# Patient Record
Sex: FEMALE | Race: WHITE | NOT HISPANIC OR LATINO | Employment: FULL TIME | URBAN - METROPOLITAN AREA
[De-identification: names, ages, dates, MRNs, and addresses within clinical notes are randomized per-mention and may not be internally consistent; named-entity substitution may affect disease eponyms.]

---

## 2017-01-16 ENCOUNTER — PRE VISIT (OUTPATIENT)
Dept: NEUROLOGY | Facility: CLINIC | Age: 57
End: 2017-01-16

## 2017-01-16 NOTE — TELEPHONE ENCOUNTER
1.  Date/reason for appt:  1/31/17   Small Vessel Ischemic Disease    2.  Referring provider:  Internal, Dr Wegener - Office Visit 9/30/16    3.  Call to patient (Yes / No - short description):  MRI at [facility]    4.  Previous care at / records requested from:  [facility]

## 2017-01-16 NOTE — TELEPHONE ENCOUNTER
Radiology reports received from [facility], will forward to clinic. Notified film room to pull image.   MRI Cspine on 12/5/98  MRI Lspine on 1/19/04  MR brain on 9/16/16

## 2017-01-25 ASSESSMENT — ENCOUNTER SYMPTOMS
BRUISES/BLEEDS EASILY: 1
DOUBLE VISION: 1
WEAKNESS: 0
PARALYSIS: 0
STIFFNESS: 0
HEADACHES: 0
DIZZINESS: 0
DISTURBANCES IN COORDINATION: 0
JOINT SWELLING: 0
MEMORY LOSS: 0
SEIZURES: 0
MUSCLE CRAMPS: 0
TINGLING: 0
EYE REDNESS: 1
NUMBNESS: 0
SWOLLEN GLANDS: 0
LOSS OF CONSCIOUSNESS: 0
ARTHRALGIAS: 0
TREMORS: 0
NECK PAIN: 1
BACK PAIN: 1
EYE WATERING: 0
MUSCLE WEAKNESS: 0
EYE IRRITATION: 1
EYE PAIN: 0
SPEECH CHANGE: 0
MYALGIAS: 1

## 2017-01-31 ENCOUNTER — OFFICE VISIT (OUTPATIENT)
Dept: NEUROLOGY | Facility: CLINIC | Age: 57
End: 2017-01-31

## 2017-01-31 VITALS — DIASTOLIC BLOOD PRESSURE: 72 MMHG | HEART RATE: 69 BPM | HEIGHT: 66 IN | SYSTOLIC BLOOD PRESSURE: 124 MMHG

## 2017-01-31 DIAGNOSIS — H49.11: Primary | ICD-10-CM

## 2017-01-31 ASSESSMENT — PAIN SCALES - GENERAL: PAINLEVEL: NO PAIN (0)

## 2017-01-31 NOTE — PROGRESS NOTES
[facility]  [address] [place] [address]  Phone: [phone]  Fax: [phone]    Reason for clinic visit:  Hx of Rt 4th nerve palsy; suspected stroke    History of present Illness:   Ms. Boykin is a pleasant 55 yo F who was referred to stroke clinic for suspected small vessel stroke. Per patient, in March/April 2016 she went to optometrist when it was noticed that she has astigmatism. Since then she required multiple changes in her eyeglasses. She started noticing double vision afterwards when she went to Ophthalmologist in 09/16 when she was diagnosed with Rt 4th nerve palsy based on eye exam. Her MRI did not reveal any evidence of acute stroke or 4th nerve inflammation but small white matter disease in Lt frontal lobe. She was referred to stroke clinic for further evaluation.    Per patient, her symptoms have improved slightly since last 09/16. However, she continues to have double vision in Lt lateral gaze which is minimaShe denies any headaches, numbness, weakness, dizziness or gait abnormalities.    Past Medical History:  1. Rt 4th nerve palsy  2. White matter disease  3. Hyperlipidemia    Past Surgical History:  Past Surgical History   Procedure Laterality Date     Herniorrhaphy ventral N/A 7/7/2016     Procedure: HERNIORRHAPHY VENTRAL;  Surgeon: Ash Thompson MD;  [facility]: [facility]     Bunionectomy Right 2016     Social History:  Social History     Social History     Marital Status:      Spouse Name: N/A     Number of Children: N/A     Years of Education: N/A     Occupational History     Not on file.     Social History Main Topics     Smoking status: Never Smoker      Smokeless tobacco: Never Used     Alcohol Use: Not on file     Drug Use: Not on file     Sexual Activity: Not on file     Other Topics Concern     Parent/Sibling W/ Cabg, Mi Or Angioplasty Before 65f 55m? No     Social  "History Narrative     Family History:  Multiple family members on maternal side with strokes (~>70 years) but no family hx of strokes/CAD at young age.    Home Medications:  Current Outpatient Prescriptions   Medication     traZODone (DESYREL) 50 MG tablet     senna-docusate (SENOKOT-S;PERICOLACE) 8.6-50 MG per tablet     HYDROcodone-acetaminophen (NORCO) 5-325 MG per tablet     melatonin 3 MG CAPS     aspirin 81 MG tablet     Calcium Carbonate-Vit D-Min (CALCIUM 1200 PO)     Cholecalciferol (VITAMIN D) 1000 UNITS capsule     diphenhydrAMINE (BENADRYL) 25 MG tablet     Omega-3 Fatty Acids (OMEGA 3 PO)     No current facility-administered medications for this visit.     Allergies:  Allergies   Allergen Reactions     Darvocet [Propoxyphene N-Apap] Other (See Comments)     confusion     Penicillins Itching     Percocet [Oxycodone-Acetaminophen] Other (See Comments)     confusion     Tape [Adhesive Tape]      Once after surgical tape     Physical Examination:  /72 mmHg  Pulse 69  Ht 1.664 m (5' 5.5\")  LMP 09/06/2006  General: Awake, alert, no acute distress  Neurological:  Awake, alert and oriented x3  Cranial Nerves: PERRLA, VFF, EOMI, Facial sensations intact, Face symmetric, hearing normal B/L, palate elevates to midline, shoulder shrug strong B/L, tongue movements intact   Motor: Tone normal. Strenght 5/5 throughout  Sensations: Intact B/L to LT, PP, vibration, temp  Cerebellar signs: FTN/HST intact B/L  Gait: Normal. Romberg's neg. Tandem gait normal  Attention/Concentration: Intact per serial 7s   Memory: 3/3 immediate & delayed recall    National Institutes of Health Stroke Scale     Score    Level of consciousness: (0)   Alert, keenly responsive    LOC questions: (0)   Answers both questions correctly    LOC commands: (0)   Performs both tasks correctly    Best gaze: (0)   Normal    Visual: (0)   No visual loss    Facial palsy: (0)   Normal symmetrical movements    Motor arm (left): (0)   No drift    " Motor arm (right): (0)   No drift    Motor leg (left): (0)   No drift    Motor leg (right): (0)   No drift    Limb ataxia: (0)   Absent    Sensory: (0)   Normal- no sensory loss    Best language: (0)   Normal- no aphasia    Dysarthria: (0)   Normal    Extinction and inattention: (0)   No abnormality        Total Score:  0     mRS 0 - No symptoms.    Imaging:  MRI Brain w/wo contrast - No acute stroke. Small Lt frontal T2 FLAIR hyperintensity.    Impression:  - Hx of Rt 4th nerve palsy   - White matter disease  - Hyperlipidemia    Plan:  - CTA head/neck to look for vascular disease. Will update results over phone.  - Refer to neuro-ophthalmologist Dr. Ann. NO 4th nerve palsy on our evaluation while patient is symptomatic. She may also benefit from vision specific occupational therapy; deferred to Dr. Ann  - Counseled on BP monitoring  - No need of ASA given no evidence of stroke  - White matter disease in Lt frontal is non-specific and appears proportionate to her age  - Hyperlipiedemia: LDL goal <100. Patient adopting lifestyle modification to get LDL at goal. Decision to start statin deferred to PCP.  - RTC as needed    Patient seen and discussed with Dr. Reddy who agrees with plan mentioned above.    Inder Clemente  Stroke fellow    ATTENDING NOTE    Patient was seen and examined with Dr. Clemente . I agree with the note above except as noted. Patient has diplopia on lateral gaze but not at extreme of gaze. No diplopia on vertical testing and also able to perform superior oblique actions with both eyes to our exam. Vascular imaging ordered.

## 2017-01-31 NOTE — NURSING NOTE
Chief Complaint   Patient presents with     Consult     small vessel disease/stroke    Nelly Bang, GINA

## 2017-01-31 NOTE — Clinical Note
1/31/2017       RE: Clara Boykin  [address]   [facility] [address]     Dear Colleague,    Thank you for referring your patient, Clara Boykin, to the [facility]UR[facility] at [facility]. Please see a copy of my visit note below.                                                                         [facility]  [address]  3[address]  [facility] [address]  Phone: [phone]  Fax: [phone]    Reason for clinic visit:  Hx of Rt 4th nerve palsy; suspected stroke    History of present Illness:   Ms. Boykin is a pleasant 55 yo F who was referred to stroke clinic for suspected small vessel stroke. Per patient, in March/April 2016 she went to optometrist when it was noticed that she has astigmatism. Since then she required multiple changes in her eyeglasses. She started noticing double vision afterwards when she went to Ophthalmologist in 09/16 when she was diagnosed with Rt 4th nerve palsy based on eye exam. Her MRI did not reveal any evidence of acute stroke or 4th nerve inflammation but small white matter disease in Lt frontal lobe. She was referred to stroke clinic for further evaluation.    Per patient, her symptoms have improved slightly since last 09/16. However, she continues to have double vision in Lt lateral gaze which is minimaShe denies any headaches, numbness, weakness, dizziness or gait abnormalities.    Past Medical History:  1. Rt 4th nerve palsy  2. White matter disease  3. Hyperlipidemia    Past Surgical History:  Past Surgical History   Procedure Laterality Date     Herniorrhaphy ventral N/A 7/7/2016     Procedure: HERNIORRHAPHY VENTRAL;  Surgeon: Ash Thompson MD;  [facility]     Bunionectomy Right 2016     Social History:  Social History     Social History     Marital Status:      Spouse Name: N/A     Number of Children: N/A     Years of Education: N/A     Occupational History     Not on file.     Social  "History Main Topics     Smoking status: Never Smoker      Smokeless tobacco: Never Used     Alcohol Use: Not on file     Drug Use: Not on file     Sexual Activity: Not on file     Other Topics Concern     Parent/Sibling W/ Cabg, Mi Or Angioplasty Before 65f 55m? No     Social History Narrative     Family History:  Multiple family members on maternal side with strokes (~>70 years) but no family hx of strokes/CAD at young age.    Home Medications:  Current Outpatient Prescriptions   Medication     traZODone (DESYREL) 50 MG tablet     senna-docusate (SENOKOT-S;PERICOLACE) 8.6-50 MG per tablet     HYDROcodone-acetaminophen (NORCO) 5-325 MG per tablet     melatonin 3 MG CAPS     aspirin 81 MG tablet     Calcium Carbonate-Vit D-Min (CALCIUM 1200 PO)     Cholecalciferol (VITAMIN D) 1000 UNITS capsule     diphenhydrAMINE (BENADRYL) 25 MG tablet     Omega-3 Fatty Acids (OMEGA 3 PO)     No current facility-administered medications for this visit.     Allergies:  Allergies   Allergen Reactions     Darvocet [Propoxyphene N-Apap] Other (See Comments)     confusion     Penicillins Itching     Percocet [Oxycodone-Acetaminophen] Other (See Comments)     confusion     Tape [Adhesive Tape]      Once after surgical tape     Physical Examination:  /72 mmHg  Pulse 69  Ht 1.664 m (5' 5.5\")  LMP 09/06/2006  General: Awake, alert, no acute distress  Neurological:  Awake, alert and oriented x3  Cranial Nerves: PERRLA, VFF, EOMI, Facial sensations intact, Face symmetric, hearing normal B/L, palate elevates to midline, shoulder shrug strong B/L, tongue movements intact   Motor: Tone normal. Strenght 5/5 throughout  Sensations: Intact B/L to LT, PP, vibration, temp  Cerebellar signs: FTN/HST intact B/L  Gait: Normal. Romberg's neg. Tandem gait normal  Attention/Concentration: Intact per serial 7s   Memory: 3/3 immediate & delayed recall    National Institutes of Health Stroke Scale     Score    Level of consciousness: (0)   Alert, " keenly responsive    LOC questions: (0)   Answers both questions correctly    LOC commands: (0)   Performs both tasks correctly    Best gaze: (0)   Normal    Visual: (0)   No visual loss    Facial palsy: (0)   Normal symmetrical movements    Motor arm (left): (0)   No drift    Motor arm (right): (0)   No drift    Motor leg (left): (0)   No drift    Motor leg (right): (0)   No drift    Limb ataxia: (0)   Absent    Sensory: (0)   Normal- no sensory loss    Best language: (0)   Normal- no aphasia    Dysarthria: (0)   Normal    Extinction and inattention: (0)   No abnormality        Total Score:  0     mRS 0 - No symptoms.    Imaging:  MRI Brain w/wo contrast - No acute stroke. Small Lt frontal T2 FLAIR hyperintensity.    Impression:  - Hx of Rt 4th nerve palsy   - White matter disease  - Hyperlipidemia    Plan:  - CTA head/neck to look for vascular disease. Will update results over phone.  - Refer to neuro-ophthalmologist Dr. Ann. NO 4th nerve palsy on our evaluation while patient is symptomatic. She may also benefit from vision specific occupational therapy; deferred to Dr. Ann  - Counseled on BP monitoring  - No need of ASA given no evidence of stroke  - White matter disease in Lt frontal is non-specific and appears proportionate to her age  - Hyperlipiedemia: LDL goal <100. Patient adopting lifestyle modification to get LDL at goal. Decision to start statin deferred to PCP.  - RTC as needed    Patient seen and discussed with Dr. Reddy who agrees with plan mentioned above.    Inder Clemente  Stroke fellow    ATTENDING NOTE    Patient was seen and examined with Dr. Clemente . I agree with the note above except as noted. Patient has diplopia on lateral gaze but not at extreme of gaze. No diplopia on vertical testing and also able to perform superior oblique actions with both eyes to our exam. Vascular imaging ordered.      Again, thank you for allowing me to participate in the care of your patient.       Sincerely,    Zion Reddy MD

## 2017-02-17 ENCOUNTER — OFFICE VISIT (OUTPATIENT)
Dept: FAMILY MEDICINE | Facility: CLINIC | Age: 57
End: 2017-02-17
Payer: COMMERCIAL

## 2017-02-17 VITALS
TEMPERATURE: 98.3 F | BODY MASS INDEX: 20.65 KG/M2 | HEART RATE: 67 BPM | RESPIRATION RATE: 22 BRPM | SYSTOLIC BLOOD PRESSURE: 104 MMHG | DIASTOLIC BLOOD PRESSURE: 72 MMHG | WEIGHT: 126 LBS | OXYGEN SATURATION: 97 %

## 2017-02-17 DIAGNOSIS — Z00.00 ROUTINE GENERAL MEDICAL EXAMINATION AT A HEALTH CARE FACILITY: Primary | ICD-10-CM

## 2017-02-17 DIAGNOSIS — Z12.4 SCREENING FOR CERVICAL CANCER: ICD-10-CM

## 2017-02-17 DIAGNOSIS — I67.9 SMALL VESSEL DISEASE, CEREBROVASCULAR: ICD-10-CM

## 2017-02-17 PROCEDURE — 87624 HPV HI-RISK TYP POOLED RSLT: CPT | Performed by: FAMILY MEDICINE

## 2017-02-17 PROCEDURE — G0145 SCR C/V CYTO,THINLAYER,RESCR: HCPCS | Performed by: FAMILY MEDICINE

## 2017-02-17 PROCEDURE — 99396 PREV VISIT EST AGE 40-64: CPT | Performed by: FAMILY MEDICINE

## 2017-02-17 NOTE — PROGRESS NOTES
SUBJECTIVE:     CC: Clara Boykin is an 56 year old woman who presents for preventive health visit.     Healthy Habits:    Do you get at least three servings of calcium containing foods daily (dairy, green leafy vegetables, etc.)? yes    Amount of exercise or daily activities, outside of work: 5 day(s) per week    Problems taking medications regularly No    Medication side effects: No        Do you see a dentist twice per year? yes    Do you have sleep apnea, excessive snoring or daytime drowsiness?no        -------------------------------------    Today's PHQ-2 Score:   PHQ-2 ( 1999 [employer]) 9/30/2016 2/23/2016   Q1: Little interest or pleasure in doing things 0 0   Q2: Feeling down, depressed or hopeless 0 0   PHQ-2 Score 0 0       Abuse: Current or Past(Physical, Sexual or Emotional)- No  Do you feel safe in your environment - Yes    Social History   Substance Use Topics     Smoking status: Never Smoker     Smokeless tobacco: Never Used     Alcohol use Not on file     The patient does not drink >3 drinks per day nor >7 drinks per week.    Recent Labs   Lab Test  10/10/16   0828  09/04/15   0841   CHOL  199  213*   HDL  67  72   LDL  119*  129   TRIG  66  62   CHOLHDLRATIO   --   3.0   NHDL  132*   --        Reviewed orders with patient.  Reviewed health maintenance and updated orders accordingly - Yes    Mammo Decision Support:  Patient over age 50, mutual decision to screen reflected in health maintenance.    Pertinent mammograms are reviewed under the imaging tab.  History of abnormal Pap smear: not in recent past but years ago had colposcopy.   All Histories reviewed and updated in Epic.  No past medical history on file.   Past Surgical History   Procedure Laterality Date     Herniorrhaphy ventral N/A 7/7/2016     Procedure: HERNIORRHAPHY VENTRAL;  Surgeon: Ash Thompson MD;  [facility]: [facility]     Bunionectomy Right 2016       ROS:  C: NEGATIVE for fever, chills, change in weight  I: NEGATIVE for  worrisome rashes, moles or lesions  E: NEGATIVE for vision changes or irritation  ENT: NEGATIVE for ear, mouth and throat problems  R: NEGATIVE for significant cough or SOB  B: NEGATIVE for masses, tenderness or discharge  CV: NEGATIVE for chest pain, palpitations or peripheral edema  GI: NEGATIVE for nausea, abdominal pain, heartburn, or change in bowel habits  : NEGATIVE for unusual urinary or vaginal symptoms. No vaginal bleeding.  M: NEGATIVE for significant arthralgias or myalgia  N: NEGATIVE for weakness, dizziness or paresthesias  P: NEGATIVE for changes in mood or affect     Problem list, Medication list, Allergies, and Medical/Social/Surgical histories reviewed in EPIC and updated as appropriate.  OBJECTIVE:     /72 (BP Location: Left arm, Patient Position: Chair, Cuff Size: Adult Regular)  Pulse 67  Temp 98.3  F (36.8  C) (Oral)  Resp 22  Wt 126 lb (57.2 kg)  LMP 09/06/2006  SpO2 97%  Breastfeeding? No  BMI 20.65 kg/m2  EXAM:  GENERAL: healthy, alert and no distress  ABDOMEN: soft, nontender, no hepatosplenomegaly, no masses and bowel sounds normal  MS: no gross musculoskeletal defects noted, no edema  SKIN: no suspicious lesions or rashes  NEURO: Normal strength and tone, mentation intact and speech normal  PSYCH: mentation appears normal, affect normal/bright    ASSESSMENT/PLAN:     ASSESSMENT AND PLAN  1. Routine general medical examination at a health care facility  Had flu shot already.  Desiring pelvic exam early since friend with ovarian cancer and another with endometrial cancer.  She has had not specific symptoms or vaginal bleeding or abdominal pain.     Discussed pap smear screening/hpv testing for cervical cancer.  Besides exam no other screening recommended for ovarian/endometrial cancer.  Following lipids via her chiropractic clinic and does not want/need today.   2. Screening for cervical cancer    - Pap imaged thin layer screen with HPV - recommended age 30 - 65 years (select HPV  "order below)    3. Small vessel disease, cerebrovascular  Working with the stroke clinic to manage stroke risks.  Possibly has early morning hypertension so is monitoring at home.             COUNSELING:   Reviewed preventive health counseling, as reflected in patient instructions       Regular exercise       Healthy diet/nutrition       Colon cancer screening         reports that she has never smoked. She has never used smokeless tobacco.    Estimated body mass index is 20.65 kg/(m^2) as calculated from the following:    Height as of 1/31/17: 5' 5.5\" (1.664 m).    Weight as of this encounter: 126 lb (57.2 kg).       Counseling Resources:  ATP IV Guidelines  Pooled Cohorts Equation Calculator  Breast Cancer Risk Calculator  FRAX Risk Assessment  ICSI Preventive Guidelines  Dietary Guidelines for Americans, 2010  USDA's MyPlate  ASA Prophylaxis  Lung CA Screening    Joel Daniel Wegener, MD  [facility]  "

## 2017-02-17 NOTE — NURSING NOTE
"Chief Complaint   Patient presents with     Gyn Exam       Initial /72 (BP Location: Left arm, Patient Position: Chair, Cuff Size: Adult Regular)  Pulse 67  Temp 98.3  F (36.8  C) (Oral)  Resp 22  Wt 126 lb (57.2 kg)  LMP 09/06/2006  SpO2 97%  Breastfeeding? No  BMI 20.65 kg/m2 Estimated body mass index is 20.65 kg/(m^2) as calculated from the following:    Height as of 1/31/17: 5' 5.5\" (1.664 m).    Weight as of this encounter: 126 lb (57.2 kg).  Medication Reconciliation: complete   Ciro Moreno CMA   "

## 2017-02-17 NOTE — MR AVS SNAPSHOT
After Visit Summary   2/17/2017    Clara Boykin    MRN: [id number]           Patient Information     Date Of Birth          1960        Visit Information        Provider Department      2/17/2017 7:40 AM Wegener, Joel Daniel Irwin, MD [facility]        Today's Diagnoses     Routine general medical examination at a health care facility    -  1    Screening for cervical cancer        Small vessel disease, cerebrovascular          Care Instructions      Preventive Health Recommendations  Female Ages 50 - 64    Yearly exam: See your health care provider every year in order to  o Review health changes.   o Discuss preventive care.    o Review your medicines if your doctor has prescribed any.      Get a Pap test every three years (unless you have an abnormal result and your provider advises testing more often).    If you get Pap tests with HPV test, you only need to test every 5 years, unless you have an abnormal result.     You do not need a Pap test if your uterus was removed (hysterectomy) and you have not had cancer.    You should be tested each year for STDs (sexually transmitted diseases) if you're at risk.     Have a mammogram every 1 to 2 years.    Have a colonoscopy at age 50, or have a yearly FIT test (stool test). These exams screen for colon cancer.      Have a cholesterol test every 5 years, or more often if advised.    Have a diabetes test (fasting glucose) every three years. If you are at risk for diabetes, you should have this test more often.     If you are at risk for osteoporosis (brittle bone disease), think about having a bone density scan (DEXA).    Shots: Get a flu shot each year. Get a tetanus shot every 10 years.    Nutrition:     Eat at least 5 servings of fruits and vegetables each day.    Eat whole-grain bread, whole-wheat pasta and brown rice instead of white grains and rice.    Talk to your provider about Calcium and Vitamin D.     Lifestyle    Exercise at  least 150 minutes a week (30 minutes a day, 5 days a week). This will help you control your weight and prevent disease.    Limit alcohol to one drink per day.    No smoking.     Wear sunscreen to prevent skin cancer.     See your dentist every six months for an exam and cleaning.    See your eye doctor every 1 to 2 years.          Follow-ups after your visit        Who to contact     If you have questions or need follow up information about today's clinic visit or your schedule please contact [facility] directly at [phone].  Normal or non-critical lab and imaging results will be communicated to you by Zkatterhart, letter or phone within 4 business days after the clinic has received the results. If you do not hear from us within 7 days, please contact the clinic through Velteo or phone. If you have a critical or abnormal lab result, we will notify you by phone as soon as possible.  Submit refill requests through Velteo or call your pharmacy and they will forward the refill request to us. Please allow 3 business days for your refill to be completed.          Additional Information About Your Visit        ZkatterharMicrosaic Information     Velteo gives you secure access to your electronic health record. If you see a primary care provider, you can also send messages to your care team and make appointments. If you have questions, please call your primary care clinic.  If you do not have a primary care provider, please call [phone] and they will assist you.        Care EveryWhere ID     This is your Care EveryWhere ID. This could be used by other organizations to access your [facility] medical records  [id number]        Your Vitals Were     Pulse Temperature Respirations Last Period Pulse Oximetry Breastfeeding?    67 98.3  F (36.8  C) (Oral) 22 09/06/2006 97% No    BMI (Body Mass Index)                   20.65 kg/m2            Blood Pressure from Last 3 Encounters:   02/17/17 104/72   01/31/17 124/72    09/30/16 130/80    Weight from Last 3 Encounters:   02/17/17 126 lb (57.2 kg)   09/30/16 128 lb (58.1 kg)   07/19/16 125 lb (56.7 kg)              We Performed the Following     Pap imaged thin layer screen with HPV - recommended age 30 - 65 years (select HPV order below)        Primary Care Provider Office Phone # Fax #    Joel Daniel Irwin Wegener, [phone]       [facility] [address]  [place] [address]        Thank you!     Thank you for choosing [facility]  for your care. Our goal is always to provide you with excellent care. Hearing back from our patients is one way we can continue to improve our services. Please take a few minutes to complete the written survey that you may receive in the mail after your visit with us. Thank you!             Your Updated Medication List - Protect others around you: Learn how to safely use, store and throw away your medicines at www.disposemymeds.org.          This list is accurate as of: 2/17/17  9:50 AM.  Always use your most recent med list.                   Brand Name Dispense Instructions for use    aspirin 81 MG tablet      Take 81 mg by mouth as needed       BENADRYL 25 MG tablet   Generic drug:  diphenhydrAMINE      Take 25 mg by mouth nightly as needed Patient takes half tabs       CALCIUM 1200 PO          melatonin 3 MG Caps      Take 2 capsules by mouth At Bedtime       OMEGA 3 PO      Take  by mouth.       traZODone 50 MG tablet    DESYREL    30 tablet    1/4 to 1/2 pill nightly as needed.       vitamin D 1000 UNITS capsule      Take 1 capsule by mouth daily

## 2017-02-21 LAB
COPATH REPORT: NORMAL
PAP: NORMAL

## 2017-02-22 LAB
FINAL DIAGNOSIS: NORMAL
HPV HR 12 DNA CVX QL NAA+PROBE: NEGATIVE
HPV16 DNA SPEC QL NAA+PROBE: NEGATIVE
HPV18 DNA SPEC QL NAA+PROBE: NEGATIVE
SPECIMEN DESCRIPTION: NORMAL

## 2017-03-09 ENCOUNTER — OFFICE VISIT (OUTPATIENT)
Dept: OPHTHALMOLOGY | Facility: CLINIC | Age: 57
End: 2017-03-09
Attending: OPHTHALMOLOGY
Payer: COMMERCIAL

## 2017-03-09 DIAGNOSIS — H49.11 PARALYTIC STRABISMUS, FOURTH OR TROCHLEAR NERVE PALSY, RIGHT: Primary | ICD-10-CM

## 2017-03-09 PROCEDURE — 92060 SENSORIMOTOR EXAMINATION: CPT | Mod: ZF | Performed by: OPHTHALMOLOGY

## 2017-03-09 ASSESSMENT — EXTERNAL EXAM - RIGHT EYE: OD_EXAM: NORMAL

## 2017-03-09 ASSESSMENT — CUP TO DISC RATIO
OD_RATIO: 0.1
OS_RATIO: 0.1

## 2017-03-09 ASSESSMENT — REFRACTION_WEARINGRX
OS_CYLINDER: +0.50
OD_SPHERE: +1.75
OD_AXIS: 175
OS_AXIS: 030
OD_CYLINDER: +1.00
OD_VPRISM: 3 BD
OS_VPRISM: 3 BU
OS_SPHERE: +2.00

## 2017-03-09 ASSESSMENT — VISUAL ACUITY
OS_SC+: -3
OD_SC: 20/20
OS_SC: 20/20
METHOD: SNELLEN - LINEAR

## 2017-03-09 ASSESSMENT — SLIT LAMP EXAM - LIDS
COMMENTS: NORMAL
COMMENTS: NORMAL

## 2017-03-09 ASSESSMENT — TONOMETRY
OS_IOP_MMHG: 21
IOP_METHOD: TONOPEN
OD_IOP_MMHG: 19

## 2017-03-09 ASSESSMENT — CONF VISUAL FIELD
METHOD: COUNTING FINGERS
OS_NORMAL: 1
OD_NORMAL: 1

## 2017-03-09 ASSESSMENT — EXTERNAL EXAM - LEFT EYE: OS_EXAM: NORMAL

## 2017-03-09 NOTE — MR AVS SNAPSHOT
After Visit Summary   3/9/2017    Clara Boykin    MRN: [id number]           Patient Information     Date Of Birth          1960        Visit Information        Provider Department      3/9/2017 1:00 PM Emeterio Ann MD [facility] [place] Peds Eye General        Today's Diagnoses     Paralytic strabismus, fourth or trochlear nerve palsy, right    -  1       Follow-ups after your visit        Follow-up notes from your care team     Return for adult strabismus - jenn.      Who to contact     Please call your clinic at [phone] to:    Ask questions about your health    Make or cancel appointments    Discuss your medicines    Learn about your test results    Speak to your doctor   If you have compliments or concerns about an experience at your clinic, or if you wish to file a complaint, please contact [facility] Patient Relations at [phone] or email us at [email or web address]         Additional Information About Your Visit        MyChart Information     [employer] gives you secure access to your electronic health record. If you see a primary care provider, you can also send messages to your care team and make appointments. If you have questions, please call your primary care clinic.  If you do not have a primary care provider, please call [phone] and they will assist you.      [employer] is an electronic gateway that provides easy, online access to your medical records. With [employer], you can request a clinic appointment, read your test results, renew a prescription or communicate with your care team.     To access your existing account, please contact your [facility] or call [phone] for assistance.        Care EveryWhere ID     This is your Care EveryWhere ID. This could be used by other organizations to access your [place] medical records  [id number]        Your Vitals Were     Last Period                    09/06/2006            Blood Pressure from Last 3 Encounters:   02/17/17 104/72   01/31/17 124/72   09/30/16 130/80    Weight from Last 3 Encounters:   02/17/17 57.2 kg (126 lb)   09/30/16 58.1 kg (128 lb)   07/19/16 56.7 kg (125 lb)              We Performed the Following     Sensorimotor        Primary Care Provider Office Phone # Fax #    Joel Daniel Irwin Wegener, [phone]       [facility] [address] [facility] [address]        Thank you!     Thank you for choosing [facility]  for your care. Our goal is always to provide you with excellent care. Hearing back from our patients is one way we can continue to improve our services. Please take a few minutes to complete the written survey that you may receive in the mail after your visit with us. Thank you!             Your Updated Medication List - Protect others around you: Learn how to safely use, store and throw away your medicines at www.disposemymeds.org.          This list is accurate as of: 3/9/17  3:11 PM.  Always use your most recent med list.                   Brand Name Dispense Instructions for use    aspirin 81 MG tablet      Take 81 mg by mouth as needed       BENADRYL 25 MG tablet   Generic drug:  diphenhydrAMINE      Take 25 mg by mouth nightly as needed Patient takes half tabs       CALCIUM 1200 PO          melatonin 3 MG Caps      Take 2 capsules by mouth At Bedtime       OMEGA 3 PO      Take  by mouth.       traZODone 50 MG tablet    DESYREL    30 tablet    1/4 to 1/2 pill nightly as needed.       vitamin D 1000 UNITS capsule      Take 1 capsule by mouth daily

## 2017-03-09 NOTE — Clinical Note
3/9/2017      RE: Clara Boykin  [address]   [place] [address]              Assessment & Plan     Clara Boykin is a 56 year old female with the following diagnoses:   1. Paralytic strabismus, fourth or trochlear nerve palsy, right       She presents today for evaluation of longstanding double vision. She describes difficulty with leveling things when she is working on her computer. She had a brain MRI and CT angiogram, which I reviewed the images for and both were unremarkable.     Her examination today is remarkable for right hypertropia worse in the left gaze and right head tilt mapping a right fourth nerve palsy.   She has large vertical fusional amplitudes and this is most consistent with a congenital or longstanding 4th nerve palsy.       Today she has a pair of glasses for computer. She likes 8 PD BU in the left eye.     We discussed strabismus surgery, prism, and patching. She would like to pursue strabismus surgery.           Attending Physician Attestation:  I have seen and examined this patient.  I have confirmed and edited as necessary the chief complaint(s), history of present illness, review of systems, relevant history, and examination findings as documented by others.  I have personally reviewed the relevant tests, images, and reports as documented above.  I have confirmed and edited as necessary the assessment and plan and agree with this note.  - Emeterio Ann MD 3:44 PM 3/9/2017      Emeterio Ann MD

## 2017-03-09 NOTE — PROGRESS NOTES
Assessment & Plan     Clara Boykin is a 56 year old female with the following diagnoses:   1. Paralytic strabismus, fourth or trochlear nerve palsy, right       She presents today for evaluation of longstanding double vision. She describes difficulty with leveling things when she is working on her computer. She had a brain MRI and CT angiogram, which I reviewed the images for and both were unremarkable.     Her examination today is remarkable for right hypertropia worse in the left gaze and right head tilt mapping a right fourth nerve palsy.   She has large vertical fusional amplitudes and this is most consistent with a congenital or longstanding 4th nerve palsy.       Today she has a pair of glasses for computer. She likes 8 PD BU in the left eye.     We discussed strabismus surgery, prism, and patching. She would like to pursue strabismus surgery.           Attending Physician Attestation:  I have seen and examined this patient.  I have confirmed and edited as necessary the chief complaint(s), history of present illness, review of systems, relevant history, and examination findings as documented by others.  I have personally reviewed the relevant tests, images, and reports as documented above.  I have confirmed and edited as necessary the assessment and plan and agree with this note.  - Emeterio Ann MD 3:44 PM 3/9/2017

## 2017-03-09 NOTE — LETTER
3/9/2017         RE:  :  MRN: Clara Boykin  1960  [id number]     Dear Dr. Reddy,    Thank you for asking me to see your very pleasant patient, Clara Boykin, in neuro-ophthalmic consultation.  I would like to thank you for sending your records and I have summarized them in the history of present illness.  My assessment and plan are below.  For further details, please see my attached clinic note.          Assessment & Plan     Clara Boykin is a 56 year old female with the following diagnoses:   1. Paralytic strabismus, fourth or trochlear nerve palsy, right       She presents today for evaluation of longstanding double vision. She describes difficulty with leveling things when she is working on her computer. She had a brain MRI and CT angiogram, which I reviewed the images for and both were unremarkable.     Her examination today is remarkable for right hypertropia worse in the left gaze and right head tilt mapping a right fourth nerve palsy.   She has large vertical fusional amplitudes and this is most consistent with a congenital or longstanding 4th nerve palsy.       Today she has a pair of glasses for computer. She likes 8 PD BU in the left eye.     We discussed strabismus surgery, prism, and patching. She would like to pursue strabismus surgery.      Again, thank you for allowing me to participate in the care of your patient.      Sincerely,    Emeterio Ann MD  Professor, Neuro-Ophthalmology  Department of Ophthalmology and Visual Neurosciences  [facility]    CC: Zion Reddy MD   Physicians  [address] [facility] [id number]  [facility] [address]  VIA In Basket     Joel Daniel Irwin Wegener, MD  [facility]  [address] [facility] [address]  VIA In Basket     [facility]  [address] [facility] [address]  VIA Facsimile: [phone]     Eloise Vigil RN  VIA In Basket       DX = congenital 4th nerve palsy

## 2017-03-09 NOTE — NURSING NOTE
Chief Complaint   Patient presents with     Diplopia Evaluation     diplopia at near with readers/computer glasses since 2014. Tried prisms, but still not correct. Has changed glasss about 4 times. Starting to notice some issues at dist now, but doesn't want to wear glasses full time.      HPI    Affected eye(s):  Both   Symptoms:     Double vision      Frequency:  Constant       Do you have eye pain now?:  No      Comments:  Pt states has prism in gls but feels that are not helping. Pt states easier to read with one eye closed. Computer distance is the hardest for her. Pt has had CT recently and MRI back in September.     Liliana Tracy COT 1:00 PM March 9, 2017

## 2017-03-09 NOTE — NURSING NOTE
Chief Complaints and History of Present Illnesses   Patient presents with     Diplopia Evaluation     HPI    Affected eye(s):  Both   Symptoms:     Double vision      Frequency:  Constant       Do you have eye pain now?:  No      Comments:  Pt states has prism in gls but feels that are not helping. Pt states easier to read with one eye closed. Computer distance is the hardest for her. Pt has had CT recently and MRI back in September.     Liliana Tracy COT 1:00 PM March 9, 2017

## 2017-03-14 ENCOUNTER — OFFICE VISIT (OUTPATIENT)
Dept: OPHTHALMOLOGY | Facility: CLINIC | Age: 57
End: 2017-03-14
Attending: OPHTHALMOLOGY
Payer: COMMERCIAL

## 2017-03-14 DIAGNOSIS — H50.21 HYPERTROPIA OF RIGHT EYE: Primary | ICD-10-CM

## 2017-03-14 PROCEDURE — 99213 OFFICE O/P EST LOW 20 MIN: CPT | Mod: ZF | Performed by: TECHNICIAN/TECHNOLOGIST

## 2017-03-14 ASSESSMENT — REFRACTION_WEARINGRX
OS_AXIS: 030
OD_CYLINDER: +0.75
SPECS_TYPE: COMPUTER GLS
OD_SPHERE: +1.75
OD_VPRISM: 3.0
OD_VBASE: DOWN
OD_CYLINDER: +1.00
OD_AXIS: 005
OD_VPRISM: 3.0
OD_AXIS: 175
OS_CYLINDER: +0.50
OS_AXIS: 025
OD_SPHERE: +1.50
OS_CYLINDER: +0.25
OS_SPHERE: +2.25
OD_VBASE: DOWN
OS_SPHERE: +2.00
OS_VBASE: UP
OS_VPRISM: 3.0

## 2017-03-14 ASSESSMENT — EXTERNAL EXAM - RIGHT EYE: OD_EXAM: NORMAL

## 2017-03-14 ASSESSMENT — CUP TO DISC RATIO
OS_RATIO: 0.1
OD_RATIO: 0.1

## 2017-03-14 ASSESSMENT — VISUAL ACUITY
OS_SC+: -1
METHOD: SNELLEN - LINEAR
OS_SC: 20/20
CORRECTION_TYPE: GLASSES
OD_SC+: +2
OD_SC: 20/25

## 2017-03-14 ASSESSMENT — TONOMETRY
OS_IOP_MMHG: 19
IOP_METHOD: ICARE
OD_IOP_MMHG: 20

## 2017-03-14 ASSESSMENT — SLIT LAMP EXAM - LIDS
COMMENTS: NORMAL
COMMENTS: NORMAL

## 2017-03-14 ASSESSMENT — CONF VISUAL FIELD
OS_NORMAL: 1
OD_NORMAL: 1

## 2017-03-14 ASSESSMENT — EXTERNAL EXAM - LEFT EYE: OS_EXAM: NORMAL

## 2017-03-14 NOTE — PROGRESS NOTES
ASSESSMENT AND PLAN:         1. Right congenital cranial nerve 4 palsy:    - binocular blurring / diplopia symptoms present for at least 3 years  - MRI brain performed in the past for diplopia was negative by patient's report  - patient has 6 pairs of prism glasses and is not completely happy with having to wear prism glasses - does not wear distance glasses most of the time.    - We discussed in detail the risks, benefits, and alternatives of eye muscle correction surgery including the very rare risk of death or serious morbidity from a general anesthesia complication and the rare risk of severe vision loss in the operative eye(s) secondary to retinal detachment or endophthalmitis.  We discussed more likely sub-optimal outcomes including the unanticipated need for additional strabismus surgery.  Finally the patient was aware that prisms glasses may be required to optimize single binocular vision following surgery.    After a thorough discussion of these risks, the patient decided to proceed with strabismus surgery.  The surgical plan is as follows:     - Right inferior oblique myectomy     Follow-up 1 week after surgery  Patient has prism free computer glasses for reading after surgery         Complete documentation of historical and exam elements from today's encounter can be found in the full encounter summary report (not reduplicated in this progress note).  I personally obtained the chief complaint(s) and history of present illness.  I confirmed and edited as necessary the review of systems, past medical/surgical history, family history, social history, and examination findings as documented by others; and I examined the patient myself.  I personally reviewed the relevant tests, images, and reports as documented above.  I formulated and edited as necessary the assessment and plan and discussed the findings and management plan with the patient and family   Lyle Leggett MD  3:38  PM  3/14/2017

## 2017-03-14 NOTE — NURSING NOTE
Chief Complaints and History of Present Illnesses   Patient presents with     Consult For     strabismus sx     HPI    Affected eye(s):  Both   Symptoms:        Frequency:  Constant       Do you have eye pain now?:  No      Comments:  Since 2014 she has had prism in her gls.  Feels that the prism is not working   Father had strab sx around the age 41.   Only getting diplopia when laying on her right side and looking to the left  Feels that her eyes are tiring faster than before  Laura Ramos COT 1:40 PM March 14, 2017

## 2017-03-14 NOTE — MR AVS SNAPSHOT
After Visit Summary   3/14/2017    Clara Boykin    MRN: [id number]           Patient Information     Date Of Birth          1960        Visit Information        Provider Department      3/14/2017 1:30 PM Lyle Leggett MD [facility] Peds Eye General        Today's Diagnoses     Hypertropia of right eye    -  1       Follow-ups after your visit        Your next 10 appointments already scheduled     Apr 18, 2017 10:00 AM CDT   Post-Op with Lyle Leggett MD   [facility] Peds Eye General ([facility])    [address]  [address] [id number] [phone]              Who to contact     Please call your clinic at [phone] to:    Ask questions about your health    Make or cancel appointments    Discuss your medicines    Learn about your test results    Speak to your doctor   If you have compliments or concerns about an experience at your clinic, or if you wish to file a complaint, please contact [facility] Patient Relations at [phone] or email us at [email or web address]         Additional Information About Your Visit        MyChart Information     [employer] gives you secure access to your electronic health record. If you see a primary care provider, you can also send messages to your care team and make appointments. If you have questions, please call your primary care clinic.  If you do not have a primary care provider, please call [phone] and they will assist you.      [employer] is an electronic gateway that provides easy, online access to your medical records. With [employer], you can request a clinic appointment, read your test results, renew a prescription or communicate with your care team.     To access your existing account, please contact your [facility] or call [phone] for assistance.        Care EveryWhere ID     This is your Care EveryWhere ID. This could be used  by other organizations to access your [place] medical records  [id number]        Your Vitals Were     Last Period                   09/06/2006            Blood Pressure from Last 3 Encounters:   02/17/17 104/72   01/31/17 124/72   09/30/16 130/80    Weight from Last 3 Encounters:   02/17/17 57.2 kg (126 lb)   09/30/16 58.1 kg (128 lb)   07/19/16 56.7 kg (125 lb)              We Performed the Following     Rosalinda-Operative Worksheet (Peds)     Sensorimotor        Primary Care Provider Office Phone # Fax #    Joel Daniel Irwin Wegener, [phone]       [place] [address] [facility] [address]        Thank you!     Thank you for choosing [facility]  for your care. Our goal is always to provide you with excellent care. Hearing back from our patients is one way we can continue to improve our services. Please take a few minutes to complete the written survey that you may receive in the mail after your visit with us. Thank you!             Your Updated Medication List - Protect others around you: Learn how to safely use, store and throw away your medicines at www.disposemymeds.org.          This list is accurate as of: 3/14/17  3:20 PM.  Always use your most recent med list.                   Brand Name Dispense Instructions for use    aspirin 81 MG tablet      Take 81 mg by mouth as needed       BENADRYL 25 MG tablet   Generic drug:  diphenhydrAMINE      Take 25 mg by mouth nightly as needed Patient takes half tabs       CALCIUM 1200 PO          melatonin 3 MG Caps      Take 2 capsules by mouth At Bedtime       OMEGA 3 PO      Take  by mouth.       traZODone 50 MG tablet    DESYREL    30 tablet    1/4 to 1/2 pill nightly as needed.       vitamin D 1000 UNITS capsule      Take 1 capsule by mouth daily

## 2017-03-14 NOTE — LETTER
2017    RE: Clara Boykin  : 1960  MRN: [id number]    Dear Dr. Ann,    Thank you for referring your patient, Clara Boykin, to my neuro-ophthalmology clinic recently.  After a thorough neuro-ophthalmic history and examination, I came to the following conclusions:         1. Right congenital cranial nerve 4 palsy:    - binocular blurring / diplopia symptoms present for at least 3 years  - MRI brain performed in the past for diplopia was negative by patient's report  - patient has 6 pairs of prism glasses and is not completely happy with having to wear prism glasses - does not wear distance glasses most of the time.    - We discussed in detail the risks, benefits, and alternatives of eye muscle correction surgery including the very rare risk of death or serious morbidity from a general anesthesia complication and the rare risk of severe vision loss in the operative eye(s) secondary to retinal detachment or endophthalmitis.  We discussed more likely sub-optimal outcomes including the unanticipated need for additional strabismus surgery.  Finally the patient was aware that prisms glasses may be required to optimize single binocular vision following surgery.    After a thorough discussion of these risks, the patient decided to proceed with strabismus surgery.  The surgical plan is as follows:     - Right inferior oblique myectomy     Follow-up 1 week after surgery  Patient has prism free computer glasses for reading after surgery        Again, thank you for trusting me with the care of your patient.  For further exam details, please feel free to contact our office for additional records.  If you wish to contact me regarding this patient please email me at [email or web address] or give my clinic a call to arrange a phone conversation.    Sincerely,    Lyle Leggett MD  , Neuro-Ophthalmology and Adult Strabismus  Department of Ophthalmology and Visual Neurosciences  [facility]  [place]    DX: right congenital cranial nerve 4 palsy

## 2017-03-30 ENCOUNTER — MYC MEDICAL ADVICE (OUTPATIENT)
Dept: FAMILY MEDICINE | Facility: CLINIC | Age: 57
End: 2017-03-30

## 2017-03-30 DIAGNOSIS — J20.9 ACUTE BRONCHITIS WITH SYMPTOMS > 10 DAYS: Primary | ICD-10-CM

## 2017-03-30 RX ORDER — ALBUTEROL SULFATE 90 UG/1
2 AEROSOL, METERED RESPIRATORY (INHALATION) EVERY 6 HOURS PRN
Qty: 1 INHALER | Refills: 0 | Status: SHIPPED | OUTPATIENT
Start: 2017-03-30 | End: 2018-02-19

## 2017-03-31 ENCOUNTER — OFFICE VISIT (OUTPATIENT)
Dept: FAMILY MEDICINE | Facility: CLINIC | Age: 57
End: 2017-03-31
Payer: COMMERCIAL

## 2017-03-31 VITALS
BODY MASS INDEX: 19.99 KG/M2 | WEIGHT: 122 LBS | TEMPERATURE: 98.5 F | OXYGEN SATURATION: 96 % | HEART RATE: 76 BPM | DIASTOLIC BLOOD PRESSURE: 62 MMHG | SYSTOLIC BLOOD PRESSURE: 100 MMHG

## 2017-03-31 DIAGNOSIS — H50.9 STRABISMUS: ICD-10-CM

## 2017-03-31 DIAGNOSIS — Z01.818 PREOP GENERAL PHYSICAL EXAM: Primary | ICD-10-CM

## 2017-03-31 DIAGNOSIS — J20.9 ACUTE BRONCHITIS WITH SYMPTOMS > 10 DAYS: ICD-10-CM

## 2017-03-31 PROCEDURE — 99214 OFFICE O/P EST MOD 30 MIN: CPT | Performed by: FAMILY MEDICINE

## 2017-03-31 RX ORDER — AZITHROMYCIN 250 MG/1
TABLET, FILM COATED ORAL
Qty: 6 TABLET | Refills: 0 | Status: SHIPPED | OUTPATIENT
Start: 2017-03-31 | End: 2017-04-07

## 2017-03-31 RX ORDER — GUAIFENESIN/DEXTROMETHORPHAN 100-10MG/5
5 SYRUP ORAL EVERY 4 HOURS PRN
Qty: 560 ML | Refills: 0 | Status: SHIPPED | OUTPATIENT
Start: 2017-03-31 | End: 2017-04-07

## 2017-03-31 RX ORDER — PREDNISONE 20 MG/1
40 TABLET ORAL DAILY
Qty: 10 TABLET | Refills: 0 | Status: SHIPPED | OUTPATIENT
Start: 2017-03-31 | End: 2017-04-05

## 2017-03-31 NOTE — PROGRESS NOTES
[facility]  [address] [place] [id number] [phone]  Dept: [phone]    PRE-OP EVALUATION:  Today's date: 3/31/2017    Clara Boykin (: 1960) presents for pre-operative evaluation assessment as requested by Dr. Leggett, Lyle Macdonald MD.  She requires evaluation and anesthesia risk assessment prior to undergoing surgery/procedure for treatment of RECESSION RESECTION (REPAIR STRABISMUS)  .  Proposed procedure: strabismus repair    Date of Surgery/ Procedure: 4/10/2017  Time of Surgery/ Procedure: 10:45am  Hospital/Surgical Facility: [facility]  Fax number for surgical facility: [phone]  Primary Physician: Wegener, Joel Daniel Irwin  Type of Anesthesia Anticipated: General    Patient has a Health Care Directive or Living Will:  NO    Preop Questions 3/30/2017   1.  Do you have a history of heart attack, stroke, stent, bypass or surgery on an artery in the head, neck, heart or legs? No   2.  Do you ever have any pain or discomfort in your chest? No   3.  Do you have a history of  Heart Failure? No   4.   Are you troubled by shortness of breath when:  walking on a level surface, or up a slight hill, or at night? No   5.  Do you currently have a cold, bronchitis or other respiratory infection? No   6.  Do you have a cough, shortness of breath, or wheezing? YES - pt have a cough/   7.  Do you sometimes get pains in the calves of your legs when you walk? No   8. Do you or anyone in your family have previous history of blood clots? No   9.  Do you or does anyone in your family have a serious bleeding problem such as prolonged bleeding following surgeries or cuts? No   10. Have you ever had problems with anemia or been told to take iron pills? No   11. Have you had any abnormal blood loss such as black, tarry or bloody stools, or abnormal vaginal bleeding? No   12. Have you ever had a blood transfusion? YES -  premature    13. Have you or any of your relatives ever had problems with anesthesia? YES - nausea and vomitting   14. Do you have sleep apnea, excessive snoring or daytime drowsiness? No   15. Do you have any prosthetic heart valves? No   16. Do you have prosthetic joints? No   17. Is there any chance that you may be pregnant? No         HPI:                                                      Brief HPI related to upcoming procedure: overall doing well.  Back to running, feeling healthy except developed chest cold/bronchitis about 10 days ago now.  Wheezing in chest, severe cough.  Chills but no fevers.  Did start albuterol yesterday, not sure if helped yet.           MEDICAL HISTORY:                                                      Patient Active Problem List    Diagnosis Date Noted     Right foot pain 2016     Priority: Medium     Small vessel disease, cerebrovascular 2016     Priority: Medium     History of anesthesia reaction 2016     Priority: Medium     CARDIOVASCULAR SCREENING; LDL GOAL LESS THAN 160 2015     Priority: Medium     Insomnia 2015     Priority: Medium     Skin exam, screening for cancer 2013     Priority: Medium     Pain in joint, upper arm 2013     iamJOINT PAIN-PELVIS 2005      Past Medical History:   Diagnosis Date     Diplopia      H/O CT scan      H/O magnetic resonance imaging      Paralytic strabismus      Past Surgical History:   Procedure Laterality Date     BUNIONECTOMY Right 2016     HERNIORRHAPHY VENTRAL N/A 2016    Procedure: HERNIORRHAPHY VENTRAL;  Surgeon: Ash Thompson MD;  Location: [facility] OR     Current Outpatient Prescriptions   Medication Sig Dispense Refill     albuterol (PROAIR HFA/PROVENTIL HFA/VENTOLIN HFA) 108 (90 BASE) MCG/ACT Inhaler Inhale 2 puffs into the lungs every 6 hours as needed for shortness of breath / dyspnea or wheezing 1 Inhaler 0     traZODone (DESYREL) 50 MG tablet 1/4 to 1/2 pill  nightly as needed. 30 tablet 3     melatonin 3 MG CAPS Take 2 capsules by mouth At Bedtime        Calcium Carbonate-Vit D-Min (CALCIUM 1200 PO)        Cholecalciferol (VITAMIN D) 1000 UNITS capsule Take 1 capsule by mouth daily       aspirin 81 MG tablet Take 81 mg by mouth as needed Reported on 3/31/2017       diphenhydrAMINE (BENADRYL) 25 MG tablet Take 25 mg by mouth nightly as needed Reported on 3/31/2017       Omega-3 Fatty Acids (OMEGA 3 PO) Reported on 3/31/2017       OTC products: None, except as noted above    Allergies   Allergen Reactions     Darvocet [Propoxyphene N-Apap] Other (See Comments)     confusion     Penicillins Itching     Percocet [Oxycodone-Acetaminophen] Other (See Comments)     confusion     Tape [Adhesive Tape]      Once after surgical tape      Latex Allergy: NO    Social History   Substance Use Topics     Smoking status: Never Smoker     Smokeless tobacco: Never Used     Alcohol use Not on file     History   Drug Use No       REVIEW OF SYSTEMS:                                                    Constitutional, neuro, ENT, endocrine, pulmonary, cardiac, gastrointestinal, genitourinary, musculoskeletal, integument and psychiatric systems are negative, except as otherwise noted.    EXAM:                                                    /62 (BP Location: Left arm, Patient Position: Chair, Cuff Size: Adult Regular)  Pulse 76  Temp 98.5  F (36.9  C) (Oral)  Wt 122 lb (55.3 kg)  LMP 09/06/2006  SpO2 96%  BMI 19.99 kg/m2    GENERAL APPEARANCE: healthy, alert and no distress     EYES: EOMI, PERRL     HENT: ear canals and TM's normal and nose and mouth without ulcers or lesions     NECK: no adenopathy, no asymmetry, masses, or scars and thyroid normal to palpation     RESP: ronchi, wheeze with forced expiration.  No crackles, good air movement.      CV: regular rates and rhythm, normal S1 S2, no S3 or S4 and no murmur, click or rub     ABDOMEN:  soft, nontender, no HSM or masses and  bowel sounds normal     MS: extremities normal- no gross deformities noted, no evidence of inflammation in joints, FROM in all extremities.     NEURO: Normal strength and tone, sensory exam grossly normal, mentation intact and speech normal     PSYCH: mentation appears normal. and affect normal/bright     LYMPHATICS: No axillary, cervical, or supraclavicular nodes    DIAGNOSTICS:                                                    EKG: Not indicated due to non-vascular surgery and low risk of event (age <65 and without cardiac risk factors)    Recent Labs   Lab Test  06/28/16   0921   HGB  13.5   PLT  271   NA  142   POTASSIUM  4.0   CR  0.71        IMPRESSION:                                                    Reason for surgery/procedure: strabismus  Diagnosis/reason for consult: pre op clearance    The proposed surgical procedure is considered LOW risk.    REVISED CARDIAC RISK INDEX  The patient has the following serious cardiovascular risks for perioperative complications such as (MI, PE, VFib and 3  AV Block):  No serious cardiac risks  INTERPRETATION: 0 risks: Class I (very low risk - 0.4% complication rate)    The patient has the following additional risks for perioperative complications:  No identified additional risks    No diagnosis found.    RECOMMENDATIONS:                                                      ASSESSMENT AND PLAN  1. Preop general physical exam  Cleared for surgery with general anesthesia. Already stopped aspirin.  Skip any other meds/supplements the morning of the procedure. Discussed doing EKG given h/o small vessel disease but given exercise tolerance and previous normal ekg decided not needed.     2. 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     3. Acute bronchitis with symptoms > 10 days  Given surgery coming up start prednisone, azithromycin now.  Continue albuterol 2 puffs four times daily.   Cough syrup given to use as well.     If not feeling back to  normal will let me know 1-2 days prior to the procedure.     - predniSONE (DELTASONE) 20 MG tablet; Take 2 tablets (40 mg) by mouth daily for 5 days  Dispense: 10 tablet; Refill: 0  - azithromycin (ZITHROMAX) 250 MG tablet; Two tablets first day, then one tablet daily for four days.  Dispense: 6 tablet; Refill: 0  - guaiFENesin-dextromethorphan (ROBITUSSIN DM) 100-10 MG/5ML syrup; Take 5 mLs by mouth every 4 hours as needed for cough  Dispense: 560 mL; Refill: 0         Signed Electronically by: Joel Daniel Wegener, MD    Copy of this evaluation report is provided to requesting physician.    [facility] Preop Guidelines

## 2017-03-31 NOTE — MR AVS SNAPSHOT
After Visit Summary   3/31/2017    Clara Boykin    MRN: [id number]           Patient Information     Date Of Birth          1960        Visit Information        Provider Department      3/31/2017 8:00 AM Wegener, Joel Daniel Irwin, MD [facility]        Today's Diagnoses     Preop general physical exam    -  1    Strabismus        Acute bronchitis with symptoms > 10 days          Care Instructions    ASSESSMENT AND PLAN  1. Preop general physical exam  Cleared for surgery with general anesthesia. Already stopped aspirin.  Skip any other meds/supplements the morning of the procedure.     2. 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     3. Acute bronchitis with symptoms > 10 days  Given surgery coming up start prednisone, azithromycin now.  Continue albuterol 2 puffs four times daily.   Cough syrup given to use as well.     - predniSONE (DELTASONE) 20 MG tablet; Take 2 tablets (40 mg) by mouth daily for 5 days  Dispense: 10 tablet; Refill: 0  - azithromycin (ZITHROMAX) 250 MG tablet; Two tablets first day, then one tablet daily for four days.  Dispense: 6 tablet; Refill: 0  - guaiFENesin-dextromethorphan (ROBITUSSIN DM) 100-10 MG/5ML syrup; Take 5 mLs by mouth every 4 hours as needed for cough  Dispense: 560 mL; Refill: 0        MYCHART SIGNUP FOR E-VISITS AND EASIER COMMUNICATION:  [email or web address]     Zipnosis:  [email or web address].  Sign up for e-visits for common illnesses!     RADIOLOGY:   [facility]:  [phone] to schedule any radiology tests at [facility]: [phone]    Mammograms/colonoscopies:  [phone]    CONSUMER PRICE LINE for estimates of test costs:  [phone]       Before Your Surgery      Call your surgeon if there is any change in your health. This includes signs of a cold or flu (such as a sore throat, runny nose, cough, rash or fever).    Do not smoke, drink alcohol or take over the  counter medicine (unless your surgeon or primary care doctor tells you to) for the 24 hours before and after surgery.    If you take prescribed drugs: Follow your doctor s orders about which medicines to take and which to stop until after surgery.    Eating and drinking prior to surgery: follow the instructions from your surgeon    Take a shower or bath the night before surgery. Use the soap your surgeon gave you to gently clean your skin. If you do not have soap from your surgeon, use your regular soap. Do not shave or scrub the surgery site.  Wear clean pajamas and have clean sheets on your bed.         Follow-ups after your visit        Your next 10 appointments already scheduled     Apr 10, 2017   Procedure with Lyle Leggett MD   [facility] ([facility])    [address] [place] [address]  5th [facility] [address] [phone]           Located in the Clinics and Surgery Center at [address] [place] [address].   parking is very convenient and highly recommended.  is a $6 flat rate fee.  Both  and self parkers should enter the main arrival plaza from [facility]; parking attendants will direct you based on your parking preference.            Apr 18, 2017 10:00 AM CDT   Post-Op with Lyle Leggett MD   [facility] ([facility])    [address] [facility] [address]  [address] [address] [phone]              Who to contact     If you have questions or need follow up information about today's clinic visit or your schedule please contact [facility] directly at [phone].  Normal or non-critical lab and imaging results will be communicated to you by MyChart, letter or phone within 4 business days after the clinic has received the results. If you do not hear from us within 7 days, please contact the clinic through MyChart or phone. If you have a critical or  abnormal lab result, we will notify you by phone as soon as possible.  Submit refill requests through EditGrid or call your pharmacy and they will forward the refill request to us. Please allow 3 business days for your refill to be completed.          Additional Information About Your Visit        [email or web address] Information     EditGrid gives you secure access to your electronic health record. If you see a primary care provider, you can also send messages to your care team and make appointments. If you have questions, please call your primary care clinic.  If you do not have a primary care provider, please call [phone] and they will assist you.        Care EveryWhere ID     This is your Care EveryWhere ID. This could be used by other organizations to access your [facility] medical records  [id number]        Your Vitals Were     Pulse Temperature Last Period Pulse Oximetry BMI (Body Mass Index)       76 98.5  F (36.9  C) (Oral) 09/06/2006 96% 19.99 kg/m2        Blood Pressure from Last 3 Encounters:   03/31/17 100/62   02/17/17 104/72   01/31/17 124/72    Weight from Last 3 Encounters:   03/31/17 122 lb (55.3 kg)   02/17/17 126 lb (57.2 kg)   09/30/16 128 lb (58.1 kg)              Today, you had the following     No orders found for display         Today's Medication Changes          These changes are accurate as of: 3/31/17  8:32 AM.  If you have any questions, ask your nurse or doctor.               Start taking these medicines.        Dose/Directions    azithromycin 250 MG tablet   Commonly known as:  ZITHROMAX   Used for:  Acute bronchitis with symptoms > 10 days   Started by:  Wegener, Joel Daniel Irwin, MD        Two tablets first day, then one tablet daily for four days.   Quantity:  6 tablet   Refills:  0       guaiFENesin-dextromethorphan 100-10 MG/5ML syrup   Commonly known as:  ROBITUSSIN DM   Used for:  Acute bronchitis with symptoms > 10 days   Started by:  Wegener, Joel Daniel Irwin, MD        Dose:  5 mL    Take 5 mLs by mouth every 4 hours as needed for cough   Quantity:  560 mL   Refills:  0       predniSONE 20 MG tablet   Commonly known as:  DELTASONE   Used for:  Acute bronchitis with symptoms > 10 days   Started by:  Wegener, Joel Daniel Irwin, MD        Dose:  40 mg   Take 2 tablets (40 mg) by mouth daily for 5 days   Quantity:  10 tablet   Refills:  0            Where to get your medicines      These medications were sent to [facility] [address] [place] [address] AT [facility] & [address] [place] [id number]    Hours:  24-hours Phone:  [phone]     azithromycin 250 MG tablet    guaiFENesin-dextromethorphan 100-10 MG/5ML syrup    predniSONE 20 MG tablet                Primary Care Provider Office Phone # Fax #    Joel Daniel Irwin Wegener, [phone]       [facility] [address] [place] [address]        Thank you!     Thank you for choosing [facility]  for your care. Our goal is always to provide you with excellent care. Hearing back from our patients is one way we can continue to improve our services. Please take a few minutes to complete the written survey that you may receive in the mail after your visit with us. Thank you!             Your Updated Medication List - Protect others around you: Learn how to safely use, store and throw away your medicines at www.disposemymeds.org.          This list is accurate as of: 3/31/17  8:32 AM.  Always use your most recent med list.                   Brand Name Dispense Instructions for use    albuterol 108 (90 BASE) MCG/ACT Inhaler    PROAIR HFA/PROVENTIL HFA/VENTOLIN HFA    1 Inhaler    Inhale 2 puffs into the lungs every 6 hours as needed for shortness of breath / dyspnea or wheezing       aspirin 81 MG tablet      Take 81 mg by mouth as needed Reported on 3/31/2017       azithromycin 250 MG tablet    ZITHROMAX    6 tablet    Two tablets first day, then one  tablet daily for four days.       BENADRYL 25 MG tablet   Generic drug:  diphenhydrAMINE      Take 25 mg by mouth nightly as needed Reported on 3/31/2017       CALCIUM 1200 PO          guaiFENesin-dextromethorphan 100-10 MG/5ML syrup    ROBITUSSIN DM    560 mL    Take 5 mLs by mouth every 4 hours as needed for cough       melatonin 3 MG Caps      Take 2 capsules by mouth At Bedtime       OMEGA 3 PO      Reported on 3/31/2017       predniSONE 20 MG tablet    DELTASONE    10 tablet    Take 2 tablets (40 mg) by mouth daily for 5 days       traZODone 50 MG tablet    DESYREL    30 tablet    1/4 to 1/2 pill nightly as needed.       vitamin D 1000 UNITS capsule      Take 1 capsule by mouth daily

## 2017-03-31 NOTE — PATIENT INSTRUCTIONS
ASSESSMENT AND PLAN  1. Preop general physical exam  Cleared for surgery with general anesthesia. Already stopped aspirin.  Skip any other meds/supplements the morning of the procedure.     2. 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     3. Acute bronchitis with symptoms > 10 days  Given surgery coming up start prednisone, azithromycin now.  Continue albuterol 2 puffs four times daily.   Cough syrup given to use as well.     - predniSONE (DELTASONE) 20 MG tablet; Take 2 tablets (40 mg) by mouth daily for 5 days  Dispense: 10 tablet; Refill: 0  - azithromycin (ZITHROMAX) 250 MG tablet; Two tablets first day, then one tablet daily for four days.  Dispense: 6 tablet; Refill: 0  - guaiFENesin-dextromethorphan (ROBITUSSIN DM) 100-10 MG/5ML syrup; Take 5 mLs by mouth every 4 hours as needed for cough  Dispense: 560 mL; Refill: 0        MYCHART SIGNUP FOR E-VISITS AND EASIER COMMUNICATION:  [email or web address]     Zipnosis:  [email or web address].  Sign up for e-visits for common illnesses!     RADIOLOGY:   [facility]:  [phone] to schedule any radiology tests at [facility]: [phone]    Mammograms/colonoscopies:  [phone]    CONSUMER PRICE LINE for estimates of test costs:  [phone]       Before Your Surgery      Call your surgeon if there is any change in your health. This includes signs of a cold or flu (such as a sore throat, runny nose, cough, rash or fever).    Do not smoke, drink alcohol or take over the counter medicine (unless your surgeon or primary care doctor tells you to) for the 24 hours before and after surgery.    If you take prescribed drugs: Follow your doctor s orders about which medicines to take and which to stop until after surgery.    Eating and drinking prior to surgery: follow the instructions from your surgeon    Take a shower or bath the night before surgery. Use the soap your surgeon gave you to gently clean your  skin. If you do not have soap from your surgeon, use your regular soap. Do not shave or scrub the surgery site.  Wear clean pajamas and have clean sheets on your bed.

## 2017-03-31 NOTE — NURSING NOTE
"Chief Complaint   Patient presents with     Pre-Op Exam       Initial /62 (BP Location: Left arm, Patient Position: Chair, Cuff Size: Adult Regular)  Pulse 76  Temp 98.5  F (36.9  C) (Oral)  Wt 122 lb (55.3 kg)  LMP 09/06/2006  SpO2 96%  BMI 19.99 kg/m2 Estimated body mass index is 19.99 kg/(m^2) as calculated from the following:    Height as of 1/31/17: 5' 5.5\" (1.664 m).    Weight as of this encounter: 122 lb (55.3 kg).  Medication Reconciliation: complete Elliot Tracy MA      "

## 2017-04-10 ENCOUNTER — ANESTHESIA EVENT (OUTPATIENT)
Dept: SURGERY | Facility: AMBULATORY SURGERY CENTER | Age: 57
End: 2017-04-10

## 2017-04-10 ENCOUNTER — ANESTHESIA (OUTPATIENT)
Dept: SURGERY | Facility: AMBULATORY SURGERY CENTER | Age: 57
End: 2017-04-10

## 2017-04-10 ENCOUNTER — HOSPITAL ENCOUNTER (OUTPATIENT)
Facility: AMBULATORY SURGERY CENTER | Age: 57
End: 2017-04-10
Attending: OPHTHALMOLOGY

## 2017-04-10 ENCOUNTER — SURGERY (OUTPATIENT)
Age: 57
End: 2017-04-10

## 2017-04-10 VITALS
OXYGEN SATURATION: 98 % | SYSTOLIC BLOOD PRESSURE: 135 MMHG | DIASTOLIC BLOOD PRESSURE: 81 MMHG | RESPIRATION RATE: 16 BRPM | WEIGHT: 121 LBS | BODY MASS INDEX: 20.16 KG/M2 | HEART RATE: 77 BPM | HEIGHT: 65 IN | TEMPERATURE: 97.2 F

## 2017-04-10 DIAGNOSIS — Z98.890 POST-OPERATIVE STATE: Primary | ICD-10-CM

## 2017-04-10 RX ORDER — ONDANSETRON 4 MG/1
4 TABLET, ORALLY DISINTEGRATING ORAL EVERY 30 MIN PRN
Status: DISCONTINUED | OUTPATIENT
Start: 2017-04-10 | End: 2017-04-11 | Stop reason: HOSPADM

## 2017-04-10 RX ORDER — PREDNISOLONE ACETATE 10 MG/ML
1 SUSPENSION/ DROPS OPHTHALMIC 4 TIMES DAILY
Qty: 1 BOTTLE | Refills: 0 | Status: SHIPPED | OUTPATIENT
Start: 2017-04-10 | End: 2018-03-20

## 2017-04-10 RX ORDER — HYDROCODONE BITARTRATE AND ACETAMINOPHEN 5; 325 MG/1; MG/1
1-2 TABLET ORAL ONCE
Status: COMPLETED | OUTPATIENT
Start: 2017-04-10 | End: 2017-04-10

## 2017-04-10 RX ORDER — FENTANYL CITRATE 50 UG/ML
25 INJECTION, SOLUTION INTRAMUSCULAR; INTRAVENOUS ONCE
Status: COMPLETED | OUTPATIENT
Start: 2017-04-10 | End: 2017-04-10

## 2017-04-10 RX ORDER — MEPERIDINE HYDROCHLORIDE 25 MG/ML
12.5 INJECTION INTRAMUSCULAR; INTRAVENOUS; SUBCUTANEOUS
Status: DISCONTINUED | OUTPATIENT
Start: 2017-04-10 | End: 2017-04-11 | Stop reason: HOSPADM

## 2017-04-10 RX ORDER — OXYMETAZOLINE HYDROCHLORIDE 0.05 G/100ML
SPRAY NASAL PRN
Status: DISCONTINUED | OUTPATIENT
Start: 2017-04-10 | End: 2017-04-10 | Stop reason: HOSPADM

## 2017-04-10 RX ORDER — ONDANSETRON 2 MG/ML
4 INJECTION INTRAMUSCULAR; INTRAVENOUS EVERY 30 MIN PRN
Status: DISCONTINUED | OUTPATIENT
Start: 2017-04-10 | End: 2017-04-11 | Stop reason: HOSPADM

## 2017-04-10 RX ORDER — ACETAMINOPHEN 325 MG/1
975 TABLET ORAL ONCE
Status: COMPLETED | OUTPATIENT
Start: 2017-04-10 | End: 2017-04-10

## 2017-04-10 RX ORDER — SCOLOPAMINE TRANSDERMAL SYSTEM 1 MG/1
1 PATCH, EXTENDED RELEASE TRANSDERMAL
Status: DISCONTINUED | OUTPATIENT
Start: 2017-04-10 | End: 2017-04-11 | Stop reason: HOSPADM

## 2017-04-10 RX ORDER — NALOXONE HYDROCHLORIDE 0.4 MG/ML
.1-.4 INJECTION, SOLUTION INTRAMUSCULAR; INTRAVENOUS; SUBCUTANEOUS
Status: DISCONTINUED | OUTPATIENT
Start: 2017-04-10 | End: 2017-04-11 | Stop reason: HOSPADM

## 2017-04-10 RX ORDER — PROPOFOL 10 MG/ML
INJECTION, EMULSION INTRAVENOUS PRN
Status: DISCONTINUED | OUTPATIENT
Start: 2017-04-10 | End: 2017-04-10

## 2017-04-10 RX ORDER — KETOROLAC TROMETHAMINE 30 MG/ML
30 INJECTION, SOLUTION INTRAMUSCULAR; INTRAVENOUS ONCE
Status: COMPLETED | OUTPATIENT
Start: 2017-04-10 | End: 2017-04-10

## 2017-04-10 RX ORDER — DEXAMETHASONE SODIUM PHOSPHATE 4 MG/ML
INJECTION, SOLUTION INTRA-ARTICULAR; INTRALESIONAL; INTRAMUSCULAR; INTRAVENOUS; SOFT TISSUE PRN
Status: DISCONTINUED | OUTPATIENT
Start: 2017-04-10 | End: 2017-04-10

## 2017-04-10 RX ORDER — FENTANYL CITRATE 50 UG/ML
INJECTION, SOLUTION INTRAMUSCULAR; INTRAVENOUS PRN
Status: DISCONTINUED | OUTPATIENT
Start: 2017-04-10 | End: 2017-04-10

## 2017-04-10 RX ORDER — BALANCED SALT SOLUTION 6.4; .75; .48; .3; 3.9; 1.7 MG/ML; MG/ML; MG/ML; MG/ML; MG/ML; MG/ML
SOLUTION OPHTHALMIC PRN
Status: DISCONTINUED | OUTPATIENT
Start: 2017-04-10 | End: 2017-04-10 | Stop reason: HOSPADM

## 2017-04-10 RX ORDER — GABAPENTIN 300 MG/1
300 CAPSULE ORAL ONCE
Status: DISCONTINUED | OUTPATIENT
Start: 2017-04-10 | End: 2017-04-11 | Stop reason: HOSPADM

## 2017-04-10 RX ORDER — SODIUM CHLORIDE, SODIUM LACTATE, POTASSIUM CHLORIDE, CALCIUM CHLORIDE 600; 310; 30; 20 MG/100ML; MG/100ML; MG/100ML; MG/100ML
INJECTION, SOLUTION INTRAVENOUS CONTINUOUS
Status: DISCONTINUED | OUTPATIENT
Start: 2017-04-10 | End: 2017-04-11 | Stop reason: HOSPADM

## 2017-04-10 RX ORDER — LIDOCAINE 40 MG/G
CREAM TOPICAL
Status: DISCONTINUED | OUTPATIENT
Start: 2017-04-10 | End: 2017-04-11 | Stop reason: HOSPADM

## 2017-04-10 RX ORDER — ONDANSETRON 2 MG/ML
INJECTION INTRAMUSCULAR; INTRAVENOUS PRN
Status: DISCONTINUED | OUTPATIENT
Start: 2017-04-10 | End: 2017-04-10

## 2017-04-10 RX ORDER — LIDOCAINE HYDROCHLORIDE 20 MG/ML
INJECTION, SOLUTION INFILTRATION; PERINEURAL PRN
Status: DISCONTINUED | OUTPATIENT
Start: 2017-04-10 | End: 2017-04-10

## 2017-04-10 RX ORDER — GLYCOPYRROLATE 0.2 MG/ML
INJECTION, SOLUTION INTRAMUSCULAR; INTRAVENOUS PRN
Status: DISCONTINUED | OUTPATIENT
Start: 2017-04-10 | End: 2017-04-10

## 2017-04-10 RX ORDER — PROPOFOL 10 MG/ML
INJECTION, EMULSION INTRAVENOUS CONTINUOUS PRN
Status: DISCONTINUED | OUTPATIENT
Start: 2017-04-10 | End: 2017-04-10

## 2017-04-10 RX ADMIN — PROPOFOL 50 MG: 10 INJECTION, EMULSION INTRAVENOUS at 12:17

## 2017-04-10 RX ADMIN — GLYCOPYRROLATE 0.2 MG: 0.2 INJECTION, SOLUTION INTRAMUSCULAR; INTRAVENOUS at 11:59

## 2017-04-10 RX ADMIN — ONDANSETRON 4 MG: 2 INJECTION INTRAMUSCULAR; INTRAVENOUS at 12:28

## 2017-04-10 RX ADMIN — SODIUM CHLORIDE, SODIUM LACTATE, POTASSIUM CHLORIDE, CALCIUM CHLORIDE: 600; 310; 30; 20 INJECTION, SOLUTION INTRAVENOUS at 11:18

## 2017-04-10 RX ADMIN — KETOROLAC TROMETHAMINE 30 MG: 30 INJECTION, SOLUTION INTRAMUSCULAR; INTRAVENOUS at 13:14

## 2017-04-10 RX ADMIN — PROPOFOL 200 MCG/KG/MIN: 10 INJECTION, EMULSION INTRAVENOUS at 11:56

## 2017-04-10 RX ADMIN — OXYMETAZOLINE HYDROCHLORIDE 1 SPRAY: 0.05 SPRAY NASAL at 12:14

## 2017-04-10 RX ADMIN — BALANCED SALT SOLUTION 10 ML: 6.4; .75; .48; .3; 3.9; 1.7 SOLUTION OPHTHALMIC at 12:13

## 2017-04-10 RX ADMIN — DEXAMETHASONE SODIUM PHOSPHATE 4 MG: 4 INJECTION, SOLUTION INTRA-ARTICULAR; INTRALESIONAL; INTRAMUSCULAR; INTRAVENOUS; SOFT TISSUE at 11:57

## 2017-04-10 RX ADMIN — LIDOCAINE HYDROCHLORIDE 60 MG: 20 INJECTION, SOLUTION INFILTRATION; PERINEURAL at 11:56

## 2017-04-10 RX ADMIN — ONDANSETRON 4 MG: 2 INJECTION INTRAMUSCULAR; INTRAVENOUS at 11:57

## 2017-04-10 RX ADMIN — ACETAMINOPHEN 975 MG: 325 TABLET ORAL at 11:34

## 2017-04-10 RX ADMIN — FENTANYL CITRATE 50 MCG: 50 INJECTION, SOLUTION INTRAMUSCULAR; INTRAVENOUS at 11:58

## 2017-04-10 RX ADMIN — SCOLOPAMINE TRANSDERMAL SYSTEM 1 PATCH: 1 PATCH, EXTENDED RELEASE TRANSDERMAL at 11:34

## 2017-04-10 RX ADMIN — PROPOFOL 160 MG: 10 INJECTION, EMULSION INTRAVENOUS at 11:56

## 2017-04-10 RX ADMIN — FENTANYL CITRATE 25 MCG: 50 INJECTION, SOLUTION INTRAMUSCULAR; INTRAVENOUS at 13:17

## 2017-04-10 RX ADMIN — HYDROCODONE BITARTRATE AND ACETAMINOPHEN 1 TABLET: 5; 325 TABLET ORAL at 13:19

## 2017-04-10 RX ADMIN — FENTANYL CITRATE: 50 INJECTION, SOLUTION INTRAMUSCULAR; INTRAVENOUS at 13:30

## 2017-04-10 NOTE — ANESTHESIA POSTPROCEDURE EVALUATION
Patient: Clara Boykin    Procedure(s):  Eye Muscle Correction, Right Eye - Wound Class: I-Clean    Diagnosis:Right Hypertropia  Diagnosis Additional Information: No value filed.    Anesthesia Type:  General    Note:  Anesthesia Post Evaluation    Patient location during evaluation: PACU  Patient participation: Able to fully participate in evaluation  Level of consciousness: awake and alert  Pain management: adequate  Airway patency: patent  Cardiovascular status: hemodynamically stable  Respiratory status: acceptable  Hydration status: stable  PONV: none     Anesthetic complications: None          Last vitals:  Vitals:    04/10/17 1042   BP: 120/79   Pulse: 77   Resp: 18   Temp: 36.7  C (98.1  F)   SpO2: 96%         Electronically Signed By: Reagan Fabian MD  April 10, 2017  12:53 PM

## 2017-04-10 NOTE — IP AVS SNAPSHOT
[facility]    [address] [place] [address]    Phone:  [phone]    Fax:  [phone]                                       After Visit Summary   4/10/2017    Clara Boykin    MRN: [id number]           After Visit Summary Signature Page     I have received my discharge instructions, and my questions have been answered. I have discussed any challenges I see with this plan with the nurse or doctor.    ..........................................................................................................................................  Patient/Patient Representative Signature      ..........................................................................................................................................  Patient Representative Print Name and Relationship to Patient    ..................................................               ................................................  Date                                            Time    ..........................................................................................................................................  Reviewed by Signature/Title    ...................................................              ..............................................  Date                                                            Time

## 2017-04-10 NOTE — ANESTHESIA PREPROCEDURE EVALUATION
Anesthesia Evaluation     .             ROS/MED HX    ENT/Pulmonary:  - neg pulmonary ROS     Neurologic:  - neg neurologic ROS     Cardiovascular:  - neg cardiovascular ROS       METS/Exercise Tolerance:     Hematologic:  - neg hematologic  ROS       Musculoskeletal:  - neg musculoskeletal ROS       GI/Hepatic:  - neg GI/hepatic ROS       Renal/Genitourinary:  - ROS Renal section negative       Endo:  - neg endo ROS       Psychiatric:  - neg psychiatric ROS       Infectious Disease:  - neg infectious disease ROS       Malignancy:      - no malignancy   Other:    - neg other ROS                 Physical Exam  Normal systems: cardiovascular, pulmonary and dental    Airway   Mallampati: I  TM distance: >3 FB  Neck ROM: full    Dental     Cardiovascular   Rhythm and rate: regular and normal      Pulmonary    breath sounds clear to auscultation                    Anesthesia Plan      History & Physical Review  History and physical reviewed and following examination; no interval change.    ASA Status:  2 .    NPO Status:  > 8 hours    Plan for General and LMA with Propofol and Intravenous induction. Maintenance will be TIVA.    PONV prophylaxis:  Ondansetron (or other 5HT-3)       Postoperative Care  Postoperative pain management:  Oral pain medications.      Consents  Anesthetic plan, risks, benefits and alternatives discussed with:  Patient..                          .

## 2017-04-10 NOTE — ANESTHESIA CARE TRANSFER NOTE
Patient: Clara Boykin    Procedure(s):  Eye Muscle Correction, Right Eye - Wound Class: I-Clean    Diagnosis: Right Hypertropia  Diagnosis Additional Information: No value filed.    Anesthesia Type:   General     Note:  Airway :Face Mask  Patient transferred to:PACU  Comments: Awake, spont resp, vss, iv patent  137/83  68  99%  Report to RN        Vitals: (Last set prior to Anesthesia Care Transfer)    [facility] VITALS  4/10/2017 1204 - 4/10/2017 1238      4/10/2017             Pulse: 74    SpO2: 98 %                Electronically Signed By: BLADIMIR Delgado CRNA  April 10, 2017  12:38 PM

## 2017-04-10 NOTE — BRIEF OP NOTE
[facility] Brief Operative Note    Pre-operative diagnosis: Right Hypertropia   Post-operative diagnosis * No post-op diagnosis entered *   Procedure: Procedure(s):  Eye Muscle Correction, Right Eye - Wound Class: I-Clean   Surgeon: Lyle Leggett    Assistants(s): Andre Burgess    Estimated blood loss: None    Specimens: None   Findings: See full operative note

## 2017-04-10 NOTE — DISCHARGE INSTRUCTIONS
Scopolamine Patch  (Absorbed through the skin)    The Scopolamine Patch prevents nausea and vomiting caused by motion sickness or anesthesia and surgery in adults.    Brand Name(s): Transderm Scop, Transderm-Scope  There may be other brand names for this medicine.    When This Medicine Should Not Be Used:  You should not use this medicine if you have had an allergic reaction to scopolamine, or if you have narrow angle glaucoma.    How to Use This Medicine:    Your doctor will tell you how many patches to use, where to apply them, and how often to apply them.     Do not use more patches or apply them more often than your doctor tells you to.    This medicine comes with patient instructions. Read and follow these instructions carefully. Ask your doctor or pharmacist if you have any questions.    To prevent motion sickness, apply the patch at least 4 hours before you need it.    Wash and dry your hands thoroughly before applying the patch.    Leave the patch in its sealed wrapper until you are ready to put it on. Tear the wrapper open carefully. NEVER CUT the wrapper or the patch with scissors. Do not use any patch that has been cut by accident.    Take the liner off the sticky side before applying.    Apply the patch to dry, hairless skin behind the ear.    If the patch is loose or falls off, apply a new patch at a different place behind the ear.    After you take off the patch, wash the place where the patch was and your hands thoroughly.    Only one patch should be used at any time.    If a dose is missed:  If you forget to wear or change a patch, put one on as soon as you can. If it is almost time to put on your next patch, wait until then to apply a new patch and skip the one you missed. Do not apply extra patches to make up for a missed dose.    How to Store and Dispose of This Medicine:    Store the patches at room temperature in a closed container, away from heat, moisture and direct light.    Fold the used  patch in half with the sticky sides together. Throw any used patch away so that children or pets cannot get to it. You will also need to throw away old patches after the expiration date has passed.    Keep all medicine away from children and never share your medicine with anyone.    Ask your doctor or pharmacist before using any other medicine, including over-the-counter medicines, vitamins, and herbal products.  Tell your doctor if you are using any medicines that make you sleepy. These include sleeping pills, cold and allergy medicine, narcotic pain relievers and sedatives.     Tell your doctor if you are using any medicine that make you sleepy. These include sleeping pills, brad and allergy medicine, narcotic pain relievers and sedatives.    Do not drink alcohol while you are using this medicine.     Warnings While Using This Medicine:    Make sure your doctor knows if you are pregnant or breastfeeding or if you have glaucoma, prostate problems, trouble urinating, blocked bowels, liver disease, kidney disease or a history of seizures or mental illness.    This medicine can cause blurring of vision and other vision problems if it comes in contact with the eyes. This medicine may also cause problems with urination. If any of these reactions occur, remove the patch and call your doctor right away.    This medicine may make you dizzy or drowsy. Avoid driving, using machines, or doing anything else that could be dangerous if you are not alert. If you plan to participate in underwater sports, this medicine may cause disorienting effects. If this is a concern for you, talk with your doctor.    This medicine may make you sweat less and cause your body to get too hot. Be careful in hot weather, when you are exercising or if using sauna or whirlpool.    Make sure any doctor or dentist who treats you knows that you are using this medicine. This medicine may affect the results of certain medical tests.    Skin burns have  been reported at the patch site in several patients wearing an aluminized transdermal system during a magnetic resonance imaging scan (MRI). Because Transderm Scop contains aluminum, it is recommended to remove the system before undergoing an MRI.    Call your doctor right away if you notice any of these side effects:    Allergic reaction: Itching or hives, swelling in your face or hands, swelling or tingling in your mouth or throat, chest tightness, trouble breathing    Blurred vision    Confusion or memory loss    Fast, slow or uneven heartbeat    Lightheadedness, dizziness, drowsiness or fainting    Seeing, hearing or feeling things that are not there    Severe eye pain    Trouble urinating    If you notice these less serious side effects, talk with your doctor:    Dry mouth    Dry, itchy or red eyes    Restlessness    Skin rash or redness    If you notice other side effects that you think are caused by this medicine, tell your doctor immediately.    Rev. 4/2014    [facility]  Home Care Following Anesthesia  For 24 hours after surgery:  1. Get plenty of rest.  A responsible adult must stay with you for at least 24 hours after you leave the surgery center.  2. Do not drive or use heavy equipment.  If you have weakness or tingling, don't drive or use heavy equipment until this feeling goes away.   3. Do not drink alcohol.   4. Avoid strenuous or risky activities.  Ask for help when climbing stairs.  5. You may feel lightheaded.  IF so, sit for a few minutes before standing.  Have someone help you get up.   6. If you have nausea (feel sick to your stomach): Drink only clear liquids such as apple juice, ginger ale, broth or 7-Up.  Rest may also help.  Be sure to drink enough fluids.  Move to a regular diet as you feel able.   7. You may have a slight fever.  Call the doctor if your fever is over 100 F (37.7 C) (taken under the tongue) or lasts longer than 24 hours.  8. You may  have a dry mouth, a sore throat, muscle aches or trouble sleeping. These should go away after 24 hours.  9. Do not make important or legal decisions.               Tips for taking pain medications  To get the best pain relief possible, remember these points:    Take pain medications as directed, before pain becomes severe.    Pain medication can upset your stomach: taking it with food may help.    Constipation is a common side effect of pain medication. Drink plenty of  fluids.    Eat foods high in fiber. Take a stool softener if recommended by your doctor or pharmacist.    Do not drink alcohol, drive or operate machinery while taking pain medications.    Ask about other ways to control pain, such as with heat, ice or relaxation.    Call a doctor for any of the followin. Signs of infection (fever, growing tenderness at the surgery site, a large amount of drainage or bleeding, severe pain, foul-smelling drainage, redness, swelling).  2. It has been over 8 to 10 hours since surgery and you are still not able to urinate (pass water).  3. Headache for over 24 hours.  4. Numbness, tingling or weakness the day after surgery (if you had spinal anesthesia).  Your doctor is:       Dr. Lyle Leggett, Ophthalmology: [phone]               Or dial [phone] and ask for the resident on call for:  Ophthalmology  For emergency care, call the:  [facility]:  [phone] (TTY for hearing impaired: [phone])                Instructions for after your eye muscle surgery:    Instill 1 cm of Tobradex ophthalmic ointment in the operative eye(s) in 3 times per day until follow-up with Dr. Leggett in about 1 week.  The ointment is expected to blur vision but is necessary.      You will also go home with a prescription for a steroid eye drop. Do NOT start this eye drop yet.  When you see Dr. Leggett at your post-operative visit he will tell you if and when to start the drops.    Avoid all eye  pressure or trauma for 7 days.  No eye rubbing, straining, swimming, athletics, or outdoor activities in which dirt or foreign objects could enter the eye.      No water in the face for 1 week- ok to take a bath and shower immediately but don t run shower water on face.      Tylenol or ibuprofen over the counter may be used for pain.  Pain can occasionally be moderate for 1 or 2 days after surgery.  If pain is severe or does not improve greatly with over the counter medications call our office.    Return for follow-up with Dr. Leggett as already scheduled (generally about 1 week after surgery).  If you do not have an appointment already, please call Seth Sharp at [phone] or our  at [phone] and arrange to follow-up in 1 week.

## 2017-04-10 NOTE — IP AVS SNAPSHOT
MRN:[id number]                      After Visit Summary   4/10/2017    Clara Boykin    MRN: [id number]           Thank you!     Thank you for choosing [facility] for your care. Our goal is always to provide you with excellent care. Hearing back from our patients is one way we can continue to improve our services. Please take a few minutes to complete the written survey that you may receive in the mail after you visit with us. Thank you!        Patient Information     Date Of Birth          1960        About your hospital stay     You were admitted on:  April 10, 2017 [facility]    You were discharged on:  April 10, 2017       Who to Call     For medical emergencies, please call 911.  For non-urgent questions about your medical care, please call your primary care provider or clinic, [phone]  For questions related to your surgery, please call your surgery clinic        Attending Provider     Provider Specialty    Lyle Leggett MD Ophthalmology       Primary Care Provider Office Phone # Fax #    Manny Mehul Irwin Wegener, [phone]       [facility] [address]  [facility] [address]        Your next 10 appointments already scheduled     Apr 18, 2017 10:00 AM CDT   Post-Op with Lyle Leggett MD   [facility] ([facility])    [address]  [place] [address] [facility] [address] [phone]              Further instructions from your care team       Scopolamine Patch  (Absorbed through the skin)    The Scopolamine Patch prevents nausea and vomiting caused by motion sickness or anesthesia and surgery in adults.    Brand Name(s): Transderm Scop, Transderm-Scope  There may be other brand names for this medicine.    When This Medicine Should Not Be Used:  You should not use this medicine if you have had an allergic reaction to scopolamine, or if you have  narrow angle glaucoma.    How to Use This Medicine:    Your doctor will tell you how many patches to use, where to apply them, and how often to apply them.     Do not use more patches or apply them more often than your doctor tells you to.    This medicine comes with patient instructions. Read and follow these instructions carefully. Ask your doctor or pharmacist if you have any questions.    To prevent motion sickness, apply the patch at least 4 hours before you need it.    Wash and dry your hands thoroughly before applying the patch.    Leave the patch in its sealed wrapper until you are ready to put it on. Tear the wrapper open carefully. NEVER CUT the wrapper or the patch with scissors. Do not use any patch that has been cut by accident.    Take the liner off the sticky side before applying.    Apply the patch to dry, hairless skin behind the ear.    If the patch is loose or falls off, apply a new patch at a different place behind the ear.    After you take off the patch, wash the place where the patch was and your hands thoroughly.    Only one patch should be used at any time.    If a dose is missed:  If you forget to wear or change a patch, put one on as soon as you can. If it is almost time to put on your next patch, wait until then to apply a new patch and skip the one you missed. Do not apply extra patches to make up for a missed dose.    How to Store and Dispose of This Medicine:    Store the patches at room temperature in a closed container, away from heat, moisture and direct light.    Fold the used patch in half with the sticky sides together. Throw any used patch away so that children or pets cannot get to it. You will also need to throw away old patches after the expiration date has passed.    Keep all medicine away from children and never share your medicine with anyone.    Ask your doctor or pharmacist before using any other medicine, including over-the-counter medicines, vitamins, and herbal  products.  Tell your doctor if you are using any medicines that make you sleepy. These include sleeping pills, cold and allergy medicine, narcotic pain relievers and sedatives.     Tell your doctor if you are using any medicine that make you sleepy. These include sleeping pills, brad and allergy medicine, narcotic pain relievers and sedatives.    Do not drink alcohol while you are using this medicine.     Warnings While Using This Medicine:    Make sure your doctor knows if you are pregnant or breastfeeding or if you have glaucoma, prostate problems, trouble urinating, blocked bowels, liver disease, kidney disease or a history of seizures or mental illness.    This medicine can cause blurring of vision and other vision problems if it comes in contact with the eyes. This medicine may also cause problems with urination. If any of these reactions occur, remove the patch and call your doctor right away.    This medicine may make you dizzy or drowsy. Avoid driving, using machines, or doing anything else that could be dangerous if you are not alert. If you plan to participate in underwater sports, this medicine may cause disorienting effects. If this is a concern for you, talk with your doctor.    This medicine may make you sweat less and cause your body to get too hot. Be careful in hot weather, when you are exercising or if using sauna or whirlpool.    Make sure any doctor or dentist who treats you knows that you are using this medicine. This medicine may affect the results of certain medical tests.    Skin burns have been reported at the patch site in several patients wearing an aluminized transdermal system during a magnetic resonance imaging scan (MRI). Because Transderm Scop contains aluminum, it is recommended to remove the system before undergoing an MRI.    Call your doctor right away if you notice any of these side effects:    Allergic reaction: Itching or hives, swelling in your face or hands, swelling or  tingling in your mouth or throat, chest tightness, trouble breathing    Blurred vision    Confusion or memory loss    Fast, slow or uneven heartbeat    Lightheadedness, dizziness, drowsiness or fainting    Seeing, hearing or feeling things that are not there    Severe eye pain    Trouble urinating    If you notice these less serious side effects, talk with your doctor:    Dry mouth    Dry, itchy or red eyes    Restlessness    Skin rash or redness    If you notice other side effects that you think are caused by this medicine, tell your doctor immediately.    Rev. 4/2014    [facility]  Home Care Following Anesthesia  For 24 hours after surgery:  1. Get plenty of rest.  A responsible adult must stay with you for at least 24 hours after you leave the surgery center.  2. Do not drive or use heavy equipment.  If you have weakness or tingling, don't drive or use heavy equipment until this feeling goes away.   3. Do not drink alcohol.   4. Avoid strenuous or risky activities.  Ask for help when climbing stairs.  5. You may feel lightheaded.  IF so, sit for a few minutes before standing.  Have someone help you get up.   6. If you have nausea (feel sick to your stomach): Drink only clear liquids such as apple juice, ginger ale, broth or 7-Up.  Rest may also help.  Be sure to drink enough fluids.  Move to a regular diet as you feel able.   7. You may have a slight fever.  Call the doctor if your fever is over 100 F (37.7 C) (taken under the tongue) or lasts longer than 24 hours.  8. You may have a dry mouth, a sore throat, muscle aches or trouble sleeping. These should go away after 24 hours.  9. Do not make important or legal decisions.               Tips for taking pain medications  To get the best pain relief possible, remember these points:    Take pain medications as directed, before pain becomes severe.    Pain medication can upset your stomach: taking it with food may  help.    Constipation is a common side effect of pain medication. Drink plenty of  fluids.    Eat foods high in fiber. Take a stool softener if recommended by your doctor or pharmacist.    Do not drink alcohol, drive or operate machinery while taking pain medications.    Ask about other ways to control pain, such as with heat, ice or relaxation.    Call a doctor for any of the followin. Signs of infection (fever, growing tenderness at the surgery site, a large amount of drainage or bleeding, severe pain, foul-smelling drainage, redness, swelling).  2. It has been over 8 to 10 hours since surgery and you are still not able to urinate (pass water).  3. Headache for over 24 hours.  4. Numbness, tingling or weakness the day after surgery (if you had spinal anesthesia).  Your doctor is:       Dr. Lyle Leggett, Ophthalmology: [phone]               Or dial [phone] and ask for the resident on call for:  Ophthalmology  For emergency care, call the:  [facility]:  [phone] (TTY for hearing impaired: [phone])                Instructions for after your eye muscle surgery:    Instill 1 cm of Tobradex ophthalmic ointment in the operative eye(s) in 3 times per day until follow-up with Dr. Leggett in about 1 week.  The ointment is expected to blur vision but is necessary.      You will also go home with a prescription for a steroid eye drop. Do NOT start this eye drop yet.  When you see Dr. Leggett at your post-operative visit he will tell you if and when to start the drops.    Avoid all eye pressure or trauma for 7 days.  No eye rubbing, straining, swimming, athletics, or outdoor activities in which dirt or foreign objects could enter the eye.      No water in the face for 1 week- ok to take a bath and shower immediately but don t run shower water on face.      Tylenol or ibuprofen over the counter may be used for pain.  Pain can occasionally be moderate for 1 or 2 days after  "surgery.  If pain is severe or does not improve greatly with over the counter medications call our office.    Return for follow-up with Dr. Leggett as already scheduled (generally about 1 week after surgery).  If you do not have an appointment already, please call Seth Sharp at [phone] or our  at [phone] and arrange to follow-up in 1 week.      Pending Results     No orders found from 4/8/2017 to 4/11/2017.            Admission Information     Date & Time Provider Department Dept. Phone    4/10/2017 Lyle Leggett MD [facility] [phone]      Your Vitals Were     Blood Pressure Pulse Temperature Respirations Height Weight    120/79 77 98.1  F (36.7  C) (Oral) 18 1.657 m (5' 5.25\") 54.9 kg (121 lb)    Last Period Pulse Oximetry BMI (Body Mass Index)             09/06/2006 96% 19.98 kg/m2         [employer] Information     [employer] gives you secure access to your electronic health record. If you see a primary care provider, you can also send messages to your care team and make appointments. If you have questions, please call your primary care clinic.  If you do not have a primary care provider, please call [phone] and they will assist you.      [employer] is an electronic gateway that provides easy, online access to your medical records. With [employer], you can request a clinic appointment, read your test results, renew a prescription or communicate with your care team.     To access your existing account, please contact your [facility] or call [phone] for assistance.        Care EveryWhere ID     This is your Care EveryWhere ID. This could be used by other organizations to access your [facility] medical records  [id number]           Review of your medicines      UNREVIEWED medicines. Ask your doctor about these medicines        Dose / Directions    albuterol 108 (90 BASE) MCG/ACT Inhaler   Commonly known as:  " PROAIR HFA/PROVENTIL HFA/VENTOLIN HFA   Used for:  Acute bronchitis with symptoms > 10 days        Dose:  2 puff   Inhale 2 puffs into the lungs every 6 hours as needed for shortness of breath / dyspnea or wheezing   Quantity:  1 Inhaler   Refills:  0       aspirin 81 MG tablet        Dose:  81 mg   Take 81 mg by mouth as needed Reported on 3/31/2017   Refills:  0       BENADRYL 25 MG tablet   Generic drug:  diphenhydrAMINE        Dose:  25 mg   Take 25 mg by mouth nightly as needed Reported on 3/31/2017   Refills:  0       CALCIUM 1200 PO        Refills:  0       melatonin 3 MG Caps        Dose:  2 capsule   Take 2 capsules by mouth At Bedtime   Refills:  0       OMEGA 3 PO        Reported on 3/31/2017   Refills:  0       traZODone 50 MG tablet   Commonly known as:  DESYREL   Used for:  Primary insomnia        1/4 to 1/2 pill nightly as needed.   Quantity:  30 tablet   Refills:  3       vitamin D 1000 UNITS capsule        Dose:  1 capsule   Take 1 capsule by mouth daily   Refills:  0         START taking        Dose / Directions    prednisoLONE acetate 1 % ophthalmic susp   Commonly known as:  PRED FORTE   Used for:  Post-operative state        Dose:  1 drop   Place 1 drop into the right eye 4 times daily   Quantity:  1 Bottle   Refills:  0            Where to get your medicines      These medications were sent to [address] [address]    Hours:  TRANSPLANT PHONE NUMBER [phone] Phone:  [phone]     prednisoLONE acetate 1 % ophthalmic susp                Protect others around you: Learn how to safely use, store and throw away your medicines at www.disposemymeds.org.             Medication List: This is a list of all your medications and when to take them. Check marks below indicate your daily home schedule. Keep this list as a reference.      Medications           Morning Afternoon Evening Bedtime  As Needed    albuterol 108 (90 BASE) MCG/ACT Inhaler   Commonly known as:  PROAIR HFA/PROVENTIL HFA/VENTOLIN HFA   Inhale 2 puffs into the lungs every 6 hours as needed for shortness of breath / dyspnea or wheezing                                aspirin 81 MG tablet   Take 81 mg by mouth as needed Reported on 3/31/2017                                BENADRYL 25 MG tablet   Take 25 mg by mouth nightly as needed Reported on 3/31/2017   Generic drug:  diphenhydrAMINE                                CALCIUM 1200 PO                                melatonin 3 MG Caps   Take 2 capsules by mouth At Bedtime                                OMEGA 3 PO   Reported on 3/31/2017                                prednisoLONE acetate 1 % ophthalmic susp   Commonly known as:  PRED FORTE   Place 1 drop into the right eye 4 times daily                                traZODone 50 MG tablet   Commonly known as:  DESYREL   1/4 to 1/2 pill nightly as needed.                                vitamin D 1000 UNITS capsule   Take 1 capsule by mouth daily

## 2017-04-11 NOTE — OP NOTE
"ATTENDING SURGEON:  Lyle Leggett MD    DATE OF PROCEDURE:  April 10, 2017    FIRST SURGICAL ASSISTANT:  Andre Burgess MD     ANESTHESIA:  General anesthesia    PREOPERATIVE DIAGNOSIS (ES):  1. 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 Right inferior oblique myectomy     INDICATIONS FOR PROCEDURE:    The patient is a pleasant 56 year old  female with binocular diplopia secondary to a congenital cranial nerve 4 palsy.  She was highly interested in surgical correction.    I warned the patient about the risks, benefits, and alternatives of eye muscle surgery including a relatively small risk for severe infection, retinal detachment, and permanent vision loss of the eye.  I also discussed the possibility of postoperative double vision.  I discussed the possibility that due to postoperative double vision or a postoperative recurrent strabismus, the patient may require additional strabismus procedures in the future.  Understanding these risks, the patient decided to proceed with strabismus surgery.      DESCRIPTION OF PROCEDURE:    After the risks, benefits, and alternatives of eye muscle surgery were discussed with the patient and the patient's questions were answered both in clinic and on the day of surgery, written informed consent was obtained and witnessed in the preoperative holding area on the day of surgery.  The word \"yes\" was written over the right eye indicating that the patient consented to eye muscle correction in the right eye.  An intravenous line was placed and light intravenous sedation was given.  The patient was transported to the operating room where after appropriate monitors were placed, the patient underwent induction of general anesthesia without complication.      Both eyes were prepped and draped in the usual sterile fashion.  A lid speculum was placed in the right eye.  Forced ductions testing of this eye " revealed no restriction to movement.  Conjunctival forceps and Rio scissors were used to create a conjunctival incision in the inferotemporal fornix down to the level of bare sclera.  Two conjunctival forceps were used to retract tenon's capsule and directly visualize the inferior oblique muscle which was isolated using two Danielito tenotomy hooks.  The inferior oblique muscle belly was pulled up through the incision and approximately 1-1.5 centimeter segment of the muscle was clamped on either end with mosquito hemostats.  This section of muscle was removed and the two stumps of remaining muscle were cauterized.  At this point additional examination of the inferotemporal fornix revealed no additional muscle fibers of the inferior oblique muscle.  The muscle ends were released from the hemostats and retracted into the periocular tenons space. The conjunctival incision was closed with two 6-0 plain gut sutures placed in an interrupted fashion.  At this point the lid speculum was removed.     The drapes were removed. The face was cleaned.  TobraDex ointment was placed in the right eye. The patient was awakened from general anesthesia without complications and discharged to the recovery room in stable condition.      SPECIMENS REMOVED:  None.      ESTIMATED BLOOD LOSS:  1 mL or less.    INTRAOPERATIVE FLUIDS:  As per anesthesia records.      SPONGE/INSTRUMENT/NEEDLE COUNTS:  All sponge, instrument, and needle counts were correct times two.    CONDITION ON DISCHARGE FROM OPERATING ROOM:  Stable.      COMPLICATIONS:  None.     I was present for the entire procedure

## 2017-04-13 ENCOUNTER — TELEPHONE (OUTPATIENT)
Dept: OPHTHALMOLOGY | Facility: CLINIC | Age: 57
End: 2017-04-13

## 2017-04-13 NOTE — TELEPHONE ENCOUNTER
Patient states eyes have been improving, most dramatically yesterday to today. She states vision is still very blurry and quick eye movements are difficult but she has been sleeping a lot and it seems to help. She states distance vision is very blurry and there seems to be a difference in her ability to see things depending on whether they are in her superior or inferior field of view. Not able to look side to side.   Patient denies any abnormal swelling or discharge, she is using ointment TID. Reminded patient of follow up appointment and requested that she call if she have any questions or concerns between now and then.

## 2017-04-15 DIAGNOSIS — Z98.890 POSTOPERATIVE EYE STATE: Primary | ICD-10-CM

## 2017-04-17 DIAGNOSIS — Z98.890 POSTOPERATIVE EYE STATE: ICD-10-CM

## 2017-04-17 NOTE — TELEPHONE ENCOUNTER
torbradex  Last Written Prescription Date:  4/15/17  Last Fill Quantity: 1,   # refills: 0  Last Office Visit: 4/10/17 suregery  Future Office visit:   4/15/17  Routing refill request to provider for review/approval because:  Dr Leggett,   Pretty sure that I left that little tube in [place] after    I used it at 1pm today. Drat.  Can you reorder Tobradex   at [facility] on [address] [place]?   [phone]. I see you Tues 10a. It seems to help   raw feeling & itching.   Thanks   Clara

## 2017-04-18 ENCOUNTER — OFFICE VISIT (OUTPATIENT)
Dept: OPHTHALMOLOGY | Facility: CLINIC | Age: 57
End: 2017-04-18
Attending: OPHTHALMOLOGY
Payer: COMMERCIAL

## 2017-04-18 DIAGNOSIS — H50.21 HYPERTROPIA OF RIGHT EYE: Primary | ICD-10-CM

## 2017-04-18 PROCEDURE — 99214 OFFICE O/P EST MOD 30 MIN: CPT | Mod: ZF

## 2017-04-18 ASSESSMENT — CONF VISUAL FIELD
OD_NORMAL: 1
OS_NORMAL: 1

## 2017-04-18 ASSESSMENT — TONOMETRY
OD_IOP_MMHG: 21
IOP_METHOD: TONOPEN
OS_IOP_MMHG: 19

## 2017-04-18 ASSESSMENT — VISUAL ACUITY
OD_SC: 20/20
OS_SC: 20/30
OS_PH_SC: 20/20
OD_SC+: -2
METHOD: SNELLEN - LINEAR

## 2017-04-18 NOTE — PROGRESS NOTES
ASSESSMENT AND PLAN:     1. 1 week post-op status post strabismus surgery:     -Surgery: 4/10/17  Right inferior oblique myectomy   - Alignment: Doing great.  Small right hypotropia in up gaze and tiny in far left gaze - the upgaze may still improve over next several weeks.  - Diplopia: only in far up gaze and far left gaze.  Major improvement from preop  - Healing up appropriately  - Tobradex ophthalmic ointment: stop  - Start Predforte 1% four times a day in the operative eye(s) and decrease by one drop daily every week.  Course will complete in 1 month.    Return to clinic at the end of June due to patient having insurance issues (may be losing current insurance)     Complete documentation of historical and exam elements from today's encounter can be found in the full encounter summary report (not reduplicated in this progress note).  I personally obtained the chief complaint(s) and history of present illness.  I confirmed and edited as necessary the review of systems, past medical/surgical history, family history, social history, and examination findings as documented by others; and I examined the patient myself.  I personally reviewed the relevant tests, images, and reports as documented above.  I formulated and edited as necessary the assessment and plan and discussed the findings and management plan with the patient and family   Lyle Leggett MD  10:49 AM  4/18/2017

## 2017-04-18 NOTE — NURSING NOTE
Chief Complaints and History of Present Illnesses   Patient presents with     Post Op (Ophthalmology) Right Eye     Right inferior oblique myectomy      HPI    Affected eye(s):  Right   Symptoms:        Frequency:  Constant       Do you have eye pain now?:  No      Comments:  Double va if turns head   Depth perception is off  When looking to the left she can feel the RE pulling  +gritty feeling  Laura JAMES 10:07 AM April 18, 2017

## 2017-04-18 NOTE — NURSING NOTE
Chief Complaint   Patient presents with     Post Op (Ophthalmology) Right Eye     Right inferior oblique myectomy 4/10/2017 for RSOP. Diplopia in extreme upgaze and left tilt.      HPI    Affected eye(s):  Right   Symptoms:        Frequency:  Constant       Do you have eye pain now?:  No      Comments:  Double va if turns head   Depth perception is off  When looking to the left she can feel the RE pulling  +gritty feeling  Laura Ramos COT 10:07 AM April 18, 2017

## 2017-04-18 NOTE — MR AVS SNAPSHOT
After Visit Summary   4/18/2017    Clara Boykin    MRN: [id number]           Patient Information     Date Of Birth          1960        Visit Information        Provider Department      4/18/2017 10:00 AM Lyle Leggett MD [facility]         Follow-ups after your visit        Your next 10 appointments already scheduled     Jun 20, 2017  8:00 AM CDT   Post-Op with Lyle Leggett MD   [facility] ([facility])    [address] [facility] [address]  [address] [id number] [phone]              Who to contact     Please call your clinic at [phone] to:    Ask questions about your health    Make or cancel appointments    Discuss your medicines    Learn about your test results    Speak to your doctor   If you have compliments or concerns about an experience at your clinic, or if you wish to file a complaint, please contact [facility] Patient Relations at [phone] or email us at [email or web address]         Additional Information About Your Visit        MyChart Information     myLINGOt gives you secure access to your electronic health record. If you see a primary care provider, you can also send messages to your care team and make appointments. If you have questions, please call your primary care clinic.  If you do not have a primary care provider, please call [phone] and they will assist you.      STYLHUNT is an electronic gateway that provides easy, online access to your medical records. With STYLHUNT, you can request a clinic appointment, read your test results, renew a prescription or communicate with your care team.     To access your existing account, please contact your [facility] or call [phone] for assistance.        Care EveryWhere ID     This is your Care EveryWhere ID. This could be used by other organizations to access your [facility]  records  [id number]        Your Vitals Were     Last Period                   09/06/2006            Blood Pressure from Last 3 Encounters:   04/10/17 135/81   03/31/17 100/62   02/17/17 104/72    Weight from Last 3 Encounters:   04/10/17 54.9 kg (121 lb)   03/31/17 55.3 kg (122 lb)   02/17/17 57.2 kg (126 lb)              Today, you had the following     No orders found for display       Primary Care Provider Office Phone # Fax #    Joel Daniel Irwin Wegener, [phone]       [facility] [address]  [facility] [address]        Thank you!     Thank you for choosing [facility]  for your care. Our goal is always to provide you with excellent care. Hearing back from our patients is one way we can continue to improve our services. Please take a few minutes to complete the written survey that you may receive in the mail after your visit with us. Thank you!             Your Updated Medication List - Protect others around you: Learn how to safely use, store and throw away your medicines at www.disposemymeds.org.          This list is accurate as of: 4/18/17 10:55 AM.  Always use your most recent med list.                   Brand Name Dispense Instructions for use    albuterol 108 (90 BASE) MCG/ACT Inhaler    PROAIR HFA/PROVENTIL HFA/VENTOLIN HFA    1 Inhaler    Inhale 2 puffs into the lungs every 6 hours as needed for shortness of breath / dyspnea or wheezing       aspirin 81 MG tablet      Take 81 mg by mouth as needed Reported on 3/31/2017       BENADRYL 25 MG tablet   Generic drug:  diphenhydrAMINE      Take 25 mg by mouth nightly as needed Reported on 3/31/2017       CALCIUM 1200 PO          melatonin 3 MG Caps      Take 2 capsules by mouth At Bedtime       OMEGA 3 PO      Reported on 3/31/2017       prednisoLONE acetate 1 % ophthalmic susp    PRED FORTE    1 Bottle    Place 1 drop into the right eye 4 times daily       tobramycin-dexamethasone ophthalmic ointment    TOBRADEX    1  Tube    Place 1 Application into the right eye 3 times daily       traZODone 50 MG tablet    DESYREL    30 tablet    1/4 to 1/2 pill nightly as needed.       vitamin D 1000 UNITS capsule      Take 1 capsule by mouth daily

## 2017-06-20 ENCOUNTER — OFFICE VISIT (OUTPATIENT)
Dept: OPHTHALMOLOGY | Facility: CLINIC | Age: 57
End: 2017-06-20
Attending: OPHTHALMOLOGY
Payer: COMMERCIAL

## 2017-06-20 DIAGNOSIS — H49.11 PARALYTIC STRABISMUS, FOURTH OR TROCHLEAR NERVE PALSY, RIGHT: Primary | ICD-10-CM

## 2017-06-20 PROCEDURE — 99213 OFFICE O/P EST LOW 20 MIN: CPT | Mod: ZF

## 2017-06-20 PROCEDURE — 92015 DETERMINE REFRACTIVE STATE: CPT | Mod: GY

## 2017-06-20 ASSESSMENT — CONF VISUAL FIELD
OS_NORMAL: 1
OD_NORMAL: 1
METHOD: COUNTING FINGERS

## 2017-06-20 ASSESSMENT — TONOMETRY
IOP_METHOD: ICARE
OS_IOP_MMHG: 19
OD_IOP_MMHG: 18

## 2017-06-20 ASSESSMENT — REFRACTION_WEARINGRX
OS_SPHERE: +2.00
OD_VBASE: DOWN
SPECS_TYPE: COMPUTER GLS
OD_AXIS: 175
OD_AXIS: 005
OD_SPHERE: +1.50
OD_VBASE: DOWN
OS_SPHERE: +2.25
OD_CYLINDER: +0.75
OS_VBASE: UP
OD_CYLINDER: +1.00
OD_VPRISM: 3.0
OD_VPRISM: 3.0
OS_AXIS: 025
OS_CYLINDER: +0.50
OS_VPRISM: 3.0
OD_SPHERE: +1.75
OS_AXIS: 030
OS_CYLINDER: +0.25

## 2017-06-20 ASSESSMENT — SLIT LAMP EXAM - LIDS
COMMENTS: NORMAL
COMMENTS: NORMAL

## 2017-06-20 ASSESSMENT — REFRACTION_MANIFEST
OS_CYLINDER: SPHERE
OS_SPHERE: +2.25
OD_AXIS: 180
OD_SPHERE: +2.00
OD_CYLINDER: +1.00

## 2017-06-20 ASSESSMENT — VISUAL ACUITY
METHOD: SNELLEN - LINEAR
OD_SC: 20/20-
OS_SC: 20/20-

## 2017-06-20 ASSESSMENT — EXTERNAL EXAM - LEFT EYE: OS_EXAM: NORMAL

## 2017-06-20 ASSESSMENT — MARGIN REFLEX DISTANCE
OD_MRD1: 5
OS_MRD1: 4.5

## 2017-06-20 ASSESSMENT — EXTERNAL EXAM - RIGHT EYE: OD_EXAM: NORMAL

## 2017-06-20 NOTE — MR AVS SNAPSHOT
After Visit Summary   6/20/2017    Clara Boykin    MRN: [id number]           Patient Information     Date Of Birth          1960        Visit Information        Provider Department      6/20/2017 8:00 AM Lyle Leggett MD [facility] Peds Eye General         Follow-ups after your visit        Who to contact     Please call your clinic at [phone] to:    Ask questions about your health    Make or cancel appointments    Discuss your medicines    Learn about your test results    Speak to your doctor   If you have compliments or concerns about an experience at your clinic, or if you wish to file a complaint, please contact [facility] Patient Relations at [phone] or email us at [email or web address]         Additional Information About Your Visit        MyChart Information     Hashablet gives you secure access to your electronic health record. If you see a primary care provider, you can also send messages to your care team and make appointments. If you have questions, please call your primary care clinic.  If you do not have a primary care provider, please call [phone] and they will assist you.      [employer] is an electronic gateway that provides easy, online access to your medical records. With [employer], you can request a clinic appointment, read your test results, renew a prescription or communicate with your care team.     To access your existing account, please contact your [facility] or call [phone] for assistance.        Care EveryWhere ID     This is your Care EveryWhere ID. This could be used by other organizations to access your [facility] medical records  [id number]        Your Vitals Were     Last Period                   09/06/2006            Blood Pressure from Last 3 Encounters:   04/10/17 135/81   03/31/17 100/62   02/17/17 104/72    Weight from Last 3 Encounters:   04/10/17 54.9 kg (121 lb)    03/31/17 55.3 kg (122 lb)   02/17/17 57.2 kg (126 lb)              Today, you had the following     No orders found for display       Primary Care Provider Office Phone # Fax #    Joel Daniel Irwin Wegener, [phone]       [facility] [address]  [facility] [address]        Thank you!     Thank you for choosing [facility]  for your care. Our goal is always to provide you with excellent care. Hearing back from our patients is one way we can continue to improve our services. Please take a few minutes to complete the written survey that you may receive in the mail after your visit with us. Thank you!             Your Updated Medication List - Protect others around you: Learn how to safely use, store and throw away your medicines at www.disposemymeds.org.          This list is accurate as of: 6/20/17  9:05 AM.  Always use your most recent med list.                   Brand Name Dispense Instructions for use    albuterol 108 (90 BASE) MCG/ACT Inhaler    PROAIR HFA/PROVENTIL HFA/VENTOLIN HFA    1 Inhaler    Inhale 2 puffs into the lungs every 6 hours as needed for shortness of breath / dyspnea or wheezing       ALEVE PO      Take by mouth as needed       aspirin 81 MG tablet      Take 81 mg by mouth as needed Reported on 3/31/2017       BENADRYL 25 MG tablet   Generic drug:  diphenhydrAMINE      Take 25 mg by mouth nightly as needed Reported on 3/31/2017       CALCIUM 1200 PO          melatonin 3 MG Caps      Take 2 capsules by mouth At Bedtime       OMEGA 3 PO      Reported on 3/31/2017       prednisoLONE acetate 1 % ophthalmic susp    PRED FORTE    1 Bottle    Place 1 drop into the right eye 4 times daily       tobramycin-dexamethasone ophthalmic ointment    TOBRADEX    1 Tube    Place 1 Application into the right eye 3 times daily       traZODone 50 MG tablet    DESYREL    30 tablet    1/4 to 1/2 pill nightly as needed.       vitamin D 1000 UNITS capsule      Take 1 capsule by  mouth daily

## 2017-06-20 NOTE — NURSING NOTE
Chief Complaints and History of Present Illnesses   Patient presents with     Post Op (Ophthalmology) Right Eye     post-op status post strabismus surgery Surgery: 4/10/17     HPI    Affected eye(s):  Right   Symptoms:     Double vision      Frequency:  Intermittent       Do you have eye pain now?:  No      Comments:  Pt states that she is doing well, no diplopia unless she looks way up  Feels like right eyelid is drooping slightly since surgery  HALIMA Martel 8:08 AM 06/20/2017

## 2017-06-20 NOTE — PROGRESS NOTES
ASSESSMENT AND PLAN:     1. 2 months post-op status post strabismus surgery:     -Surgery: 4/10/17  Right inferior oblique myectomy   - Alignment: Doing fantastic.   - Diplopia: only in far far up gaze   - Has healed well; patient concerned about lid droop right eye; not appreciable today in clinic.  MRD1 today symmetric in both eyes.  - Off all drops  -Updated Manifest refraction given to patient today    Follow up as needed     Complete documentation of historical and exam elements from today's encounter can be found in the full encounter summary report (not reduplicated in this progress note).  I personally obtained the chief complaint(s) and history of present illness.  I confirmed and edited as necessary the review of systems, past medical/surgical history, family history, social history, and examination findings as documented by others; and I examined the patient myself.  I personally reviewed the relevant tests, images, and reports as documented above.  I formulated and edited as necessary the assessment and plan and discussed the findings and management plan with the patient and family     Lyle Leggett MD

## 2017-07-18 ENCOUNTER — ALLIED HEALTH/NURSE VISIT (OUTPATIENT)
Dept: NURSING | Facility: CLINIC | Age: 57
End: 2017-07-18
Payer: COMMERCIAL

## 2017-07-18 DIAGNOSIS — Z23 NEED FOR TDAP VACCINATION: Primary | ICD-10-CM

## 2017-07-18 PROCEDURE — 99207 ZZC NO CHARGE NURSE ONLY: CPT

## 2017-07-18 PROCEDURE — 90715 TDAP VACCINE 7 YRS/> IM: CPT

## 2017-07-18 PROCEDURE — 90471 IMMUNIZATION ADMIN: CPT

## 2017-07-18 NOTE — NURSING NOTE
Screening Questionnaire for Adult Immunization    Are you sick today?   No   Do you have allergies to medications, food, a vaccine component or latex?   No   Have you ever had a serious reaction after receiving a vaccination?   No   Do you have a long-term health problem with heart disease, lung disease, asthma, kidney disease, metabolic disease (e.g. diabetes), anemia, or other blood disorder?   No   Do you have cancer, leukemia, HIV/AIDS, or any other immune system problem?   No   In the past 3 months, have you taken medications that affect  your immune system, such as prednisone, other steroids, or anticancer drugs; drugs for the treatment of rheumatoid arthritis, Crohn s disease, or psoriasis; or have you had radiation treatments?   No   Have you had a seizure, or a brain or other nervous system problem?   No   During the past year, have you received a transfusion of blood or blood     products, or been given immune (gamma) globulin or antiviral drug?   No   For women: Are you pregnant or is there a chance you could become        pregnant during the next month?   No   Have you received any vaccinations in the past 4 weeks?   No     Immunization questionnaire answers were all negative.      MNVFC doesn't apply on this patient    Per orders of Dr. Collado, injection of Adacel given by Francy Garcia. Patient instructed to remain in clinic for 15 minutes afterwards, and to report any adverse reaction to me immediately.       Screening performed by Francy Garcia on 7/18/2017 at 9:23 AM.

## 2017-07-18 NOTE — MR AVS SNAPSHOT
After Visit Summary   7/18/2017    Clara Boykin    MRN: [id number]           Patient Information     Date Of Birth          1960        Visit Information        Provider Department      7/18/2017 11:00 AM  [facility]        Today's Diagnoses     Need for Tdap vaccination    -  1       Follow-ups after your visit        Your next 10 appointments already scheduled     Jul 18, 2017 11:00 AM CDT   Nurse Only with  MEDICAL ASSISTANT   [facility] ([facility])    [address] [place] [address] [phone]              Who to contact     If you have questions or need follow up information about today's clinic visit or your schedule please contact [facility] directly at [phone].  Normal or non-critical lab and imaging results will be communicated to you by MyChart, letter or phone within 4 business days after the clinic has received the results. If you do not hear from us within 7 days, please contact the clinic through MyChart or phone. If you have a critical or abnormal lab result, we will notify you by phone as soon as possible.  Submit refill requests through Curalate or call your pharmacy and they will forward the refill request to us. Please allow 3 business days for your refill to be completed.          Additional Information About Your Visit        MyChart Information     Curalate gives you secure access to your electronic health record. If you see a primary care provider, you can also send messages to your care team and make appointments. If you have questions, please call your primary care clinic.  If you do not have a primary care provider, please call [phone] and they will assist you.        Care EveryWhere ID     This is your Care EveryWhere ID. This could be used by other organizations to access your [facility] medical records  [id number]        Your Vitals Were     Last Period                    09/06/2006            Blood Pressure from Last 3 Encounters:   04/10/17 135/81   03/31/17 100/62   02/17/17 104/72    Weight from Last 3 Encounters:   04/10/17 121 lb (54.9 kg)   03/31/17 122 lb (55.3 kg)   02/17/17 126 lb (57.2 kg)              We Performed the Following     TDAP VACCINE (ADACEL)        Primary Care Provider Office Phone # Fax #    Manny Daniel Irwin Wegener, [phone]       [facility] [address]  [facility] [address]        Equal Access to Services     [facility]: Hadii aad ku hadasho Soomaali, waaxda luqadaha, qaybta kaalmada adeegyada, waxjustin genaoin hayaan adeanu landaverde . So [facility] [phone].    ATENCIÓN: Si habla español, tiene a fuentes disposición servicios gratuitos de asistencia lingüística. Llame al [phone].    We comply with applicable federal civil rights laws and [place] laws. We do not discriminate on the basis of race, color, national origin, age, disability sex, sexual orientation or gender identity.            Thank you!     Thank you for choosing [facility]  for your care. Our goal is always to provide you with excellent care. Hearing back from our patients is one way we can continue to improve our services. Please take a few minutes to complete the written survey that you may receive in the mail after your visit with us. Thank you!             Your Updated Medication List - Protect others around you: Learn how to safely use, store and throw away your medicines at www.disposemymeds.org.          This list is accurate as of: 7/18/17  9:42 AM.  Always use your most recent med list.                   Brand Name Dispense Instructions for use Diagnosis    albuterol 108 (90 BASE) MCG/ACT Inhaler    PROAIR HFA/PROVENTIL HFA/VENTOLIN HFA    1 Inhaler    Inhale 2 puffs into the lungs every 6 hours as needed for shortness of breath / dyspnea or wheezing    Acute bronchitis with symptoms > 10 days       ALEVE PO      Take  by mouth as needed        aspirin 81 MG tablet      Take 81 mg by mouth as needed Reported on 3/31/2017        BENADRYL 25 MG tablet   Generic drug:  diphenhydrAMINE      Take 25 mg by mouth nightly as needed Reported on 3/31/2017        CALCIUM 1200 PO           melatonin 3 MG Caps      Take 2 capsules by mouth At Bedtime        OMEGA 3 PO      Reported on 3/31/2017        prednisoLONE acetate 1 % ophthalmic susp    PRED FORTE    1 Bottle    Place 1 drop into the right eye 4 times daily    Post-operative state       tobramycin-dexamethasone ophthalmic ointment    TOBRADEX    1 Tube    Place 1 Application into the right eye 3 times daily    Postoperative eye state       traZODone 50 MG tablet    DESYREL    30 tablet    1/4 to 1/2 pill nightly as needed.    Primary insomnia       vitamin D 1000 UNITS capsule      Take 1 capsule by mouth daily

## 2017-10-27 ENCOUNTER — TELEPHONE (OUTPATIENT)
Dept: DERMATOLOGY | Facility: CLINIC | Age: 57
End: 2017-10-27

## 2017-10-27 NOTE — TELEPHONE ENCOUNTER
Attached documentation and chart reviewed. Okay to wait until next available.    Tatianna White MD  PGY-3, Dermatology  [facility]

## 2017-10-27 NOTE — TELEPHONE ENCOUNTER
I called the patient and she stated that she has had the lesion for awhile but it has a halo around it and it's kind of raised. She would like to have a skin cancer exam as well. The lesion does not bleed and it is not painful. Can this wait till the next available appointment or should she be worked in?   Nuria Cruz, CMA

## 2017-10-27 NOTE — TELEPHONE ENCOUNTER
----- Message from Rusty Garibay sent at 10/27/2017  9:28 AM CDT -----  Regarding: Urgent- DX: Changing Leasion (Red, Raised, Color Change in 2 weeks time)  Hi Team,    Hope you all are well!    Patient called in regards to a lesion she has on the inside of her knee that she noticed two weeks ago. She needs to be scheduled for DX: Changing Leasion (Red, Raised, Color Change in 2 weeks time). She says it has a white halo around it now that wasn't there before.     Kind Regards    Rusty     Please DO NOT send this message and/or reply back to sender. Call Center Representatives DO NOT respond to messages.

## 2017-12-12 ENCOUNTER — ALLIED HEALTH/NURSE VISIT (OUTPATIENT)
Dept: NURSING | Facility: CLINIC | Age: 57
End: 2017-12-12
Payer: COMMERCIAL

## 2017-12-12 DIAGNOSIS — Z23 NEED FOR PROPHYLACTIC VACCINATION AND INOCULATION AGAINST INFLUENZA: Primary | ICD-10-CM

## 2017-12-12 PROCEDURE — 90686 IIV4 VACC NO PRSV 0.5 ML IM: CPT

## 2017-12-12 PROCEDURE — 99207 ZZC NO CHARGE NURSE ONLY: CPT

## 2017-12-12 PROCEDURE — 90471 IMMUNIZATION ADMIN: CPT

## 2017-12-12 NOTE — PROGRESS NOTES

## 2017-12-12 NOTE — MR AVS SNAPSHOT
After Visit Summary   12/12/2017    Clara Boykin    MRN: [id number]           Patient Information     Date Of Birth          1960        Visit Information        Provider Department      12/12/2017 3:00 PM [facility]        Today's Diagnoses     Need for prophylactic vaccination and inoculation against influenza    -  1       Follow-ups after your visit        Your next 10 appointments already scheduled     Jan 11, 2018  8:30 AM CST   (Arrive by 8:15 AM)   Return Visit with Michelle Sanabria MD   [facility] D[facility] ([facility] [place])    [address] [place] [address]  3rd [facility] [address] [phone]              Who to contact     If you have questions or need follow up information about today's clinic visit or your schedule please contact [facility] directly at [phone].  Normal or non-critical lab and imaging results will be communicated to you by MyChart, letter or phone within 4 business days after the clinic has received the results. If you do not hear from us within 7 days, please contact the clinic through MyChart or phone. If you have a critical or abnormal lab result, we will notify you by phone as soon as possible.  Submit refill requests through Contactual or call your pharmacy and they will forward the refill request to us. Please allow 3 business days for your refill to be completed.          Additional Information About Your Visit        MyChart Information     Contactual gives you secure access to your electronic health record. If you see a primary care provider, you can also send messages to your care team and make appointments. If you have questions, please call your primary care clinic.  If you do not have a primary care provider, please call [phone] and they will assist you.        Care EveryWhere ID     This is your Care EveryWhere ID. This could be used by other organizations to access  your [facility] medical records  [id number]        Your Vitals Were     Last Period                   09/06/2006            Blood Pressure from Last 3 Encounters:   04/10/17 135/81   03/31/17 100/62   02/17/17 104/72    Weight from Last 3 Encounters:   04/10/17 121 lb (54.9 kg)   03/31/17 122 lb (55.3 kg)   02/17/17 126 lb (57.2 kg)              We Performed the Following     FLU VAC, SPLIT VIRUS IM > 3 YO (QUADRIVALENT) [52188]     Vaccine Administration, Initial [03381]        Primary Care Provider Office Phone # Fax #    Manny Mehul Irwin Wegener, [phone] [address] [place] [address]        Equal Access to Services     [facility] : Hadii andrews arnetto Solaxmi, waaxda luqadaha, qaybta kaalmada adeegyada, shahab landaverde . So [facility] [phone].    ATENCIÓN: Si habla español, tiene a fuentes disposición servicios gratuitos de asistencia lingüística. Llame al [phone].    We comply with applicable federal civil rights laws and [place] laws. We do not discriminate on the basis of race, color, national origin, age, disability, sex, sexual orientation, or gender identity.            Thank you!     Thank you for choosing [facility]  for your care. Our goal is always to provide you with excellent care. Hearing back from our patients is one way we can continue to improve our services. Please take a few minutes to complete the written survey that you may receive in the mail after your visit with us. Thank you!             Your Updated Medication List - Protect others around you: Learn how to safely use, store and throw away your medicines at www.disposemymeds.org.          This list is accurate as of: 12/12/17  3:14 PM.  Always use your most recent med list.                   Brand Name Dispense Instructions for use Diagnosis    albuterol 108 (90 BASE) MCG/ACT Inhaler    PROAIR HFA/PROVENTIL HFA/VENTOLIN HFA    1 Inhaler    Inhale 2 puffs into  the lungs every 6 hours as needed for shortness of breath / dyspnea or wheezing    Acute bronchitis with symptoms > 10 days       ALEVE PO      Take by mouth as needed        aspirin 81 MG tablet      Take 81 mg by mouth as needed Reported on 3/31/2017        BENADRYL 25 MG tablet   Generic drug:  diphenhydrAMINE      Take 25 mg by mouth nightly as needed Reported on 3/31/2017        CALCIUM 1200 PO           melatonin 3 MG Caps      Take 2 capsules by mouth At Bedtime        OMEGA 3 PO      Reported on 3/31/2017        prednisoLONE acetate 1 % ophthalmic susp    PRED FORTE    1 Bottle    Place 1 drop into the right eye 4 times daily    Post-operative state       tobramycin-dexamethasone ophthalmic ointment    TOBRADEX    1 Tube    Place 1 Application into the right eye 3 times daily    Postoperative eye state       traZODone 50 MG tablet    DESYREL    30 tablet    1/4 to 1/2 pill nightly as needed.    Primary insomnia       vitamin D 1000 UNITS capsule      Take 1 capsule by mouth daily

## 2018-01-11 ENCOUNTER — OFFICE VISIT (OUTPATIENT)
Dept: DERMATOLOGY | Facility: CLINIC | Age: 58
End: 2018-01-11
Payer: COMMERCIAL

## 2018-01-11 DIAGNOSIS — L57.0 AK (ACTINIC KERATOSIS): Primary | ICD-10-CM

## 2018-01-11 DIAGNOSIS — L82.1 SEBORRHEIC KERATOSIS: ICD-10-CM

## 2018-01-11 ASSESSMENT — PAIN SCALES - GENERAL: PAINLEVEL: NO PAIN (0)

## 2018-01-11 NOTE — LETTER
1/11/2018       RE: Clara Boykin  [address]  [address]  [facility] [address]     Dear Colleague,    Thank you for referring your patient, Clara Boykin, to the [facility] DERMATOLOGY at [facility] Please see a copy of my visit note below.    [facility] Dermatology Note      Dermatology Problem List:  1. Lipoma  2. SKs  3. Cherry angiomas  4. Solar lentigenes  5. Pruritus  6. Prurigo nodularis  7. Onychodystrophy  8. AKs  -cryo    Encounter Date: Jan 11, 2018    CC:   Chief Complaint   Patient presents with     Skin Check     Clara allen is here today for a skin check.  Did have a spot on the outside of her right knee           History of Present Illness:  Ms. Clara Boykin is a 57 year old female who presents as a follow-up for skin check. The patient was last seen 3/6/15 when she had lipoma wound check s/p excision.   The patient called a couple months ago with a spot on her right knee she wanted to have evaluated.  She states this spot has now fallen off.  No other lesions of concern.    Past Medical History:   Patient Active Problem List   Diagnosis     iamJOINT PAIN-PELVIS     Pain in joint, upper arm     Skin exam, screening for cancer     CARDIOVASCULAR SCREENING; LDL GOAL LESS THAN 160     Insomnia     History of anesthesia reaction     Small vessel disease, cerebrovascular     Right foot pain     Past Medical History:   Diagnosis Date     Diplopia      H/O CT scan      H/O magnetic resonance imaging      Paralytic strabismus      Pneumonia      PONV (postoperative nausea and vomiting)      Past Surgical History:   Procedure Laterality Date     BUNIONECTOMY Right 2016     HERNIORRHAPHY VENTRAL N/A 7/7/2016    Procedure: HERNIORRHAPHY VENTRAL;  Surgeon: Ash Thompson MD;  Location: [facility] OR     RECESSION RESECTION (REPAIR STRABISMUS) Right 4/10/2017    Procedure: RECESSION RESECTION (REPAIR STRABISMUS);  Surgeon: Lyle Leggett  MD Torres;  Location:  OR       Social History:  The patient denies use of tanning beds.    Family History:  History of NMSC in parents    Medications:  Current Outpatient Prescriptions   Medication Sig Dispense Refill     Naproxen Sodium (ALEVE PO) Take by mouth as needed       tobramycin-dexamethasone (TOBRADEX) ophthalmic ointment Place 1 Application into the right eye 3 times daily 1 Tube 0     prednisoLONE acetate (PRED FORTE) 1 % ophthalmic susp Place 1 drop into the right eye 4 times daily 1 Bottle 0     albuterol (PROAIR HFA/PROVENTIL HFA/VENTOLIN HFA) 108 (90 BASE) MCG/ACT Inhaler Inhale 2 puffs into the lungs every 6 hours as needed for shortness of breath / dyspnea or wheezing 1 Inhaler 0     traZODone (DESYREL) 50 MG tablet 1/4 to 1/2 pill nightly as needed. 30 tablet 3     melatonin 3 MG CAPS Take 2 capsules by mouth At Bedtime        aspirin 81 MG tablet Take 81 mg by mouth as needed Reported on 3/31/2017       Calcium Carbonate-Vit D-Min (CALCIUM 1200 PO)        Cholecalciferol (VITAMIN D) 1000 UNITS capsule Take 1 capsule by mouth daily       diphenhydrAMINE (BENADRYL) 25 MG tablet Take 25 mg by mouth nightly as needed Reported on 3/31/2017       Omega-3 Fatty Acids (OMEGA 3 PO) Reported on 3/31/2017          Allergies   Allergen Reactions     Exparel [Bupivacaine] GI Disturbance     Patient had severe abdominal distention     Darvocet [Propoxyphene N-Apap] Other (See Comments)     confusion     Liposomes GI Disturbance     Penicillins Itching     Percocet [Oxycodone-Acetaminophen] Other (See Comments)     confusion     Tape [Adhesive Tape]      Once after surgical tape     Vistaril [Hydroxyzine] Rash     Intense flushing/redness of face          Review of Systems:  -As per HPI  -Constitutional: The patient denies fatigue, fevers, chills, unintended weight loss, and night sweats.  -HEENT: Patient denies nonhealing oral sores.  -Skin: As above in HPI. No additional skin concerns.    Physical  exam:  Vitals: LMP 09/06/2006  Breastfeeding? No  GEN: This is a well developed, well-nourished female in no acute distress, in a pleasant mood.    SKIN: Total skin excluding the undergarment areas was performed. The exam included the head/face, neck, both arms, chest, back, abdomen, both legs, digits and/or nails.   - There are erythematous macules with overyling adherent scale on the bridge of nose and R cheek (AKs x 2).   There are bright red some shaped papules scattered on the abdomen.   There are waxy stuck on tan to brown papules on the trunk and extremities.  -Numerous well-healed surgical scars on her back  -No other lesions of concern on areas examined.     Impression/Plan:  1. Actinic keratosis    Cryotherapy procedure note: After verbal consent and discussion of risks and benefits including but no limited to dyspigmentation/scar, blister, and pain, 2 was(were) treated with 1-2mm freeze border for 2 cycles with liquid nitrogen. Post cryotherapy instructions were provided.       2. Seborrheic keratosis, non irritated    Seborrheic keratosis: Discussed benign nature of lesions.      3. Cherry angioma(s)    Benign nature of skin lesions described      CC Dr. De Los Santos on close of this encounter.  Follow-up in 1 year, earlier for new or changing lesions.       Dr. De Los Santos staffed the patient.    Staff Involved:  Resident(Michelle Sanabria)/Staff(as above)  I talked with and examined Clara Boykin and I agree with the assessment and the plan. I was present for the entire procedure. SAEED De Los Santos MD.      Again, thank you for allowing me to participate in the care of your patient.      Sincerely,    Michelle Sanabria MD

## 2018-01-11 NOTE — MR AVS SNAPSHOT
After Visit Summary   1/11/2018    Clara Boykin    MRN: [id number]           Patient Information     Date Of Birth          1960        Visit Information        Provider Department      1/11/2018 8:30 AM Michelle Sanabria MD [facility] Dermatology        Today's Diagnoses     AK (actinic keratosis)    -  1    Seborrheic keratosis          Care Instructions    Cryotherapy    What is it?    Use of a very cold liquid, such as liquid nitrogen, to freeze and destroy abnormal skin cells that need to be removed    What should I expect?    Tenderness and redness    A small blister that might grow and fill with dark purple blood. There may be crusting.    More than one treatment may be needed if the lesions do not go away.    How do I care for the treated area?    Gently wash the area with your hands when bathing.    Use a thin layer of Vaseline to help with healing. You may use a Band-Aid.     The area should heal within 7-10 days and may leave behind a pink or lighter color.     Do not use an antibiotic or Neosporin ointment.     You may take acetaminophen (Tylenol) for pain.     Call your Doctor if you have:    Severe pain    Signs of infection (warmth, redness, cloudy yellow drainage, and or a bad smell)    Questions or concerns    Who should I call with questions?       [facility]: [phone]       [facility]: [phone]       For urgent needs outside of business hours call the [facility] at [phone]        and ask for the dermatology resident on call            Follow-ups after your visit        Follow-up notes from your care team     Return in about 1 year (around 1/11/2019).      Your next 10 appointments already scheduled     Jan 30, 2018  8:00 AM CST   Office Visit with Joel Daniel Irwin Wegener, MD   [facility] ([facility]    [address] [place] [id number]    [phone]           Bring a current list of meds and any records pertaining to this visit. For Physicals, please bring immunization records and any forms needing to be filled out. Please arrive 10 minutes early to complete paperwork.              Who to contact     Please call your clinic at [phone] to:    Ask questions about your health    Make or cancel appointments    Discuss your medicines    Learn about your test results    Speak to your doctor   If you have compliments or concerns about an experience at your clinic, or if you wish to file a complaint, please contact [facility] Patient Relations at [phone] or email us at [email or web address]         Additional Information About Your Visit        [employer] Information     Home-Account gives you secure access to your electronic health record. If you see a primary care provider, you can also send messages to your care team and make appointments. If you have questions, please call your primary care clinic.  If you do not have a primary care provider, please call [phone] and they will assist you.      Home-Account is an electronic gateway that provides easy, online access to your medical records. With Home-Account, you can request a clinic appointment, read your test results, renew a prescription or communicate with your care team.     To access your existing account, please contact your [facility] or call [phone] for assistance.        Care EveryWhere ID     This is your Care EveryWhere ID. This could be used by other organizations to access your [facility] medical records  [id number]        Your Vitals Were     Last Period Breastfeeding?                09/06/2006 No           Blood Pressure from Last 3 Encounters:   No data found for BP    Weight from Last 3 Encounters:   No data found for Wt              Today, you had the following     No orders found for display       Primary Care  Provider Office Phone # Fax #    Joel Daniel Irwin Wegener, [phone] [address]  [facility] [address]        Equal Access to Services     BRIELLE JOYKATHLEEN : Hadii aad ku haddoo Soradhaali, waaxda luqadaha, qaybta kaalmada adecarlosda, shahab garciakandi megan. So [facility] [phone].    ATENCIÓN: Si habla español, tiene a fuentes disposición servicios gratuitos de asistencia lingüística. Llame al [phone].    We comply with applicable federal civil rights laws and [place] laws. We do not discriminate on the basis of race, color, national origin, age, disability, sex, sexual orientation, or gender identity.            Thank you!     Thank you for choosing [facility] DERMATOLOGY  for your care. Our goal is always to provide you with excellent care. Hearing back from our patients is one way we can continue to improve our services. Please take a few minutes to complete the written survey that you may receive in the mail after your visit with us. Thank you!             Your Updated Medication List - Protect others around you: Learn how to safely use, store and throw away your medicines at www.disposemymeds.org.          This list is accurate as of: 1/11/18 11:59 PM.  Always use your most recent med list.                   Brand Name Dispense Instructions for use Diagnosis    albuterol 108 (90 BASE) MCG/ACT Inhaler    PROAIR HFA/PROVENTIL HFA/VENTOLIN HFA    1 Inhaler    Inhale 2 puffs into the lungs every 6 hours as needed for shortness of breath / dyspnea or wheezing    Acute bronchitis with symptoms > 10 days       ALEVE PO      Take by mouth as needed        aspirin 81 MG tablet      Take 81 mg by mouth as needed Reported on 3/31/2017        BENADRYL 25 MG tablet   Generic drug:  diphenhydrAMINE      Take 25 mg by mouth nightly as needed Reported on 3/31/2017        CALCIUM 1200 PO           melatonin 3 MG Caps      Take 2 capsules by mouth At Bedtime        OMEGA 3 PO      Reported  on 3/31/2017        prednisoLONE acetate 1 % ophthalmic susp    PRED FORTE    1 Bottle    Place 1 drop into the right eye 4 times daily    Post-operative state       tobramycin-dexamethasone ophthalmic ointment    TOBRADEX    1 Tube    Place 1 Application into the right eye 3 times daily    Postoperative eye state       traZODone 50 MG tablet    DESYREL    30 tablet    1/4 to 1/2 pill nightly as needed.    Primary insomnia       vitamin D 1000 UNITS capsule      Take 1 capsule by mouth daily

## 2018-01-11 NOTE — PATIENT INSTRUCTIONS
Cryotherapy    What is it?    Use of a very cold liquid, such as liquid nitrogen, to freeze and destroy abnormal skin cells that need to be removed    What should I expect?    Tenderness and redness    A small blister that might grow and fill with dark purple blood. There may be crusting.    More than one treatment may be needed if the lesions do not go away.    How do I care for the treated area?    Gently wash the area with your hands when bathing.    Use a thin layer of Vaseline to help with healing. You may use a Band-Aid.     The area should heal within 7-10 days and may leave behind a pink or lighter color.     Do not use an antibiotic or Neosporin ointment.     You may take acetaminophen (Tylenol) for pain.     Call your Doctor if you have:    Severe pain    Signs of infection (warmth, redness, cloudy yellow drainage, and or a bad smell)    Questions or concerns    Who should I call with questions?       [facility]: [phone]       [facility]: [phone]       For urgent needs outside of business hours call the [facility] at [phone]        and ask for the dermatology resident on call

## 2018-01-11 NOTE — PROGRESS NOTES
[facility] Dermatology Note      Dermatology Problem List:  1. Lipoma  2. SKs  3. Cherry angiomas  4. Solar lentigenes  5. Pruritus  6. Prurigo nodularis  7. Onychodystrophy  8. AKs  -cryo    Encounter Date: Jan 11, 2018    CC:   Chief Complaint   Patient presents with     Skin Check     Clara allen is here today for a skin check.  Did have a spot on the outside of her right knee           History of Present Illness:  Ms. Clara Boykin is a 57 year old female who presents as a follow-up for skin check. The patient was last seen 3/6/15 when she had lipoma wound check s/p excision.   The patient called a couple months ago with a spot on her right knee she wanted to have evaluated.  She states this spot has now fallen off.  No other lesions of concern.    Past Medical History:   Patient Active Problem List   Diagnosis     iamJOINT PAIN-PELVIS     Pain in joint, upper arm     Skin exam, screening for cancer     CARDIOVASCULAR SCREENING; LDL GOAL LESS THAN 160     Insomnia     History of anesthesia reaction     Small vessel disease, cerebrovascular     Right foot pain     Past Medical History:   Diagnosis Date     Diplopia      H/O CT scan      H/O magnetic resonance imaging      Paralytic strabismus      Pneumonia      PONV (postoperative nausea and vomiting)      Past Surgical History:   Procedure Laterality Date     BUNIONECTOMY Right 2016     HERNIORRHAPHY VENTRAL N/A 7/7/2016    Procedure: HERNIORRHAPHY VENTRAL;  Surgeon: Ash Thompson MD;  Location:  OR     RECESSION RESECTION (REPAIR STRABISMUS) Right 4/10/2017    Procedure: RECESSION RESECTION (REPAIR STRABISMUS);  Surgeon: Lyle Leggett MD;  Location:  OR       Social History:  The patient denies use of tanning beds.    Family History:  History of NMSC in parents    Medications:  Current Outpatient Prescriptions   Medication Sig Dispense Refill     Naproxen Sodium (ALEVE PO) Take by mouth as needed        tobramycin-dexamethasone (TOBRADEX) ophthalmic ointment Place 1 Application into the right eye 3 times daily 1 Tube 0     prednisoLONE acetate (PRED FORTE) 1 % ophthalmic susp Place 1 drop into the right eye 4 times daily 1 Bottle 0     albuterol (PROAIR HFA/PROVENTIL HFA/VENTOLIN HFA) 108 (90 BASE) MCG/ACT Inhaler Inhale 2 puffs into the lungs every 6 hours as needed for shortness of breath / dyspnea or wheezing 1 Inhaler 0     traZODone (DESYREL) 50 MG tablet 1/4 to 1/2 pill nightly as needed. 30 tablet 3     melatonin 3 MG CAPS Take 2 capsules by mouth At Bedtime        aspirin 81 MG tablet Take 81 mg by mouth as needed Reported on 3/31/2017       Calcium Carbonate-Vit D-Min (CALCIUM 1200 PO)        Cholecalciferol (VITAMIN D) 1000 UNITS capsule Take 1 capsule by mouth daily       diphenhydrAMINE (BENADRYL) 25 MG tablet Take 25 mg by mouth nightly as needed Reported on 3/31/2017       Omega-3 Fatty Acids (OMEGA 3 PO) Reported on 3/31/2017          Allergies   Allergen Reactions     Exparel [Bupivacaine] GI Disturbance     Patient had severe abdominal distention     Darvocet [Propoxyphene N-Apap] Other (See Comments)     confusion     Liposomes GI Disturbance     Penicillins Itching     Percocet [Oxycodone-Acetaminophen] Other (See Comments)     confusion     Tape [Adhesive Tape]      Once after surgical tape     Vistaril [Hydroxyzine] Rash     Intense flushing/redness of face          Review of Systems:  -As per HPI  -Constitutional: The patient denies fatigue, fevers, chills, unintended weight loss, and night sweats.  -HEENT: Patient denies nonhealing oral sores.  -Skin: As above in HPI. No additional skin concerns.    Physical exam:  Vitals: LMP 09/06/2006  Breastfeeding? No  GEN: This is a well developed, well-nourished female in no acute distress, in a pleasant mood.    SKIN: Total skin excluding the undergarment areas was performed. The exam included the head/face, neck, both arms, chest, back, abdomen,  both legs, digits and/or nails.   - There are erythematous macules with overyling adherent scale on the bridge of nose and R cheek (AKs x 2).   There are bright red some shaped papules scattered on the abdomen.   There are waxy stuck on tan to brown papules on the trunk and extremities.  -Numerous well-healed surgical scars on her back  -No other lesions of concern on areas examined.     Impression/Plan:  1. Actinic keratosis    Cryotherapy procedure note: After verbal consent and discussion of risks and benefits including but no limited to dyspigmentation/scar, blister, and pain, 2 was(were) treated with 1-2mm freeze border for 2 cycles with liquid nitrogen. Post cryotherapy instructions were provided.       2. Seborrheic keratosis, non irritated    Seborrheic keratosis: Discussed benign nature of lesions.      3. Cherry angioma(s)    Benign nature of skin lesions described        CC Dr. De Los Santos on close of this encounter.  Follow-up in 1 year, earlier for new or changing lesions.       Dr. De Los Santos staffed the patient.    Staff Involved:  Resident(Michelle Sanabria)/Staff(as above)  I talked with and examined Clara Boykin and I agree with the assessment and the plan. I was present for the entire procedure. SAEED De Los Santos MD.

## 2018-01-11 NOTE — LETTER
1/11/2018      RE: Clara Boykin  [address]  [address]  [place] [address]       [facility] Dermatology Note      Dermatology Problem List:  1. Lipoma  2. SKs  3. Cherry angiomas  4. Solar lentigenes  5. Pruritus  6. Prurigo nodularis  7. Onychodystrophy  8. AKs  -cryo    Encounter Date: Jan 11, 2018    CC:   Chief Complaint   Patient presents with     Skin Check     Clara allen is here today for a skin check.  Did have a spot on the outside of her right knee           History of Present Illness:  Ms. Clara Boykin is a 57 year old female who presents as a follow-up for skin check. The patient was last seen 3/6/15 when she had lipoma wound check s/p excision.   The patient called a couple months ago with a spot on her right knee she wanted to have evaluated.  She states this spot has now fallen off.  No other lesions of concern.    Past Medical History:   Patient Active Problem List   Diagnosis     iamJOINT PAIN-PELVIS     Pain in joint, upper arm     Skin exam, screening for cancer     CARDIOVASCULAR SCREENING; LDL GOAL LESS THAN 160     Insomnia     History of anesthesia reaction     Small vessel disease, cerebrovascular     Right foot pain     Past Medical History:   Diagnosis Date     Diplopia      H/O CT scan      H/O magnetic resonance imaging      Paralytic strabismus      Pneumonia      PONV (postoperative nausea and vomiting)      Past Surgical History:   Procedure Laterality Date     BUNIONECTOMY Right 2016     HERNIORRHAPHY VENTRAL N/A 7/7/2016    Procedure: HERNIORRHAPHY VENTRAL;  Surgeon: Ash Thompson MD;  Location:  OR     RECESSION RESECTION (REPAIR STRABISMUS) Right 4/10/2017    Procedure: RECESSION RESECTION (REPAIR STRABISMUS);  Surgeon: Lyle Leggett MD;  Location:  OR       Social History:  The patient denies use of tanning beds.    Family History:  History of NMSC in parents    Medications:  Current Outpatient Prescriptions   Medication  Sig Dispense Refill     Naproxen Sodium (ALEVE PO) Take by mouth as needed       tobramycin-dexamethasone (TOBRADEX) ophthalmic ointment Place 1 Application into the right eye 3 times daily 1 Tube 0     prednisoLONE acetate (PRED FORTE) 1 % ophthalmic susp Place 1 drop into the right eye 4 times daily 1 Bottle 0     albuterol (PROAIR HFA/PROVENTIL HFA/VENTOLIN HFA) 108 (90 BASE) MCG/ACT Inhaler Inhale 2 puffs into the lungs every 6 hours as needed for shortness of breath / dyspnea or wheezing 1 Inhaler 0     traZODone (DESYREL) 50 MG tablet 1/4 to 1/2 pill nightly as needed. 30 tablet 3     melatonin 3 MG CAPS Take 2 capsules by mouth At Bedtime        aspirin 81 MG tablet Take 81 mg by mouth as needed Reported on 3/31/2017       Calcium Carbonate-Vit D-Min (CALCIUM 1200 PO)        Cholecalciferol (VITAMIN D) 1000 UNITS capsule Take 1 capsule by mouth daily       diphenhydrAMINE (BENADRYL) 25 MG tablet Take 25 mg by mouth nightly as needed Reported on 3/31/2017       Omega-3 Fatty Acids (OMEGA 3 PO) Reported on 3/31/2017          Allergies   Allergen Reactions     Exparel [Bupivacaine] GI Disturbance     Patient had severe abdominal distention     Darvocet [Propoxyphene N-Apap] Other (See Comments)     confusion     Liposomes GI Disturbance     Penicillins Itching     Percocet [Oxycodone-Acetaminophen] Other (See Comments)     confusion     Tape [Adhesive Tape]      Once after surgical tape     Vistaril [Hydroxyzine] Rash     Intense flushing/redness of face          Review of Systems:  -As per HPI  -Constitutional: The patient denies fatigue, fevers, chills, unintended weight loss, and night sweats.  -HEENT: Patient denies nonhealing oral sores.  -Skin: As above in HPI. No additional skin concerns.    Physical exam:  Vitals: LMP 09/06/2006  Breastfeeding? No  GEN: This is a well developed, well-nourished female in no acute distress, in a pleasant mood.    SKIN: Total skin excluding the undergarment areas was  performed. The exam included the head/face, neck, both arms, chest, back, abdomen, both legs, digits and/or nails.   - There are erythematous macules with overyling adherent scale on the bridge of nose and R cheek (AKs x 2).   There are bright red some shaped papules scattered on the abdomen.   There are waxy stuck on tan to brown papules on the trunk and extremities.  -Numerous well-healed surgical scars on her back  -No other lesions of concern on areas examined.     Impression/Plan:  1. Actinic keratosis    Cryotherapy procedure note: After verbal consent and discussion of risks and benefits including but no limited to dyspigmentation/scar, blister, and pain, 2 was(were) treated with 1-2mm freeze border for 2 cycles with liquid nitrogen. Post cryotherapy instructions were provided.       2. Seborrheic keratosis, non irritated    Seborrheic keratosis: Discussed benign nature of lesions.      3. Cherry angioma(s)    Benign nature of skin lesions described        CC Dr. De Los Santos on close of this encounter.  Follow-up in 1 year, earlier for new or changing lesions.       Dr. De Los Santos staffed the patient.    Staff Involved:  Resident(Michelle Sanabria)/Staff(as above)  I talked with and examined Clara Boykin and I agree with the assessment and the plan. I was present for the entire procedure. SAEED De Los Santos MD.      Michelle Sanabria MD

## 2018-01-11 NOTE — NURSING NOTE
Dermatology Rooming Note    Clara Boykin's goals for this visit include:   Chief Complaint   Patient presents with     Skin Check     Clara allen is here today for a skin check.  Did have a spot on the outside of her right knee           Betty Elliott LPN

## 2018-02-19 ENCOUNTER — TELEPHONE (OUTPATIENT)
Dept: FAMILY MEDICINE | Facility: CLINIC | Age: 58
End: 2018-02-19

## 2018-02-19 ENCOUNTER — OFFICE VISIT (OUTPATIENT)
Dept: FAMILY MEDICINE | Facility: CLINIC | Age: 58
End: 2018-02-19
Payer: COMMERCIAL

## 2018-02-19 VITALS
TEMPERATURE: 99.1 F | BODY MASS INDEX: 19.65 KG/M2 | HEART RATE: 91 BPM | OXYGEN SATURATION: 97 % | DIASTOLIC BLOOD PRESSURE: 66 MMHG | RESPIRATION RATE: 18 BRPM | SYSTOLIC BLOOD PRESSURE: 114 MMHG | WEIGHT: 119 LBS

## 2018-02-19 DIAGNOSIS — R68.89 FLU-LIKE SYMPTOMS: Primary | ICD-10-CM

## 2018-02-19 DIAGNOSIS — J20.9 ACUTE BRONCHITIS, UNSPECIFIED ORGANISM: ICD-10-CM

## 2018-02-19 LAB
FLUAV+FLUBV AG SPEC QL: NEGATIVE
FLUAV+FLUBV AG SPEC QL: NEGATIVE
SPECIMEN SOURCE: NORMAL

## 2018-02-19 PROCEDURE — 87804 INFLUENZA ASSAY W/OPTIC: CPT | Performed by: FAMILY MEDICINE

## 2018-02-19 PROCEDURE — 99214 OFFICE O/P EST MOD 30 MIN: CPT | Performed by: FAMILY MEDICINE

## 2018-02-19 RX ORDER — OSELTAMIVIR PHOSPHATE 75 MG/1
75 CAPSULE ORAL 2 TIMES DAILY
Qty: 10 CAPSULE | Refills: 0 | Status: SHIPPED | OUTPATIENT
Start: 2018-02-19 | End: 2018-03-20

## 2018-02-19 RX ORDER — ALBUTEROL SULFATE 90 UG/1
2 AEROSOL, METERED RESPIRATORY (INHALATION) EVERY 6 HOURS PRN
Qty: 1 INHALER | Refills: 0 | Status: SHIPPED | OUTPATIENT
Start: 2018-02-19 | End: 2018-10-09

## 2018-02-19 RX ORDER — CODEINE PHOSPHATE AND GUAIFENESIN 10; 100 MG/5ML; MG/5ML
1 SOLUTION ORAL EVERY 4 HOURS PRN
Qty: 120 ML | Refills: 1 | Status: SHIPPED | OUTPATIENT
Start: 2018-02-19 | End: 2018-04-20

## 2018-02-19 NOTE — PATIENT INSTRUCTIONS
ASSESSMENT AND PLAN  1. Flu-like symptoms  Based on classic symptoms.     Has had significant respiratory illnesses in past so if not improving in next 24 hours then ok to start tamiflu.      Ibuprofen for fever/aches and cough syrup with codeine for cough at night and can take regular robitusin during the day.     Continue albuterol two puffs every 4-6 hours until better.       - Influenza A/B antigen  - oseltamivir (TAMIFLU) 75 MG capsule; Take 1 capsule (75 mg) by mouth 2 times daily  Dispense: 10 capsule; Refill: 0  - guaiFENesin-codeine (ROBITUSSIN AC) 100-10 MG/5ML SOLN solution; Take 5 mLs by mouth every 4 hours as needed for cough  Dispense: 120 mL; Refill: 1    2. Acute bronchitis, unspecified organism    - guaiFENesin-codeine (ROBITUSSIN AC) 100-10 MG/5ML SOLN solution; Take 5 mLs by mouth every 4 hours as needed for cough  Dispense: 120 mL; Refill: 1        MYCHART SIGNUP FOR E-VISITS AND EASIER COMMUNICATION:  [email or web address]     Zipnosis:  [place].Thoof.  Sign up for e-visits for common illnesses!     RADIOLOGY:   [facility]:  [phone] to schedule any radiology tests at [facility]: [phone]    Mammograms/colonoscopies:  [phone]    CONSUMER PRICE LINE for estimates of test costs:  [phone]

## 2018-02-19 NOTE — PROGRESS NOTES
SUBJECTIVE:   Clara Boykin is a 57 year old female who presents to clinic today for the following health issues:      RESPIRATORY SYMPTOMS      Duration: 2 days    Description  nasal congestion, rhinorrhea, sore throat, cough, fever, headache, fatigue/malaise and hoarse voice    Severity: moderate    Accompanying signs and symptoms: None    History (predisposing factors):  none    Precipitating or alleviating factors: None    Therapies tried and outcome:  Benadryl  effective      Had known contact of person sick with influenza A. Now has two days of nasal congestion, cough, sore throat, possible fever, mild muscle aches. Has been using old albuterol inhaler. Feels like the symptoms came on very suddenly. Is wondering if she has the flu and if she should be treated, as she is going on a vacation this weekend.    Additional history/life update:    Her mother recently  after repeated hospitalizations and nursing home stays. She is now working 0.7 FTE at work and is waiting a while to go back to full time.       Flu-like symptoms  Acute bronchitis, unspecified organism         Problem list, Medication list, Allergies, and Medical/Social/Surgical histories reviewed in [facility] and updated as appropriate.  Labs reviewed in EPIC  BP Readings from Last 3 Encounters:   18 114/66   04/10/17 135/81   17 100/62    Wt Readings from Last 3 Encounters:   18 119 lb (54 kg)   04/10/17 121 lb (54.9 kg)   17 122 lb (55.3 kg)                  Patient Active Problem List   Diagnosis     iamJOINT PAIN-PELVIS     Pain in joint, upper arm     Skin exam, screening for cancer     CARDIOVASCULAR SCREENING; LDL GOAL LESS THAN 160     Insomnia     History of anesthesia reaction     Small vessel disease, cerebrovascular     Right foot pain     Past Surgical History:   Procedure Laterality Date     BUNIONECTOMY Right 2016     HERNIORRHAPHY VENTRAL N/A 2016    Procedure: HERNIORRHAPHY VENTRAL;  Surgeon: Donna  Ash Tucker MD;  Location:  OR     RECESSION RESECTION (REPAIR STRABISMUS) Right 4/10/2017    Procedure: RECESSION RESECTION (REPAIR STRABISMUS);  Surgeon: Lyle Leggett MD;  Location:  OR       Social History   Substance Use Topics     Smoking status: Never Smoker     Smokeless tobacco: Never Used     Alcohol use Not on file     Family History   Problem Relation Age of Onset     CANCER Mother      skin cancer     DIABETES Mother      borderline/Type 2     Hypertension Mother      3 meds: 4.5cm asc aortic aneurysm     Hyperlipidemia Mother      hi chol & triglycerides     CEREBROVASCULAR DISEASE Mother      many small, cog. impaired     OSTEOPOROSIS Mother      fosamax x 5 yrs     Obesity Mother      BMI 35 or so     Unknown/Adopted Mother      dermatomyositis since      CANCER Father      skin cancer     Coronary Artery Disease Father      angioplasty ~     Hypertension Father      1 med, mild. Age 94     Depression Father      2x: age 87 [facility]     CANCER Maternal Grandmother      skin cancer     CEREBROVASCULAR DISEASE Maternal Grandmother       from multiple CVA     Obesity Maternal Grandmother      overweight     DIABETES Maternal Grandfather      borderline/Type 2     Coronary Artery Disease Maternal Grandfather      2-3 MIs; worked after each     CEREBROVASCULAR DISEASE Maternal Grandfather       from CVA postop hernia     Obesity Maternal Grandfather      was overwt to obese     DIABETES Cousin      prediabetes     Hypertension Brother      1 med for 20 yrs     Substance Abuse Brother      hx of EtOH     CEREBROVASCULAR DISEASE Other      cog. impairment like my mom     MENTAL ILLNESS Sister      ?bipolar; past chem dep     Substance Abuse Sister      hx of: prescript drug& EtOH     Unknown/Adopted Sister      sister is adopted     Anesthesia Reaction Other      naus/vomit 1-8 hr postop unless tx'd     Anesthesia Reaction No family hx of          Current Outpatient  Prescriptions   Medication Sig Dispense Refill     albuterol (PROAIR HFA/PROVENTIL HFA/VENTOLIN HFA) 108 (90 BASE) MCG/ACT Inhaler Inhale 2 puffs into the lungs every 6 hours as needed for shortness of breath / dyspnea or wheezing 1 Inhaler 0     oseltamivir (TAMIFLU) 75 MG capsule Take 1 capsule (75 mg) by mouth 2 times daily 10 capsule 0     guaiFENesin-codeine (ROBITUSSIN AC) 100-10 MG/5ML SOLN solution Take 5 mLs by mouth every 4 hours as needed for cough 120 mL 1     Naproxen Sodium (ALEVE PO) Take by mouth as needed       tobramycin-dexamethasone (TOBRADEX) ophthalmic ointment Place 1 Application into the right eye 3 times daily 1 Tube 0     melatonin 3 MG CAPS Take 2 capsules by mouth At Bedtime        aspirin 81 MG tablet Take 81 mg by mouth as needed Reported on 3/31/2017       Calcium Carbonate-Vit D-Min (CALCIUM 1200 PO)        Cholecalciferol (VITAMIN D) 1000 UNITS capsule Take 1 capsule by mouth daily       diphenhydrAMINE (BENADRYL) 25 MG tablet Take 25 mg by mouth nightly as needed Reported on 3/31/2017       Omega-3 Fatty Acids (OMEGA 3 PO) Reported on 3/31/2017       prednisoLONE acetate (PRED FORTE) 1 % ophthalmic susp Place 1 drop into the right eye 4 times daily (Patient not taking: Reported on 2/19/2018) 1 Bottle 0     [DISCONTINUED] albuterol (PROAIR HFA/PROVENTIL HFA/VENTOLIN HFA) 108 (90 BASE) MCG/ACT Inhaler Inhale 2 puffs into the lungs every 6 hours as needed for shortness of breath / dyspnea or wheezing 1 Inhaler 0     traZODone (DESYREL) 50 MG tablet 1/4 to 1/2 pill nightly as needed. (Patient not taking: Reported on 2/19/2018) 30 tablet 3     Allergies   Allergen Reactions     Exparel [Bupivacaine] GI Disturbance     Patient had severe abdominal distention     Darvocet [Propoxyphene N-Apap] Other (See Comments)     confusion     Liposomes GI Disturbance     Penicillins Itching     Percocet [Oxycodone-Acetaminophen] Other (See Comments)     confusion     Tape [Adhesive Tape]      Once  after surgical tape     Vistaril [Hydroxyzine] Rash     Intense flushing/redness of face      Recent Labs   Lab Test  10/10/16   0828  06/28/16   0921  09/04/15   0841   LDL  119*   --   129   HDL  67   --   72   TRIG  66   --   62   CR   --   0.71   --    GFRESTIMATED   --   84   --    GFRESTBLACK   --   >90   GFR Calc     --    POTASSIUM   --   4.0   --    TSH   --    --   1.22        ROS:  Constitutional, HEENT, cardiovascular, pulmonary, GI, , musculoskeletal, neuro, skin, endocrine and psych systems are negative, except as otherwise noted.        OBJECTIVE:  /66  Pulse 91  Temp 99.1  F (37.3  C) (Oral)  Resp 18  Wt 119 lb (54 kg)  LMP 09/06/2006  SpO2 97%  BMI 19.65 kg/m2    EXAM:  GENERAL APPEARANCE: healthy, alert and no distress  HEENT: posterior oropharynx without exudate or lesions, without cervical lymphadenopathy, nasal congestion.   Tympanic membranes clear  No lymphadenopathy   Pharynx clear  RESP: lungs clear to auscultation - no rales, rhonchi or wheezes. Prominent cough throughout examination with some productive yellow sputum      ASSESSMENT AND PLAN  Patient Instructions   ASSESSMENT AND PLAN  1. Flu-like symptoms  Based on classic symptoms.     Has had significant respiratory illnesses in past so if not improving in next 24 hours then ok to start tamiflu.      Ibuprofen for fever/aches and cough syrup with codeine for cough at night and can take regular robitusin during the day.     Continue albuterol two puffs every 4-6 hours until better.       - Influenza A/B antigen  - oseltamivir (TAMIFLU) 75 MG capsule; Take 1 capsule (75 mg) by mouth 2 times daily  Dispense: 10 capsule; Refill: 0  - guaiFENesin-codeine (ROBITUSSIN AC) 100-10 MG/5ML SOLN solution; Take 5 mLs by mouth every 4 hours as needed for cough  Dispense: 120 mL; Refill: 1    2. Acute bronchitis, unspecified organism    - guaiFENesin-codeine (ROBITUSSIN AC) 100-10 MG/5ML SOLN solution; Take 5 mLs by  mouth every 4 hours as needed for cough  Dispense: 120 mL; Refill: 1        MYCHART SIGNUP FOR E-VISITS AND EASIER COMMUNICATION:  [email or web address]     Zipnosis:  [place].[employer]ova.[employer].  Sign up for e-visits for common illnesses!     RADIOLOGY:   [facility]:  [phone] to schedule any radiology tests at [facility]: [phone]    Mammograms/colonoscopies:  [phone]    CONSUMER PRICE LINE for estimates of test costs:  [phone]              Scribed by Jose Alfredo Zazueat, Medical Student for Joel Wegener, MD based on the provider s statements to me.        I have reviewed and edited the medical student s documentation acting as scribe and verify that the history, exam and medical decision making reflect the history, exam and decision making performed by myself today.      Joel Wegener,MD

## 2018-02-19 NOTE — TELEPHONE ENCOUNTER
Patient states she has been ill for a day and a half  Started yesterday with cold symptoms but today has HA and feels tired  Temp was 99.2  States she is supposed to of out of town next weekend to a cabin with some friends and does not want to expose others if she has the flu    Last time she was sick with URI symptoms needed Inhaler, prednisone, cough syrup and antibiotics   Appointment scheduled for later this morning.  Brandy Marcos RN

## 2018-02-19 NOTE — MR AVS SNAPSHOT
After Visit Summary   2/19/2018    Clara Boykin    MRN: [id number]           Patient Information     Date Of Birth          1960        Visit Information        Provider Department      2/19/2018 11:20 AM Wegener, Joel Daniel Irwin, MD [facility][note 2]        Today's Diagnoses     Flu-like symptoms    -  1    Acute bronchitis, unspecified organism          Care Instructions    ASSESSMENT AND PLAN  1. Flu-like symptoms  Based on classic symptoms.     Has had significant respiratory illnesses in past so if not improving in next 24 hours then ok to start tamiflu.      Ibuprofen for fever/aches and cough syrup with codeine for cough at night and can take regular robitusin during the day.     Continue albuterol two puffs every 4-6 hours until better.       - Influenza A/B antigen  - oseltamivir (TAMIFLU) 75 MG capsule; Take 1 capsule (75 mg) by mouth 2 times daily  Dispense: 10 capsule; Refill: 0  - guaiFENesin-codeine (ROBITUSSIN AC) 100-10 MG/5ML SOLN solution; Take 5 mLs by mouth every 4 hours as needed for cough  Dispense: 120 mL; Refill: 1    2. Acute bronchitis, unspecified organism    - guaiFENesin-codeine (ROBITUSSIN AC) 100-10 MG/5ML SOLN solution; Take 5 mLs by mouth every 4 hours as needed for cough  Dispense: 120 mL; Refill: 1        MYCHART SIGNUP FOR E-VISITS AND EASIER COMMUNICATION:  [email or web address]     Zipnosis:  [place].[employer].Needly.  Sign up for e-visits for common illnesses!     RADIOLOGY:   [facility]:  [phone] to schedule any radiology tests at [facility]: [phone]    Mammograms/colonoscopies:  [phone]    CONSUMER PRICE LINE for estimates of test costs:  [phone]               Follow-ups after your visit        Your next 10 appointments already scheduled     Mar 02, 2018  8:00 AM CST   Office Visit with Joel Daniel Irwin Wegener, MD   [facility][note 2] ([facility] [place][note 2])    [address]  [facility] [address] [phone]           Bring a current list of meds and any records pertaining to this visit. For Physicals, please bring immunization records and any forms needing to be filled out. Please arrive 10 minutes early to complete paperwork.              Who to contact     If you have questions or need follow up information about today's clinic visit or your schedule please contact [facility] directly at [phone].  Normal or non-critical lab and imaging results will be communicated to you by Format Dynamicshart, letter or phone within 4 business days after the clinic has received the results. If you do not hear from us within 7 days, please contact the clinic through Hoot.Me or phone. If you have a critical or abnormal lab result, we will notify you by phone as soon as possible.  Submit refill requests through Hoot.Me or call your pharmacy and they will forward the refill request to us. Please allow 3 business days for your refill to be completed.          Additional Information About Your Visit        Hoot.Me Information     Hoot.Me gives you secure access to your electronic health record. If you see a primary care provider, you can also send messages to your care team and make appointments. If you have questions, please call your primary care clinic.  If you do not have a primary care provider, please call [phone] and they will assist you.        Care EveryWhere ID     This is your Care EveryWhere ID. This could be used by other organizations to access your [facility] medical records  [id number]        Your Vitals Were     Pulse Temperature Respirations Last Period Pulse Oximetry BMI (Body Mass Index)    91 99.1  F (37.3  C) (Oral) 18 09/06/2006 97% 19.65 kg/m2       Blood Pressure from Last 3 Encounters:   02/19/18 114/66   04/10/17 135/81   03/31/17 100/62    Weight from Last 3 Encounters:   02/19/18 119 lb (54 kg)   04/10/17 121 lb (54.9 kg)   03/31/17 122 lb (55.3 kg)               We Performed the Following     Influenza A/B antigen          Today's Medication Changes          These changes are accurate as of 2/19/18 12:03 PM.  If you have any questions, ask your nurse or doctor.               Start taking these medicines.        Dose/Directions    guaiFENesin-codeine 100-10 MG/5ML Soln solution   Commonly known as:  ROBITUSSIN AC   Used for:  Flu-like symptoms, Acute bronchitis, unspecified organism   Started by:  Wegener, Joel Daniel Irwin, MD        Dose:  1 tsp.   Take 5 mLs by mouth every 4 hours as needed for cough   Quantity:  120 mL   Refills:  1       oseltamivir 75 MG capsule   Commonly known as:  TAMIFLU   Used for:  Flu-like symptoms   Started by:  Wegener, Joel Daniel Irwin, MD        Dose:  75 mg   Take 1 capsule (75 mg) by mouth 2 times daily   Quantity:  10 capsule   Refills:  0            Where to get your medicines      These medications were sent to [facility] [id number] [place] [address] AT [facility]  [address] [place] [id number]     Phone:  [phone]     albuterol 108 (90 BASE) MCG/ACT Inhaler    oseltamivir 75 MG capsule         Some of these will need a paper prescription and others can be bought over the counter.  Ask your nurse if you have questions.     Bring a paper prescription for each of these medications     guaiFENesin-codeine 100-10 MG/5ML Soln solution                Primary Care Provider Office Phone # Fax #    Joel Daniel Irwin Wegener, [phone] [address] [place] [address]        Equal Access to Services     [facility]: Hadii aad ku hadasho Soradhaali, waaxda luqadaha, qaybta kaalmada shahab spencer. So [facility] [phone].    ATENCIÓN: Si habla español, tiene a fuentes disposición servicios gratuitos de asistencia lingüística. Llame al [phone].    We comply with applicable federal civil rights laws and [place]  laws. We do not discriminate on the basis of race, color, national origin, age, disability, sex, sexual orientation, or gender identity.            Thank you!     Thank you for choosing [facility]  for your care. Our goal is always to provide you with excellent care. Hearing back from our patients is one way we can continue to improve our services. Please take a few minutes to complete the written survey that you may receive in the mail after your visit with us. Thank you!             Your Updated Medication List - Protect others around you: Learn how to safely use, store and throw away your medicines at www.disposemymeds.org.          This list is accurate as of 2/19/18 12:03 PM.  Always use your most recent med list.                   Brand Name Dispense Instructions for use Diagnosis    albuterol 108 (90 BASE) MCG/ACT Inhaler    PROAIR HFA/PROVENTIL HFA/VENTOLIN HFA    1 Inhaler    Inhale 2 puffs into the lungs every 6 hours as needed for shortness of breath / dyspnea or wheezing        ALEVE PO      Take by mouth as needed        aspirin 81 MG tablet      Take 81 mg by mouth as needed Reported on 3/31/2017        BENADRYL 25 MG tablet   Generic drug:  diphenhydrAMINE      Take 25 mg by mouth nightly as needed Reported on 3/31/2017        CALCIUM 1200 PO           guaiFENesin-codeine 100-10 MG/5ML Soln solution    ROBITUSSIN AC    120 mL    Take 5 mLs by mouth every 4 hours as needed for cough    Flu-like symptoms, Acute bronchitis, unspecified organism       melatonin 3 MG Caps      Take 2 capsules by mouth At Bedtime        OMEGA 3 PO      Reported on 3/31/2017        oseltamivir 75 MG capsule    TAMIFLU    10 capsule    Take 1 capsule (75 mg) by mouth 2 times daily    Flu-like symptoms       prednisoLONE acetate 1 % ophthalmic susp    PRED FORTE    1 Bottle    Place 1 drop into the right eye 4 times daily    Post-operative state       tobramycin-dexamethasone ophthalmic ointment    TOBRADEX    1  Tube    Place 1 Application into the right eye 3 times daily    Postoperative eye state       traZODone 50 MG tablet    DESYREL    30 tablet    1/4 to 1/2 pill nightly as needed.    Primary insomnia       vitamin D 1000 UNITS capsule      Take 1 capsule by mouth daily

## 2018-03-02 ENCOUNTER — MYC MEDICAL ADVICE (OUTPATIENT)
Dept: FAMILY MEDICINE | Facility: CLINIC | Age: 58
End: 2018-03-02

## 2018-03-02 DIAGNOSIS — R05.9 COUGH: Primary | ICD-10-CM

## 2018-03-02 RX ORDER — PREDNISONE 20 MG/1
20 TABLET ORAL DAILY
Qty: 5 TABLET | Refills: 0 | Status: SHIPPED | OUTPATIENT
Start: 2018-03-02 | End: 2018-03-20

## 2018-03-02 NOTE — TELEPHONE ENCOUNTER
Dr. Wegener -- please see pt message below. She is still having symptoms; inquiring about another inhaler. Please advise. Would you like patient symptoms triaged further?    Thank you  VALENTÍN WilhelmN, RN  [facility]

## 2018-03-20 ENCOUNTER — OFFICE VISIT (OUTPATIENT)
Dept: FAMILY MEDICINE | Facility: CLINIC | Age: 58
End: 2018-03-20
Payer: COMMERCIAL

## 2018-03-20 VITALS
OXYGEN SATURATION: 96 % | RESPIRATION RATE: 22 BRPM | BODY MASS INDEX: 20.03 KG/M2 | HEART RATE: 69 BPM | TEMPERATURE: 97.9 F | WEIGHT: 120.25 LBS | HEIGHT: 65 IN | DIASTOLIC BLOOD PRESSURE: 74 MMHG | SYSTOLIC BLOOD PRESSURE: 116 MMHG

## 2018-03-20 DIAGNOSIS — J20.9 ACUTE BRONCHITIS WITH SYMPTOMS > 10 DAYS: Primary | ICD-10-CM

## 2018-03-20 PROCEDURE — 99213 OFFICE O/P EST LOW 20 MIN: CPT | Performed by: FAMILY MEDICINE

## 2018-03-20 RX ORDER — AZITHROMYCIN 250 MG/1
TABLET, FILM COATED ORAL
Qty: 6 TABLET | Refills: 0 | Status: SHIPPED | OUTPATIENT
Start: 2018-03-20 | End: 2018-04-20

## 2018-03-20 NOTE — MR AVS SNAPSHOT
After Visit Summary   3/20/2018    Clara Boykin    MRN: [id number]           Patient Information     Date Of Birth          1960        Visit Information        Provider Department      3/20/2018 11:20 AM Yesy Hand MD [facility]        Today's Diagnoses     Acute bronchitis with symptoms > 10 days    -  1      Care Instructions      Bronchitis, Antibiotic Treatment (Adult)    Bronchitis is an infection of the air passages (bronchial tubes) in your lungs. It often occurs when you have a cold. This illness is contagious during the first few days and is spread through the air by coughing and sneezing, or by direct contact (touching the sick person and then touching your own eyes, nose, or mouth).  Symptoms of bronchitis include cough with mucus (phlegm) and low-grade fever. Bronchitis usually lasts 7 to 14 days. Mild cases can be treated with simple home remedies. More severe infection is treated with an antibiotic.  Home care  Follow these guidelines when caring for yourself at home:    If your symptoms are severe, rest at home for the first 2 to 3 days. When you go back to your usual activities, don't let yourself get too tired.    Do not smoke. Also avoid being exposed to secondhand smoke.    You may use over-the-counter medicines to control fever or pain, unless another medicine was prescribed. (Note: If you have chronic liver or kidney disease or have ever had a stomach ulcer or gastrointestinal bleeding, talk with your healthcare provider before using these medicines. Also talk to your provider if you are taking medicine to prevent blood clots.) Aspirin should never be given to anyone younger than 18 years of age who is ill with a viral infection or fever. It may cause severe liver or brain damage.    Your appetite may be poor, so a light diet is fine. Avoid dehydration by drinking 6 to 8 glasses of fluids per day (such as water, soft drinks, sports drinks, juices, tea,  or soup). Extra fluids will help loosen secretions in the nose and lungs.    Over-the-counter cough, cold, and sore-throat medicines will not shorten the length of the illness, but they may be helpful to reduce symptoms. (Note: Do not use decongestants if you have high blood pressure.)    Finish all antibiotic medicine. Do this even if you are feeling better after only a few days.  Follow-up care  Follow up with your healthcare provider, or as advised. If you had an X-ray or ECG (electrocardiogram), a specialist will review it. You will be notified of any new findings that may affect your care.  Note: If you are age 65 or older, or if you have a chronic lung disease or condition that affects your immune system, or you smoke, talk to your healthcare provider about having pneumococcal vaccinations and a yearly influenza vaccination (flu shot).  When to seek medical advice  Call your healthcare provider right away if any of these occur:    Fever of 100.4 F (38 C) or higher    Coughing up increased amounts of colored sputum    Weakness, drowsiness, headache, facial pain, ear pain, or a stiff neck  Call 911, or get immediate medical care  Contact emergency services right away if any of these occur.    Coughing up blood    Worsening weakness, drowsiness, headache, or stiff neck    Trouble breathing, wheezing, or pain with breathing  Date Last Reviewed: 9/13/2015 2000-2017 [employer]. [address] [place] [address]. All rights reserved. This information is not intended as a substitute for professional medical care. Always follow your healthcare professional's instructions.                Follow-ups after your visit        Your next 10 appointments already scheduled     Apr 20, 2018  7:40 AM CDT   Office Visit with Joel Daniel Irwin Wegener, MD   [facility] ([facility])    [address] [place] [id number] [phone]           Bring a current  "list of meds and any records pertaining to this visit. For Physicals, please bring immunization records and any forms needing to be filled out. Please arrive 10 minutes early to complete paperwork.              Who to contact     If you have questions or need follow up information about today's clinic visit or your schedule please contact [facility] directly at [phone].  Normal or non-critical lab and imaging results will be communicated to you by [employer], letter or phone within 4 business days after the clinic has received the results. If you do not hear from us within 7 days, please contact the clinic through [employer] or phone. If you have a critical or abnormal lab result, we will notify you by phone as soon as possible.  Submit refill requests through [employer] or call your pharmacy and they will forward the refill request to us. Please allow 3 business days for your refill to be completed.          Additional Information About Your Visit        [employer] Information     [employer] gives you secure access to your electronic health record. If you see a primary care provider, you can also send messages to your care team and make appointments. If you have questions, please call your primary care clinic.  If you do not have a primary care provider, please call [phone] and they will assist you.        Care EveryWhere ID     This is your Care EveryWhere ID. This could be used by other organizations to access your [facility] medical records  [id number]        Your Vitals Were     Pulse Temperature Respirations Height Last Period Pulse Oximetry    69 97.9  F (36.6  C) (Oral) 22 5' 5\" (1.651 m) 09/06/2006 96%    BMI (Body Mass Index)                   20.01 kg/m2            Blood Pressure from Last 3 Encounters:   03/20/18 116/74   02/19/18 114/66   04/10/17 135/81    Weight from Last 3 Encounters:   03/20/18 120 lb 4 oz (54.5 kg)   02/19/18 119 lb (54 kg)   04/10/17 121 lb (54.9 kg)              Today, " you had the following     No orders found for display         Today's Medication Changes          These changes are accurate as of 3/20/18 11:58 AM.  If you have any questions, ask your nurse or doctor.               Start taking these medicines.        Dose/Directions    azithromycin 250 MG tablet   Commonly known as:  ZITHROMAX   Used for:  Acute bronchitis with symptoms > 10 days   Started by:  Yesy Hand MD        Two tablets first day, then one tablet daily for four days.   Quantity:  6 tablet   Refills:  0         Stop taking these medicines if you haven't already. Please contact your care team if you have questions.     prednisoLONE acetate 1 % ophthalmic susp   Commonly known as:  PRED FORTE   Stopped by:  Yesy Hand MD           tobramycin-dexamethasone ophthalmic ointment   Commonly known as:  TOBRADEX   Stopped by:  Yesy Hand MD                Where to get your medicines      These medications were sent to [facility] [address] [place], [place] [address]E S AT [facility] [address]  [address] [facility] [address]     Phone:  [phone]     azithromycin 250 MG tablet                Primary Care Provider Office Phone # Fax #    Manny Daniel Irwin Wegener, [phone] [address]  [facility] [address]        Equal Access to Services     BRIELLE LR AH: Hadii aad ku hadasho Soomaali, waaxda luqadaha, qaybta kaalmada adeegyada, waxay birgitin hayagapiton mishel guzman. So [facility] [phone].    ATENCIÓN: Si habla español, tiene a fuentes disposición servicios gratuitos de asistencia lingüística. Llame al [phone].    We comply with applicable federal civil rights laws and [place] laws. We do not discriminate on the basis of race, color, national origin, age, disability, sex, sexual orientation, or gender identity.            Thank you!     Thank you for choosing [facility]  for your care. Our goal is always to provide  you with excellent care. Hearing back from our patients is one way we can continue to improve our services. Please take a few minutes to complete the written survey that you may receive in the mail after your visit with us. Thank you!             Your Updated Medication List - Protect others around you: Learn how to safely use, store and throw away your medicines at www.disposemymeds.org.          This list is accurate as of 3/20/18 11:58 AM.  Always use your most recent med list.                   Brand Name Dispense Instructions for use Diagnosis    albuterol 108 (90 BASE) MCG/ACT Inhaler    PROAIR HFA/PROVENTIL HFA/VENTOLIN HFA    1 Inhaler    Inhale 2 puffs into the lungs every 6 hours as needed for shortness of breath / dyspnea or wheezing        ALEVE PO      Take by mouth as needed        aspirin 81 MG tablet      Take 81 mg by mouth as needed Reported on 3/31/2017        azithromycin 250 MG tablet    ZITHROMAX    6 tablet    Two tablets first day, then one tablet daily for four days.    Acute bronchitis with symptoms > 10 days       BENADRYL 25 MG tablet   Generic drug:  diphenhydrAMINE      Take 25 mg by mouth nightly as needed Reported on 3/31/2017        CALCIUM 1200 PO           fluticasone 250 MCG/BLIST Aepb Inhaler    FLOVENT DISKUS    1 Inhaler    Inhale 1 puff into the lungs 2 times daily    Cough       guaiFENesin-codeine 100-10 MG/5ML Soln solution    ROBITUSSIN AC    120 mL    Take 5 mLs by mouth every 4 hours as needed for cough    Flu-like symptoms, Acute bronchitis, unspecified organism       melatonin 3 MG Caps      Take 2 capsules by mouth At Bedtime        OMEGA 3 PO      Reported on 3/31/2017        traZODone 50 MG tablet    DESYREL    30 tablet    1/4 to 1/2 pill nightly as needed.    Primary insomnia       vitamin D 1000 UNITS capsule      Take 1 capsule by mouth daily

## 2018-03-20 NOTE — PATIENT INSTRUCTIONS
Bronchitis, Antibiotic Treatment (Adult)    Bronchitis is an infection of the air passages (bronchial tubes) in your lungs. It often occurs when you have a cold. This illness is contagious during the first few days and is spread through the air by coughing and sneezing, or by direct contact (touching the sick person and then touching your own eyes, nose, or mouth).  Symptoms of bronchitis include cough with mucus (phlegm) and low-grade fever. Bronchitis usually lasts 7 to 14 days. Mild cases can be treated with simple home remedies. More severe infection is treated with an antibiotic.  Home care  Follow these guidelines when caring for yourself at home:    If your symptoms are severe, rest at home for the first 2 to 3 days. When you go back to your usual activities, don't let yourself get too tired.    Do not smoke. Also avoid being exposed to secondhand smoke.    You may use over-the-counter medicines to control fever or pain, unless another medicine was prescribed. (Note: If you have chronic liver or kidney disease or have ever had a stomach ulcer or gastrointestinal bleeding, talk with your healthcare provider before using these medicines. Also talk to your provider if you are taking medicine to prevent blood clots.) Aspirin should never be given to anyone younger than 18 years of age who is ill with a viral infection or fever. It may cause severe liver or brain damage.    Your appetite may be poor, so a light diet is fine. Avoid dehydration by drinking 6 to 8 glasses of fluids per day (such as water, soft drinks, sports drinks, juices, tea, or soup). Extra fluids will help loosen secretions in the nose and lungs.    Over-the-counter cough, cold, and sore-throat medicines will not shorten the length of the illness, but they may be helpful to reduce symptoms. (Note: Do not use decongestants if you have high blood pressure.)    Finish all antibiotic medicine. Do this even if you are feeling better after only a  few days.  Follow-up care  Follow up with your healthcare provider, or as advised. If you had an X-ray or ECG (electrocardiogram), a specialist will review it. You will be notified of any new findings that may affect your care.  Note: If you are age 65 or older, or if you have a chronic lung disease or condition that affects your immune system, or you smoke, talk to your healthcare provider about having pneumococcal vaccinations and a yearly influenza vaccination (flu shot).  When to seek medical advice  Call your healthcare provider right away if any of these occur:    Fever of 100.4 F (38 C) or higher    Coughing up increased amounts of colored sputum    Weakness, drowsiness, headache, facial pain, ear pain, or a stiff neck  Call 911, or get immediate medical care  Contact emergency services right away if any of these occur.    Coughing up blood    Worsening weakness, drowsiness, headache, or stiff neck    Trouble breathing, wheezing, or pain with breathing  Date Last Reviewed: 9/13/2015 2000-2017 [employer]. [address] [place] [address], [place] [address]. All rights reserved. This information is not intended as a substitute for professional medical care. Always follow your healthcare professional's instructions.

## 2018-03-20 NOTE — PROGRESS NOTES
"  SUBJECTIVE:   Clara Boykin is a 57 year old female who presents to clinic today for the following health issues:         Acute Illness     She is having this lingering illness. This is abnormal for her because she normally takes very good care of herself. She has been under a lot of stress lately taking care of her 90 year old father and settling her mother's estate. Bronchospasms early in the morning, has coughing fits and will occassionally cough up a chunk of yellow-brown phlegm. She was originally thought to have the flu and took Tamaflu, but she didn't improve much. She then was given prednisone and this helped loosen her chest up a bit. She just picked up the Flovent inhaler yesterday. She has been taking benadryl this last month as well. She can hear inspiratory and expiratory rattling in her lungs. She reports that today is the best she has been in the last month.     Acute illness concerns: Bronchitis   Onset:  1 month    Fever: no     Chills/Sweats: no     Headache (location?): YES    Sinus Pressure:YES    Conjunctivitis:  YES: both    Ear Pain: YES: left    Rhinorrhea: YES    Congestion: YES- chest    Sore Throat: no      Cough: YES-productive of clear sputum, productive of yellow sputum    Wheeze: no     Decreased Appetite: yes    Nausea: no     Vomiting: no     Diarrhea:  no     Dysuria/Freq.: no     Fatigue/Achiness: YES    Sick/Strep Exposure: YES- in past     Therapies Tried and outcome:  meds that was prescribe by Dr. Wegener and juan m       From clinic visit on 2/19/18 with Dr. Wegener:   \"Had known contact of person sick with influenza A. Now has two days of nasal congestion, cough, sore throat, possible fever, mild muscle aches. Has been using old albuterol inhaler. Feels like the symptoms came on very suddenly. Is wondering if she has the flu and if she should be treated, as she is going on a vacation this weekend.    1. Flu-like symptoms  Based on classic symptoms.      Has had " "significant respiratory illnesses in past so if not improving in next 24 hours then ok to start tamiflu.       Ibuprofen for fever/aches and cough syrup with codeine for cough at night and can take regular robitusin during the day.      Continue albuterol two puffs every 4-6 hours until better.         - Influenza A/B antigen  - oseltamivir (TAMIFLU) 75 MG capsule; Take 1 capsule (75 mg) by mouth 2 times daily  Dispense: 10 capsule; Refill: 0  - guaiFENesin-codeine (ROBITUSSIN AC) 100-10 MG/5ML SOLN solution; Take 5 mLs by mouth every 4 hours as needed for cough  Dispense: 120 mL; Refill: 1     2. Acute bronchitis, unspecified organism     - guaiFENesin-codeine (ROBITUSSIN AC) 100-10 MG/5ML SOLN solution; Take 5 mLs by mouth every 4 hours as needed for cough  Dispense: 120 mL; Refill: 1\"    See MyChart Communication with Dr. Wegener- On 3/2 her cough still not resolved, prescribed oral steroids and steroid inhaler. Patient started with just the Prednisone 20 mg daily. On 3/15 she reported still having the bronchial cough. She had finished the oral steroid already at that time and was using her ventolin inhaler 3x daily along with cough syrup and benadryl. Dr. Wegener advised to start the steroid inhaler and follow up this week.       Problem list and histories reviewed & adjusted, as indicated.  Additional history: as documented    Patient Active Problem List   Diagnosis     iamJOINT PAIN-PELVIS     Pain in joint, upper arm     Skin exam, screening for cancer     CARDIOVASCULAR SCREENING; LDL GOAL LESS THAN 160     Insomnia     History of anesthesia reaction     Small vessel disease, cerebrovascular     Right foot pain     Past Surgical History:   Procedure Laterality Date     BUNIONECTOMY Right 2016     HERNIORRHAPHY VENTRAL N/A 7/7/2016    Procedure: HERNIORRHAPHY VENTRAL;  Surgeon: Ash Thompson MD;  Location: [facility] OR     RECESSION RESECTION (REPAIR STRABISMUS) Right 4/10/2017    Procedure: RECESSION " RESECTION (REPAIR STRABISMUS);  Surgeon: Lyle Leggett MD;  Location:  OR       Social History   Substance Use Topics     Smoking status: Never Smoker     Smokeless tobacco: Never Used     Alcohol use Not on file     Family History   Problem Relation Age of Onset     CANCER Mother      skin cancer     DIABETES Mother      borderline/Type 2     Hypertension Mother      3 meds: 4.5cm asc aortic aneurysm     Hyperlipidemia Mother      hi chol & triglycerides     CEREBROVASCULAR DISEASE Mother      many small, cog. impaired     OSTEOPOROSIS Mother      fosamax x 5 yrs     Obesity Mother      BMI 35 or so     Unknown/Adopted Mother      dermatomyositis since      CANCER Father      skin cancer     Coronary Artery Disease Father      angioplasty ~     Hypertension Father      1 med, mild. Age 94     Depression Father      2x: age 87 [facility]     CANCER Maternal Grandmother      skin cancer     CEREBROVASCULAR DISEASE Maternal Grandmother       from multiple CVA     Obesity Maternal Grandmother      overweight     DIABETES Maternal Grandfather      borderline/Type 2     Coronary Artery Disease Maternal Grandfather      2-3 MIs; worked after each     CEREBROVASCULAR DISEASE Maternal Grandfather       from CVA postop hernia     Obesity Maternal Grandfather      was overwt to obese     DIABETES Cousin      prediabetes     Hypertension Brother      1 med for 20 yrs     Substance Abuse Brother      hx of EtOH     CEREBROVASCULAR DISEASE Other      cog. impairment like my mom     MENTAL ILLNESS Sister      ?bipolar; past chem dep     Substance Abuse Sister      hx of: prescript drug& EtOH     Unknown/Adopted Sister      sister is adopted     Anesthesia Reaction Other      naus/vomit 1-8 hr postop unless tx'd     Anesthesia Reaction No family hx of          Current Outpatient Prescriptions   Medication Sig Dispense Refill     fluticasone (FLOVENT DISKUS) 250 MCG/BLIST AEPB Inhaler Inhale 1  puff into the lungs 2 times daily 1 Inhaler 0     predniSONE (DELTASONE) 20 MG tablet Take 1 tablet (20 mg) by mouth daily 5 tablet 0     albuterol (PROAIR HFA/PROVENTIL HFA/VENTOLIN HFA) 108 (90 BASE) MCG/ACT Inhaler Inhale 2 puffs into the lungs every 6 hours as needed for shortness of breath / dyspnea or wheezing 1 Inhaler 0     guaiFENesin-codeine (ROBITUSSIN AC) 100-10 MG/5ML SOLN solution Take 5 mLs by mouth every 4 hours as needed for cough 120 mL 1     Naproxen Sodium (ALEVE PO) Take by mouth as needed       tobramycin-dexamethasone (TOBRADEX) ophthalmic ointment Place 1 Application into the right eye 3 times daily 1 Tube 0     prednisoLONE acetate (PRED FORTE) 1 % ophthalmic susp Place 1 drop into the right eye 4 times daily 1 Bottle 0     traZODone (DESYREL) 50 MG tablet 1/4 to 1/2 pill nightly as needed. 30 tablet 3     melatonin 3 MG CAPS Take 2 capsules by mouth At Bedtime        aspirin 81 MG tablet Take 81 mg by mouth as needed Reported on 3/31/2017       Calcium Carbonate-Vit D-Min (CALCIUM 1200 PO)        Cholecalciferol (VITAMIN D) 1000 UNITS capsule Take 1 capsule by mouth daily       diphenhydrAMINE (BENADRYL) 25 MG tablet Take 25 mg by mouth nightly as needed Reported on 3/31/2017       Omega-3 Fatty Acids (OMEGA 3 PO) Reported on 3/31/2017       Allergies   Allergen Reactions     Exparel [Bupivacaine] GI Disturbance     Patient had severe abdominal distention     Darvocet [Propoxyphene N-Apap] Other (See Comments)     confusion     Liposomes GI Disturbance     Penicillins Itching     Percocet [Oxycodone-Acetaminophen] Other (See Comments)     confusion     Tape [Adhesive Tape]      Once after surgical tape     Vistaril [Hydroxyzine] Rash     Intense flushing/redness of face      Recent Labs   Lab Test  10/10/16   0828  06/28/16   0921  09/04/15   0841   LDL  119*   --   129   HDL  67   --   72   TRIG  66   --   62   CR   --   0.71   --    GFRESTIMATED   --   84   --    GFRESTBLACK   --    ">90   GFR Calc     --    POTASSIUM   --   4.0   --    TSH   --    --   1.22      BP Readings from Last 3 Encounters:   03/20/18 116/74   02/19/18 114/66   04/10/17 135/81    Wt Readings from Last 3 Encounters:   03/20/18 54.5 kg (120 lb 4 oz)   02/19/18 54 kg (119 lb)   04/10/17 54.9 kg (121 lb)            Reviewed and updated as needed this visit by clinical staff  Tobacco  Allergies  Med Hx  Surg Hx  Fam Hx  Soc Hx      Reviewed and updated as needed this visit by Provider       ROS:    See above.     This document serves as a record of the services and decisions personally performed and made by Yesy Hand MD. It was created on her behalf by Erin Prescott, a trained medical scribe. The creation of this document is based the provider's statements to the medical scribe.  Erin Prescott, March 20, 2018 11:52 AM     OBJECTIVE:     /74 (BP Location: Right arm, Patient Position: Chair, Cuff Size: Adult Regular)  Pulse 69  Temp 97.9  F (36.6  C) (Oral)  Resp 22  Ht 1.651 m (5' 5\")  Wt 54.5 kg (120 lb 4 oz)  LMP 09/06/2006  SpO2 96%  BMI 20.01 kg/m2  Body mass index is 20.01 kg/(m^2).  Exam:  GENERAL APPEARANCE:  alert and no acute distress, appears fatigued  EYES: anicteric, no conjunctival redness  HENT: ear canals and TM's normal and nose and mouth without ulcers or lesions; OP mildly erythematous without swelling or exudates  RESP: intermittent cough, a few wheezes were heard, no rhonchi or rales, airflow to bases.    CV: regular rates and rhythm, normal S1 S2, no S3 or S4 and no murmur, click or rub    Diagnostic Test Results:  No results found for this or any previous visit (from the past 24 hour(s)).    ASSESSMENT/PLAN:       1. Acute bronchitis with symptoms > 10 days  Symptoms present for about a month. Not much improvement with prednisone. Recommended starting z-pack for possible bacterial infection. She will continue with two weeks of flovent and has robitussin AC to use for " cough. I encouraged plenty of rest. If persistent despite antibiotic, would recommend rtc and cxr.   - azithromycin (ZITHROMAX) 250 MG tablet; Two tablets first day, then one tablet daily for four days.  Dispense: 6 tablet; Refill: 0     Patient Instructions     Bronchitis, Antibiotic Treatment (Adult)    Bronchitis is an infection of the air passages (bronchial tubes) in your lungs. It often occurs when you have a cold. This illness is contagious during the first few days and is spread through the air by coughing and sneezing, or by direct contact (touching the sick person and then touching your own eyes, nose, or mouth).  Symptoms of bronchitis include cough with mucus (phlegm) and low-grade fever. Bronchitis usually lasts 7 to 14 days. Mild cases can be treated with simple home remedies. More severe infection is treated with an antibiotic.  Home care  Follow these guidelines when caring for yourself at home:    If your symptoms are severe, rest at home for the first 2 to 3 days. When you go back to your usual activities, don't let yourself get too tired.    Do not smoke. Also avoid being exposed to secondhand smoke.    You may use over-the-counter medicines to control fever or pain, unless another medicine was prescribed. (Note: If you have chronic liver or kidney disease or have ever had a stomach ulcer or gastrointestinal bleeding, talk with your healthcare provider before using these medicines. Also talk to your provider if you are taking medicine to prevent blood clots.) Aspirin should never be given to anyone younger than 18 years of age who is ill with a viral infection or fever. It may cause severe liver or brain damage.    Your appetite may be poor, so a light diet is fine. Avoid dehydration by drinking 6 to 8 glasses of fluids per day (such as water, soft drinks, sports drinks, juices, tea, or soup). Extra fluids will help loosen secretions in the nose and lungs.    Over-the-counter cough, cold, and  sore-throat medicines will not shorten the length of the illness, but they may be helpful to reduce symptoms. (Note: Do not use decongestants if you have high blood pressure.)    Finish all antibiotic medicine. Do this even if you are feeling better after only a few days.  Follow-up care  Follow up with your healthcare provider, or as advised. If you had an X-ray or ECG (electrocardiogram), a specialist will review it. You will be notified of any new findings that may affect your care.  Note: If you are age 65 or older, or if you have a chronic lung disease or condition that affects your immune system, or you smoke, talk to your healthcare provider about having pneumococcal vaccinations and a yearly influenza vaccination (flu shot).  When to seek medical advice  Call your healthcare provider right away if any of these occur:    Fever of 100.4 F (38 C) or higher    Coughing up increased amounts of colored sputum    Weakness, drowsiness, headache, facial pain, ear pain, or a stiff neck  Call 911, or get immediate medical care  Contact emergency services right away if any of these occur.    Coughing up blood    Worsening weakness, drowsiness, headache, or stiff neck    Trouble breathing, wheezing, or pain with breathing  Date Last Reviewed: 9/13/2015 2000-2017 [employer]. [address] [place] [address], [place] [address]. All rights reserved. This information is not intended as a substitute for professional medical care. Always follow your healthcare professional's instructions.             Yesy Hand MD, MD  [facility]

## 2018-04-17 ENCOUNTER — MYC MEDICAL ADVICE (OUTPATIENT)
Dept: FAMILY MEDICINE | Facility: CLINIC | Age: 58
End: 2018-04-17

## 2018-04-17 NOTE — TELEPHONE ENCOUNTER
Dr. Wegener,     Please see patient MyChart message below --Do you want patient to be seen sooner than Friday 4/20?    Thanks! Keshia Grant RN

## 2018-04-20 ENCOUNTER — OFFICE VISIT (OUTPATIENT)
Dept: FAMILY MEDICINE | Facility: CLINIC | Age: 58
End: 2018-04-20
Payer: COMMERCIAL

## 2018-04-20 VITALS
DIASTOLIC BLOOD PRESSURE: 69 MMHG | OXYGEN SATURATION: 99 % | TEMPERATURE: 97.8 F | WEIGHT: 121 LBS | BODY MASS INDEX: 20.14 KG/M2 | RESPIRATION RATE: 18 BRPM | HEART RATE: 60 BPM | SYSTOLIC BLOOD PRESSURE: 111 MMHG

## 2018-04-20 DIAGNOSIS — I67.9 SMALL VESSEL DISEASE, CEREBROVASCULAR: ICD-10-CM

## 2018-04-20 DIAGNOSIS — Z11.3 SCREEN FOR STD (SEXUALLY TRANSMITTED DISEASE): ICD-10-CM

## 2018-04-20 DIAGNOSIS — Z11.59 ENCOUNTER FOR HCV SCREENING TEST FOR LOW RISK PATIENT: ICD-10-CM

## 2018-04-20 DIAGNOSIS — Z87.01 H/O: PNEUMONIA: ICD-10-CM

## 2018-04-20 DIAGNOSIS — D72.819 LEUKOPENIA, UNSPECIFIED TYPE: ICD-10-CM

## 2018-04-20 DIAGNOSIS — Z23 NEED FOR PNEUMOCOCCAL VACCINATION: Primary | ICD-10-CM

## 2018-04-20 DIAGNOSIS — Z00.00 ROUTINE GENERAL MEDICAL EXAMINATION AT A HEALTH CARE FACILITY: ICD-10-CM

## 2018-04-20 DIAGNOSIS — E55.9 HYPOVITAMINOSIS D: ICD-10-CM

## 2018-04-20 DIAGNOSIS — R53.83 FATIGUE, UNSPECIFIED TYPE: ICD-10-CM

## 2018-04-20 DIAGNOSIS — M85.80 OSTEOPENIA, UNSPECIFIED LOCATION: ICD-10-CM

## 2018-04-20 DIAGNOSIS — N39.3 FEMALE STRESS INCONTINENCE: ICD-10-CM

## 2018-04-20 DIAGNOSIS — Z13.6 CARDIOVASCULAR SCREENING; LDL GOAL LESS THAN 160: ICD-10-CM

## 2018-04-20 LAB
ALBUMIN SERPL-MCNC: 4.4 G/DL (ref 3.4–5)
ALP SERPL-CCNC: 60 U/L (ref 40–150)
ALT SERPL W P-5'-P-CCNC: 38 U/L (ref 0–50)
ANION GAP SERPL CALCULATED.3IONS-SCNC: 5 MMOL/L (ref 3–14)
AST SERPL W P-5'-P-CCNC: 35 U/L (ref 0–45)
BASOPHILS # BLD AUTO: 0 10E9/L (ref 0–0.2)
BASOPHILS NFR BLD AUTO: 0.8 %
BILIRUB SERPL-MCNC: 0.6 MG/DL (ref 0.2–1.3)
BUN SERPL-MCNC: 23 MG/DL (ref 7–30)
CALCIUM SERPL-MCNC: 9.3 MG/DL (ref 8.5–10.1)
CHLORIDE SERPL-SCNC: 105 MMOL/L (ref 94–109)
CHOLEST SERPL-MCNC: 206 MG/DL
CO2 SERPL-SCNC: 30 MMOL/L (ref 20–32)
CREAT SERPL-MCNC: 0.64 MG/DL (ref 0.52–1.04)
DIFFERENTIAL METHOD BLD: ABNORMAL
EOSINOPHIL # BLD AUTO: 0.1 10E9/L (ref 0–0.7)
EOSINOPHIL NFR BLD AUTO: 3.8 %
ERYTHROCYTE [DISTWIDTH] IN BLOOD BY AUTOMATED COUNT: 13.5 % (ref 10–15)
GFR SERPL CREATININE-BSD FRML MDRD: >90 ML/MIN/1.7M2
GLUCOSE SERPL-MCNC: 86 MG/DL (ref 70–99)
HCT VFR BLD AUTO: 43.8 % (ref 35–47)
HDLC SERPL-MCNC: 73 MG/DL
HGB BLD-MCNC: 14.7 G/DL (ref 11.7–15.7)
LDLC SERPL CALC-MCNC: 118 MG/DL
LYMPHOCYTES # BLD AUTO: 1.8 10E9/L (ref 0.8–5.3)
LYMPHOCYTES NFR BLD AUTO: 48 %
MCH RBC QN AUTO: 31.2 PG (ref 26.5–33)
MCHC RBC AUTO-ENTMCNC: 33.6 G/DL (ref 31.5–36.5)
MCV RBC AUTO: 93 FL (ref 78–100)
MONOCYTES # BLD AUTO: 0.4 10E9/L (ref 0–1.3)
MONOCYTES NFR BLD AUTO: 10.4 %
NEUTROPHILS # BLD AUTO: 1.4 10E9/L (ref 1.6–8.3)
NEUTROPHILS NFR BLD AUTO: 37 %
NONHDLC SERPL-MCNC: 133 MG/DL
PLATELET # BLD AUTO: 256 10E9/L (ref 150–450)
POTASSIUM SERPL-SCNC: 3.7 MMOL/L (ref 3.4–5.3)
PROT SERPL-MCNC: 8 G/DL (ref 6.8–8.8)
RBC # BLD AUTO: 4.71 10E12/L (ref 3.8–5.2)
SODIUM SERPL-SCNC: 140 MMOL/L (ref 133–144)
TRIGL SERPL-MCNC: 73 MG/DL
TSH SERPL DL<=0.005 MIU/L-ACNC: 1.44 MU/L (ref 0.4–4)
WBC # BLD AUTO: 3.7 10E9/L (ref 4–11)

## 2018-04-20 PROCEDURE — 85025 COMPLETE CBC W/AUTO DIFF WBC: CPT | Performed by: FAMILY MEDICINE

## 2018-04-20 PROCEDURE — 80061 LIPID PANEL: CPT | Performed by: FAMILY MEDICINE

## 2018-04-20 PROCEDURE — 87389 HIV-1 AG W/HIV-1&-2 AB AG IA: CPT | Performed by: FAMILY MEDICINE

## 2018-04-20 PROCEDURE — 36415 COLL VENOUS BLD VENIPUNCTURE: CPT | Performed by: FAMILY MEDICINE

## 2018-04-20 PROCEDURE — 90670 PCV13 VACCINE IM: CPT | Performed by: FAMILY MEDICINE

## 2018-04-20 PROCEDURE — 82306 VITAMIN D 25 HYDROXY: CPT | Performed by: FAMILY MEDICINE

## 2018-04-20 PROCEDURE — 90471 IMMUNIZATION ADMIN: CPT | Performed by: FAMILY MEDICINE

## 2018-04-20 PROCEDURE — 99396 PREV VISIT EST AGE 40-64: CPT | Mod: 25 | Performed by: FAMILY MEDICINE

## 2018-04-20 PROCEDURE — 84443 ASSAY THYROID STIM HORMONE: CPT | Performed by: FAMILY MEDICINE

## 2018-04-20 PROCEDURE — 99213 OFFICE O/P EST LOW 20 MIN: CPT | Mod: 25 | Performed by: FAMILY MEDICINE

## 2018-04-20 PROCEDURE — 80053 COMPREHEN METABOLIC PANEL: CPT | Performed by: FAMILY MEDICINE

## 2018-04-20 PROCEDURE — 86803 HEPATITIS C AB TEST: CPT | Performed by: FAMILY MEDICINE

## 2018-04-20 NOTE — MR AVS SNAPSHOT
After Visit Summary   4/20/2018    Clara Boykin    MRN: [id number]           Patient Information     Date Of Birth          1960        Visit Information        Provider Department      4/20/2018 7:40 AM Wegener, Joel Daniel Irwin, MD [facility]        Today's Diagnoses     Routine general medical examination at a health care facility    -  1    Fatigue, unspecified type        Female stress incontinence        H/O: pneumonia        Small vessel disease, cerebrovascular        Hypovitaminosis D        Osteopenia, unspecified location        Encounter for HCV screening test for low risk patient        Screen for STD (sexually transmitted disease)        CARDIOVASCULAR SCREENING; LDL GOAL LESS THAN 160          Care Instructions    ASSESSMENT AND PLAN  1. Routine general medical examination at a health care facility  Due for colonoscopy by October (call number below).     Schedule mammogram now.         2. Fatigue, unspecified type  Check labs for anemia/hypothyroid.   - CBC with platelets differential  - Comprehensive metabolic panel  - TSH with free T4 reflex    3. Female stress incontinence  Reviewed.  Offered/declined referral at this time.     4. H/O: pneumonia  pcv 13 today and 23 next year.  Repeat age 63.     5. Small vessel disease, cerebrovascular  Monitoring morning surge htn to 130/140s, otherwise at goal.   - Comprehensive metabolic panel    6. Hypovitaminosis D  Re-check today.   - Vitamin D Deficiency    7. 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     Osteopenia 8 years ago, re-check today.   - DX Hip/Pelvis/Spine; Future    8. Encounter for HCV screening test for low risk patient  Hep c screen today.     9. Screen for STD (sexually transmitted disease)  today  - HIV Antigen Antibody Combo    10. CARDIOVASCULAR SCREENING; LDL GOAL LESS THAN 160    - Lipid panel reflex to direct LDL  "Fasting        MYCHART FOR ON-LINE CARE(VISITS), LABS, REFILLS, MESSAGING, ETC [email or web address] , [phone]    E-VISIT: click \"on-line care, then request e-visit\".  E-visits work well for following up on issues we have discussed in clinic previously which may need new prescriptions, new prescriptions or substantial discussion. These are always done by me (Dr. Wegener).     ONCARE VISIT:  [email or web address]  - we treat nearly 50 common conditions through on-care.  These are done in an hour by on-call staff.     RADIOLOGY:  [facility]:  [phone]   [facility]: [phone]    Mammogram and Colonoscopy [phone]    Smoking Cessation: www.quitplan.org, 0-012-792-PLAN (9030)      CONSUMER PRICE LINE for estimates of test costs:  [phone]               Follow-ups after your visit        Future tests that were ordered for you today     Open Future Orders        Priority Expected Expires Ordered    DX Hip/Pelvis/Spine Routine  2019            Who to contact     If you have questions or need follow up information about today's clinic visit or your schedule please contact [facility] directly at [phone].  Normal or non-critical lab and imaging results will be communicated to you by [email or web address], letter or phone within 4 business days after the clinic has received the results. If you do not hear from us within 7 days, please contact the clinic through [email or web address] or phone. If you have a critical or abnormal lab result, we will notify you by phone as soon as possible.  Submit refill requests through beModel or call your pharmacy and they will forward the refill request to us. Please allow 3 business days for your refill to be completed.          Additional Information About Your Visit        [email or web address] Information     beModel gives you secure access to your electronic health record. If you see a primary care provider, you can also send messages to " your care team and make appointments. If you have questions, please call your primary care clinic.  If you do not have a primary care provider, please call [phone] and they will assist you.        Care EveryWhere ID     This is your Care EveryWhere ID. This could be used by other organizations to access your [facility] medical records  [id number]        Your Vitals Were     Pulse Temperature Respirations Last Period Pulse Oximetry BMI (Body Mass Index)    60 97.8  F (36.6  C) (Oral) 18 09/06/2006 99% 20.14 kg/m2       Blood Pressure from Last 3 Encounters:   04/20/18 111/69   03/20/18 116/74   02/19/18 114/66    Weight from Last 3 Encounters:   04/20/18 121 lb (54.9 kg)   03/20/18 120 lb 4 oz (54.5 kg)   02/19/18 119 lb (54 kg)              We Performed the Following     CBC with platelets differential     Comprehensive metabolic panel     Hepatitis C Screen Reflex to HCV RNA Quant and Genotype     HIV Antigen Antibody Combo     Lipid panel reflex to direct LDL Fasting     TSH with free T4 reflex     Vitamin D Deficiency          Today's Medication Changes          These changes are accurate as of 4/20/18  8:28 AM.  If you have any questions, ask your nurse or doctor.               Stop taking these medicines if you haven't already. Please contact your care team if you have questions.     fluticasone 250 MCG/BLIST Aepb Inhaler   Commonly known as:  FLOVENT DISKUS   Stopped by:  Wegener, Joel Daniel Irwin, MD                    Primary Care Provider Office Phone # Fax #    Joel Daniel Irwin Wegener, [phone] [address] [address]        Equal Access to Services     [facility]: Hadii aad ku hadasho Soomaali, waaxda luqadaha, qaybta kaalmada shahab spencer. So [facility] [phone].    ATENCIÓN: Si habla español, tiene a fuentes disposición servicios gratuitos de asistencia lingüística. Llame al [phone].    We comply with applicable  federal civil rights laws and [place] laws. We do not discriminate on the basis of race, color, national origin, age, disability, sex, sexual orientation, or gender identity.            Thank you!     Thank you for choosing [facility]  for your care. Our goal is always to provide you with excellent care. Hearing back from our patients is one way we can continue to improve our services. Please take a few minutes to complete the written survey that you may receive in the mail after your visit with us. Thank you!             Your Updated Medication List - Protect others around you: Learn how to safely use, store and throw away your medicines at www.disposemymeds.org.          This list is accurate as of 4/20/18  8:28 AM.  Always use your most recent med list.                   Brand Name Dispense Instructions for use Diagnosis    albuterol 108 (90 Base) MCG/ACT Inhaler    PROAIR HFA/PROVENTIL HFA/VENTOLIN HFA    1 Inhaler    Inhale 2 puffs into the lungs every 6 hours as needed for shortness of breath / dyspnea or wheezing        ALEVE PO      Take by mouth as needed        aspirin 81 MG tablet      Take 81 mg by mouth as needed Reported on 3/31/2017        BENADRYL 25 MG tablet   Generic drug:  diphenhydrAMINE      Take 25 mg by mouth nightly as needed Reported on 3/31/2017        CALCIUM 1200 PO           melatonin 3 MG Caps      Take 2 capsules by mouth At Bedtime        OMEGA 3 PO      Reported on 3/31/2017        traZODone 50 MG tablet    DESYREL    30 tablet    1/4 to 1/2 pill nightly as needed.    Primary insomnia       vitamin D 1000 units capsule      Take 1 capsule by mouth daily

## 2018-04-20 NOTE — PATIENT INSTRUCTIONS
"ASSESSMENT AND PLAN  1. Routine general medical examination at a health care facility  Due for colonoscopy by October (call number below).     Schedule mammogram now.         2. Fatigue, unspecified type  Check labs for anemia/hypothyroid.   - CBC with platelets differential  - Comprehensive metabolic panel  - TSH with free T4 reflex    3. Female stress incontinence  Reviewed.  Offered/declined referral at this time.     4. H/O: pneumonia  pcv 13 today and 23 next year.  Repeat age 63.     5. Small vessel disease, cerebrovascular  Monitoring morning surge htn to 130/140s, otherwise at goal.   - Comprehensive metabolic panel    6. Hypovitaminosis D  Re-check today.   - Vitamin D Deficiency    7. 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     Osteopenia 8 years ago, re-check today.   - DX Hip/Pelvis/Spine; Future    8. Encounter for HCV screening test for low risk patient  Hep c screen today.     9. Screen for STD (sexually transmitted disease)  today  - HIV Antigen Antibody Combo    10. CARDIOVASCULAR SCREENING; LDL GOAL LESS THAN 160    - Lipid panel reflex to direct LDL Fasting        MYCHART FOR ON-LINE CARE(VISITS), LABS, REFILLS, MESSAGING, ETC [email or web address] , [phone]    E-VISIT: click \"on-line care, then request e-visit\".  E-visits work well for following up on issues we have discussed in clinic previously which may need new prescriptions, new prescriptions or substantial discussion. These are always done by me (Dr. Wegener).     ONCARE VISIT:  [email or web address]  - we treat nearly 50 common conditions through on-care.  These are done in an hour by on-call staff.     RADIOLOGY:  [facility]:  [phone]   [facility]: [phone]    Mammogram and Colonoscopy [phone]    Smoking Cessation: www.[email or web address], 8-833-091-PLAN (3423)      CONSUMER PRICE LINE for estimates of test costs:  " [phone]         Preventive Health Recommendations  Female Ages 50 - 64    Yearly exam: See your health care provider every year in order to  o Review health changes.   o Discuss preventive care.    o Review your medicines if your doctor has prescribed any.      Get a Pap test every three years (unless you have an abnormal result and your provider advises testing more often).    If you get Pap tests with HPV test, you only need to test every 5 years, unless you have an abnormal result.     You do not need a Pap test if your uterus was removed (hysterectomy) and you have not had cancer.    You should be tested each year for STDs (sexually transmitted diseases) if you're at risk.     Have a mammogram every 1 to 2 years.    Have a colonoscopy at age 50, or have a yearly FIT test (stool test). These exams screen for colon cancer.      Have a cholesterol test every 5 years, or more often if advised.    Have a diabetes test (fasting glucose) every three years. If you are at risk for diabetes, you should have this test more often.     If you are at risk for osteoporosis (brittle bone disease), think about having a bone density scan (DEXA).    Shots: Get a flu shot each year. Get a tetanus shot every 10 years.    Nutrition:     Eat at least 5 servings of fruits and vegetables each day.    Eat whole-grain bread, whole-wheat pasta and brown rice instead of white grains and rice.    Talk to your provider about Calcium and Vitamin D.     Lifestyle    Exercise at least 150 minutes a week (30 minutes a day, 5 days a week). This will help you control your weight and prevent disease.    Limit alcohol to one drink per day.    No smoking.     Wear sunscreen to prevent skin cancer.     See your dentist every six months for an exam and cleaning.    See your eye doctor every 1 to 2 years.

## 2018-04-20 NOTE — PROGRESS NOTES
SUBJECTIVE:   Clara Boykin is a 57 year old female who presents to clinic today for the following health issues:      Pt comes in clinic today to discuss Health maintenance.     -------------------------------------  ASSESSMENT AND PLAN  1. Routine general medical examination at a health care facility  Wonders when colonoscopy due.     2. Fatigue, unspecified type  Increased over past few months with training for 1/2 marathon.  Has not run one for 15 years!    -    3. Female stress incontinence  Wondering if any new options, not desiring surgery.  Mostly bothers on am runs.  Uses diphenhydramine prior which helps some but is somewhat sedating.           6. Hypovitaminosis D  Has had in past, due for re-check.  Taking supplement.  Wondering about this and also if should do repeat dexa scan.       7. Osteopenia, unspecified location  Last dexa about 8 years ago in osteopenic range after poor healing of left acromial fracture managed by dr. Guevara.  Has had two fractures, neither unexplained, both with trauma.  Family history of maternal osteoporosis.  No h/o prednisone use, no h/o heavy alcohol use.  Has always been thin.  High level of activity/excercise as child but low level of calcium intake as thought had milk allergy.  Did not have much yogurt or cheese.                     Problem list and histories reviewed & adjusted, as indicated.  Additional history: as documented        Reviewed and updated as needed this visit by clinical staff  Tobacco  Allergies  Meds  Med Hx  Surg Hx  Fam Hx  Soc Hx      Reviewed and updated as needed this visit by Provider                SUBJECTIVE:   CC: Clara Boykin is an 57 year old woman who presents for preventive health visit.     Healthy Habits:    Do you get at least three servings of calcium containing foods daily (dairy, green leafy vegetables, etc.)? yes    Amount of exercise or daily activities, outside of work: 5 day(s) per week    Problems taking  medications regularly No    Medication side effects: No    Have you had an eye exam in the past two years? no    Do you see a dentist twice per year? yes    Do you have sleep apnea, excessive snoring or daytime drowsiness?no      PROBLEMS TO ADD ON...  -------------------------------------  See above    Today's PHQ-2 Score:   PHQ-2 ( 1999 Pf[employer]) 4/20/2018 3/20/2018   Q1: Little interest or pleasure in doing things 0 0   Q2: Feeling down, depressed or hopeless 0 0   PHQ-2 Score 0 0       Abuse: Current or Past(Physical, Sexual or Emotional)- not asked  Do you feel safe in your environment - not asked    Social History   Substance Use Topics     Smoking status: Never Smoker     Smokeless tobacco: Never Used     Alcohol use Not on file     If you drink alcohol do you typically have >3 drinks per day or >7 drinks per week? No                     Reviewed orders with patient.  Reviewed health maintenance and updated orders accordingly - Yes  Labs reviewed in EPIC        Pertinent mammograms are reviewed under the imaging tab.  History of abnormal Pap smear: NO - age 30-65 PAP every 5 years with negative HPV co-testing recommended    Reviewed and updated as needed this visit by clinical staff  Tobacco  Allergies  Meds  Med Hx  Surg Hx  Fam Hx  Soc Hx        Reviewed and updated as needed this visit by Provider        Past Medical History:   Diagnosis Date     Diplopia      H/O CT scan      H/O magnetic resonance imaging      Paralytic strabismus      Pneumonia      PONV (postoperative nausea and vomiting)       Past Surgical History:   Procedure Laterality Date     BUNIONECTOMY Right 2016     HERNIORRHAPHY VENTRAL N/A 7/7/2016    Procedure: HERNIORRHAPHY VENTRAL;  Surgeon: sAh Thompson MD;  Location:  OR     RECESSION RESECTION (REPAIR STRABISMUS) Right 4/10/2017    Procedure: RECESSION RESECTION (REPAIR STRABISMUS);  Surgeon: Lyle Leggett MD;  Location:  OR       ROS:  CONSTITUTIONAL:  NEGATIVE for fever, chills, change in weight  INTEGUMENTARY/SKIN: NEGATIVE for worrisome rashes, moles or lesions  EYES: NEGATIVE for vision changes or irritation  ENT: NEGATIVE for ear, mouth and throat problems  RESP: NEGATIVE for significant cough or SOB  BREAST: NEGATIVE for masses, tenderness or discharge  CV: NEGATIVE for chest pain, palpitations or peripheral edema  GI: NEGATIVE for nausea, abdominal pain, heartburn, or change in bowel habits  : NEGATIVE for unusual urinary or vaginal symptoms. No vaginal bleeding.  MUSCULOSKELETAL: NEGATIVE for significant arthralgias or myalgia  NEURO: NEGATIVE for weakness, dizziness or paresthesias  PSYCHIATRIC: NEGATIVE for changes in mood or affect     OBJECTIVE:   /69  Pulse 60  Temp 97.8  F (36.6  C) (Oral)  Resp 18  Wt 121 lb (54.9 kg)  LMP 09/06/2006  SpO2 99%  BMI 20.14 kg/m2  EXAM:  GENERAL: healthy, alert and no distress  EYES: Eyes grossly normal to inspection, PERRL and conjunctivae and sclerae normal  HENT: ear canals and TM's normal, nose and mouth without ulcers or lesions  NECK: no adenopathy, no asymmetry, masses, or scars and thyroid normal to palpation  RESP: lungs clear to auscultation - no rales, rhonchi or wheezes  BREAST: normal without masses, tenderness or nipple discharge and no palpable axillary masses or adenopathy  CV: regular rate and rhythm, normal S1 S2, no S3 or S4, no murmur, click or rub, no peripheral edema and peripheral pulses strong  ABDOMEN: soft, nontender, no hepatosplenomegaly, no masses and bowel sounds normal  MS: no gross musculoskeletal defects noted, no edema  SKIN: no suspicious lesions or rashes  NEURO: Normal strength and tone, mentation intact and speech normal  PSYCH: mentation appears normal, affect normal/bright    ASSESSMENT/PLAN:   ASSESSMENT AND PLAN  1. Routine general medical examination at a health care facility  Due for colonoscopy by October (call number below).     Schedule mammogram now. Up to  "date on tdap reviewed/pertussis.         2. Fatigue, unspecified type  Check labs for anemia/hypothyroid.   - CBC with platelets differential  - Comprehensive metabolic panel  - TSH with free T4 reflex    3. Female stress incontinence  Reviewed.  Offered/declined referral at this time. No other good medication options for this.     4. H/O: pneumonia  pcv 13 today and 23 next year.  Repeat age 63.     5. Small vessel disease, cerebrovascular  Monitoring morning surge htn to 130/140s, otherwise at goal.   - Comprehensive metabolic panel eval for ckd.     6. Hypovitaminosis D  Re-check today.   - Vitamin D Deficiency    7. 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     Osteopenia 8 years ago, re-check today follow up to review results.   - DX Hip/Pelvis/Spine; Future    8. Encounter for HCV screening test for low risk patient  Hep c screen today.     9. Screen for STD (sexually transmitted disease)  today  - HIV Antigen Antibody Combo    10. CARDIOVASCULAR SCREENING; LDL GOAL LESS THAN 160    - Lipid panel reflex to direct LDL Fasting        MYCHART FOR ON-LINE CARE(VISITS), LABS, REFILLS, MESSAGING, ETC [email or web address] , [phone]    E-VISIT: click \"on-line care, then request e-visit\".  E-visits work well for following up on issues we have discussed in clinic previously which may need new prescriptions, new prescriptions or substantial discussion. These are always done by me (Dr. Wegener).     ONCARE VISIT:  [email or web address]  - we treat nearly 50 common conditions through on-care.  These are done in an hour by on-call staff.     RADIOLOGY:  [facility]:  [phone]   [facility]: [phone]    Mammogram and Colonoscopy [phone]    Smoking Cessation: www.[email or web address], 4-877-557-PLAN ([phone])      CONSUMER PRICE LINE for estimates of test costs:  [phone]     COUNSELING:   Reviewed preventive " "health counseling, as reflected in patient instructions       Regular exercise       Healthy diet/nutrition       Colon cancer screening         reports that she has never smoked. She has never used smokeless tobacco.    Estimated body mass index is 20.14 kg/(m^2) as calculated from the following:    Height as of 3/20/18: 5' 5\" (1.651 m).    Weight as of this encounter: 121 lb (54.9 kg).       Counseling Resources:  ATP IV Guidelines  Pooled Cohorts Equation Calculator  Breast Cancer Risk Calculator  FRAX Risk Assessment  ICSI Preventive Guidelines  Dietary Guidelines for Americans, 2010  USDA's MyPlate  ASA Prophylaxis  Lung CA Screening    Joel Daniel Wegener, MD  [facility]  "

## 2018-04-21 LAB — HCV AB SERPL QL IA: NONREACTIVE

## 2018-04-22 ENCOUNTER — MYC MEDICAL ADVICE (OUTPATIENT)
Dept: FAMILY MEDICINE | Facility: CLINIC | Age: 58
End: 2018-04-22

## 2018-04-23 LAB
DEPRECATED CALCIDIOL+CALCIFEROL SERPL-MC: 45 UG/L (ref 20–75)
HIV 1+2 AB+HIV1 P24 AG SERPL QL IA: NONREACTIVE

## 2018-07-12 ENCOUNTER — RADIANT APPOINTMENT (OUTPATIENT)
Dept: BONE DENSITY | Facility: CLINIC | Age: 58
End: 2018-07-12
Attending: FAMILY MEDICINE
Payer: COMMERCIAL

## 2018-07-12 DIAGNOSIS — M85.80 OSTEOPENIA, UNSPECIFIED LOCATION: ICD-10-CM

## 2018-07-12 PROCEDURE — 77080 DXA BONE DENSITY AXIAL: CPT | Performed by: INTERNAL MEDICINE

## 2018-07-27 ENCOUNTER — RADIANT APPOINTMENT (OUTPATIENT)
Dept: MAMMOGRAPHY | Facility: CLINIC | Age: 58
End: 2018-07-27
Attending: FAMILY MEDICINE
Payer: COMMERCIAL

## 2018-07-27 DIAGNOSIS — Z00.00 PREVENTATIVE HEALTH CARE: ICD-10-CM

## 2018-09-04 ENCOUNTER — OFFICE VISIT (OUTPATIENT)
Dept: FAMILY MEDICINE | Facility: CLINIC | Age: 58
End: 2018-09-04
Payer: COMMERCIAL

## 2018-09-04 ENCOUNTER — MYC MEDICAL ADVICE (OUTPATIENT)
Dept: FAMILY MEDICINE | Facility: CLINIC | Age: 58
End: 2018-09-04

## 2018-09-04 VITALS
HEIGHT: 65 IN | SYSTOLIC BLOOD PRESSURE: 116 MMHG | DIASTOLIC BLOOD PRESSURE: 77 MMHG | TEMPERATURE: 98.5 F | HEART RATE: 60 BPM | BODY MASS INDEX: 20.66 KG/M2 | OXYGEN SATURATION: 95 % | WEIGHT: 124 LBS | RESPIRATION RATE: 18 BRPM

## 2018-09-04 DIAGNOSIS — Z23 NEED FOR ZOSTER VACCINE: ICD-10-CM

## 2018-09-04 DIAGNOSIS — I10 HYPERTENSION GOAL BP (BLOOD PRESSURE) < 140/90: Primary | ICD-10-CM

## 2018-09-04 DIAGNOSIS — F51.01 PRIMARY INSOMNIA: ICD-10-CM

## 2018-09-04 PROCEDURE — 90471 IMMUNIZATION ADMIN: CPT | Performed by: FAMILY MEDICINE

## 2018-09-04 PROCEDURE — 99214 OFFICE O/P EST MOD 30 MIN: CPT | Mod: 25 | Performed by: FAMILY MEDICINE

## 2018-09-04 PROCEDURE — 90736 HZV VACCINE LIVE SUBQ: CPT | Performed by: FAMILY MEDICINE

## 2018-09-04 RX ORDER — LISINOPRIL 5 MG/1
5 TABLET ORAL DAILY
Qty: 30 TABLET | Refills: 1 | Status: SHIPPED | OUTPATIENT
Start: 2018-09-04 | End: 2018-10-11

## 2018-09-04 NOTE — NURSING NOTE
Screening Questionnaire for Adult Immunization    Are you sick today?   No   Do you have allergies to medications, food, a vaccine component or latex?   No   Have you ever had a serious reaction after receiving a vaccination?   No   Do you have a long-term health problem with heart disease, lung disease, asthma, kidney disease, metabolic disease (e.g. diabetes), anemia, or other blood disorder?   No   Do you have cancer, leukemia, HIV/AIDS, or any other immune system problem?   No   In the past 3 months, have you taken medications that affect  your immune system, such as prednisone, other steroids, or anticancer drugs; drugs for the treatment of rheumatoid arthritis, Crohn s disease, or psoriasis; or have you had radiation treatments?   No   Have you had a seizure, or a brain or other nervous system problem?   No   During the past year, have you received a transfusion of blood or blood     products, or been given immune (gamma) globulin or antiviral drug?   No   For women: Are you pregnant or is there a chance you could become        pregnant during the next month?   No   Have you received any vaccinations in the past 4 weeks?   No     Immunization questionnaire answers were all negative.        Per orders of Dr. Wegener, injection of Zostavax given by Monalisa Roman. Patient instructed to remain in clinic for 15 minutes afterwards, and to report any adverse reaction to me immediately.       Screening performed by Monalisa Roman on 9/4/2018 at 9:23 AM.

## 2018-09-04 NOTE — MR AVS SNAPSHOT
"              After Visit Summary   2018    Clara Boykin    MRN: [id number]           Patient Information     Date Of Birth          1960        Visit Information        Provider Department      2018 7:40 AM Wegener, Joel Daniel Irwin, MD [facility]        Today's Diagnoses     Hypertension goal BP (blood pressure) < 140/90    -  1      Care Instructions    ASSESSMENT AND PLAN  1. Hypertension goal BP (blood pressure) < 140/90  Start lisinopril  5 mg daily.  Follow up one month for re-check and lab.     Will do old shingles vaccine zostavax today.       - lisinopril (PRINIVIL/ZESTRIL) 5 MG tablet; Take 1 tablet (5 mg) by mouth daily  Dispense: 30 tablet; Refill: 1        MYCHART FOR ON-LINE CARE(VISITS), LABS, REFILLS, MESSAGING, ETC [email or web address] , [phone]    E-VISIT: click \"on-line care, then request e-visit\".  E-visits work well for following up on issues we have discussed in clinic previously which may need new prescriptions, new prescriptions or substantial discussion. These are always done by me (Dr. Wegener).     ONCARE VISIT:  [email or web address]  - we treat nearly 50 common conditions through on-care.  These are done in an hour by on-call staff.     RADIOLOGY:  [facility]:  [phone]   [facility]: [phone]    Mammogram and Colonoscopy [phone]    Smoking Cessation: www.[email or web address], 6-163-291-PLAN ([phone])      CONSUMER PRICE LINE for estimates of test costs:  [phone]               Follow-ups after your visit        Your next 10 appointments already scheduled     Sep 13, 2018  5:20 PM CDT   (Arrive by 5:05 PM)   [facility] with Tyrone Lucia PT   [facility]  )    [address] [place] [id number] [phone]              Who to contact     If you have questions or need follow up information about today's clinic visit or your " "schedule please contact [facility] directly at [phone].  Normal or non-critical lab and imaging results will be communicated to you by MyChart, letter or phone within 4 business days after the clinic has received the results. If you do not hear from us within 7 days, please contact the clinic through [employer] or phone. If you have a critical or abnormal lab result, we will notify you by phone as soon as possible.  Submit refill requests through [facility] or call your pharmacy and they will forward the refill request to us. Please allow 3 business days for your refill to be completed.          Additional Information About Your Visit        [facility] Information     [employer] gives you secure access to your electronic health record. If you see a primary care provider, you can also send messages to your care team and make appointments. If you have questions, please call your primary care clinic.  If you do not have a primary care provider, please call [phone] and they will assist you.        Care EveryWhere ID     This is your Care EveryWhere ID. This could be used by other organizations to access your [facility] medical records  [id number]        Your Vitals Were     Pulse Temperature Respirations Height Last Period Pulse Oximetry    60 98.5  F (36.9  C) (Oral) 18 5' 5\" (1.651 m) 09/06/2006 95%    BMI (Body Mass Index)                   20.63 kg/m2            Blood Pressure from Last 3 Encounters:   09/04/18 116/77   04/20/18 111/69   03/20/18 116/74    Weight from Last 3 Encounters:   09/04/18 124 lb (56.2 kg)   04/20/18 121 lb (54.9 kg)   03/20/18 120 lb 4 oz (54.5 kg)              Today, you had the following     No orders found for display         Today's Medication Changes          These changes are accurate as of 9/4/18  8:25 AM.  If you have any questions, ask your nurse or doctor.               Start taking these medicines.        Dose/Directions    lisinopril 5 MG tablet   Commonly known as: "  PRINIVIL/ZESTRIL   Used for:  Hypertension goal BP (blood pressure) < 140/90   Started by:  Wegener, Joel Daniel Irwin, MD        Dose:  5 mg   Take 1 tablet (5 mg) by mouth daily   Quantity:  30 tablet   Refills:  1            Where to get your medicines      These medications were sent to [facility] [address] [place] [address] AT [facility] [address] [facility] [address]  [address] [place] [id number]    Hours:  24-hours Phone:  [phone]     lisinopril 5 MG tablet                Primary Care Provider Office Phone # Fax #    Joel Daniel Irwin Wegener, [phone] [address] [place] [id number]        Equal Access to Services     [facility] AH: Hadii aad ku hadasho Soomaali, waaxda luqadaha, qaybta kaalmada adeegyada, waxay birgitin hayaan mishel landaverde . So [facility] [phone].    ATENCIÓN: Si habla español, tiene a fuentes disposición servicios gratuitos de asistencia lingüística. Nelame al [phone].    We comply with applicable federal civil rights laws and [place] laws. We do not discriminate on the basis of race, color, national origin, age, disability, sex, sexual orientation, or gender identity.            Thank you!     Thank you for choosing [facility]  for your care. Our goal is always to provide you with excellent care. Hearing back from our patients is one way we can continue to improve our services. Please take a few minutes to complete the written survey that you may receive in the mail after your visit with us. Thank you!             Your Updated Medication List - Protect others around you: Learn how to safely use, store and throw away your medicines at www.disposemymeds.org.          This list is accurate as of 9/4/18  8:25 AM.  Always use your most recent med list.                   Brand Name Dispense Instructions for use Diagnosis    albuterol 108 (90 Base) MCG/ACT inhaler    PROAIR HFA/PROVENTIL HFA/VENTOLIN HFA    1  Inhaler    Inhale 2 puffs into the lungs every 6 hours as needed for shortness of breath / dyspnea or wheezing        ALEVE PO      Take by mouth as needed        aspirin 81 MG tablet      Take 81 mg by mouth as needed Reported on 3/31/2017        BENADRYL 25 MG tablet   Generic drug:  diphenhydrAMINE      Take 25 mg by mouth nightly as needed Reported on 3/31/2017        CALCIUM 1200 PO           lisinopril 5 MG tablet    PRINIVIL/ZESTRIL    30 tablet    Take 1 tablet (5 mg) by mouth daily    Hypertension goal BP (blood pressure) < 140/90       melatonin 3 MG Caps      Take 2 capsules by mouth At Bedtime        OMEGA 3 PO      Reported on 3/31/2017        traZODone 50 MG tablet    DESYREL    30 tablet    1/4 to 1/2 pill nightly as needed.    Primary insomnia       vitamin D 1000 units capsule      Take 1 capsule by mouth daily

## 2018-09-04 NOTE — PATIENT INSTRUCTIONS
"ASSESSMENT AND PLAN  1. Hypertension goal BP (blood pressure) < 140/90  Start lisinopril  5 mg daily.  Follow up one month for re-check and lab.     Will do old shingles vaccine zostavax today.       - lisinopril (PRINIVIL/ZESTRIL) 5 MG tablet; Take 1 tablet (5 mg) by mouth daily  Dispense: 30 tablet; Refill: 1        MYCHART FOR ON-LINE CARE(VISITS), LABS, REFILLS, MESSAGING, ETC [email or web address] , [phone]    E-VISIT: click \"on-line care, then request e-visit\".  E-visits work well for following up on issues we have discussed in clinic previously which may need new prescriptions, new prescriptions or substantial discussion. These are always done by me (Dr. Wegener).     ONCARE VISIT:  [email or web address]  - we treat nearly 50 common conditions through on-care.  These are done in an hour by on-call staff.     RADIOLOGY:  [facility]:  [phone]   [facility]: [phone]    Mammogram and Colonoscopy [phone]    Smoking Cessation: www.quitplan.org, 0-044-626-PLAN (3443)      CONSUMER PRICE LINE for estimates of test costs:  [phone]     morg surger htn, acetabular tear/pubic rami fracture after fall. , zoster, flu shot next visit, re check labs  "
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
## 2018-09-05 RX ORDER — TRAZODONE HYDROCHLORIDE 50 MG/1
TABLET, FILM COATED ORAL
Qty: 45 TABLET | Refills: 3 | Status: SHIPPED | OUTPATIENT
Start: 2018-09-05 | End: 2020-02-04

## 2018-09-05 NOTE — TELEPHONE ENCOUNTER
"Requested Prescriptions   Pending Prescriptions Disp Refills     traZODone (DESYREL) 50 MG tablet  Last Written Prescription Date:  2016  Last Fill Quantity: 30 tablet,  # refills: 3   Last Office Visit: 2018  Future Office Visit:    Next 5 appointments (look out 90 days)     Oct 05, 2018  7:40 AM CDT   SHORT with Joel Daniel Irwin Wegener, MD   [facility] ([facility])    [address] [place] [id number] [phone]                30 tablet 3     Si/2 pill nightly as needed.    Serotonin Modulators Passed    2018  3:58 PM       Passed - Recent (12 mo) or future (30 days) visit within the authorizing provider's specialty    Patient had office visit in the last 12 months or has a visit in the next 30 days with authorizing provider or within the authorizing provider's specialty.  See \"Patient Info\" tab in inbasket, or \"Choose Columns\" in Meds & Orders section of the refill encounter.           Passed - Patient is age 18 or older       Passed - No active pregnancy on record       Passed - No positive pregnancy test in past 12 months          "

## 2018-09-05 NOTE — TELEPHONE ENCOUNTER
Dr. Wegener-Please review and sign if agree.    Sig had to be updated/changed, per patient's report of how she takes medication, so writer unable to authorize refill.    Thank you!  VALENTÍN ShepardN, RN

## 2018-09-07 ENCOUNTER — TRANSFERRED RECORDS (OUTPATIENT)
Dept: HEALTH INFORMATION MANAGEMENT | Facility: CLINIC | Age: 58
End: 2018-09-07

## 2018-09-13 ENCOUNTER — THERAPY VISIT (OUTPATIENT)
Dept: PHYSICAL THERAPY | Facility: CLINIC | Age: 58
End: 2018-09-13
Payer: COMMERCIAL

## 2018-09-13 DIAGNOSIS — M25.551 HIP PAIN, RIGHT: Primary | ICD-10-CM

## 2018-09-13 PROCEDURE — 97110 THERAPEUTIC EXERCISES: CPT | Mod: GP | Performed by: PHYSICAL THERAPIST

## 2018-09-13 PROCEDURE — 97530 THERAPEUTIC ACTIVITIES: CPT | Mod: GP | Performed by: PHYSICAL THERAPIST

## 2018-09-13 PROCEDURE — 97161 PT EVAL LOW COMPLEX 20 MIN: CPT | Mod: GP | Performed by: PHYSICAL THERAPIST

## 2018-09-13 ASSESSMENT — ACTIVITIES OF DAILY LIVING (ADL)
HOS_ADL_HIGHEST_POTENTIAL_SCORE: 56
PUTTING_ON_SOCKS_AND_SHOES: NO DIFFICULTY AT ALL
HOW_WOULD_YOU_RATE_YOUR_CURRENT_LEVEL_OF_FUNCTION_DURING_YOUR_USUAL_ACTIVITIES_OF_DAILY_LIVING_FROM_0_TO_100_WITH_100_BEING_YOUR_LEVEL_OF_FUNCTION_PRIOR_TO_YOUR_HIP_PROBLEM_AND_0_BEING_THE_INABILITY_TO_PERFORM_ANY_OF_YOUR_USUAL_DAILY_ACTIVITIES?: 30
HOS_ADL_COUNT: 14
SITTING_FOR_15_MINUTES: SLIGHT DIFFICULTY
RECREATIONAL_ACTIVITIES: SLIGHT DIFFICULTY
ROLLING_OVER_IN_BED: NO DIFFICULTY AT ALL
GOING_UP_1_FLIGHT_OF_STAIRS: NO DIFFICULTY AT ALL
HOS_ADL_ITEM_SCORE_TOTAL: 47
GOING_DOWN_1_FLIGHT_OF_STAIRS: NO DIFFICULTY AT ALL
GETTING_INTO_AND_OUT_OF_A_BATHTUB: NO DIFFICULTY AT ALL
GETTING_INTO_AND_OUT_OF_AN_AVERAGE_CAR: NO DIFFICULTY AT ALL
LIGHT_TO_MODERATE_WORK: SLIGHT DIFFICULTY
HEAVY_WORK: SLIGHT DIFFICULTY
HOS_ADL_SCORE(%): 83.93
WALKING_15_MINUTES_OR_GREATER: SLIGHT DIFFICULTY
DEEP_SQUATTING: SLIGHT DIFFICULTY
TWISTING/PIVOTING_ON_INVOLVED_LEG: SLIGHT DIFFICULTY
WALKING_APPROXIMATELY_10_MINUTES: SLIGHT DIFFICULTY
WALKING_INITIALLY: MODERATE DIFFICULTY
STANDING_FOR_15_MINUTES: NO DIFFICULTY AT ALL
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
## 2018-09-13 NOTE — MR AVS SNAPSHOT
After Visit Summary   9/13/2018    Clara Boykin    MRN: [id number]           Patient Information     Date Of Birth          1960        Visit Information        Provider Department      9/13/2018 5:20 PM Tyrone Lucia PT [facility]        Today's Diagnoses     Hip pain, right    -  1       Follow-ups after your visit        Your next 10 appointments already scheduled     Sep 25, 2018  4:30 PM CDT   BAETA Extremity with Tyrone Lucia PT   [facility] ([address] [id number] [phone]            Oct 03, 2018  4:00 PM CDT   BEATA Extremity with Tyrone Lucia PT   [facility] ([facility]  )    [address] [place] [id number] [phone]            Oct 05, 2018  7:40 AM CDT   SHORT with Joel Daniel Irwin Wegener, MD   [facility] ([facility])    [address] [place] [id number] [phone]              Who to contact     If you have questions or need follow up information about today's clinic visit or your schedule please contact [facility] directly at [phone].  Normal or non-critical lab and imaging results will be communicated to you by MyChart, letter or phone within 4 business days after the clinic has received the results. If you do not hear from us within 7 days, please contact the clinic through MyChart or phone. If you have a critical or abnormal lab result, we will notify you by phone as soon as possible.  Submit refill requests through [employer] or call your pharmacy and they will forward the refill request to us. Please allow 3 business days for your refill to be completed.          Additional Information About Your Visit        MyChart Information      [employer] gives you secure access to your electronic health record. If you see a primary care provider, you can also send messages to your care team and make appointments. If you have questions, please call your primary care clinic.  If you do not have a primary care provider, please call [phone] and they will assist you.        Care EveryWhere ID     This is your Care EveryWhere ID. This could be used by other organizations to access your [facility] medical records  [id number]        Your Vitals Were     Last Period                   09/06/2006            Blood Pressure from Last 3 Encounters:   09/04/18 116/77   04/20/18 111/69   03/20/18 116/74    Weight from Last 3 Encounters:   09/04/18 56.2 kg (124 lb)   04/20/18 54.9 kg (121 lb)   03/20/18 54.5 kg (120 lb 4 oz)              We Performed the Following     PT Eval, Low Complexity (90213)     Therapeutic Activities     Therapeutic Exercises        Primary Care Provider Office Phone # Fax #    Joel Daniel Irwin Wegener, [phone] [address] [place], [place] [id number]        Equal Access to Services     [facility] Hospital[facility]ATHLEEN : Hadii aad ku hadasho Soomaali, waaxda luqadaha, qaybta kaalmada adeanuyahong, shahab landaverde . So [facility] [phone].    ATENCIÓN: Si habla español, tiene a fuentes disposición servicios gratuitos de asistencia lingüística. [facility] [phone].    We comply with applicable federal civil rights laws and [place] laws. We do not discriminate on the basis of race, color, national origin, age, disability, sex, sexual orientation, or gender identity.            Thank you!     Thank you for choosing [facility]  for your care. Our goal is always to provide you with excellent care. Hearing back from our patients is one way we can continue to improve our services. Please take a few minutes to complete the written survey that you may receive in the mail after  your visit with us. Thank you!             Your Updated Medication List - Protect others around you: Learn how to safely use, store and throw away your medicines at www.disposemymeds.org.          This list is accurate as of 9/13/18 11:59 PM.  Always use your most recent med list.                   Brand Name Dispense Instructions for use Diagnosis    albuterol 108 (90 Base) MCG/ACT inhaler    PROAIR HFA/PROVENTIL HFA/VENTOLIN HFA    1 Inhaler    Inhale 2 puffs into the lungs every 6 hours as needed for shortness of breath / dyspnea or wheezing        ALEVE PO      Take by mouth as needed        aspirin 81 MG tablet      Take 81 mg by mouth as needed Reported on 3/31/2017        BENADRYL 25 MG tablet   Generic drug:  diphenhydrAMINE      Take 25 mg by mouth nightly as needed Reported on 3/31/2017        CALCIUM 1200 PO           lisinopril 5 MG tablet    PRINIVIL/ZESTRIL    30 tablet    Take 1 tablet (5 mg) by mouth daily    Hypertension goal BP (blood pressure) < 140/90       melatonin 3 MG Caps      Take 2 capsules by mouth At Bedtime        OMEGA 3 PO      Reported on 3/31/2017        traZODone 50 MG tablet    DESYREL    45 tablet    1/2 pill nightly as needed.    Primary insomnia       vitamin D 1000 units capsule      Take 1 capsule by mouth daily

## 2018-09-25 ENCOUNTER — THERAPY VISIT (OUTPATIENT)
Dept: PHYSICAL THERAPY | Facility: CLINIC | Age: 58
End: 2018-09-25
Payer: COMMERCIAL

## 2018-09-25 DIAGNOSIS — M25.551 HIP PAIN, RIGHT: Primary | ICD-10-CM

## 2018-09-25 PROCEDURE — 97110 THERAPEUTIC EXERCISES: CPT | Mod: GP | Performed by: PHYSICAL THERAPIST

## 2018-09-25 PROCEDURE — 97530 THERAPEUTIC ACTIVITIES: CPT | Mod: GP | Performed by: PHYSICAL THERAPIST

## 2018-10-09 ENCOUNTER — THERAPY VISIT (OUTPATIENT)
Dept: PHYSICAL THERAPY | Facility: CLINIC | Age: 58
End: 2018-10-09
Payer: COMMERCIAL

## 2018-10-09 ENCOUNTER — OFFICE VISIT (OUTPATIENT)
Dept: FAMILY MEDICINE | Facility: CLINIC | Age: 58
End: 2018-10-09
Payer: COMMERCIAL

## 2018-10-09 VITALS
HEIGHT: 65 IN | SYSTOLIC BLOOD PRESSURE: 108 MMHG | DIASTOLIC BLOOD PRESSURE: 72 MMHG | WEIGHT: 120 LBS | TEMPERATURE: 97.9 F | RESPIRATION RATE: 16 BRPM | HEART RATE: 63 BPM | OXYGEN SATURATION: 97 % | BODY MASS INDEX: 19.99 KG/M2

## 2018-10-09 DIAGNOSIS — I10 HYPERTENSION GOAL BP (BLOOD PRESSURE) < 130/80: ICD-10-CM

## 2018-10-09 DIAGNOSIS — Z01.818 PREOP GENERAL PHYSICAL EXAM: Primary | ICD-10-CM

## 2018-10-09 DIAGNOSIS — M25.551 HIP PAIN, RIGHT: ICD-10-CM

## 2018-10-09 DIAGNOSIS — M21.612 BUNION, LEFT: ICD-10-CM

## 2018-10-09 DIAGNOSIS — D70.9 NEUTROPENIA, UNSPECIFIED TYPE (H): ICD-10-CM

## 2018-10-09 LAB
ANION GAP SERPL CALCULATED.3IONS-SCNC: 8 MMOL/L (ref 3–14)
BUN SERPL-MCNC: 20 MG/DL (ref 7–30)
CALCIUM SERPL-MCNC: 9.2 MG/DL (ref 8.5–10.1)
CHLORIDE SERPL-SCNC: 101 MMOL/L (ref 94–109)
CO2 SERPL-SCNC: 29 MMOL/L (ref 20–32)
CREAT SERPL-MCNC: 0.73 MG/DL (ref 0.52–1.04)
ERYTHROCYTE [DISTWIDTH] IN BLOOD BY AUTOMATED COUNT: 13.2 % (ref 10–15)
GFR SERPL CREATININE-BSD FRML MDRD: 81 ML/MIN/1.7M2
GLUCOSE SERPL-MCNC: 87 MG/DL (ref 70–99)
HCT VFR BLD AUTO: 43.4 % (ref 35–47)
HGB BLD-MCNC: 14.3 G/DL (ref 11.7–15.7)
MCH RBC QN AUTO: 30.8 PG (ref 26.5–33)
MCHC RBC AUTO-ENTMCNC: 32.9 G/DL (ref 31.5–36.5)
MCV RBC AUTO: 93 FL (ref 78–100)
PLATELET # BLD AUTO: 285 10E9/L (ref 150–450)
POTASSIUM SERPL-SCNC: 4 MMOL/L (ref 3.4–5.3)
RBC # BLD AUTO: 4.65 10E12/L (ref 3.8–5.2)
SODIUM SERPL-SCNC: 138 MMOL/L (ref 133–144)
WBC # BLD AUTO: 3.8 10E9/L (ref 4–11)

## 2018-10-09 PROCEDURE — 97110 THERAPEUTIC EXERCISES: CPT | Mod: GP | Performed by: PHYSICAL THERAPIST

## 2018-10-09 PROCEDURE — 80048 BASIC METABOLIC PNL TOTAL CA: CPT | Performed by: FAMILY MEDICINE

## 2018-10-09 PROCEDURE — 85027 COMPLETE CBC AUTOMATED: CPT | Performed by: FAMILY MEDICINE

## 2018-10-09 PROCEDURE — 97530 THERAPEUTIC ACTIVITIES: CPT | Mod: GP | Performed by: PHYSICAL THERAPIST

## 2018-10-09 PROCEDURE — 99214 OFFICE O/P EST MOD 30 MIN: CPT | Performed by: FAMILY MEDICINE

## 2018-10-09 PROCEDURE — 36415 COLL VENOUS BLD VENIPUNCTURE: CPT | Performed by: FAMILY MEDICINE

## 2018-10-09 PROCEDURE — 93000 ELECTROCARDIOGRAM COMPLETE: CPT | Performed by: FAMILY MEDICINE

## 2018-10-09 NOTE — MR AVS SNAPSHOT
"              After Visit Summary   10/9/2018    Clara Boykin    MRN: [id number]           Patient Information     Date Of Birth          1960        Visit Information        Provider Department      10/9/2018 8:00 AM Wegener, Joel Daniel Irwin, MD [facility]        Today's Diagnoses     Preop general physical exam    -  1    Bunion, left        Hypertension goal BP (blood pressure) < 130/80          Care Instructions    ASSESSMENT AND PLAN  1. Preop general physical exam  Cleared for surgery. Planning flu shot in December.       Pre-op labs today.   - EKG 12-lead complete w/read - Clinics; Future  - EKG 12-lead complete w/read - Clinics  - CBC with platelets  - Basic metabolic panel    2. Bunion, left  Reason for surgery .       - CBC with platelets  - Basic metabolic panel    3. Hypertension goal BP (blood pressure) < 130/80  Controlled.  Continuing lisinopril 5 mg daily.           MYCHART FOR ON-LINE CARE(VISITS), LABS, REFILLS, MESSAGING, ETC [email or web address] , [phone]    E-VISIT: click \"on-line care, then request e-visit\".  E-visits work well for following up on issues we have discussed in clinic previously which may need new prescriptions, new prescriptions or substantial discussion. These are always done by me (Dr. Wegener).     ONCARE VISIT:  [email or web address]  - we treat nearly 50 common conditions through on-care.  These are done in an hour by on-call staff.     RADIOLOGY:  [facility]:  [phone]   [facility]: [phone]    Mammogram and Colonoscopy [phone]    Smoking Cessation: www.quitplan.org, 4-664-308-PLAN (0417)      CONSUMER PRICE LINE for estimates of test costs:  [phone]           Before Your Surgery      Call your surgeon if there is any change in your health. This includes signs of a cold or flu (such as a sore throat, runny nose, cough, rash or fever).    Do not smoke, drink alcohol or take over the counter " medicine (unless your surgeon or primary care doctor tells you to) for the 24 hours before and after surgery.    If you take prescribed drugs: Follow your doctor s orders about which medicines to take and which to stop until after surgery.    Eating and drinking prior to surgery: follow the instructions from your surgeon    Take a shower or bath the night before surgery. Use the soap your surgeon gave you to gently clean your skin. If you do not have soap from your surgeon, use your regular soap. Do not shave or scrub the surgery site.  Wear clean pajamas and have clean sheets on your bed.     Before Your Surgery      Call your surgeon if there is any change in your health. This includes signs of a cold or flu (such as a sore throat, runny nose, cough, rash or fever).    Do not smoke, drink alcohol or take over the counter medicine (unless your surgeon or primary care doctor tells you to) for the 24 hours before and after surgery.    If you take prescribed drugs: Follow your doctor s orders about which medicines to take and which to stop until after surgery.    Eating and drinking prior to surgery: follow the instructions from your surgeon    Take a shower or bath the night before surgery. Use the soap your surgeon gave you to gently clean your skin. If you do not have soap from your surgeon, use your regular soap. Do not shave or scrub the surgery site.  Wear clean pajamas and have clean sheets on your bed.           Follow-ups after your visit        Your next 10 appointments already scheduled     Oct 09, 2018  4:30 PM CDT   BEATA Extremity with Tyrone Lucia PT   [facility] ([address] [id number] [phone]            Oct 16, 2018  7:40 AM CDT   Office Visit with Joel Daniel Irwin Wegener, MD   [facility] ([facility])    [address] [place] [address] [phone]            Bring a current list of meds and any records pertaining to this visit. For Physicals, please bring immunization records and any forms needing to be filled out. Please arrive 10 minutes early to complete paperwork.            Oct 22, 2018   Procedure with Johanna Lund MD   [facility] (--)    [address]  [facility] [address]   [phone]            Nov 08, 2018  8:20 AM CST   (Arrive by 8:05 AM)   NEW HAND with Izabella Arroyo MD   [facility] ([facility])    [address]  4th Floor  [facility] [id number] [phone]              Who to contact     If you have questions or need follow up information about today's clinic visit or your schedule please contact [facility] directly at [phone].  Normal or non-critical lab and imaging results will be communicated to you by MyChart, letter or phone within 4 business days after the clinic has received the results. If you do not hear from us within 7 days, please contact the clinic through [employer] or phone. If you have a critical or abnormal lab result, we will notify you by phone as soon as possible.  Submit refill requests through [employer] or call your pharmacy and they will forward the refill request to us. Please allow 3 business days for your refill to be completed.          Additional Information About Your Visit        [employer] Information     [employer] gives you secure access to your electronic health record. If you see a primary care provider, you can also send messages to your care team and make appointments. If you have questions, please call your primary care clinic.  If you do not have a primary care provider, please call [phone] and they will assist you.        Care EveryWhere ID     This is your Care EveryWhere ID. This could be used by other organizations to access your [place] medical records  [id number]        Your  "Vitals Were     Pulse Temperature Respirations Height Last Period Pulse Oximetry    63 97.9  F (36.6  C) (Oral) 16 5' 5\" (1.651 m) 09/06/2006 97%    BMI (Body Mass Index)                   19.97 kg/m2            Blood Pressure from Last 3 Encounters:   10/09/18 108/72   09/04/18 116/77   04/20/18 111/69    Weight from Last 3 Encounters:   10/09/18 120 lb (54.4 kg)   09/04/18 124 lb (56.2 kg)   04/20/18 121 lb (54.9 kg)              We Performed the Following     Basic metabolic panel     CBC with platelets     EKG 12-lead complete w/read - Clinics        Primary Care Provider Office Phone # Fax #    Mannyel Daniel Irwin Wegener, [phone] [address]  [facility] [address]        Equal Access to Services     [facility]: Hadii aad ku hadasho Soomaali, waaxda luqadaha, qaybta kaalmada adeegyada, waxay birgitin hayaan mishel landaverde . So [facility] [phone].    ATENCIÓN: Si habla español, tiene a fuentes disposición servicios gratuitos de asistencia lingüística. Llame al [phone].    We comply with applicable federal civil rights laws and [place] laws. We do not discriminate on the basis of race, color, national origin, age, disability, sex, sexual orientation, or gender identity.            Thank you!     Thank you for choosing [facility]  for your care. Our goal is always to provide you with excellent care. Hearing back from our patients is one way we can continue to improve our services. Please take a few minutes to complete the written survey that you may receive in the mail after your visit with us. Thank you!             Your Updated Medication List - Protect others around you: Learn how to safely use, store and throw away your medicines at www.disposemymeds.org.          This list is accurate as of 10/9/18  8:53 AM.  Always use your most recent med list.                   Brand Name Dispense Instructions for use Diagnosis    ALEVE PO      Take by mouth as needed        " aspirin 81 MG tablet      Take 81 mg by mouth as needed Reported on 3/31/2017        BENADRYL 25 MG tablet   Generic drug:  diphenhydrAMINE      Take 25 mg by mouth nightly as needed Reported on 3/31/2017        CALCIUM 1200 PO           lisinopril 5 MG tablet    PRINIVIL/ZESTRIL    30 tablet    Take 1 tablet (5 mg) by mouth daily    Hypertension goal BP (blood pressure) < 140/90       melatonin 3 MG Caps      Take 2 capsules by mouth At Bedtime        OMEGA 3 PO      Reported on 3/31/2017        traZODone 50 MG tablet    DESYREL    45 tablet    1/2 pill nightly as needed.    Primary insomnia       vitamin D 1000 units capsule      Take 1 capsule by mouth daily

## 2018-10-09 NOTE — PATIENT INSTRUCTIONS
"ASSESSMENT AND PLAN  1. Preop general physical exam  Cleared for surgery. Planning flu shot in December.       Pre-op labs today.   - EKG 12-lead complete w/read - Clinics; Future  - EKG 12-lead complete w/read - Clinics  - CBC with platelets  - Basic metabolic panel    2. Bunion, left  Reason for surgery .       - CBC with platelets  - Basic metabolic panel    3. Hypertension goal BP (blood pressure) < 130/80  Controlled.  Continuing lisinopril 5 mg daily.           MYCHART FOR ON-LINE CARE(VISITS), LABS, REFILLS, MESSAGING, ETC [email or web address] , [phone]    E-VISIT: click \"on-line care, then request e-visit\".  E-visits work well for following up on issues we have discussed in clinic previously which may need new prescriptions, new prescriptions or substantial discussion. These are always done by me (Dr. Wegener).     ONCARE VISIT:  [email or web address]  - we treat nearly 50 common conditions through on-care.  These are done in an hour by on-call staff.     RADIOLOGY:  [facility]:  [phone]   [facility]: [phone]    Mammogram and Colonoscopy [phone]    Smoking Cessation: www.quitplan.org, 0-416-102-PLAN (7907)      CONSUMER PRICE LINE for estimates of test costs:  [phone]           Before Your Surgery      Call your surgeon if there is any change in your health. This includes signs of a cold or flu (such as a sore throat, runny nose, cough, rash or fever).    Do not smoke, drink alcohol or take over the counter medicine (unless your surgeon or primary care doctor tells you to) for the 24 hours before and after surgery.    If you take prescribed drugs: Follow your doctor s orders about which medicines to take and which to stop until after surgery.    Eating and drinking prior to surgery: follow the instructions from your surgeon    Take a shower or bath the night before surgery. Use the soap your surgeon gave you to gently clean your skin. If you do " not have soap from your surgeon, use your regular soap. Do not shave or scrub the surgery site.  Wear clean pajamas and have clean sheets on your bed.     Before Your Surgery      Call your surgeon if there is any change in your health. This includes signs of a cold or flu (such as a sore throat, runny nose, cough, rash or fever).    Do not smoke, drink alcohol or take over the counter medicine (unless your surgeon or primary care doctor tells you to) for the 24 hours before and after surgery.    If you take prescribed drugs: Follow your doctor s orders about which medicines to take and which to stop until after surgery.    Eating and drinking prior to surgery: follow the instructions from your surgeon    Take a shower or bath the night before surgery. Use the soap your surgeon gave you to gently clean your skin. If you do not have soap from your surgeon, use your regular soap. Do not shave or scrub the surgery site.  Wear clean pajamas and have clean sheets on your bed.
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
## 2018-10-11 ENCOUNTER — MYC MEDICAL ADVICE (OUTPATIENT)
Dept: FAMILY MEDICINE | Facility: CLINIC | Age: 58
End: 2018-10-11

## 2018-10-11 DIAGNOSIS — I10 HYPERTENSION GOAL BP (BLOOD PRESSURE) < 140/90: ICD-10-CM

## 2018-10-11 RX ORDER — LISINOPRIL 5 MG/1
5 TABLET ORAL DAILY
Qty: 90 TABLET | Refills: 3 | Status: SHIPPED | OUTPATIENT
Start: 2018-10-11 | End: 2020-01-23

## 2018-10-11 NOTE — TELEPHONE ENCOUNTER
BP Readings from Last 3 Encounters:   10/09/18 108/72   09/04/18 116/77   04/20/18 111/69     Creatinine   Date Value Ref Range Status   10/09/2018 0.73 0.52 - 1.04 mg/dL Final     Potassium   Date Value Ref Range Status   10/09/2018 4.0 3.4 - 5.3 mmol/L Final     Seen for preop on 10/9/18\    This [employer] message is being routed to provider for response to pt.  Eloise Baptiste RN

## 2018-10-12 ENCOUNTER — MYC MEDICAL ADVICE (OUTPATIENT)
Dept: FAMILY MEDICINE | Facility: CLINIC | Age: 58
End: 2018-10-12

## 2018-10-17 RX ORDER — MULTIVITAMIN,THERAPEUTIC
1 TABLET ORAL DAILY
COMMUNITY
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
## 2018-10-19 DIAGNOSIS — D70.9 NEUTROPENIA, UNSPECIFIED TYPE (H): ICD-10-CM

## 2018-10-19 LAB
BASOPHILS # BLD AUTO: 0 10E9/L (ref 0–0.2)
BASOPHILS NFR BLD AUTO: 0.7 %
DIFFERENTIAL METHOD BLD: NORMAL
EOSINOPHIL # BLD AUTO: 0.2 10E9/L (ref 0–0.7)
EOSINOPHIL NFR BLD AUTO: 4.3 %
ERYTHROCYTE [DISTWIDTH] IN BLOOD BY AUTOMATED COUNT: 13 % (ref 10–15)
HCT VFR BLD AUTO: 41.9 % (ref 35–47)
HGB BLD-MCNC: 13.8 G/DL (ref 11.7–15.7)
LYMPHOCYTES # BLD AUTO: 1.9 10E9/L (ref 0.8–5.3)
LYMPHOCYTES NFR BLD AUTO: 44 %
MCH RBC QN AUTO: 30.7 PG (ref 26.5–33)
MCHC RBC AUTO-ENTMCNC: 32.9 G/DL (ref 31.5–36.5)
MCV RBC AUTO: 93 FL (ref 78–100)
MONOCYTES # BLD AUTO: 0.6 10E9/L (ref 0–1.3)
MONOCYTES NFR BLD AUTO: 13.2 %
NEUTROPHILS # BLD AUTO: 1.7 10E9/L (ref 1.6–8.3)
NEUTROPHILS NFR BLD AUTO: 37.8 %
PLATELET # BLD AUTO: 274 10E9/L (ref 150–450)
RBC # BLD AUTO: 4.49 10E12/L (ref 3.8–5.2)
WBC # BLD AUTO: 4.4 10E9/L (ref 4–11)

## 2018-10-19 PROCEDURE — 36415 COLL VENOUS BLD VENIPUNCTURE: CPT | Performed by: FAMILY MEDICINE

## 2018-10-19 PROCEDURE — 85025 COMPLETE CBC W/AUTO DIFF WBC: CPT | Performed by: FAMILY MEDICINE

## 2018-10-19 PROCEDURE — 84165 PROTEIN E-PHORESIS SERUM: CPT | Performed by: FAMILY MEDICINE

## 2018-10-19 PROCEDURE — 00000402 ZZHCL STATISTIC TOTAL PROTEIN: Performed by: FAMILY MEDICINE

## 2018-10-22 ENCOUNTER — APPOINTMENT (OUTPATIENT)
Dept: GENERAL RADIOLOGY | Facility: CLINIC | Age: 58
End: 2018-10-22
Attending: ORTHOPAEDIC SURGERY
Payer: COMMERCIAL

## 2018-10-22 ENCOUNTER — SURGERY (OUTPATIENT)
Age: 58
End: 2018-10-22

## 2018-10-22 ENCOUNTER — HOSPITAL ENCOUNTER (OUTPATIENT)
Facility: CLINIC | Age: 58
Discharge: HOME | End: 2018-10-22
Attending: ORTHOPAEDIC SURGERY | Admitting: ORTHOPAEDIC SURGERY
Payer: COMMERCIAL

## 2018-10-22 ENCOUNTER — ANESTHESIA (OUTPATIENT)
Dept: SURGERY | Facility: CLINIC | Age: 58
End: 2018-10-22
Payer: COMMERCIAL

## 2018-10-22 ENCOUNTER — ANESTHESIA EVENT (OUTPATIENT)
Dept: SURGERY | Facility: CLINIC | Age: 58
End: 2018-10-22
Payer: COMMERCIAL

## 2018-10-22 VITALS
BODY MASS INDEX: 19.63 KG/M2 | DIASTOLIC BLOOD PRESSURE: 67 MMHG | TEMPERATURE: 97.9 F | HEIGHT: 65 IN | OXYGEN SATURATION: 98 % | RESPIRATION RATE: 14 BRPM | SYSTOLIC BLOOD PRESSURE: 106 MMHG | WEIGHT: 117.8 LBS

## 2018-10-22 DIAGNOSIS — Z98.890 S/P BUNIONECTOMY: Primary | ICD-10-CM

## 2018-10-22 LAB
ALBUMIN SERPL ELPH-MCNC: 4.6 G/DL (ref 3.7–5.1)
ALPHA1 GLOB SERPL ELPH-MCNC: 0.2 G/DL (ref 0.2–0.4)
ALPHA2 GLOB SERPL ELPH-MCNC: 0.7 G/DL (ref 0.5–0.9)
B-GLOBULIN SERPL ELPH-MCNC: 0.8 G/DL (ref 0.6–1)
GAMMA GLOB SERPL ELPH-MCNC: 0.9 G/DL (ref 0.7–1.6)
M PROTEIN SERPL ELPH-MCNC: 0 G/DL
PROT PATTERN SERPL ELPH-IMP: NORMAL

## 2018-10-22 PROCEDURE — 40000277 XR SURGERY CARM FLUORO LESS THAN 5 MIN W STILLS

## 2018-10-22 PROCEDURE — 37000009 ZZH ANESTHESIA TECHNICAL FEE, EACH ADDTL 15 MIN: Performed by: ORTHOPAEDIC SURGERY

## 2018-10-22 PROCEDURE — 36000060 ZZH SURGERY LEVEL 3 W FLUORO 1ST 30 MIN: Performed by: ORTHOPAEDIC SURGERY

## 2018-10-22 PROCEDURE — C1713 ANCHOR/SCREW BN/BN,TIS/BN: HCPCS | Performed by: ORTHOPAEDIC SURGERY

## 2018-10-22 PROCEDURE — 71000012 ZZH RECOVERY PHASE 1 LEVEL 1 FIRST HR: Performed by: ORTHOPAEDIC SURGERY

## 2018-10-22 PROCEDURE — 40000170 ZZH STATISTIC PRE-PROCEDURE ASSESSMENT II: Performed by: ORTHOPAEDIC SURGERY

## 2018-10-22 PROCEDURE — 25000125 ZZHC RX 250: Performed by: ANESTHESIOLOGY

## 2018-10-22 PROCEDURE — 25000128 H RX IP 250 OP 636: Performed by: NURSE ANESTHETIST, CERTIFIED REGISTERED

## 2018-10-22 PROCEDURE — 25000125 ZZHC RX 250: Performed by: NURSE ANESTHETIST, CERTIFIED REGISTERED

## 2018-10-22 PROCEDURE — 25000132 ZZH RX MED GY IP 250 OP 250 PS 637: Performed by: PHYSICIAN ASSISTANT

## 2018-10-22 PROCEDURE — 37000008 ZZH ANESTHESIA TECHNICAL FEE, 1ST 30 MIN: Performed by: ORTHOPAEDIC SURGERY

## 2018-10-22 PROCEDURE — 36000058 ZZH SURGERY LEVEL 3 EA 15 ADDTL MIN: Performed by: ORTHOPAEDIC SURGERY

## 2018-10-22 PROCEDURE — 71000027 ZZH RECOVERY PHASE 2 EACH 15 MINS: Performed by: ORTHOPAEDIC SURGERY

## 2018-10-22 PROCEDURE — 27110028 ZZH OR GENERAL SUPPLY NON-STERILE: Performed by: ORTHOPAEDIC SURGERY

## 2018-10-22 PROCEDURE — 25000128 H RX IP 250 OP 636: Performed by: PHYSICIAN ASSISTANT

## 2018-10-22 PROCEDURE — 71000013 ZZH RECOVERY PHASE 1 LEVEL 1 EA ADDTL HR: Performed by: ORTHOPAEDIC SURGERY

## 2018-10-22 PROCEDURE — 25000128 H RX IP 250 OP 636: Performed by: ORTHOPAEDIC SURGERY

## 2018-10-22 PROCEDURE — 27210794 ZZH OR GENERAL SUPPLY STERILE: Performed by: ORTHOPAEDIC SURGERY

## 2018-10-22 DEVICE — IMP SCR SYN CORTEX 1.5X16MM SELF TAP SS 200.816: Type: IMPLANTABLE DEVICE | Site: FOOT | Status: FUNCTIONAL

## 2018-10-22 DEVICE — IMP SCR SYN CORTEX 2.0X16MM SELF TAP SS 201.816: Type: IMPLANTABLE DEVICE | Site: FOOT | Status: FUNCTIONAL

## 2018-10-22 RX ORDER — HYDROCODONE BITARTRATE AND ACETAMINOPHEN 5; 325 MG/1; MG/1
1 TABLET ORAL
Status: COMPLETED | OUTPATIENT
Start: 2018-10-22 | End: 2018-10-22

## 2018-10-22 RX ORDER — SODIUM CHLORIDE, SODIUM LACTATE, POTASSIUM CHLORIDE, CALCIUM CHLORIDE 600; 310; 30; 20 MG/100ML; MG/100ML; MG/100ML; MG/100ML
INJECTION, SOLUTION INTRAVENOUS CONTINUOUS PRN
Status: DISCONTINUED | OUTPATIENT
Start: 2018-10-22 | End: 2018-10-22

## 2018-10-22 RX ORDER — CEFAZOLIN SODIUM 2 G/100ML
2 INJECTION, SOLUTION INTRAVENOUS
Status: DISCONTINUED | OUTPATIENT
Start: 2018-10-22 | End: 2018-10-22 | Stop reason: HOSPADM

## 2018-10-22 RX ORDER — ONDANSETRON 2 MG/ML
INJECTION INTRAMUSCULAR; INTRAVENOUS PRN
Status: DISCONTINUED | OUTPATIENT
Start: 2018-10-22 | End: 2018-10-22

## 2018-10-22 RX ORDER — PROPOFOL 10 MG/ML
INJECTION, EMULSION INTRAVENOUS PRN
Status: DISCONTINUED | OUTPATIENT
Start: 2018-10-22 | End: 2018-10-22

## 2018-10-22 RX ORDER — ONDANSETRON 2 MG/ML
4 INJECTION INTRAMUSCULAR; INTRAVENOUS EVERY 30 MIN PRN
Status: DISCONTINUED | OUTPATIENT
Start: 2018-10-22 | End: 2018-10-22 | Stop reason: HOSPADM

## 2018-10-22 RX ORDER — ONDANSETRON 4 MG/1
4 TABLET, ORALLY DISINTEGRATING ORAL
Status: DISCONTINUED | OUTPATIENT
Start: 2018-10-22 | End: 2018-10-22 | Stop reason: HOSPADM

## 2018-10-22 RX ORDER — FENTANYL CITRATE 50 UG/ML
25-50 INJECTION, SOLUTION INTRAMUSCULAR; INTRAVENOUS
Status: DISCONTINUED | OUTPATIENT
Start: 2018-10-22 | End: 2018-10-22 | Stop reason: HOSPADM

## 2018-10-22 RX ORDER — CEFAZOLIN SODIUM 1 G/3ML
1 INJECTION, POWDER, FOR SOLUTION INTRAMUSCULAR; INTRAVENOUS SEE ADMIN INSTRUCTIONS
Status: DISCONTINUED | OUTPATIENT
Start: 2018-10-22 | End: 2018-10-22 | Stop reason: HOSPADM

## 2018-10-22 RX ORDER — ONDANSETRON 4 MG/1
4-8 TABLET, ORALLY DISINTEGRATING ORAL EVERY 8 HOURS PRN
Qty: 15 TABLET | Refills: 0 | Status: SHIPPED | OUTPATIENT
Start: 2018-10-22 | End: 2018-11-08

## 2018-10-22 RX ORDER — MEPERIDINE HYDROCHLORIDE 25 MG/ML
12.5 INJECTION INTRAMUSCULAR; INTRAVENOUS; SUBCUTANEOUS
Status: DISCONTINUED | OUTPATIENT
Start: 2018-10-22 | End: 2018-10-22 | Stop reason: HOSPADM

## 2018-10-22 RX ORDER — LIDOCAINE 40 MG/G
CREAM TOPICAL
Status: DISCONTINUED | OUTPATIENT
Start: 2018-10-22 | End: 2018-10-22 | Stop reason: HOSPADM

## 2018-10-22 RX ORDER — SCOLOPAMINE TRANSDERMAL SYSTEM 1 MG/1
1 PATCH, EXTENDED RELEASE TRANSDERMAL
Status: DISCONTINUED | OUTPATIENT
Start: 2018-10-22 | End: 2018-10-22 | Stop reason: HOSPADM

## 2018-10-22 RX ORDER — SODIUM CHLORIDE, SODIUM LACTATE, POTASSIUM CHLORIDE, CALCIUM CHLORIDE 600; 310; 30; 20 MG/100ML; MG/100ML; MG/100ML; MG/100ML
INJECTION, SOLUTION INTRAVENOUS CONTINUOUS
Status: DISCONTINUED | OUTPATIENT
Start: 2018-10-22 | End: 2018-10-22 | Stop reason: HOSPADM

## 2018-10-22 RX ORDER — DEXAMETHASONE SODIUM PHOSPHATE 4 MG/ML
INJECTION, SOLUTION INTRA-ARTICULAR; INTRALESIONAL; INTRAMUSCULAR; INTRAVENOUS; SOFT TISSUE PRN
Status: DISCONTINUED | OUTPATIENT
Start: 2018-10-22 | End: 2018-10-22

## 2018-10-22 RX ORDER — ROPIVACAINE HYDROCHLORIDE 5 MG/ML
INJECTION, SOLUTION EPIDURAL; INFILTRATION; PERINEURAL PRN
Status: DISCONTINUED | OUTPATIENT
Start: 2018-10-22 | End: 2018-10-22 | Stop reason: HOSPADM

## 2018-10-22 RX ORDER — FENTANYL CITRATE 50 UG/ML
25-50 INJECTION, SOLUTION INTRAMUSCULAR; INTRAVENOUS EVERY 5 MIN PRN
Status: DISCONTINUED | OUTPATIENT
Start: 2018-10-22 | End: 2018-10-22 | Stop reason: HOSPADM

## 2018-10-22 RX ORDER — HYDROMORPHONE HYDROCHLORIDE 1 MG/ML
.3-.5 INJECTION, SOLUTION INTRAMUSCULAR; INTRAVENOUS; SUBCUTANEOUS EVERY 10 MIN PRN
Status: DISCONTINUED | OUTPATIENT
Start: 2018-10-22 | End: 2018-10-22 | Stop reason: HOSPADM

## 2018-10-22 RX ORDER — CEFAZOLIN SODIUM 2 G/100ML
2 INJECTION, SOLUTION INTRAVENOUS
Status: COMPLETED | OUTPATIENT
Start: 2018-10-22 | End: 2018-10-22

## 2018-10-22 RX ORDER — FENTANYL CITRATE 50 UG/ML
INJECTION, SOLUTION INTRAMUSCULAR; INTRAVENOUS PRN
Status: DISCONTINUED | OUTPATIENT
Start: 2018-10-22 | End: 2018-10-22

## 2018-10-22 RX ORDER — FENTANYL CITRATE 50 UG/ML
50-100 INJECTION, SOLUTION INTRAMUSCULAR; INTRAVENOUS
Status: DISCONTINUED | OUTPATIENT
Start: 2018-10-22 | End: 2018-10-22 | Stop reason: HOSPADM

## 2018-10-22 RX ORDER — ONDANSETRON 4 MG/1
4 TABLET, ORALLY DISINTEGRATING ORAL EVERY 30 MIN PRN
Status: DISCONTINUED | OUTPATIENT
Start: 2018-10-22 | End: 2018-10-22 | Stop reason: HOSPADM

## 2018-10-22 RX ORDER — AMOXICILLIN 250 MG
1-2 CAPSULE ORAL 2 TIMES DAILY
Qty: 30 TABLET | Refills: 0 | Status: SHIPPED | OUTPATIENT
Start: 2018-10-22 | End: 2018-11-08

## 2018-10-22 RX ORDER — HYDROCODONE BITARTRATE AND ACETAMINOPHEN 5; 325 MG/1; MG/1
1-2 TABLET ORAL EVERY 4 HOURS PRN
Qty: 40 TABLET | Refills: 0 | Status: SHIPPED | OUTPATIENT
Start: 2018-10-22 | End: 2018-11-08

## 2018-10-22 RX ORDER — NALOXONE HYDROCHLORIDE 0.4 MG/ML
.1-.4 INJECTION, SOLUTION INTRAMUSCULAR; INTRAVENOUS; SUBCUTANEOUS
Status: DISCONTINUED | OUTPATIENT
Start: 2018-10-22 | End: 2018-10-22 | Stop reason: HOSPADM

## 2018-10-22 RX ORDER — PROPOFOL 10 MG/ML
INJECTION, EMULSION INTRAVENOUS CONTINUOUS PRN
Status: DISCONTINUED | OUTPATIENT
Start: 2018-10-22 | End: 2018-10-22

## 2018-10-22 RX ADMIN — PROPOFOL 100 MG: 10 INJECTION, EMULSION INTRAVENOUS at 10:16

## 2018-10-22 RX ADMIN — HYDROCODONE BITARTRATE AND ACETAMINOPHEN 1 TABLET: 5; 325 TABLET ORAL at 11:50

## 2018-10-22 RX ADMIN — SODIUM CHLORIDE, POTASSIUM CHLORIDE, SODIUM LACTATE AND CALCIUM CHLORIDE: 600; 310; 30; 20 INJECTION, SOLUTION INTRAVENOUS at 10:11

## 2018-10-22 RX ADMIN — DEXAMETHASONE SODIUM PHOSPHATE 4 MG: 4 INJECTION, SOLUTION INTRA-ARTICULAR; INTRALESIONAL; INTRAMUSCULAR; INTRAVENOUS; SOFT TISSUE at 10:24

## 2018-10-22 RX ADMIN — MIDAZOLAM 2 MG: 1 INJECTION INTRAMUSCULAR; INTRAVENOUS at 10:16

## 2018-10-22 RX ADMIN — FENTANYL CITRATE 25 MCG: 50 INJECTION, SOLUTION INTRAMUSCULAR; INTRAVENOUS at 10:16

## 2018-10-22 RX ADMIN — PROPOFOL 170 MCG/KG/MIN: 10 INJECTION, EMULSION INTRAVENOUS at 10:16

## 2018-10-22 RX ADMIN — CEFAZOLIN SODIUM 2 G: 2 INJECTION, SOLUTION INTRAVENOUS at 10:22

## 2018-10-22 RX ADMIN — ROPIVACAINE HYDROCHLORIDE 15 ML: 5 INJECTION, SOLUTION EPIDURAL; INFILTRATION; PERINEURAL at 10:46

## 2018-10-22 RX ADMIN — ONDANSETRON 4 MG: 2 INJECTION INTRAMUSCULAR; INTRAVENOUS at 10:26

## 2018-10-22 RX ADMIN — LIDOCAINE HYDROCHLORIDE 0.2 ML: 10 INJECTION, SOLUTION EPIDURAL; INFILTRATION; INTRACAUDAL; PERINEURAL at 09:55

## 2018-10-22 RX ADMIN — SCOPALAMINE 1 PATCH: 1 PATCH, EXTENDED RELEASE TRANSDERMAL at 09:54

## 2018-10-22 RX ADMIN — ONDANSETRON 4 MG: 2 INJECTION INTRAMUSCULAR; INTRAVENOUS at 10:53

## 2018-10-22 RX ADMIN — FENTANYL CITRATE 50 MCG: 50 INJECTION, SOLUTION INTRAMUSCULAR; INTRAVENOUS at 10:29

## 2018-10-22 ASSESSMENT — COPD QUESTIONNAIRES: COPD: 0

## 2018-10-22 ASSESSMENT — ENCOUNTER SYMPTOMS
DYSRHYTHMIAS: 0
ORTHOPNEA: 0
SEIZURES: 0

## 2018-10-22 ASSESSMENT — LIFESTYLE VARIABLES: TOBACCO_USE: 0

## 2018-10-22 NOTE — OR NURSING
"Bleeding coming through dressing, 1 1/2\" diameter. Reinforced with gauze and an ace wrap. Reported to JEVON Ramirez for Dr. Lund. No tx ordered. Pt advised to elevate and ice.  "

## 2018-10-22 NOTE — IP AVS SNAPSHOT
[facility]    [address]    [place] [address]    Phone:  [phone]    Fax:  [phone]                                       After Visit Summary   10/22/2018    Clara Boykin    MRN: [id number]           After Visit Summary Signature Page     I have received my discharge instructions, and my questions have been answered. I have discussed any challenges I see with this plan with the nurse or doctor.    ..........................................................................................................................................  Patient/Patient Representative Signature      ..........................................................................................................................................  Patient Representative Print Name and Relationship to Patient    ..................................................               ................................................  Date                                   Time    ..........................................................................................................................................  Reviewed by Signature/Title    ...................................................              ..............................................  Date                                               Time          22EPIC Rev 08/18

## 2018-10-22 NOTE — ANESTHESIA POSTPROCEDURE EVALUATION
Patient: Clara Boykin    Procedure(s):  LEFT REVISION BUNION     Diagnosis:FAILED RIGHT BUNION  Diagnosis Additional Information: No value filed.    Anesthesia Type:  General, LMA    Note:  Anesthesia Post Evaluation    Patient location during evaluation: PACU  Patient participation: Able to fully participate in evaluation  Level of consciousness: awake and alert  Pain management: adequate  Airway patency: patent  Cardiovascular status: acceptable and hemodynamically stable  Respiratory status: nonlabored ventilation, unassisted and acceptable  Hydration status: acceptable  PONV: none             Last vitals:  Vitals:    10/22/18 1145 10/22/18 1200 10/22/18 1300   BP: 121/73 121/78 106/67   Resp: 17 16 14   Temp:  36.6  C (97.9  F)    SpO2: 96% 97% 98%         Electronically Signed By: Stanton Rodriguez MD  October 22, 2018  4:32 PM

## 2018-10-22 NOTE — ANESTHESIA PREPROCEDURE EVALUATION
Procedure: Procedure(s):  RIGHT REVISION BUNION (MINI FRAG)  Preop diagnosis: FAILED RIGHT BUNION    Allergies   Allergen Reactions     Exparel [Bupivacaine] GI Disturbance     Patient had severe abdominal distention     Darvocet [Propoxyphene N-Apap] Other (See Comments)     confusion     Liposomes GI Disturbance     Penicillins Itching     Percocet [Oxycodone-Acetaminophen] Other (See Comments)     confusion     Tape [Adhesive Tape]      Once after surgical tape     Vistaril [Hydroxyzine] Rash     Intense flushing/redness of face      Past Medical History:   Diagnosis Date     Diplopia      H/O CT scan      H/O magnetic resonance imaging      Paralytic strabismus      Pneumonia      PONV (postoperative nausea and vomiting)      Past Surgical History:   Procedure Laterality Date     BUNIONECTOMY Right 2016     HERNIORRHAPHY VENTRAL N/A 7/7/2016    Procedure: HERNIORRHAPHY VENTRAL;  Surgeon: Ash Thompson MD;  Location:  OR     RECESSION RESECTION (REPAIR STRABISMUS) Right 4/10/2017    Procedure: RECESSION RESECTION (REPAIR STRABISMUS);  Surgeon: Lyle Leggett MD;  Location:  OR     Social History   Substance Use Topics     Smoking status: Never Smoker     Smokeless tobacco: Never Used     Alcohol use Not on file     Prior to Admission medications    Medication Sig Start Date End Date Taking? Authorizing Provider   CALCIUM PO Take 1,200 mg by mouth daily   Yes Reported, Patient   multivitamin, therapeutic (THERA-VIT) TABS tablet Take 1 tablet by mouth daily   Yes Reported, Patient   VITAMIN D, CHOLECALCIFEROL, PO Take 1,000 Units by mouth daily   Yes Reported, Patient   aspirin 81 MG tablet Take 81 mg by mouth daily as needed Reported on 3/31/2017    Reported, Patient   diphenhydrAMINE (BENADRYL) 25 MG tablet Take 25 mg by mouth nightly as needed Reported on 3/31/2017    Reported, Patient   lisinopril (PRINIVIL/ZESTRIL) 5 MG tablet Take 1 tablet (5 mg) by mouth daily  Patient taking  differently: Take 5 mg by mouth At Bedtime  10/11/18   Wegener, Joel Daniel Irwin, MD   Naproxen Sodium (ALEVE PO) Take 220-440 mg by mouth daily as needed     Reported, Patient   Omega-3 Fatty Acids (OMEGA 3 PO) Take 1 g by mouth daily Reported on 3/31/2017    Reported, Patient   traZODone (DESYREL) 50 MG tablet 1/2 pill nightly as needed.  Patient taking differently: Take 12.5-25 mg by mouth nightly as needed (1/4 to 1/2 tablet) 9/5/18   Wegener, Joel Daniel Irwin, MD     Current Facility-Administered Medications Ordered in Epic   Medication Dose Route Frequency Last Rate Last Dose     ceFAZolin (ANCEF) 1 g vial to attach to  ml bag for ADULT or 50 ml bag for PEDS  1 g Intravenous See Admin Instructions         ceFAZolin (ANCEF) intermittent infusion 2 g in 100 mL dextrose PRE-MIX  2 g Intravenous Pre-Op/Pre-procedure x 1 dose         No current [facility]-ordered outpatient prescriptions on file.       Wt Readings from Last 1 Encounters:   10/09/18 54.4 kg (120 lb)     Temp Readings from Last 1 Encounters:   10/09/18 36.6  C (97.9  F) (Oral)     BP Readings from Last 6 Encounters:   10/09/18 108/72   09/04/18 116/77   04/20/18 111/69   03/20/18 116/74   02/19/18 114/66   04/10/17 135/81     Pulse Readings from Last 4 Encounters:   10/09/18 63   09/04/18 60   04/20/18 60   03/20/18 69     Resp Readings from Last 1 Encounters:   10/09/18 16     SpO2 Readings from Last 1 Encounters:   10/09/18 97%     Recent Labs   Lab Test  10/09/18   0900  04/20/18   0845   NA  138  140   POTASSIUM  4.0  3.7   CHLORIDE  101  105   CO2  29  30   ANIONGAP  8  5   GLC  87  86   BUN  20  23   CR  0.73  0.64   NO  9.2  9.3     Recent Labs   Lab Test  04/20/18   0845   AST  35   ALT  38   ALKPHOS  60   BILITOTAL  0.6     Recent Labs   Lab Test  10/19/18   0735  10/09/18   0900   WBC  4.4  3.8*   HGB  13.8  14.3   PLT  274  285     RECENT LABS:     ECG: Sinus sasha     Anesthesia Evaluation     . Pt has had prior anesthetic. Type:  General    History of anesthetic complications   - PONV        ROS/MED HX    ENT/Pulmonary:      (-) tobacco use, asthma, COPD, sleep apnea and recent URI   Neurologic:      (-) seizures and CVA   Cardiovascular:     (+) hypertension----. : . . . :. .      (-) angina, CAD, orthopnea/PND, syncope, arrhythmias, irregular heartbeat/palpitations, valvular problems/murmurs and angina   METS/Exercise Tolerance:  >4 METS   Hematologic:     (+) History of Transfusion -      Musculoskeletal:         GI/Hepatic:        (-) GERD and liver disease   Renal/Genitourinary:      (-) renal disease   Endo:      (-) Type II DM, thyroid disease and chronic steroid usage   Psychiatric:         Infectious Disease:         Malignancy:         Other:                     Physical Exam      Airway   Mallampati: I  TM distance: >3 FB  Neck ROM: full    Dental   (+) caps    Cardiovascular   Rhythm and rate: regular and normal  (-) no murmur    Pulmonary    breath sounds clear to auscultation(-) no wheezes                    Anesthesia Plan      History & Physical Review  History and physical reviewed and following examination; no interval change.    ASA Status:  2 .    NPO Status:  > 8 hours    Plan for General and LMA with Propofol and Intravenous induction. Maintenance will be TIVA.    PONV prophylaxis:  Ondansetron (or other 5HT-3), Dexamethasone or Solumedrol, Other (See comment) and Scopolamine patch  Zofran 8 mg (divided)       Postoperative Care  Postoperative pain management:  Multi-modal analgesia.      Consents  Anesthetic plan, risks, benefits and alternatives discussed with:  Patient..

## 2018-10-22 NOTE — DISCHARGE INSTRUCTIONS
Information for Patients Discharging with a Transderm Scopolamine Patch       Dry mouth is a common side effect.    Drowsiness is another common side effect especially when combined with pain medication.  Please avoid activities that require mental alertness such as driving a car or making important legal decisions.    Since Scopolamine can cause temporary dilation of the pupils and blurred vision if it comes in contact with the eyes; be sure to wash your hands thoroughly with soap and water immediately after handling the patch.   When you remove your patch, please stick it to a tissue or paper towel for disposal.      Remove the patch immediately and contact a physician in the unlikely event that you experience symptoms of acute glaucoma (pain and reddening of the eyes, accompanied by dilated pupils).    Remove the patch if you develop any difficulties urinating.  If you cannot urinate after removing your patch, please notify your surgeon.    Remove the patch 24 hours after surgery.      Same Day Surgery Discharge Instructions for  Sedation and General Anesthesia       It's not unusual to feel dizzy, light-headed or faint for up to 24 hours after surgery or while taking pain medication.  If you have these symptoms: sit for a few minutes before standing and have someone assist you when you get up to walk or use the bathroom.      You should rest and relax for the next 24 hours. We recommend you make arrangements to have an adult stay with you for at least 24 hours after your discharge.  Avoid hazardous and strenuous activity.      DO NOT DRIVE any vehicle or operate mechanical equipment for 24 hours following the end of your surgery.  Even though you may feel normal, your reactions may be affected by the medication you have received.      Do not drink alcoholic beverages for 24 hours following surgery.       Slowly progress to your regular diet as you feel able. It's not unusual to feel nauseated and/or vomit  after receiving anesthesia.  If you develop these symptoms, drink clear liquids (apple juice, ginger ale, broth, 7-up, etc. ) until you feel better.  If your nausea and vomiting persists for 24 hours, please notify your surgeon.        All narcotic pain medications, along with inactivity and anesthesia, can cause constipation. Drinking plenty of liquids and increasing fiber intake will help.      For any questions of a medical nature, call your surgeon.      Do not make important decisions for 24 hours.      If you had general anesthesia, you may have a sore throat for a couple of days related to the breathing tube used during surgery.  You may use Cepacol lozenges to help with this discomfort.  If it worsens or if you develop a fever, contact your surgeon.       If you feel your pain is not well managed with the pain medications prescribed by your surgeon, please contact your surgeon's office to let them know so they can address your concerns.     POST-OPERATIVE INSTRUCTIONS  FOOT AND ANKLE SURGERY  [facility]C    These precautions MUST be followed for the first 24 hours after surgery:    Upon discharge, go directly home.    You must make arrangements to have a responsible adult stay with you.    DO NOT DRINK ALCOHOLIC BEVERAGES    It is not unusual to feel lightheaded up to 24 hours after surgery or while taking pain medication.  If you feel lightheaded, sit for a few minutes before standing and have someone assisted you when you get up to walk or use the restroom.    Do not use any mechanical equipment.    Do not make any important or legal decisions for 24 hours or while on pain medications.    You may experience dry mouth, sore throat, or sleep disturbances from the anesthesia and medications used during surgery.  Generalized muscle aches can sometimes occur.  These usually disappear in 12-24 hours.    POST-OPERATIVE CARE GUIDELINES    The following are general guidelines about  what to expect the first days/weeks after surgery.  They are not specific, and your recovery may be slightly different.    Blood clots are not common, but are emergencies.  If you have sudden onset pain in your calf or leg, or have sudden shortness of breath, go to the Emergency Department.      Elevation of your operative foot/ankle is key to reducing the swelling in the immediate post-operative phase (first 3-5 days).  When you are at rest, elevate your foot at or above the level of your heart.  When sitting, your foot should be elevated on a chair or stool; not hanging down.      You should plan to rest the day after surgery no matter how minor the procedure.  More complicated procedures will require more time to return to normal activity.      Foot and ankle surgery is painful in most cases.   It is not unusual for the pain to be worse a day or two after surgery than on the day of.  If your pain is more than 8/10 contact our office.  If you don t have pain, gradually decrease your pain meds by substituting Tylenol.  Please don t use Advil/ibuprofen unless we order it for you.      You will be prescribed Percocet or Vicodin for pain, Vistaril for spasms/pain adjunct, Senokot for constipation.  If you have had trouble taking these meds before or experience nausea or vomiting after surgery from your medication, please advise the recovery room nurses.  If you are already at home, try drinking only clear liquids and/or call our office.      All pain medications, along with inactivity can cause constipation.  Use the Senakot as directed, increase fluid intake to 1 quart per day and increase your dietary fiber.  (The  P  fruits - peaches, plums, pears, and prunes as well as anise/black licorice are recommended.)    It is not unusual to run a low-grade fever after surgery.  If your temperature is elevated above 102 , lasts longer than 24 hours, or is questionable in any way, contact our office.      Drainage from your  cast/dressing is normal.  Reinforce your cast/dressing with 4x4 dressings and cover with an Ace wrap.  Wait until 24 hours after surgery to change your dressings; by this time most of your bleeding will have stopped.  If you have drainage through your dressings 2 days after surgery, contact our office.      You cast/dressing will be changed at your 2-week follow up appointment.  They should be kept clean and DRY.  If your cast/dressing is damaged before that, contact our office.      It is normal to experience some bruising in the toes after surgery and they may appear  dark  when your foot hangs down.  It is important to actively wiggle your toes for at least 5-10 minutes each hour UNLESS you had surgery on those toes.      Use ice on your foot/ankle over the dressing/cast for 20 minutes per hour, 10 or more times per day.  A large bag of frozen peas works well.      Bathing: take a tub or sponge bath instead of a shower if possible during the first two weeks.  If you choose to shower, cover the dressing/cast with a waterproof covering, these may be ordered from www.seal-tight.com or are available at some pharmacies/medical supply stores.  Another option is XeroSox, which is the original vacuum sealed bandage and cast cover.  The cast cover has a vacuum seal and is absolutely waterproof.  1-531.854.3217 or www.xerosox.com for more information.      Driving:  For surgery on the left foot/ankle, you may drive as soon as narcotic medications are stopped.  For surgery on the right foot/ankle, you may drive when you are out of a cast, off pain medications, and you feel secure with braking.      In general, listen to your foot/ankle.  If you have a sudden, dramatic increase in pain that does not resolve after an hour or so, something is wrong - call our office.  If you fall or bump your foot/ankle and have sudden pain that resolves, give it 24 hours before you call.      Many of your questions can be addressed at your  2-week follow-up appointment - please make a list of things to ask us as they come up during your recovery.      If you had a nerve block it is common to have numbness in your leg and foot for up to 30 hours after surgery.  If your leg or foot is still numb more than 30 hours after surgery, please call the office.      FUTURE DENTIST VISITS:  If you have had a total ankle arthroplasty please be advised you must be on antibiotics prior to ANY dental work.  Please call your dentist office ahead of time and make them aware of this as your dentist will be able to order the prescription.  If you have had any other surgery this is not a concern.    If you have any further questions or concerns, please call our office at [phone]    **If you have questions or concerns about your procedure,   call Dr. Lund at [phone]**

## 2018-10-22 NOTE — OP NOTE
Procedure Date: 10/22/2018      PREOPERATIVE DIAGNOSES:   1.  Failure of a previous left bunion repair done elsewhere.   2.  Exostosis over the left 2nd metatarsophalangeal joint.      POSTOPERATIVE DIAGNOSIS:  The Same     PROCEDURE:    1.Revision Bunion repair with a Metatarsal osteotomy, and revision Akin proximal phalangs.  2. Cheilectomy second metatarso-phalangeal joint     ANESTHESIA:  General.      SURGEON:  Johanna Lund MD      ASSISTANT:  Kalin ESCOTO      PREAMBLE:  Ms. Boykin presented with a left failed bunion repair done elsewhere.  She tried conservative management to no avail.  She also has a painful 2nd MTP joint.  Informed consent was obtained for the above-mentioned procedure.      DESCRIPTION OF PROCEDURE:  After adequate induction of a general anesthetic, the patient was positioned supine on the operating table.  The left leg was sterilely prepped and free draped in the usual fashion.  Tourniquet around the thigh was inflated to 300 mmHg.        The previous incision was used to expose the medial border of the foot.  There was a vascular clip in the proximal phalanx that was removed.      This was followed by a 90-degree metatarsal osteotomy with a long plantar limb.  The capital fragment was translated laterally by 6 mm and immobilized with 2 screws.  This gave excellent alignment of the medial ray.      This was followed by a 20-degree medially based closing wedge osteotomy of the proximal phalanx.  The osteotomy was closed with a suture.      This gave excellent alignment of her foot.      A small incision was made over the 2nd metatarsophalangeal joint.  The osteophyte was removed from the 2nd metatarsal head.  The joint looked reasonable.      The tourniquet was deflated.  Hemostasis obtained.  The wound closed in layers.  A sterile dressing and a light compressive bandage were applied, followed by a bunion shoe.  She tolerated the procedure well and was taken to the recovery room  in satisfactory condition.        She can ambulate weightbearing as tolerated.  Sutures will be removed in 2 weeks.         RICARDO CASIANO MD             D: 10/22/2018   T: 10/22/2018   MT: [id number]      Name:     RAMIN JONES   MRN:      [id number]        Account:        [id number]   :      1960           Procedure Date: 10/22/2018      Document: [id number]

## 2018-10-22 NOTE — ANESTHESIA CARE TRANSFER NOTE
Patient: Clara Boykin    Procedure(s):  LEFT REVISION BUNION     Diagnosis: FAILED RIGHT BUNION  Diagnosis Additional Information: No value filed.    Anesthesia Type:   General, LMA     Note:  Airway :Face Mask  Patient transferred to:PACU  Handoff Report: Identifed the Patient, Identified the Reponsible Provider, Reviewed the pertinent medical history, Discussed the surgical course, Reviewed Intra-OP anesthesia mangement and issues during anesthesia, Set expectations for post-procedure period and Allowed opportunity for questions and acknowledgement of understanding  Awake , talking. Report to RN        Vitals: (Last set prior to Anesthesia Care Transfer)    CRNA VITALS  10/22/2018 1024 - 10/22/2018 1103      10/22/2018             Pulse: 74    SpO2: 99 %    Resp Rate (observed): 12        P: 63  BP: 103/62  SAO2:99%      Electronically Signed By: BLADIMIR Estes CRNA  October 22, 2018  11:03 AM

## 2018-10-22 NOTE — IP AVS SNAPSHOT
MRN:[id number]                      After Visit Summary   10/22/2018    Clara Boykin    MRN: [id number]           Thank you!     Thank you for choosing [facility] for your care. Our goal is always to provide you with excellent care. Hearing back from our patients is one way we can continue to improve our services. Please take a few minutes to complete the written survey that you may receive in the mail after you visit with us. Thank you!        Patient Information     Date Of Birth          1960        About your hospital stay     You were admitted on:  October 22, 2018 [facility]    You were discharged on:  October 22, 2018       Who to Call     For medical emergencies, please call 911.  For non-urgent questions about your medical care, please call your primary care provider or clinic, [phone]  For questions related to your surgery, please call your surgery clinic        Attending Provider     Provider Johanna Gómez MD Orthopaedic Surgery       Primary Care Provider Office Phone # Fax #    Manny Daniel Irwin Wegener, [phone]      After Care Instructions     Activity       Ambulate with assistance until independent            Discharge Instructions       Review discharge instructions as directed by Provider.            Discharge Instructions       Patient to arrange for return to clinic appointment in two weeks.            Elevate affected extremity           Ice to affected area       Ice pack to affected area PRN (as needed).            No dressing change       until follow up clinic appointment.            No driving or operating machinery       until the day after procedure            Weight bearing status - As tolerated                 Your next 10 appointments already scheduled     Nov 08, 2018  8:20 AM CST   (Arrive by 8:05 AM)   NEW HAND with Izabella Arroyo MD   Health  Orthopaedic Clinic ([facility] [place])    [address]  4th Floor  [place] [address] [phone]              Further instructions from your care team         Information for Patients Discharging with a Transderm Scopolamine Patch       Dry mouth is a common side effect.    Drowsiness is another common side effect especially when combined with pain medication.  Please avoid activities that require mental alertness such as driving a car or making important legal decisions.    Since Scopolamine can cause temporary dilation of the pupils and blurred vision if it comes in contact with the eyes; be sure to wash your hands thoroughly with soap and water immediately after handling the patch.   When you remove your patch, please stick it to a tissue or paper towel for disposal.      Remove the patch immediately and contact a physician in the unlikely event that you experience symptoms of acute glaucoma (pain and reddening of the eyes, accompanied by dilated pupils).    Remove the patch if you develop any difficulties urinating.  If you cannot urinate after removing your patch, please notify your surgeon.    Remove the patch 24 hours after surgery.      Same Day Surgery Discharge Instructions for  Sedation and General Anesthesia       It's not unusual to feel dizzy, light-headed or faint for up to 24 hours after surgery or while taking pain medication.  If you have these symptoms: sit for a few minutes before standing and have someone assist you when you get up to walk or use the bathroom.      You should rest and relax for the next 24 hours. We recommend you make arrangements to have an adult stay with you for at least 24 hours after your discharge.  Avoid hazardous and strenuous activity.      DO NOT DRIVE any vehicle or operate mechanical equipment for 24 hours following the end of your surgery.  Even though you may feel normal, your reactions may be affected by the medication you have  received.      Do not drink alcoholic beverages for 24 hours following surgery.       Slowly progress to your regular diet as you feel able. It's not unusual to feel nauseated and/or vomit after receiving anesthesia.  If you develop these symptoms, drink clear liquids (apple juice, ginger ale, broth, 7-up, etc. ) until you feel better.  If your nausea and vomiting persists for 24 hours, please notify your surgeon.        All narcotic pain medications, along with inactivity and anesthesia, can cause constipation. Drinking plenty of liquids and increasing fiber intake will help.      For any questions of a medical nature, call your surgeon.      Do not make important decisions for 24 hours.      If you had general anesthesia, you may have a sore throat for a couple of days related to the breathing tube used during surgery.  You may use Cepacol lozenges to help with this discomfort.  If it worsens or if you develop a fever, contact your surgeon.       If you feel your pain is not well managed with the pain medications prescribed by your surgeon, please contact your surgeon's office to let them know so they can address your concerns.     POST-OPERATIVE INSTRUCTIONS  FOOT AND ANKLE SURGERY  NIKHIL Lund M.D.  James Gutierrez P.A.-C    These precautions MUST be followed for the first 24 hours after surgery:    Upon discharge, go directly home.    You must make arrangements to have a responsible adult stay with you.    DO NOT DRINK ALCOHOLIC BEVERAGES    It is not unusual to feel lightheaded up to 24 hours after surgery or while taking pain medication.  If you feel lightheaded, sit for a few minutes before standing and have someone assisted you when you get up to walk or use the restroom.    Do not use any mechanical equipment.    Do not make any important or legal decisions for 24 hours or while on pain medications.    You may experience dry mouth, sore throat, or sleep disturbances from the anesthesia and medications  used during surgery.  Generalized muscle aches can sometimes occur.  These usually disappear in 12-24 hours.    POST-OPERATIVE CARE GUIDELINES    The following are general guidelines about what to expect the first days/weeks after surgery.  They are not specific, and your recovery may be slightly different.    Blood clots are not common, but are emergencies.  If you have sudden onset pain in your calf or leg, or have sudden shortness of breath, go to the Emergency Department.      Elevation of your operative foot/ankle is key to reducing the swelling in the immediate post-operative phase (first 3-5 days).  When you are at rest, elevate your foot at or above the level of your heart.  When sitting, your foot should be elevated on a chair or stool; not hanging down.      You should plan to rest the day after surgery no matter how minor the procedure.  More complicated procedures will require more time to return to normal activity.      Foot and ankle surgery is painful in most cases.   It is not unusual for the pain to be worse a day or two after surgery than on the day of.  If your pain is more than 8/10 contact our office.  If you don t have pain, gradually decrease your pain meds by substituting Tylenol.  Please don t use Advil/ibuprofen unless we order it for you.      You will be prescribed Percocet or Vicodin for pain, Vistaril for spasms/pain adjunct, Senokot for constipation.  If you have had trouble taking these meds before or experience nausea or vomiting after surgery from your medication, please advise the recovery room nurses.  If you are already at home, try drinking only clear liquids and/or call our office.      All pain medications, along with inactivity can cause constipation.  Use the Senakot as directed, increase fluid intake to 1 quart per day and increase your dietary fiber.  (The  P  fruits - peaches, plums, pears, and prunes as well as anise/black licorice are recommended.)    It is not unusual  to run a low-grade fever after surgery.  If your temperature is elevated above 102 , lasts longer than 24 hours, or is questionable in any way, contact our office.      Drainage from your cast/dressing is normal.  Reinforce your cast/dressing with 4x4 dressings and cover with an Ace wrap.  Wait until 24 hours after surgery to change your dressings; by this time most of your bleeding will have stopped.  If you have drainage through your dressings 2 days after surgery, contact our office.      You cast/dressing will be changed at your 2-week follow up appointment.  They should be kept clean and DRY.  If your cast/dressing is damaged before that, contact our office.      It is normal to experience some bruising in the toes after surgery and they may appear  dark  when your foot hangs down.  It is important to actively wiggle your toes for at least 5-10 minutes each hour UNLESS you had surgery on those toes.      Use ice on your foot/ankle over the dressing/cast for 20 minutes per hour, 10 or more times per day.  A large bag of frozen peas works well.      Bathing: take a tub or sponge bath instead of a shower if possible during the first two weeks.  If you choose to shower, cover the dressing/cast with a waterproof covering, these may be ordered from www.seal-tight.com or are available at some pharmacies/medical supply stores.  Another option is XeroSox, which is the original vacuum sealed bandage and cast cover.  The cast cover has a vacuum seal and is absolutely waterproof.  5-591-663-2500 or www.xerosox.com for more information.      Driving:  For surgery on the left foot/ankle, you may drive as soon as narcotic medications are stopped.  For surgery on the right foot/ankle, you may drive when you are out of a cast, off pain medications, and you feel secure with braking.      In general, listen to your foot/ankle.  If you have a sudden, dramatic increase in pain that does not resolve after an hour or so, something is  "wrong - call our office.  If you fall or bump your foot/ankle and have sudden pain that resolves, give it 24 hours before you call.      Many of your questions can be addressed at your 2-week follow-up appointment - please make a list of things to ask us as they come up during your recovery.      If you had a nerve block it is common to have numbness in your leg and foot for up to 30 hours after surgery.  If your leg or foot is still numb more than 30 hours after surgery, please call the office.      FUTURE DENTIST VISITS:  If you have had a total ankle arthroplasty please be advised you must be on antibiotics prior to ANY dental work.  Please call your dentist office ahead of time and make them aware of this as your dentist will be able to order the prescription.  If you have had any other surgery this is not a concern.    If you have any further questions or concerns, please call our office at [phone]    **If you have questions or concerns about your procedure,   call Dr. Lund at [phone]**      Pending Results     Date and Time Order Name Status Description    10/22/2018 1110 XR Surgery FRANCO Fluoro L/T 5 Min w Stills In process             Statement of Approval     Ordered          10/22/18 2930  I have reviewed and agree with all the recommendations and orders detailed in this document.  EFFECTIVE NOW     Approved and electronically signed by:  Kalin Gutierrez, PA-C             Admission Information     Date & Time Provider Department Dept. Phone    10/22/2018 Johanna Lund MD [facility] Same Day Surgery [phone]      Your Vitals Were     Blood Pressure Temperature Respirations Height Weight Last Period    111/75 97  F (36.1  C) (Oral) 13 1.651 m (5' 5\") 53.4 kg (117 lb 12.8 oz) 09/06/2006    Pulse Oximetry BMI (Body Mass Index)                98% 19.6 kg/m2          MyChart Information     [employer] gives you secure access to your electronic health record. If you see a primary " care provider, you can also send messages to your care team and make appointments. If you have questions, please call your primary care clinic.  If you do not have a primary care provider, please call [phone] and they will assist you.        Care EveryWhere ID     This is your Care EveryWhere ID. This could be used by other organizations to access your [facility] medical records  [id number]        Equal Access to Services     BRIELLE LR : Hadii andrews stinson haddoo Soradhaali, waaxda luqadaha, qaybta kaalmada adeanuyada, shahab balbuenaraleighfransisco landaverde . So [facility] [phone].    ATENCIÓN: Si habla carolyne, tiene a fuentes disposición servicios gratuitos de asistencia lingüística. Keisha al [phone].    We comply with applicable federal civil rights laws and [place] laws. We do not discriminate on the basis of race, color, national origin, age, disability, sex, sexual orientation, or gender identity.               Review of your medicines      START taking        Dose / Directions    HYDROcodone-acetaminophen 5-325 MG per tablet   Commonly known as:  NORCO   Used for:  S/P bunionectomy   Notes to Patient:  Took 1 tablet at 11:50am        Dose:  1-2 tablet   Take 1-2 tablets by mouth every 4 hours as needed for moderate to severe pain   Quantity:  40 tablet   Refills:  0       ondansetron 4 MG ODT tab   Commonly known as:  ZOFRAN-ODT   Used for:  S/P bunionectomy        Dose:  4-8 mg   Take 1-2 tablets (4-8 mg) by mouth every 8 hours as needed for nausea   Quantity:  15 tablet   Refills:  0       senna-docusate 8.6-50 MG per tablet   Commonly known as:  SENOKOT-S;PERICOLACE   Used for:  S/P bunionectomy        Dose:  1-2 tablet   Take 1-2 tablets by mouth 2 times daily   Quantity:  30 tablet   Refills:  0         CONTINUE these medicines which may have CHANGED, or have new prescriptions. If we are uncertain of the size of tablets/capsules you have at home, strength may be listed as something that might have  changed.        Dose / Directions    lisinopril 5 MG tablet   Commonly known as:  PRINIVIL/ZESTRIL   This may have changed:  when to take this   Used for:  Hypertension goal BP (blood pressure) < 140/90        Dose:  5 mg   Take 1 tablet (5 mg) by mouth daily   Quantity:  90 tablet   Refills:  3       traZODone 50 MG tablet   Commonly known as:  DESYREL   This may have changed:    - how much to take  - how to take this  - when to take this  - reasons to take this  - additional instructions   Used for:  Primary insomnia        1/2 pill nightly as needed.   Quantity:  45 tablet   Refills:  3         CONTINUE these medicines which have NOT CHANGED        Dose / Directions    aspirin 81 MG tablet        Dose:  81 mg   Take 81 mg by mouth daily as needed Reported on 3/31/2017   Refills:  0       BENADRYL 25 MG tablet   Generic drug:  diphenhydrAMINE        Dose:  25 mg   Take 25 mg by mouth nightly as needed Reported on 3/31/2017   Refills:  0       CALCIUM PO        Dose:  1200 mg   Take 1,200 mg by mouth daily   Refills:  0       multivitamin, therapeutic Tabs tablet        Dose:  1 tablet   Take 1 tablet by mouth daily   Refills:  0       OMEGA 3 PO        Dose:  1 g   Take 1 g by mouth daily Reported on 3/31/2017   Refills:  0       VITAMIN D (CHOLECALCIFEROL) PO        Dose:  1000 Units   Take 1,000 Units by mouth daily   Refills:  0         STOP taking     ALEVE PO                Where to get your medicines      These medications were sent to [facility] - [address]  [address], [place] [address]     Phone:  [phone]     ondansetron 4 MG ODT tab    senna-docusate 8.6-50 MG per tablet         Some of these will need a paper prescription and others can be bought over the counter. Ask your nurse if you have questions.     Bring a paper prescription for each of these medications     HYDROcodone-acetaminophen 5-325 MG per tablet                Protect others around  you: Learn how to safely use, store and throw away your medicines at www.disposemymeds.org.        Information about OPIOIDS     PRESCRIPTION OPIOIDS: WHAT YOU NEED TO KNOW   We gave you an opioid (narcotic) pain medicine. It is important to manage your pain, but opioids are not always the best choice. You should first try all the other options your care team gave you. Take this medicine for as short a time (and as few doses) as possible.    Some activities can increase your pain, such as bandage changes or therapy sessions. It may help to take your pain medicine 30 to 60 minutes before these activities. Reduce your stress by getting enough sleep, working on hobbies you enjoy and practicing relaxation or meditation. Talk to your care team about ways to manage your pain beyond prescription opioids.    These medicines have risks:    DO NOT drive when on new or higher doses of pain medicine. These medicines can affect your alertness and reaction times, and you could be arrested for driving under the influence (DUI). If you need to use opioids long-term, talk to your care team about driving.    DO NOT operate heavy machinery    DO NOT do any other dangerous activities while taking these medicines.    DO NOT drink any alcohol while taking these medicines.     If the opioid prescribed includes acetaminophen, DO NOT take with any other medicines that contain acetaminophen. Read all labels carefully. Look for the word  acetaminophen  or  Tylenol.  Ask your pharmacist if you have questions or are unsure.    You can get addicted to pain medicines, especially if you have a history of addiction (chemical, alcohol or substance dependence). Talk to your care team about ways to reduce this risk.    All opioids tend to cause constipation. Drink plenty of water and eat foods that have a lot of fiber, such as fruits, vegetables, prune juice, apple juice and high-fiber cereal. Take a laxative (Miralax, milk of magnesia, Colace, Senna)  if you don t move your bowels at least every other day. Other side effects include upset stomach, sleepiness, dizziness, throwing up, tolerance (needing more of the medicine to have the same effect), physical dependence and slowed breathing.    Store your pills in a secure place, locked if possible. We will not replace any lost or stolen medicine. If you don t finish your medicine, please throw away (dispose) as directed by your pharmacist. The [place] Pollution [employer] has more information about safe disposal: https://www.pca.[facility].[place].us/living-green/managing-unwanted-medications             Medication List: This is a list of all your medications and when to take them. Check marks below indicate your daily home schedule. Keep this list as a reference.      Medications           Morning Afternoon Evening Bedtime As Needed    aspirin 81 MG tablet   Take 81 mg by mouth daily as needed Reported on 3/31/2017                                BENADRYL 25 MG tablet   Take 25 mg by mouth nightly as needed Reported on 3/31/2017   Generic drug:  diphenhydrAMINE                                CALCIUM PO   Take 1,200 mg by mouth daily                                HYDROcodone-acetaminophen 5-325 MG per tablet   Commonly known as:  NORCO   Take 1-2 tablets by mouth every 4 hours as needed for moderate to severe pain   Last time this was given:  1 tablet on 10/22/2018 11:50 AM   Notes to Patient:  Took 1 tablet at 11:50am                                lisinopril 5 MG tablet   Commonly known as:  PRINIVIL/ZESTRIL   Take 1 tablet (5 mg) by mouth daily                                multivitamin, therapeutic Tabs tablet   Take 1 tablet by mouth daily                                OMEGA 3 PO   Take 1 g by mouth daily Reported on 3/31/2017                                ondansetron 4 MG ODT tab   Commonly known as:  ZOFRAN-ODT   Take 1-2 tablets (4-8 mg) by mouth every 8 hours as needed for nausea                                 senna-docusate 8.6-50 MG per tablet   Commonly known as:  SENOKOT-S;PERICOLACE   Take 1-2 tablets by mouth 2 times daily                                traZODone 50 MG tablet   Commonly known as:  DESYREL   1/2 pill nightly as needed.                                VITAMIN D (CHOLECALCIFEROL) PO   Take 1,000 Units by mouth daily

## 2018-10-24 ENCOUNTER — MYC MEDICAL ADVICE (OUTPATIENT)
Dept: FAMILY MEDICINE | Facility: CLINIC | Age: 58
End: 2018-10-24

## 2018-10-27 DIAGNOSIS — I10 HYPERTENSION GOAL BP (BLOOD PRESSURE) < 140/90: ICD-10-CM

## 2018-10-29 RX ORDER — LISINOPRIL 5 MG/1
TABLET ORAL
Qty: 30 TABLET | Refills: 0 | OUTPATIENT
Start: 2018-10-29

## 2018-10-29 NOTE — TELEPHONE ENCOUNTER
"Requested Prescriptions   Pending Prescriptions Disp Refills     lisinopril (PRINIVIL/ZESTRIL) 5 MG tablet [Pharmacy Med Name: LISINOPRIL 5MG TABLETS] 30 tablet 0    Last Written Prescription Date:  10/11/2018  Last Fill Quantity: 90,  # refills: 3   Last Office Visit: 10/9/2018   Future Office Visit:      Sig: TAKE 1 TABLET(5 MG) BY MOUTH DAILY    ACE Inhibitors (Including Combos) Protocol Passed    10/27/2018 10:48 AM       Passed - Blood pressure under 140/90 in past 12 months    BP Readings from Last 3 Encounters:   10/22/18 106/67   10/09/18 108/72   09/04/18 116/77                Passed - Recent (12 mo) or future (30 days) visit within the authorizing provider's specialty    Patient had office visit in the last 12 months or has a visit in the next 30 days with authorizing provider or within the authorizing provider's specialty.  See \"Patient Info\" tab in inbasket, or \"Choose Columns\" in Meds & Orders section of the refill encounter.             Passed - Patient is age 18 or older       Passed - No active pregnancy on record       Passed - Normal serum creatinine on file in past 12 months    Recent Labs   Lab Test  10/09/18   0900   CR  0.73            Passed - Normal serum potassium on file in past 12 months    Recent Labs   Lab Test  10/09/18   0900   POTASSIUM  4.0            Passed - No positive pregnancy test in past 12 months          "

## 2018-11-02 ASSESSMENT — ENCOUNTER SYMPTOMS
EYE IRRITATION: 1
DOUBLE VISION: 0
EYE REDNESS: 1
ARTHRALGIAS: 1
MUSCLE CRAMPS: 0
BACK PAIN: 0
EYE PAIN: 0
STIFFNESS: 0
MUSCLE WEAKNESS: 0
EYE WATERING: 1

## 2018-11-06 ENCOUNTER — TRANSFERRED RECORDS (OUTPATIENT)
Dept: HEALTH INFORMATION MANAGEMENT | Facility: CLINIC | Age: 58
End: 2018-11-06

## 2018-11-07 ENCOUNTER — PRE VISIT (OUTPATIENT)
Dept: ORTHOPEDICS | Facility: CLINIC | Age: 58
End: 2018-11-07

## 2018-11-07 NOTE — TELEPHONE ENCOUNTER
FUTURE VISIT INFORMATION      FUTURE VISIT INFORMATION:    Date: 11/8    Time: 8:20    [place] [facility] [place]:    Referring provider:      Referring providers clinic:      Reason for visit/diagnosis  r hand injury    RECORDS REQUESTED FROM:       Clinic name Comments Records Status Imaging Status   [place] Left vm with pt 11/6 and 11/7 regarding the need to sign an MELODIE
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
## 2018-11-08 ENCOUNTER — OFFICE VISIT (OUTPATIENT)
Dept: ORTHOPEDICS | Facility: CLINIC | Age: 58
End: 2018-11-08
Payer: COMMERCIAL

## 2018-11-08 VITALS — BODY MASS INDEX: 19.99 KG/M2 | HEIGHT: 65 IN | WEIGHT: 120 LBS

## 2018-11-08 DIAGNOSIS — M25.531 RIGHT WRIST PAIN: Primary | ICD-10-CM
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
## 2018-11-08 NOTE — MR AVS SNAPSHOT
After Visit Summary   11/8/2018    Clara Boykin    MRN: [id number]           Patient Information     Date Of Birth          1960        Visit Information        Provider Department      11/8/2018 8:20 AM Izabella Arroyo MD [facility]        Today's Diagnoses     Right wrist pain    -  1       Follow-ups after your visit        Your next 10 appointments already scheduled     Nov 14, 2018  3:15 PM CST   MR WRIST RIGHT W/O CONTRAST with [id number]   [facility] ([facility] [place])    [address] [place] [address] [phone]           How do I prepare for my exam? (Food and drink instructions) **If you will be receiving sedation or general anesthesia, please see special notes below.**  How do I prepare for my exam? (Other instructions) Take your medicines as usual, unless your doctor tells you not to. Please remove any body piercings and hair extensions before you arrive. Follow your doctor s orders. If you do not, we may have to postpone your exam. You may or may not receive IV contrast for this exam pending the discretion of the Radiologist.  You do not need to do anything special to prepare. **If you will be receiving sedation or general anesthesia, please see special notes below.**  What should I wear:  The MRI machine uses a strong magnet. Please wear clothes without metal (snaps, zippers). A sweatsuit works well, or we may give you a hospital gown.  How long does the exam take: Most tests take 30 to 60 minutes.  HOWEVER, IF YOUR DOCTOR PRESCRIBES ANESTHESIA please plan on spending four to five hours in the recovery room.  What should I bring: Bring a list of your current medicines to your exam (including vitamins, minerals and over-the-counter drugs). Also bring the results of similar scans you may have had.  Do I need a : **If you will be receiving sedation or general anesthesia, please see special  notes below.**  What should I do after the exam? No Restrictions, You may resume normal activities.  What is this test: MRI (magnetic resonance imaging) uses a strong magnet and radio waves to look inside the body. An MRA (magnetic resonance angiogram) does the same thing, but it lets us look at your blood vessels. A computer turns the radio waves into pictures showing cross sections of the body, much like slices of bread. This helps us see any problems more clearly.  Who should I call with questions: Please call the Imaging Department at your exam site with any questions. Directions, parking instructions, and other information is available on our website, [email or web address].  How do I prepare if I m having sedation or anesthesia? **IMPORTANT** THE INSTRUCTIONS BELOW ARE ONLY FOR THOSE PATIENTS WHO HAVE BEEN TOLD THEY WILL RECEIVE SEDATION OR GENERAL ANESTHESIA DURING THEIR MRI PROCEDURE:  IF YOU WILL RECEIVE SEDATION (take medicine to help you relax during your exam): You must get the medicine from your doctor before you arrive. Bring the medicine to the exam. Do not take it at home. Arrive one hour early. Bring someone who can take you home after the test. Your medicine will make you sleepy. After the exam, you may not drive, take a bus or take a taxi by yourself. No eating 8 hours before your exam. You may have clear liquids up until 4 hours before your exam. (Clear liquids include water, clear tea, black coffee and fruit juice without pulp.)  IF YOU WILL RECEIVE ANESTHESIA (be asleep for your exam): Arrive 1 1/2 hours early. Bring someone who can take you home after the test. You may not drive, take a bus or take a taxi by yourself. No eating 8 hours before your exam. You may have clear liquids up until 4 hours before your exam. (Clear liquids include water, clear tea, black coffee and fruit juice without pulp.) You will spend four to five hours in the recovery room.            Nov 15, 2018  9:20 AM CST  "  (Arrive by 9:05 AM)   RETURN HAND with Izabella Arroyo MD   [facility] ([facility] [place])    [address] [facility] [facility] [address] [facility]  [address] [facility] [address] [phone]              Future tests that were ordered for you today     Open Future Orders        Priority Expected Expires Ordered    MR Wrist Right w/o Contrast Routine  11/8/2019 11/8/2018            Who to contact     Please call your clinic at [phone] to:    Ask questions about your health    Make or cancel appointments    Discuss your medicines    Learn about your test results    Speak to your doctor            Additional Information About Your Visit        TeneroshariNeed Information     [employer] gives you secure access to your electronic health record. If you see a primary care provider, you can also send messages to your care team and make appointments. If you have questions, please call your primary care clinic.  If you do not have a primary care provider, please call [phone] and they will assist you.      [employer] is an electronic gateway that provides easy, online access to your medical records. With [employer], you can request a clinic appointment, read your test results, renew a prescription or communicate with your care team.     To access your existing account, please contact your [facility] or call [phone] for assistance.        Care EveryWhere ID     This is your Care EveryWhere ID. This could be used by other organizations to access your [place] medical records  [id number]        Your Vitals Were     Height Last Period BMI (Body Mass Index)             1.66 m (5' 5.35\") 09/06/2006 19.75 kg/m2          Blood Pressure from Last 3 Encounters:   10/22/18 106/67   10/09/18 108/72   09/04/18 116/77    Weight from Last 3 Encounters:   11/08/18 54.4 kg (120 lb)   10/22/18 53.4 kg (117 lb 12.8 oz)   10/09/18 54.4 kg (120 lb)                 Today's Medication Changes       "    These changes are accurate as of 11/8/18  2:39 PM.  If you have any questions, ask your nurse or doctor.               These medicines have changed or have updated prescriptions.        Dose/Directions    lisinopril 5 MG tablet   Commonly known as:  PRINIVIL/ZESTRIL   This may have changed:  when to take this   Used for:  Hypertension goal BP (blood pressure) < 140/90        Dose:  5 mg   Take 1 tablet (5 mg) by mouth daily   Quantity:  90 tablet   Refills:  3       traZODone 50 MG tablet   Commonly known as:  DESYREL   This may have changed:    - how much to take  - how to take this  - when to take this  - reasons to take this  - additional instructions   Used for:  Primary insomnia        1/2 pill nightly as needed.   Quantity:  45 tablet   Refills:  3                Primary Care Provider Office Phone # Fax #    Joel Daniel Irwin Wegener, [phone] [address] [facility] [address]        Equal Access to Services     BRIELLE LR : Hadii andrews arnetto Solaxmi, waaxda luqadaha, qaybta kaalmada adeegyada, shahab landaverde . So [facility] [phone].    ATENCIÓN: Si habla español, tiene a fuentes disposición servicios gratuitos de asistencia lingüística. [facility] [phone].    We comply with applicable federal civil rights laws and [place] laws. We do not discriminate on the basis of race, color, national origin, age, disability, sex, sexual orientation, or gender identity.            Thank you!     Thank you for choosing [facility]  for your care. Our goal is always to provide you with excellent care. Hearing back from our patients is one way we can continue to improve our services. Please take a few minutes to complete the written survey that you may receive in the mail after your visit with us. Thank you!             Your Updated Medication List - Protect others around you: Learn how to safely use, store and throw away your medicines at  www.disposemymeds.org.          This list is accurate as of 11/8/18  2:39 PM.  Always use your most recent med list.                   Brand Name Dispense Instructions for use Diagnosis    BENADRYL 25 MG tablet   Generic drug:  diphenhydrAMINE      Take 25 mg by mouth nightly as needed Reported on 3/31/2017        CALCIUM PO      Take 1,200 mg by mouth daily        lisinopril 5 MG tablet    PRINIVIL/ZESTRIL    90 tablet    Take 1 tablet (5 mg) by mouth daily    Hypertension goal BP (blood pressure) < 140/90       multivitamin, therapeutic Tabs tablet      Take 1 tablet by mouth daily        OMEGA 3 PO      Take 1 g by mouth daily Reported on 3/31/2017        traZODone 50 MG tablet    DESYREL    45 tablet    1/2 pill nightly as needed.    Primary insomnia       VITAMIN D (CHOLECALCIFEROL) PO      Take 1,000 Units by mouth daily
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
## 2018-11-14 ENCOUNTER — RADIANT APPOINTMENT (OUTPATIENT)
Dept: MRI IMAGING | Facility: CLINIC | Age: 58
End: 2018-11-14
Attending: ORTHOPAEDIC SURGERY
Payer: COMMERCIAL

## 2018-11-14 DIAGNOSIS — M25.531 RIGHT WRIST PAIN: ICD-10-CM

## 2018-11-15 ENCOUNTER — OFFICE VISIT (OUTPATIENT)
Dept: ORTHOPEDICS | Facility: CLINIC | Age: 58
End: 2018-11-15
Payer: COMMERCIAL

## 2018-11-15 VITALS — BODY MASS INDEX: 19.99 KG/M2 | HEIGHT: 65 IN | WEIGHT: 120 LBS

## 2018-11-15 DIAGNOSIS — M25.532 PAIN IN BOTH WRISTS: Primary | ICD-10-CM

## 2018-11-15 DIAGNOSIS — M25.531 PAIN IN BOTH WRISTS: Primary | ICD-10-CM

## 2018-11-15 NOTE — MR AVS SNAPSHOT
After Visit Summary   11/15/2018    Clara Boykin    MRN: [id number]           Patient Information     Date Of Birth          1960        Visit Information        Provider Department      11/15/2018 9:20 AM Izabella Arroyo MD [facility]        Today's Diagnoses     Pain in both wrists    -  1       Follow-ups after your visit        Additional Services     HAND THERAPY Occupational Therapy or Physical Therapy       Hand Therapy Referral  Bilateral zipper thumb spica splints                  Your next 10 appointments already scheduled     Nov 27, 2018  3:30 PM CST   (Arrive by 3:15 PM)   BEATA Hand with Celina Cruz   [facility] ([facility] [place])    [address] [facility]  4th Floor  [facility] [address] [phone]              Future tests that were ordered for you today     Open Future Orders        Priority Expected Expires Ordered    HAND THERAPY Occupational Therapy or Physical Therapy Routine  11/15/2019 11/15/2018            Who to contact     Please call your clinic at [phone] to:    Ask questions about your health    Make or cancel appointments    Discuss your medicines    Learn about your test results    Speak to your doctor            Additional Information About Your Visit        MyChart Information     [employer] gives you secure access to your electronic health record. If you see a primary care provider, you can also send messages to your care team and make appointments. If you have questions, please call your primary care clinic.  If you do not have a primary care provider, please call [phone] and they will assist you.      [employer] is an electronic gateway that provides easy, online access to your medical records. With [employer], you can request a clinic appointment, read your test results, renew a prescription or communicate with your care team.     To access your existing account, please contact your [facility]  [facility] [facility] or call [phone] for assistance.        Care EveryWhere ID     This is your Care EveryWhere ID. This could be used by other organizations to access your [place] medical records  [id number]        Your Vitals Were     Height Last Period BMI (Body Mass Index)             1.66 m (5' 5.35\") 09/06/2006 19.76 kg/m2          Blood Pressure from Last 3 Encounters:   10/22/18 106/67   10/09/18 108/72   09/04/18 116/77    Weight from Last 3 Encounters:   11/15/18 54.4 kg (120 lb)   11/08/18 54.4 kg (120 lb)   10/22/18 53.4 kg (117 lb 12.8 oz)                 Today's Medication Changes          These changes are accurate as of 11/15/18  8:53 PM.  If you have any questions, ask your nurse or doctor.               These medicines have changed or have updated prescriptions.        Dose/Directions    lisinopril 5 MG tablet   Commonly known as:  PRINIVIL/ZESTRIL   This may have changed:  when to take this   Used for:  Hypertension goal BP (blood pressure) < 140/90        Dose:  5 mg   Take 1 tablet (5 mg) by mouth daily   Quantity:  90 tablet   Refills:  3       traZODone 50 MG tablet   Commonly known as:  DESYREL   This may have changed:    - how much to take  - how to take this  - when to take this  - reasons to take this  - additional instructions   Used for:  Primary insomnia        1/2 pill nightly as needed.   Quantity:  45 tablet   Refills:  3                Primary Care Provider Office Phone # Fax #    Joel Daniel Irwin Wegener, [phone] [address] [id number]        Equal Access to Services     TANNER LR AH: Kyung Shane, renetta martinez, shahab dang. So [facility] [phone].    ATENCIÓN: Si habla español, tiene a fuentes disposición servicios gratuitos de asistencia lingüística. Llame al [phone].    We comply with applicable federal civil rights laws and [place] laws. We do " not discriminate on the basis of race, color, national origin, age, disability, sex, sexual orientation, or gender identity.            Thank you!     Thank you for choosing [facility]  for your care. Our goal is always to provide you with excellent care. Hearing back from our patients is one way we can continue to improve our services. Please take a few minutes to complete the written survey that you may receive in the mail after your visit with us. Thank you!             Your Updated Medication List - Protect others around you: Learn how to safely use, store and throw away your medicines at www.disposemymeds.org.          This list is accurate as of 11/15/18  8:53 PM.  Always use your most recent med list.                   Brand Name Dispense Instructions for use Diagnosis    BENADRYL 25 MG tablet   Generic drug:  diphenhydrAMINE      Take 25 mg by mouth nightly as needed Reported on 3/31/2017        CALCIUM PO      Take 1,200 mg by mouth daily        lisinopril 5 MG tablet    PRINIVIL/ZESTRIL    90 tablet    Take 1 tablet (5 mg) by mouth daily    Hypertension goal BP (blood pressure) < 140/90       multivitamin, therapeutic Tabs tablet      Take 1 tablet by mouth daily        OMEGA 3 PO      Take 1 g by mouth daily Reported on 3/31/2017        traZODone 50 MG tablet    DESYREL    45 tablet    1/2 pill nightly as needed.    Primary insomnia       VITAMIN D (CHOLECALCIFEROL) PO      Take 1,000 Units by mouth daily
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
## 2018-11-15 NOTE — NURSING NOTE
"Reason For Visit:   Chief Complaint   Patient presents with     RECHECK     The patient is here today with right wrist pain, she had an MRI yesterday.        Primary MD: Wegener, Joel Daniel Irwin  Ref. MD: self    Age: 58 year old    ?  No      Ht 1.66 m (5' 5.35\")  Wt 54.4 kg (120 lb)  LMP 09/06/2006  BMI 19.76 kg/m2      Pain Assessment  Patient Currently in Pain: Yes  0-10 Pain Scale: 3  Primary Pain Location: Wrist  Pain Orientation: Right  Pain Descriptors: Aching  Alleviating Factors: Rest  Aggravating Factors: Movement    Hand Dominance Evaluation  Hand Dominance: Right          QuickDASH Assessment  QuickDASH Main 11/2/2018   1.Open a tight or new jar. Mild difficulty   2. Do heavy household chores (e.g., wash walls, floors) Mild difficulty   3. Carry a shopping bag or briefcase. Mild difficulty   4. Wash your back. No difficulty   5. Use a knife to cut food. No difficulty   6. Recreational activities in which you take some force or impact through your arm, shoulder or hand (e.g., golf, hammering, tennis, etc.). Mild difficulty   7. During the past week, to what extent has your arm, shoulder or hand problem interfered with your normal social activities with family, friends, neighbours or groups? Not at all   8. During the past week, were you limited in your work or other regular daily activities as a result of your arm, shoulder or hand problem? Not limited at all   9. Arm, shoulder or hand pain. Mild   10.Tingling (pins and needles) in your arm,shoulder or hand. None   11. During the past week, how much difficulty have you had sleeping because of the pain in your arm, shoulder or hand? ([place] number) No difficulty   Quickdash Ability Score 11.36   1. Open a tight or new jar. -   2. Do heavy household chores (e.g., wash walls, floors). -   3. Carry a shopping bag or briefcase. -   4. Wash your back. -   5. Use a knife to cut food. -   6. Recreational activities in which you take some force or " impact through your arm, shoulder or hand (e.g., golf, hammering, tennis, etc.). -   7. During the past week, to what extent has your arm, shoulder or hand problem interfered with your normal social activities with family, friends, neighbours or groups? -   8. During the past week, were you limited in your work or other regular daily activities as a result of your arm, shoulder or hand problem? -   9. Arm, shoulder or hand pain. -   10. Tingling (pins and needles) in your arm,shoulder or hand. -   11. During the past week, how much difficulty have you had sleeping because of the pain in your arm, shoulder or hand? ([place] number) -   SUM: -          Current Outpatient Prescriptions   Medication Sig Dispense Refill     CALCIUM PO Take 1,200 mg by mouth daily       diphenhydrAMINE (BENADRYL) 25 MG tablet Take 25 mg by mouth nightly as needed Reported on 3/31/2017       lisinopril (PRINIVIL/ZESTRIL) 5 MG tablet Take 1 tablet (5 mg) by mouth daily (Patient taking differently: Take 5 mg by mouth At Bedtime ) 90 tablet 3     multivitamin, therapeutic (THERA-VIT) TABS tablet Take 1 tablet by mouth daily       Omega-3 Fatty Acids (OMEGA 3 PO) Take 1 g by mouth daily Reported on 3/31/2017       traZODone (DESYREL) 50 MG tablet 1/2 pill nightly as needed. (Patient taking differently: Take 12.5-25 mg by mouth nightly as needed (1/4 to 1/2 tablet)) 45 tablet 3     VITAMIN D, CHOLECALCIFEROL, PO Take 1,000 Units by mouth daily         Allergies   Allergen Reactions     Exparel [Bupivacaine] GI Disturbance     Patient had severe abdominal distention     Darvocet [Propoxyphene N-Apap] Other (See Comments)     confusion     Liposomes GI Disturbance     Oxycodone      Penicillins Itching     Percocet [Oxycodone-Acetaminophen] Other (See Comments)     confusion     Tape [Adhesive Tape] Rash     Once after surgical tape     Vistaril [Hydroxyzine] Rash     Intense flushing/redness of face        Daniella Arroyo, ATC

## 2018-11-27 ENCOUNTER — THERAPY VISIT (OUTPATIENT)
Dept: OCCUPATIONAL THERAPY | Facility: CLINIC | Age: 58
End: 2018-11-27
Attending: ORTHOPAEDIC SURGERY
Payer: COMMERCIAL

## 2018-11-27 DIAGNOSIS — M25.532 PAIN IN BOTH WRISTS: ICD-10-CM

## 2018-11-27 DIAGNOSIS — M25.531 PAIN IN BOTH WRISTS: ICD-10-CM

## 2018-11-27 PROCEDURE — 97760 ORTHOTIC MGMT&TRAING 1ST ENC: CPT | Mod: GO | Performed by: OCCUPATIONAL THERAPIST

## 2018-11-27 PROCEDURE — 97165 OT EVAL LOW COMPLEX 30 MIN: CPT | Mod: GO | Performed by: OCCUPATIONAL THERAPIST
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
## 2018-11-27 NOTE — MR AVS SNAPSHOT
After Visit Summary   11/27/2018    Clara Boykin    MRN: [id number]           Patient Information     Date Of Birth          1960        Visit Information        Provider Department      11/27/2018 3:30 PM [facility]        Today's Diagnoses     Pain in both wrists           Follow-ups after your visit        Your next 10 appointments already scheduled     Dec 11, 2018  3:30 PM CST   [facility] ([facility])    [address]  [address] [facility] [address] [phone]              Who to contact     If you have questions or need follow up information about today's clinic visit or your schedule please contact [facility] directly at [phone].  Normal or non-critical lab and imaging results will be communicated to you by MyChart, letter or phone within 4 business days after the clinic has received the results. If you do not hear from us within 7 days, please contact the clinic through MyChart or phone. If you have a critical or abnormal lab result, we will notify you by phone as soon as possible.  Submit refill requests through [employer]st[employer] or call your pharmacy and they will forward the refill request to us. Please allow 3 business days for your refill to be completed.          Additional Information About Your Visit        MyChart Information     Bell[employer] gives you secure access to your electronic health record. If you see a primary care provider, you can also send messages to your care team and make appointments. If you have questions, please call your primary care clinic.  If you do not have a primary care provider, please call [phone] and they will assist you.        Care EveryWhere ID     This is your Care EveryWhere ID. This could be used by other organizations to access your [place] medical records  [id number]        Your Vitals Were     Last Period                    09/06/2006            Blood Pressure from Last 3 Encounters:   10/22/18 106/67   10/09/18 108/72   09/04/18 116/77    Weight from Last 3 Encounters:   11/15/18 54.4 kg (120 lb)   11/08/18 54.4 kg (120 lb)   10/22/18 53.4 kg (117 lb 12.8 oz)              We Performed the Following     HAND THERAPY Occupational Therapy or Physical Therapy     HC OT EVAL, LOW COMPLEXITY     ORTHOTIC MGMT AND TRAINING, EACH 15 MIN          Today's Medication Changes          These changes are accurate as of 11/27/18 11:59 PM.  If you have any questions, ask your nurse or doctor.               These medicines have changed or have updated prescriptions.        Dose/Directions    lisinopril 5 MG tablet   Commonly known as:  PRINIVIL/ZESTRIL   This may have changed:  when to take this   Used for:  Hypertension goal BP (blood pressure) < 140/90        Dose:  5 mg   Take 1 tablet (5 mg) by mouth daily   Quantity:  90 tablet   Refills:  3       traZODone 50 MG tablet   Commonly known as:  DESYREL   This may have changed:    - how much to take  - how to take this  - when to take this  - reasons to take this  - additional instructions   Used for:  Primary insomnia        1/2 pill nightly as needed.   Quantity:  45 tablet   Refills:  3                Primary Care Provider Office Phone # Fax #    Joel Daniel Irwin Wegener, [phone]       [address]  [facility] [address]        Equal Access to Services     TANNER LR AH: Hadii andrews stinson hadasho Soradhaali, waaxda luqadaha, qaybta kaalmada adeegyada, shahab obrien haysherrie landaverde . So [facility] [phone].    ATENCIÓN: Si habla español, tiene a fuentes disposición servicios gratuitos de asistencia lingüística. Llame al [phone].    We comply with applicable federal civil rights laws and [place] laws. We do not discriminate on the basis of race, color, national origin, age, disability, sex, sexual orientation, or gender identity.            Thank you!     Thank you for  choosing [facility]  for your care. Our goal is always to provide you with excellent care. Hearing back from our patients is one way we can continue to improve our services. Please take a few minutes to complete the written survey that you may receive in the mail after your visit with us. Thank you!             Your Updated Medication List - Protect others around you: Learn how to safely use, store and throw away your medicines at www.disposemymeds.org.          This list is accurate as of 11/27/18 11:59 PM.  Always use your most recent med list.                   Brand Name Dispense Instructions for use Diagnosis    BENADRYL 25 MG tablet   Generic drug:  diphenhydrAMINE      Take 25 mg by mouth nightly as needed Reported on 3/31/2017        CALCIUM PO      Take 1,200 mg by mouth daily        lisinopril 5 MG tablet    PRINIVIL/ZESTRIL    90 tablet    Take 1 tablet (5 mg) by mouth daily    Hypertension goal BP (blood pressure) < 140/90       multivitamin, therapeutic Tabs tablet      Take 1 tablet by mouth daily        OMEGA 3 PO      Take 1 g by mouth daily Reported on 3/31/2017        traZODone 50 MG tablet    DESYREL    45 tablet    1/2 pill nightly as needed.    Primary insomnia       VITAMIN D (CHOLECALCIFEROL) PO      Take 1,000 Units by mouth daily

## 2018-11-28 PROBLEM — M25.532 PAIN IN BOTH WRISTS: Status: ACTIVE | Noted: 2018-11-28

## 2018-11-28 PROBLEM — M25.531 PAIN IN BOTH WRISTS: Status: ACTIVE | Noted: 2018-11-28

## 2018-12-03 ENCOUNTER — OFFICE VISIT (OUTPATIENT)
Dept: FAMILY MEDICINE | Facility: CLINIC | Age: 58
End: 2018-12-03
Payer: COMMERCIAL

## 2018-12-03 ENCOUNTER — MYC MEDICAL ADVICE (OUTPATIENT)
Dept: FAMILY MEDICINE | Facility: CLINIC | Age: 58
End: 2018-12-03

## 2018-12-03 VITALS
DIASTOLIC BLOOD PRESSURE: 68 MMHG | WEIGHT: 122 LBS | BODY MASS INDEX: 20.33 KG/M2 | HEART RATE: 67 BPM | TEMPERATURE: 97.9 F | SYSTOLIC BLOOD PRESSURE: 113 MMHG | RESPIRATION RATE: 16 BRPM | OXYGEN SATURATION: 97 % | HEIGHT: 65 IN

## 2018-12-03 DIAGNOSIS — H10.13 ALLERGIC CONJUNCTIVITIS, BILATERAL: Primary | ICD-10-CM

## 2018-12-03 PROCEDURE — 99213 OFFICE O/P EST LOW 20 MIN: CPT | Performed by: FAMILY MEDICINE

## 2018-12-03 RX ORDER — PREDNISOLONE SODIUM PHOSPHATE 10 MG/ML
1 SOLUTION/ DROPS OPHTHALMIC
Qty: 5 ML | Refills: 0 | Status: SHIPPED | OUTPATIENT
Start: 2018-12-03 | End: 2019-04-10

## 2018-12-03 NOTE — MR AVS SNAPSHOT
After Visit Summary   12/3/2018    Clara Boykin    MRN: [id number]           Patient Information     Date Of Birth          1960        Visit Information        Provider Department      12/3/2018 1:40 PM Wegener, Joel Daniel Irwin, MD [facility]        Today's Diagnoses     Allergic conjunctivitis, bilateral    -  1       Follow-ups after your visit        Follow-up notes from your care team     Return in about 10 months (around 10/3/2019) for preventive physical.      Your next 10 appointments already scheduled     Dec 11, 2018  3:30 PM CST   [facility] Hand with [facility]   [facility] ([facility] [place])    [address]  4th [address]  [facility] [address] [phone]              Who to contact     If you have questions or need follow up information about today's clinic visit or your schedule please contact [facility] directly at [phone].  Normal or non-critical lab and imaging results will be communicated to you by MyChart, letter or phone within 4 business days after the clinic has received the results. If you do not hear from us within 7 days, please contact the clinic through froolyhart or phone. If you have a critical or abnormal lab result, we will notify you by phone as soon as possible.  Submit refill requests through [employer] or call your pharmacy and they will forward the refill request to us. Please allow 3 business days for your refill to be completed.          Additional Information About Your Visit        MyChart Information     [employer] gives you secure access to your electronic health record. If you see a primary care provider, you can also send messages to your care team and make appointments. If you have questions, please call your primary care clinic.  If you do not have a primary care provider, please call [phone] and they will assist you.        Care EveryWhere ID     This is your Care EveryWhere ID.  "This could be used by other organizations to access your [place] medical records  [id number]        Your Vitals Were     Pulse Temperature Respirations Height Last Period Pulse Oximetry    67 97.9  F (36.6  C) (Oral) 16 5' 5\" (1.651 m) 09/06/2006 97%    BMI (Body Mass Index)                   20.3 kg/m2            Blood Pressure from Last 3 Encounters:   12/03/18 113/68   10/22/18 106/67   10/09/18 108/72    Weight from Last 3 Encounters:   12/03/18 122 lb (55.3 kg)   11/15/18 120 lb (54.4 kg)   11/08/18 120 lb (54.4 kg)              Today, you had the following     No orders found for display         Today's Medication Changes          These changes are accurate as of 12/3/18  2:07 PM.  If you have any questions, ask your nurse or doctor.               Start taking these medicines.        Dose/Directions    prednisoLONE sodium phosphate 1 % ophthalmic solution   Commonly known as:  INFLAMASE FORTE   Used for:  Allergic conjunctivitis, bilateral   Started by:  Wegener, Joel Daniel Irwin, MD        Dose:  1 drop   Apply 1 drop to eye every 4 hours (while awake) for 7 days   Quantity:  5 mL   Refills:  0         These medicines have changed or have updated prescriptions.        Dose/Directions    lisinopril 5 MG tablet   Commonly known as:  PRINIVIL/ZESTRIL   This may have changed:  when to take this   Used for:  Hypertension goal BP (blood pressure) < 140/90        Dose:  5 mg   Take 1 tablet (5 mg) by mouth daily   Quantity:  90 tablet   Refills:  3       traZODone 50 MG tablet   Commonly known as:  DESYREL   This may have changed:    - how much to take  - how to take this  - when to take this  - reasons to take this  - additional instructions   Used for:  Primary insomnia        1/2 pill nightly as needed.   Quantity:  45 tablet   Refills:  3            Where to get your medicines      Some of these will need a paper prescription and others can be bought over the counter.  Ask your nurse if you have questions.  "    Bring a paper prescription for each of these medications     prednisoLONE sodium phosphate 1 % ophthalmic solution                Primary Care Provider Office Phone # Fax #    Joel Daniel Irwin Wegener, [phone] [address] [facility] [facility] [address]        Equal Access to Services     KATIETANNER BE : Hadii aad ku haddoo Soomaali, waaxda luqadaha, qaybta kaalmada adeegyada, waxay birgitin saulon adeanu maldonado saima guzman. So [facility] [phone].    ATENCIÓN: Si habla español, tiene a fuentes disposición servicios gratuitos de asistencia lingüística. Llame al [phone].    We comply with applicable federal civil rights laws and [place] laws. We do not discriminate on the basis of race, color, national origin, age, disability, sex, sexual orientation, or gender identity.            Thank you!     Thank you for choosing [facility]  for your care. Our goal is always to provide you with excellent care. Hearing back from our patients is one way we can continue to improve our services. Please take a few minutes to complete the written survey that you may receive in the mail after your visit with us. Thank you!             Your Updated Medication List - Protect others around you: Learn how to safely use, store and throw away your medicines at www.disposemymeds.org.          This list is accurate as of 12/3/18  2:07 PM.  Always use your most recent med list.                   Brand Name Dispense Instructions for use Diagnosis    BENADRYL 25 MG tablet   Generic drug:  diphenhydrAMINE      Take 25 mg by mouth nightly as needed Reported on 3/31/2017        CALCIUM PO      Take 1,200 mg by mouth daily        lisinopril 5 MG tablet    PRINIVIL/ZESTRIL    90 tablet    Take 1 tablet (5 mg) by mouth daily    Hypertension goal BP (blood pressure) < 140/90       multivitamin, therapeutic Tabs tablet      Take 1 tablet by mouth daily        OMEGA 3 PO      Take 1 g by mouth daily Reported on 3/31/2017         prednisoLONE sodium phosphate 1 % ophthalmic solution    INFLAMASE FORTE    5 mL    Apply 1 drop to eye every 4 hours (while awake) for 7 days    Allergic conjunctivitis, bilateral       traZODone 50 MG tablet    DESYREL    45 tablet    1/2 pill nightly as needed.    Primary insomnia       VITAMIN D (CHOLECALCIFEROL) PO      Take 1,000 Units by mouth daily

## 2018-12-03 NOTE — PROGRESS NOTES
SUBJECTIVE:   Clara Boykin is a 58 year old female who presents to clinic today for the following health issues:      Eye(s) Problem  Onset: x 6 days     Description: puffy and redness   Location: bilateral  Pain: no   Redness: YES    Accompanying Signs & Symptoms:  Discharge/mattering: no   Swelling: YES  Visual changes: no   Fever: no   Nasal Congestion: no   Bothered by bright lights: no     History:   Trauma: no   Foreign body exposure: YES    Precipitating factors:   Wearing contacts: no     Alleviating factors:  Improved by:     Therapies Tried and outcome: benadryl eye drops       Allergic conjunctivitis, bilateral :started in timing with doing work on her basement with a lot of dust/mold exposure.  Iced, took allergy pill, wore mask.  All helped some.  Then yesterday started using prednisolone eye drops which helped as well  Had red eyes and swollen lids.  Now near gone.  Concerned about lisinopril reaction as well.         Problem list, Medication list, Allergies, and Medical/Social/Surgical histories reviewed in [facility] and updated as appropriate.  Labs reviewed in EPIC  BP Readings from Last 3 Encounters:   12/03/18 113/68   10/22/18 106/67   10/09/18 108/72    Wt Readings from Last 3 Encounters:   12/03/18 122 lb (55.3 kg)   11/15/18 120 lb (54.4 kg)   11/08/18 120 lb (54.4 kg)                  Patient Active Problem List   Diagnosis     iamJOINT PAIN-PELVIS     Pain in joint, upper arm     Skin exam, screening for cancer     CARDIOVASCULAR SCREENING; LDL GOAL LESS THAN 160     Insomnia     History of anesthesia reaction     Small vessel disease, cerebrovascular     Right foot pain     Hip pain, right     Pain in both wrists     Past Surgical History:   Procedure Laterality Date     APPENDECTOMY       AS KNEE SCOPE, DIAGNOSTIC       BUNIONECTOMY Right 2016    times 2     BUNIONECTOMY Left 10/22/2018    Procedure: LEFT REVISION BUNION ;  Surgeon: Johanna Lund MD;  [facility]      HERNIORRHAPHY VENTRAL N/A 2016    Procedure: HERNIORRHAPHY VENTRAL;  Surgeon: Ash Thompson MD;  Location: [facility] OR     left clavical       NECK SURGERY       RECESSION RESECTION (REPAIR STRABISMUS) Right 4/10/2017    Procedure: RECESSION RESECTION (REPAIR STRABISMUS);  Surgeon: Lyle Leggett MD;  Location: [facility] OR     WRIST SURGERY         Social History   Substance Use Topics     Smoking status: Never Smoker     Smokeless tobacco: Never Used     Alcohol use Yes      Comment: rare     Family History   Problem Relation Age of Onset     Cancer Mother      skin cancer     Diabetes Mother      borderline/Type 2     Hypertension Mother      3 meds: 4.5cm asc aortic aneurysm     Hyperlipidemia Mother      hi chol & triglycerides     Cerebrovascular Disease Mother      many small, cog. impaired     Osteoporosis Mother      fosamax x 5 yrs     Obesity Mother      BMI 35 or so     Unknown/Adopted Mother      dermatomyositis since      Cancer Father      skin cancer     Coronary Artery Disease Father      angioplasty ~     Hypertension Father      1 med, mild. Age 94     Depression Father      2x: age 87 [facility]     Cancer Maternal Grandmother      skin cancer     Cerebrovascular Disease Maternal Grandmother       from multiple CVA     Obesity Maternal Grandmother      overweight     Diabetes Maternal Grandfather      borderline/Type 2     Coronary Artery Disease Maternal Grandfather      2-3 MIs; worked after each     Cerebrovascular Disease Maternal Grandfather       from CVA postop hernia     Obesity Maternal Grandfather      was overwt to obese     Diabetes Cousin      prediabetes     Hypertension Brother      1 med for 20 yrs     Substance Abuse Brother      hx of EtOH     Cerebrovascular Disease Other      cog. impairment like my mom     Mental Illness Sister      ?bipolar; past chem dep     Substance Abuse Sister      hx of: prescript drug& EtOH     Unknown/Adopted Sister       sister is adopted     Anesthesia Reaction Other      naus/vomit 1-8 hr postop unless tx'd     Anesthesia Reaction No family hx of          Current Outpatient Prescriptions   Medication Sig Dispense Refill     CALCIUM PO Take 1,200 mg by mouth daily       diphenhydrAMINE (BENADRYL) 25 MG tablet Take 25 mg by mouth nightly as needed Reported on 3/31/2017       lisinopril (PRINIVIL/ZESTRIL) 5 MG tablet Take 1 tablet (5 mg) by mouth daily (Patient taking differently: Take 5 mg by mouth At Bedtime ) 90 tablet 3     multivitamin, therapeutic (THERA-VIT) TABS tablet Take 1 tablet by mouth daily       Omega-3 Fatty Acids (OMEGA 3 PO) Take 1 g by mouth daily Reported on 3/31/2017       prednisoLONE sodium phosphate (INFLAMASE FORTE) 1 % ophthalmic solution Apply 1 drop to eye every 4 hours (while awake) for 7 days 5 mL 0     traZODone (DESYREL) 50 MG tablet 1/2 pill nightly as needed. (Patient taking differently: Take 12.5-25 mg by mouth nightly as needed (1/4 to 1/2 tablet)) 45 tablet 3     VITAMIN D, CHOLECALCIFEROL, PO Take 1,000 Units by mouth daily       Allergies   Allergen Reactions     Exparel [Bupivacaine] GI Disturbance     Patient had severe abdominal distention     Darvocet [Propoxyphene N-Apap] Other (See Comments)     confusion     Liposomes GI Disturbance     Oxycodone      Penicillins Itching     Percocet [Oxycodone-Acetaminophen] Other (See Comments)     confusion     Tape [Adhesive Tape] Rash     Once after surgical tape     Vistaril [Hydroxyzine] Rash     Intense flushing/redness of face      Recent Labs   Lab Test  10/09/18   0900  04/20/18   0845  10/10/16   0828   09/04/15   0841   LDL   --   118*  119*   --   129   HDL   --   73  67   --   72   TRIG   --   73  66   --   62   ALT   --   38   --    --    --    CR  0.73  0.64   --    < >   --    GFRESTIMATED  81  >90   --    < >   --    GFRESTBLACK  >90  >90   --    < >   --    POTASSIUM  4.0  3.7   --    < >   --    TSH   --   1.44   --    --   1.22  "   < > = values in this interval not displayed.        ROS:  Constitutional, HEENT, cardiovascular, pulmonary, GI, , musculoskeletal, neuro, skin, endocrine and psych systems are negative, except as otherwise noted.        OBJECTIVE:  /68 (BP Location: Left arm, Patient Position: Sitting, Cuff Size: Adult Regular)  Pulse 67  Temp 97.9  F (36.6  C) (Oral)  Resp 16  Ht 5' 5\" (1.651 m)  Wt 122 lb (55.3 kg)  LMP 2006  SpO2 97%  BMI 20.3 kg/m2    EXAM:  GENERAL APPEARANCE: healthy, alert and no distress  Slight conjunctiva injection and lower lid swelling.   No skin redness today.     ASSESSMENT AND PLAN  1. Allergic conjunctivitis, bilateral  I agree about concern about angioedema from lisinopril.  Generally measures taken so far would not be enough to improve that however so will continue to monitor for now.     Continue prednisolone for 7 days, refill given.    follow up as needed or next fall for physical.     - prednisoLONE sodium phosphate (INFLAMASE FORTE) 1 % ophthalmic solution; Apply 1 drop to eye every 4 hours (while awake) for 7 days  Dispense: 5 mL; Refill: 0        MYCHART FOR ON-LINE CARE(VISITS), LABS, REFILLS, MESSAGING, ETC [email or web address] , [phone]    E-VISIT: click \"on-line care, then request e-visit\".  E-visits work well for following up on issues we have discussed in clinic previously which may need new prescriptions, new prescriptions or substantial discussion. These are always done by me (Dr. Wegener).     ONCARE VISIT:  [email or web address]  - we treat nearly 50 common conditions through on-care.  These are done in an hour by on-call staff.     RADIOLOGY:  [facility]:  [phone]   [facility]: [phone]    Mammogram and Colonoscopy [phone]    Smoking Cessation: www.quitplan.org, 2-433-563-PLAN ([phone])      CONSUMER PRICE LINE for estimates of test costs:  [phone]       Joel Wegener, MD        "

## 2018-12-04 ENCOUNTER — TRANSFERRED RECORDS (OUTPATIENT)
Dept: HEALTH INFORMATION MANAGEMENT | Facility: CLINIC | Age: 58
End: 2018-12-04

## 2018-12-07 ENCOUNTER — THERAPY VISIT (OUTPATIENT)
Dept: PHYSICAL THERAPY | Facility: CLINIC | Age: 58
End: 2018-12-07
Payer: COMMERCIAL

## 2018-12-07 DIAGNOSIS — M21.612 BUNION, LEFT: Primary | ICD-10-CM

## 2018-12-07 PROCEDURE — 97110 THERAPEUTIC EXERCISES: CPT | Mod: GP | Performed by: PHYSICAL THERAPIST

## 2018-12-07 PROCEDURE — 97161 PT EVAL LOW COMPLEX 20 MIN: CPT | Mod: GP | Performed by: PHYSICAL THERAPIST

## 2018-12-07 NOTE — MR AVS SNAPSHOT
After Visit Summary   12/7/2018    Clara Boykin    MRN: [id number]           Patient Information     Date Of Birth          1960        Visit Information        Provider Department      12/7/2018 7:30 AM Tyrone Lucia, PT [facility]        Today's Diagnoses     Bunion, left    -  1       Follow-ups after your visit        Your next 10 appointments already scheduled     Dec 11, 2018  3:30 PM CST   BEATA Hand with Celina Cruz   [facility] ([facility])    [address]  [address]th [facility] [id number] [phone]            Dec 13, 2018  4:00 PM CST   BEATA Extremity with Tyrone Lucia PT   [facility] [place] ([address] [address] [phone]              Who to contact     If you have questions or need follow up information about today's clinic visit or your schedule please contact [facility] directly at [phone].  Normal or non-critical lab and imaging results will be communicated to you by [employer], letter or phone within 4 business days after the clinic has received the results. If you do not hear from us within 7 days, please contact the clinic through [facility] or phone. If you have a critical or abnormal lab result, we will notify you by phone as soon as possible.  Submit refill requests through [employer] or call your pharmacy and they will forward the refill request to us. Please allow 3 business days for your refill to be completed.          Additional Information About Your Visit        [employer] Information     [employer] gives you secure access to your electronic health record. If you see a primary care provider, you can also send messages to your care team and make appointments. If you have questions, please call your primary care clinic.   If you do not have a primary care provider, please call [phone] and they will assist you.        Care EveryWhere ID     This is your Care EveryWhere ID. This could be used by other organizations to access your [facility] medical records  [id number]        Your Vitals Were     Last Period                   09/06/2006            Blood Pressure from Last 3 Encounters:   12/03/18 113/68   10/22/18 106/67   10/09/18 108/72    Weight from Last 3 Encounters:   12/03/18 55.3 kg (122 lb)   11/15/18 54.4 kg (120 lb)   11/08/18 54.4 kg (120 lb)              We Performed the Following     PT Eval, Low Complexity (20162)     Therapeutic Exercises          Today's Medication Changes          These changes are accurate as of 12/7/18  9:31 AM.  If you have any questions, ask your nurse or doctor.               These medicines have changed or have updated prescriptions.        Dose/Directions    lisinopril 5 MG tablet   Commonly known as:  PRINIVIL/ZESTRIL   This may have changed:  when to take this   Used for:  Hypertension goal BP (blood pressure) < 140/90        Dose:  5 mg   Take 1 tablet (5 mg) by mouth daily   Quantity:  90 tablet   Refills:  3       traZODone 50 MG tablet   Commonly known as:  DESYREL   This may have changed:    - how much to take  - how to take this  - when to take this  - reasons to take this  - additional instructions   Used for:  Primary insomnia        1/2 pill nightly as needed.   Quantity:  45 tablet   Refills:  3                Primary Care Provider Office Phone # Fax #    Joel Daniel Irwin Wegener, [phone] [address] [place] [id number]        Equal Access to Services     [facility] Hospital[facility]LEEN AH: Hadii andrews Shane, waaxda luqadaha, qaybta kaalshahab mejia. So [facility] [phone].    ATENCIÓN: Si habla español, tiene a fuentes disposición servicios gratuitos de asistencia lingüística. Llame al [phone].    We comply with  applicable federal civil rights laws and [place] laws. We do not discriminate on the basis of race, color, national origin, age, disability, sex, sexual orientation, or gender identity.            Thank you!     Thank you for choosing [facility] PHYSICAL [facility]  for your care. Our goal is always to provide you with excellent care. Hearing back from our patients is one way we can continue to improve our services. Please take a few minutes to complete the written survey that you may receive in the mail after your visit with us. Thank you!             Your Updated Medication List - Protect others around you: Learn how to safely use, store and throw away your medicines at www.disposemymeds.org.          This list is accurate as of 12/7/18  9:31 AM.  Always use your most recent med list.                   Brand Name Dispense Instructions for use Diagnosis    BENADRYL 25 MG tablet   Generic drug:  diphenhydrAMINE      Take 25 mg by mouth nightly as needed Reported on 3/31/2017        CALCIUM PO      Take 1,200 mg by mouth daily        lisinopril 5 MG tablet    PRINIVIL/ZESTRIL    90 tablet    Take 1 tablet (5 mg) by mouth daily    Hypertension goal BP (blood pressure) < 140/90       multivitamin, therapeutic Tabs tablet      Take 1 tablet by mouth daily        OMEGA 3 PO      Take 1 g by mouth daily Reported on 3/31/2017        prednisoLONE sodium phosphate 1 % ophthalmic solution    INFLAMASE FORTE    5 mL    Apply 1 drop to eye every 4 hours (while awake) for 7 days    Allergic conjunctivitis, bilateral       traZODone 50 MG tablet    DESYREL    45 tablet    1/2 pill nightly as needed.    Primary insomnia       VITAMIN D (CHOLECALCIFEROL) PO      Take 1,000 Units by mouth daily

## 2018-12-07 NOTE — PROGRESS NOTES
[facility] Initial Evaluation  Subjective:  Patient is a 58 year old female presenting with rehab left ankle/foot hpi.   Clara Boykin is a 58 year old female with a left foot condition.  Condition occurred with:  Repetition/overuse.  Condition occurred: during recreation/sport.  This is a new condition  Pt presents to PT with c/o L great toe pain/swelling/stiffness s/p bunion revision on 10-22-18.      Pt works as a researcher.  She is well known to PT     PMH: see epic.    Site of Pain: L great toe.    Pain is described as aching and sharp and is intermittent and reported as 2/10 and 4/10.  Associated symptoms:  Loss of strength and loss of motion/stiffness. Pain is the same all the time.  Symptoms are exacerbated by activity, bending/squatting, ascending stairs, certain positions and descending stairs and relieved by rest.  Since onset symptoms are gradually improving.  Special tests:  X-ray.  Previous treatment: none.  Improvement with previous treatment: n/a.  General health as reported by patient is excellent.                                              Objective:  System    Ankle/Foot Evaluation  ROM:  Arom ankle eval: Lacking 25 degrees great toe flexion, mild extension limitation.        LIGAMENT TESTING: normal              SPECIAL TESTS: not assessed    PALPATION: Palpation of ankle: Incisional TTP L great toe.    EDEMA: Edema ankle: Moderate L great toe.          MOBILITY TESTING: Mobility testing ankle: Decreased L midfoot mobility.              FUNCTIONAL TESTS: Functional test ankle: Squat WNL.  SLS mild unsteadiness LLE.  SL Squat  mild unsteadiness on LLE.                                                                  General Evaluation:                  Integumentary/Inspection:  Integumentary inspection wnl general: Incision healed, redness/swelling L great toe.              Gait:  Gait wnl general: Pt amb without AD without deviation.                                          ROS    Assessment/Plan:    Patient is a 58 year old female with left side ankle complaints.    Patient has the following significant findings with corresponding treatment plan.                Diagnosis 1:  L bunion  Pain -  hot/cold therapy  Decreased ROM/flexibility - manual therapy and therapeutic exercise  Decreased joint mobility - manual therapy and therapeutic exercise  Decreased strength - therapeutic exercise and therapeutic activities  Impaired balance - neuro re-education and therapeutic activities  Decreased proprioception - neuro re-education and therapeutic activities  Inflammation - cold therapy  Impaired gait - gait training  Impaired muscle performance - neuro re-education  Decreased function - therapeutic activities  Impaired posture - neuro re-education    Therapy Evaluation Codes:   1) History comprised of:   Personal factors that impact the plan of care:      None.    Comorbidity factors that impact the plan of care are:      None.     Medications impacting care: None.  2) Examination of Body Systems comprised of:   Body structures and functions that impact the plan of care:      Ankle.   Activity limitations that impact the plan of care are:      Lifting, Running, Squatting/kneeling, Stairs and Walking.  3) Clinical presentation characteristics are:   Stable/Uncomplicated.  4) Decision-Making    Low complexity using standardized patient assessment instrument and/or measureable assessment of functional outcome.  Cumulative Therapy Evaluation is: Low complexity.    Previous and current functional limitations:  (See Goal Flow Sheet for this information)    Short term and Long term goals: (See Goal Flow Sheet for this information)     Communication ability:  Patient appears to be able to clearly communicate and understand verbal and written communication and follow directions correctly.  Treatment Explanation - The following has been discussed with the patient:   RX ordered/plan of  care  Anticipated outcomes  Possible risks and side effects  This patient would benefit from PT intervention to resume normal activities.   Rehab potential is excellent.    Frequency:  1 X week, once daily  Duration:  for 6 weeks  Discharge Plan:  Achieve all LTG.  Independent in home treatment program.  Return to work with or without restrictions.  Return to previous functional level by discharge.  Reach maximal therapeutic benefit.    Please refer to the daily flowsheet for treatment today, total treatment time and time spent performing 1:1 timed codes.

## 2018-12-11 ENCOUNTER — THERAPY VISIT (OUTPATIENT)
Dept: OCCUPATIONAL THERAPY | Facility: CLINIC | Age: 58
End: 2018-12-11
Payer: COMMERCIAL

## 2018-12-11 DIAGNOSIS — M25.531 PAIN IN BOTH WRISTS: ICD-10-CM

## 2018-12-11 DIAGNOSIS — M25.532 PAIN IN BOTH WRISTS: ICD-10-CM

## 2018-12-11 PROCEDURE — 97763 ORTHC/PROSTC MGMT SBSQ ENC: CPT | Mod: GO | Performed by: OCCUPATIONAL THERAPIST

## 2018-12-13 ENCOUNTER — TELEPHONE (OUTPATIENT)
Dept: ORTHOPEDICS | Facility: CLINIC | Age: 58
End: 2018-12-13

## 2018-12-13 NOTE — TELEPHONE ENCOUNTER
I called the patient this afternoon regarding her request of a new exos splint for her left wrist. Dr. Arroyo okayed her request so I was calling to schedule her an appointment for a replacement. I left a message on her cell phone for her to call the ortho clinic back to get this appointment scheduled. I gave her the clinic phone number to return my call.    Lanie Good, ATC

## 2018-12-17 ENCOUNTER — TELEPHONE (OUTPATIENT)
Dept: ORTHOPEDICS | Facility: CLINIC | Age: 58
End: 2018-12-17

## 2018-12-17 NOTE — TELEPHONE ENCOUNTER
I called the patient this morning to offer her an appointment at noon tomorrow 12/18/18 to get a new exos splint made as she requested. The patient will either call back to schedule the appointment or confirm the noon time via [employer].    Lanie Good, ATC

## 2018-12-27 ENCOUNTER — TELEPHONE (OUTPATIENT)
Dept: ORTHOPEDICS | Facility: CLINIC | Age: 58
End: 2018-12-27

## 2019-01-03 ENCOUNTER — ALLIED HEALTH/NURSE VISIT (OUTPATIENT)
Dept: ORTHOPEDICS | Facility: CLINIC | Age: 59
End: 2019-01-03
Payer: COMMERCIAL

## 2019-01-03 DIAGNOSIS — G89.29 CHRONIC PAIN OF LEFT THUMB: Primary | ICD-10-CM

## 2019-01-03 DIAGNOSIS — M79.645 CHRONIC PAIN OF LEFT THUMB: Primary | ICD-10-CM

## 2019-01-03 NOTE — PROGRESS NOTES
Clara came in to the clinic looking for a solution for her broken Exos brace that she uses for her thumb pain.  She was shown and offered an off the shelf thumb included wrist brace and two different types of Exos, a thumb spica and wrist brace.  She declined all three options.  She wants to use the brace for biking and that is how her current brace broke.  She didn't want to have the same brace for fear that is would break the same way.  They off the shelf  Brace was too bulky.  It was recommended that she try looking online to see of there is a different kind of brace.  She is also going to contact the Exos company to see if they have any other options for her.

## 2019-01-04 ENCOUNTER — THERAPY VISIT (OUTPATIENT)
Dept: PHYSICAL THERAPY | Facility: CLINIC | Age: 59
End: 2019-01-04
Payer: COMMERCIAL

## 2019-01-04 DIAGNOSIS — M21.612 BUNION, LEFT: ICD-10-CM

## 2019-01-04 PROCEDURE — 97110 THERAPEUTIC EXERCISES: CPT | Mod: GP | Performed by: PHYSICAL THERAPIST

## 2019-01-04 PROCEDURE — 97530 THERAPEUTIC ACTIVITIES: CPT | Mod: GP | Performed by: PHYSICAL THERAPIST

## 2019-02-02 ENCOUNTER — TRANSFERRED RECORDS (OUTPATIENT)
Dept: HEALTH INFORMATION MANAGEMENT | Facility: CLINIC | Age: 59
End: 2019-02-02

## 2019-02-04 ENCOUNTER — TRANSFERRED RECORDS (OUTPATIENT)
Dept: HEALTH INFORMATION MANAGEMENT | Facility: CLINIC | Age: 59
End: 2019-02-04

## 2019-02-13 ENCOUNTER — TRANSFERRED RECORDS (OUTPATIENT)
Dept: HEALTH INFORMATION MANAGEMENT | Facility: CLINIC | Age: 59
End: 2019-02-13

## 2019-02-22 ENCOUNTER — MYC MEDICAL ADVICE (OUTPATIENT)
Dept: FAMILY MEDICINE | Facility: CLINIC | Age: 59
End: 2019-02-22

## 2019-03-13 ENCOUNTER — TRANSFERRED RECORDS (OUTPATIENT)
Dept: HEALTH INFORMATION MANAGEMENT | Facility: CLINIC | Age: 59
End: 2019-03-13

## 2019-03-29 ENCOUNTER — TRANSFERRED RECORDS (OUTPATIENT)
Dept: HEALTH INFORMATION MANAGEMENT | Facility: CLINIC | Age: 59
End: 2019-03-29

## 2019-04-02 ENCOUNTER — TELEPHONE (OUTPATIENT)
Dept: GASTROENTEROLOGY | Facility: OUTPATIENT CENTER | Age: 59
End: 2019-04-02

## 2019-04-02 NOTE — TELEPHONE ENCOUNTER
Patient taking any blood thinners ? No     Heart disease ? Denies     Lung disease ? Denies       Sleep apnea ? Denies     Diabetic ? Denies     Kidney disease ? Denies    Electronic implanted medical devices ? Denies     Are you taking any narcotic pain medication ?   No What is your daily dosage ?    PTSD ? N/a     Prep instructions reviewed with patient ? Patient declined review.  policy, conscious sedation plan reviewed. Advised patient to have someone stay with her post exam    Pharmacy : N/a    Indication for procedure : Screening colonoscopy    Referring provider : Self     Arrival Time : 7 AM

## 2019-04-10 ENCOUNTER — OFFICE VISIT (OUTPATIENT)
Dept: FAMILY MEDICINE | Facility: CLINIC | Age: 59
End: 2019-04-10
Payer: COMMERCIAL

## 2019-04-10 VITALS
BODY MASS INDEX: 20.58 KG/M2 | TEMPERATURE: 98 F | OXYGEN SATURATION: 96 % | HEIGHT: 65 IN | SYSTOLIC BLOOD PRESSURE: 103 MMHG | RESPIRATION RATE: 12 BRPM | WEIGHT: 123.5 LBS | HEART RATE: 71 BPM | DIASTOLIC BLOOD PRESSURE: 62 MMHG

## 2019-04-10 DIAGNOSIS — Z01.818 PRE-OP EXAM: Primary | ICD-10-CM

## 2019-04-10 DIAGNOSIS — I10 HYPERTENSION GOAL BP (BLOOD PRESSURE) < 140/90: ICD-10-CM

## 2019-04-10 DIAGNOSIS — M25.531 RIGHT WRIST PAIN: ICD-10-CM

## 2019-04-10 LAB
ERYTHROCYTE [DISTWIDTH] IN BLOOD BY AUTOMATED COUNT: 13.4 % (ref 10–15)
HCT VFR BLD AUTO: 40.7 % (ref 35–47)
HGB BLD-MCNC: 13.4 G/DL (ref 11.7–15.7)
MCH RBC QN AUTO: 30.8 PG (ref 26.5–33)
MCHC RBC AUTO-ENTMCNC: 32.9 G/DL (ref 31.5–36.5)
MCV RBC AUTO: 94 FL (ref 78–100)
PLATELET # BLD AUTO: 289 10E9/L (ref 150–450)
RBC # BLD AUTO: 4.35 10E12/L (ref 3.8–5.2)
WBC # BLD AUTO: 4.9 10E9/L (ref 4–11)

## 2019-04-10 PROCEDURE — 93000 ELECTROCARDIOGRAM COMPLETE: CPT | Performed by: FAMILY MEDICINE

## 2019-04-10 PROCEDURE — 80048 BASIC METABOLIC PNL TOTAL CA: CPT | Performed by: FAMILY MEDICINE

## 2019-04-10 PROCEDURE — 99214 OFFICE O/P EST MOD 30 MIN: CPT | Performed by: FAMILY MEDICINE

## 2019-04-10 PROCEDURE — 36415 COLL VENOUS BLD VENIPUNCTURE: CPT | Performed by: FAMILY MEDICINE

## 2019-04-10 PROCEDURE — 85027 COMPLETE CBC AUTOMATED: CPT | Performed by: FAMILY MEDICINE

## 2019-04-10 ASSESSMENT — MIFFLIN-ST. JEOR: SCORE: 1131.07

## 2019-04-10 NOTE — PATIENT INSTRUCTIONS
Cleared for surgery.     No nsaids for 10 days prior to surgery.     Hold all medications on the morning of the procedure.       Before Your Surgery      Call your surgeon if there is any change in your health. This includes signs of a cold or flu (such as a sore throat, runny nose, cough, rash or fever).    Do not smoke, drink alcohol or take over the counter medicine (unless your surgeon or primary care doctor tells you to) for the 24 hours before and after surgery.    If you take prescribed drugs: Follow your doctor s orders about which medicines to take and which to stop until after surgery.    Eating and drinking prior to surgery: follow the instructions from your surgeon    Take a shower or bath the night before surgery. Use the soap your surgeon gave you to gently clean your skin. If you do not have soap from your surgeon, use your regular soap. Do not shave or scrub the surgery site.  Wear clean pajamas and have clean sheets on your bed.

## 2019-04-10 NOTE — PROGRESS NOTES
[facility]  [address] [place] [id number] [phone]  Dept: [phone]    PRE-OP EVALUATION:  Today's date: 4/10/2019    Clara Boykin (: 1960) presents for pre-operative evaluation assessment as requested by Dr. Castanon .  She requires evaluation and anesthesia risk assessment prior to undergoing surgery/procedure for treatment of right wrist tendon/ligament injury .    Fax number for surgical facility: *   Primary Physician: Wegener, Joel Daniel Irwin  Type of Anesthesia Anticipated: General    Patient has a Health Care Directive or Living Will:  NO    Preop Questions 4/10/2019   Who is doing your surgery? Abdifatah Castanon MD, [facility] [email or web address]   What are you having done? Right ECU tendon repair   Date of Surgery/Procedure: 19   Facility or Hospital where procedure/surgery will be performed: [facility] FAX: [phone]   1.  Do you have a history of Heart attack, stroke, stent, coronary bypass surgery, or other heart surgery? No   2.  Do you ever have any pain or discomfort in your chest? No   3.  Do you have a history of  Heart Failure? No   4.   Are you troubled by shortness of breath when:  walking on a level surface, or up a slight hill, or at night? No   5.  Do you currently have a cold, bronchitis or other respiratory infection? No   6.  Do you have a cough, shortness of breath, or wheezing? No   7.  Do you sometimes get pains in the calves of your legs when you walk? No   8. Do you or anyone in your family have previous history of blood clots? No   9.  Do you or does anyone in your family have a serious bleeding problem such as prolonged bleeding following surgeries or cuts? No   10. Have you ever had problems with anemia or been told to take iron pills? No   11. Have you had any abnormal blood loss such as black, tarry or bloody stools, or abnormal vaginal bleeding? No   12. Have you ever had a  blood transfusion? YES -    13. Have you or any of your relatives ever had problems with anesthesia? YES - post op nausea/emesis   14. Do you have sleep apnea, excessive snoring or daytime drowsiness? No   15. Do you have any prosthetic heart valves? No   16. Do you have prosthetic joints? No   17. Is there any chance that you may be pregnant? No         HPI:     HPI related to upcoming procedure: fall/right wrist fracture and subsequent non-healing ECU.  Limited mobility and swelling, decided to have repair.      Otherwise doing well.  Blood pressue controlled (morning surge hypertension) controlled.           MEDICAL HISTORY:     Patient Active Problem List    Diagnosis Date Noted     Bunion, left 12/07/2018     Priority: Medium     Pain in both wrists 11/28/2018     Priority: Medium     Hip pain, right 09/25/2018     Priority: Medium     Right foot pain 11/30/2016     Priority: Medium     Small vessel disease, cerebrovascular 09/30/2016     Priority: Medium     History of anesthesia reaction 06/28/2016     Priority: Medium     CARDIOVASCULAR SCREENING; LDL GOAL LESS THAN 160 09/04/2015     Priority: Medium     Insomnia 09/04/2015     Priority: Medium     Skin exam, screening for cancer 08/22/2013     Priority: Medium     Pain in joint, upper arm 08/05/2013     Priority: Medium     iamJOINT PAIN-PELVIS 09/22/2005     Priority: Medium      Past Medical History:   Diagnosis Date     Diplopia      H/O CT scan      H/O magnetic resonance imaging      Paralytic strabismus      Pneumonia      PONV (postoperative nausea and vomiting)      Past Surgical History:   Procedure Laterality Date     APPENDECTOMY       AS KNEE SCOPE, DIAGNOSTIC       BUNIONECTOMY Right 2016    times 2     BUNIONECTOMY Left 10/22/2018    Procedure: LEFT REVISION BUNION ;  Surgeon: Johanna Lund MD;  Location: [facility] [place]     HERNIORRHAPHY VENTRAL N/A 7/7/2016    Procedure: HERNIORRHAPHY VENTRAL;  Surgeon: Ash Thompson MD;  Location:   OR     left clavical       NECK SURGERY       RECESSION RESECTION (REPAIR STRABISMUS) Right 4/10/2017    Procedure: RECESSION RESECTION (REPAIR STRABISMUS);  Surgeon: Lyle Leggett MD;  [facility]     WRIST SURGERY       Current Outpatient Medications   Medication Sig Dispense Refill     CALCIUM PO Take 1,200 mg by mouth daily       diphenhydrAMINE (BENADRYL) 25 MG tablet Take 25 mg by mouth nightly as needed Reported on 3/31/2017       lisinopril (PRINIVIL/ZESTRIL) 5 MG tablet Take 1 tablet (5 mg) by mouth daily (Patient taking differently: Take 5 mg by mouth At Bedtime ) 90 tablet 3     multivitamin, therapeutic (THERA-VIT) TABS tablet Take 1 tablet by mouth daily       Omega-3 Fatty Acids (OMEGA 3 PO) Take 1 g by mouth daily Reported on 3/31/2017       traZODone (DESYREL) 50 MG tablet 1/2 pill nightly as needed. (Patient taking differently: Take 12.5-25 mg by mouth nightly as needed (1/4 to 1/2 tablet)) 45 tablet 3     VITAMIN D, CHOLECALCIFEROL, PO Take 1,000 Units by mouth daily       OTC products: no recent use of OTC ASA, NSAIDS or Steroids    Allergies   Allergen Reactions     Exparel [Bupivacaine] GI Disturbance     Patient had severe abdominal distention     Darvocet [Propoxyphene N-Apap] Other (See Comments)     confusion     Liposomes GI Disturbance     Oxycodone      Penicillins Itching     Percocet [Oxycodone-Acetaminophen] Other (See Comments)     confusion     Tape [Adhesive Tape] Rash     Once after surgical tape     Vistaril [Hydroxyzine] Rash     Intense flushing/redness of face       Latex Allergy: NO    Social History     Tobacco Use     Smoking status: Never Smoker     Smokeless tobacco: Never Used   Substance Use Topics     Alcohol use: Yes     Comment: rare     History   Drug Use No       REVIEW OF SYSTEMS:   CONSTITUTIONAL: NEGATIVE for fever, chills, change in weight  INTEGUMENTARY/SKIN: NEGATIVE for worrisome rashes, moles or lesions  EYES: NEGATIVE for vision changes or  irritation  ENT/MOUTH: NEGATIVE for ear, mouth and throat problems  RESP: NEGATIVE for significant cough or SOB  BREAST: NEGATIVE for masses, tenderness or discharge  CV: NEGATIVE for chest pain, palpitations or peripheral edema  GI: NEGATIVE for nausea, abdominal pain, heartburn, or change in bowel habits  : NEGATIVE for frequency, dysuria, or hematuria  MUSCULOSKELETAL: NEGATIVE for significant arthralgias or myalgia  NEURO: NEGATIVE for weakness, dizziness or paresthesias  ENDOCRINE: NEGATIVE for temperature intolerance, skin/hair changes  HEME: NEGATIVE for bleeding problems  PSYCHIATRIC: NEGATIVE for changes in mood or affect    EXAM:   LMP 09/06/2006   GENERAL APPEARANCE: healthy, alert and no distress  HENT: ear canals and TM's normal and nose and mouth without ulcers or lesions  RESP: lungs clear to auscultation - no rales, rhonchi or wheezes  CV: regular rate and rhythm, normal S1 S2, no S3 or S4 and no murmur, click or rub   ABDOMEN: soft, nontender, no HSM or masses and bowel sounds normal  NEURO: Normal strength and tone, sensory exam grossly normal, mentation intact and speech normal    DIAGNOSTICS:   EKG: appears normal, NSR, normal axis, normal intervals, no acute ST/T changes c/w ischemia, no LVH by voltage criteria, unchanged from previous tracings    Recent Labs   Lab Test 10/19/18  0735 10/09/18  0900 04/20/18  0845   HGB 13.8 14.3 14.7    285 256   NA  --  138 140   POTASSIUM  --  4.0 3.7   CR  --  0.73 0.64        IMPRESSION:   Reason for surgery/procedure: wrist pain and swelling  Diagnosis/reason for consult: pre op clearance    The proposed surgical procedure is considered LOW risk.    REVISED CARDIAC RISK INDEX  The patient has the following serious cardiovascular risks for perioperative complications such as (MI, PE, VFib and 3  AV Block):  No serious cardiac risks  INTERPRETATION: 0 risks: Class I (very low risk - 0.4% complication rate)    The patient has the following additional  risks for perioperative complications:  No identified additional risks      ICD-10-CM    1. Pre-op exam Z01.818 EKG 12-lead complete w/read - Clinics     CBC with platelets     Basic metabolic panel   2. Hypertension goal BP (blood pressure) < 140/90 I10 EKG 12-lead complete w/read - Clinics     CBC with platelets     Basic metabolic panel   3. Neutropenia, unspecified type (H) D70.9 EKG 12-lead complete w/read - Clinics     CBC with platelets     Basic metabolic panel   4. Preop general physical exam Z01.818        RECOMMENDATIONS:     ASSESSMENT AND PLAN  1. Pre-op exam  Cleared for surgery.     Note previous h/o nausea/emesis prolonged after anesthesia.     No nsaids for 10 days prior to surgery.     Hold all medications on the morning of the procedure including lisinopril.     Pre op labs today.   - EKG 12-lead complete w/read - Clinics; Future  - CBC with platelets; Future  - Basic metabolic panel; Future  - EKG 12-lead complete w/read - Clinics  - Basic metabolic panel  - CBC with platelets    2. Right wrist pain  Reason for procedure.     3. Hypertension goal BP (blood pressure) < 140/90  Controlled.   - EKG 12-lead complete w/read - Clinics; Future  - CBC with platelets; Future  - Basic metabolic panel; Future  - EKG 12-lead complete w/read - Clinics  - Basic metabolic panel  - CBC with platelets          Copy of this evaluation report is provided to requesting physician.    [facility] Preop Guidelines    Revised Cardiac Risk Index

## 2019-04-11 LAB
ANION GAP SERPL CALCULATED.3IONS-SCNC: 6 MMOL/L (ref 3–14)
BUN SERPL-MCNC: 26 MG/DL (ref 7–30)
CALCIUM SERPL-MCNC: 8.9 MG/DL (ref 8.5–10.1)
CHLORIDE SERPL-SCNC: 105 MMOL/L (ref 94–109)
CO2 SERPL-SCNC: 28 MMOL/L (ref 20–32)
CREAT SERPL-MCNC: 0.69 MG/DL (ref 0.52–1.04)
GFR SERPL CREATININE-BSD FRML MDRD: >90 ML/MIN/{1.73_M2}
GLUCOSE SERPL-MCNC: 96 MG/DL (ref 70–99)
POTASSIUM SERPL-SCNC: 4.3 MMOL/L (ref 3.4–5.3)
SODIUM SERPL-SCNC: 139 MMOL/L (ref 133–144)

## 2019-04-11 NOTE — RESULT ENCOUNTER NOTE
Ted Ms. Boykin,  Your results came back and are within acceptable limits. -Kidney function is normal (Cr, GFR), Sodium is normal, Potassium is normal, Calcium is normal, Glucose is normal. . If you have any further concerns please do not hesitate to contact us by message, phone or making an appointment.  Have a good day   Dr Brennen RANDOLPH

## 2019-04-12 ENCOUNTER — TELEPHONE (OUTPATIENT)
Dept: FAMILY MEDICINE | Facility: CLINIC | Age: 59
End: 2019-04-12

## 2019-04-15 NOTE — TELEPHONE ENCOUNTER
Faxed preop exam to [facility] orthopedics Dr Abdifatah Castanon  Fax # [phone]

## 2019-04-17 ENCOUNTER — TRANSFERRED RECORDS (OUTPATIENT)
Dept: HEALTH INFORMATION MANAGEMENT | Facility: CLINIC | Age: 59
End: 2019-04-17

## 2019-04-18 ENCOUNTER — MYC MEDICAL ADVICE (OUTPATIENT)
Dept: FAMILY MEDICINE | Facility: CLINIC | Age: 59
End: 2019-04-18

## 2019-04-19 ENCOUNTER — DOCUMENTATION ONLY (OUTPATIENT)
Dept: GASTROENTEROLOGY | Facility: OUTPATIENT CENTER | Age: 59
End: 2019-04-19
Payer: COMMERCIAL

## 2019-04-19 ENCOUNTER — TRANSFERRED RECORDS (OUTPATIENT)
Dept: HEALTH INFORMATION MANAGEMENT | Facility: CLINIC | Age: 59
End: 2019-04-19

## 2019-04-22 LAB — COPATH REPORT: NORMAL

## 2019-04-23 ENCOUNTER — MYC MEDICAL ADVICE (OUTPATIENT)
Dept: FAMILY MEDICINE | Facility: CLINIC | Age: 59
End: 2019-04-23

## 2019-04-26 ENCOUNTER — MYC MEDICAL ADVICE (OUTPATIENT)
Dept: FAMILY MEDICINE | Facility: CLINIC | Age: 59
End: 2019-04-26

## 2019-04-26 DIAGNOSIS — M25.561 ACUTE PAIN OF RIGHT KNEE: Primary | ICD-10-CM

## 2019-05-07 ENCOUNTER — THERAPY VISIT (OUTPATIENT)
Dept: PHYSICAL THERAPY | Facility: CLINIC | Age: 59
End: 2019-05-07
Payer: COMMERCIAL

## 2019-05-07 DIAGNOSIS — M25.561 ACUTE PAIN OF RIGHT KNEE: ICD-10-CM

## 2019-05-07 PROCEDURE — 97530 THERAPEUTIC ACTIVITIES: CPT | Mod: GP | Performed by: PHYSICAL THERAPIST

## 2019-05-07 PROCEDURE — 97110 THERAPEUTIC EXERCISES: CPT | Mod: GP | Performed by: PHYSICAL THERAPIST

## 2019-05-07 PROCEDURE — 97161 PT EVAL LOW COMPLEX 20 MIN: CPT | Mod: GP | Performed by: PHYSICAL THERAPIST

## 2019-05-07 ASSESSMENT — ACTIVITIES OF DAILY LIVING (ADL)
SWELLING: I HAVE THE SYMPTOM BUT IT DOES NOT AFFECT MY ACTIVITY
LIMPING: I DO NOT HAVE THE SYMPTOM
WALK: ACTIVITY IS NOT DIFFICULT
RISE FROM A CHAIR: ACTIVITY IS NOT DIFFICULT
KNEE_ACTIVITY_OF_DAILY_LIVING_SUM: 63
SIT WITH YOUR KNEE BENT: ACTIVITY IS NOT DIFFICULT
AS_A_RESULT_OF_YOUR_KNEE_INJURY,_HOW_WOULD_YOU_RATE_YOUR_CURRENT_LEVEL_OF_DAILY_ACTIVITY?: NEARLY NORMAL
PAIN: THE SYMPTOM AFFECTS MY ACTIVITY SLIGHTLY
STIFFNESS: I HAVE THE SYMPTOM BUT IT DOES NOT AFFECT MY ACTIVITY
GO UP STAIRS: ACTIVITY IS NOT DIFFICULT
STAND: ACTIVITY IS NOT DIFFICULT
HOW_WOULD_YOU_RATE_THE_OVERALL_FUNCTION_OF_YOUR_KNEE_DURING_YOUR_USUAL_DAILY_ACTIVITIES?: NEARLY NORMAL
GIVING WAY, BUCKLING OR SHIFTING OF KNEE: I DO NOT HAVE THE SYMPTOM
KNEEL ON THE FRONT OF YOUR KNEE: ACTIVITY IS MINIMALLY DIFFICULT
RAW_SCORE: 63
GO DOWN STAIRS: ACTIVITY IS NOT DIFFICULT
SQUAT: ACTIVITY IS SOMEWHAT DIFFICULT
HOW_WOULD_YOU_RATE_THE_CURRENT_FUNCTION_OF_YOUR_KNEE_DURING_YOUR_USUAL_DAILY_ACTIVITIES_ON_A_SCALE_FROM_0_TO_100_WITH_100_BEING_YOUR_LEVEL_OF_KNEE_FUNCTION_PRIOR_TO_YOUR_INJURY_AND_0_BEING_THE_INABILITY_TO_PERFORM_ANY_OF_YOUR_USUAL_DAILY_ACTIVITIES?: 90
KNEE_ACTIVITY_OF_DAILY_LIVING_SCORE: 90
WEAKNESS: I DO NOT HAVE THE SYMPTOM

## 2019-05-07 NOTE — PROGRESS NOTES
[facility] Initial Evaluation  Subjective:  Pt presents to PT with c/o R knee pain with onset end Jan 2019.   She notices the pain now when running.  She reports she had to stop running.  She tried massage therapy and also seated hip machines without some relief.      Pt works in research.      PMH: See epic      Clara Boykin is a 59 year old female with a right knee condition.      This is a new condition     Patient reports pain:  Anterior and posterior.    Pain is described as burning and is intermittent   Associated symptoms:  Loss of strength, loss of motion/stiffness and buckling/giving out. Pain is the same all the time.  Exacerbated by: squatting, walking. and relieved by rest.  Since onset symptoms are gradually improving.  Special testing: none.  Previous treatment: none.  Improvement with previous treatment: n/a.  General health as reported by patient is good.                                              Objective:  System                                           Hip Evaluation    Hip Strength:      Extension:  Left: 4/5  Pain:Right: 4-/5    Pain:    Abduction:  Left: 4-/5     Pain:Right: 3+/5    Pain:        Knee Flexion:  Left: 4/5   Pain:Right: 4+/5   Pain:  Knee Extension:  Left: 5-/5   Pain:Right: 5-/5    Pain:                 Knee Evaluation:  ROM:  AROM: normal  PROM: normal                  Palpation:  Palpation of knee: Mild ttp R ant medial joint line.      Edema:  Normal    Mobility Testing:  Normal            Functional Testing:  : Squat mild weight shift off RLE.  SLS RLE more unsteadiness.  SL mild unsteadines B.                      General Evaluation:                              Gait:  Gait wnl general: WFL.                                         ROS    Assessment/Plan:    Patient is a 59 year old female with right side knee complaints.    Patient has the following significant findings with corresponding treatment plan.                Diagnosis 1:  R knee pain   Pain -  hot/cold therapy  Decreased ROM/flexibility - manual therapy and therapeutic exercise  Decreased joint mobility - manual therapy and therapeutic exercise  Decreased strength - therapeutic exercise and therapeutic activities  Impaired muscle performance - neuro re-education  Decreased function - therapeutic activities  Impaired posture - neuro re-education    Therapy Evaluation Codes:   1) History comprised of:   Personal factors that impact the plan of care:      None.    Comorbidity factors that impact the plan of care are:      None.     Medications impacting care: None.  2) Examination of Body Systems comprised of:   Body structures and functions that impact the plan of care:      Knee.   Activity limitations that impact the plan of care are:      Squatting/kneeling and Walking.  3) Clinical presentation characteristics are:   Stable/Uncomplicated.  4) Decision-Making    Low complexity using standardized patient assessment instrument and/or measureable assessment of functional outcome.  Cumulative Therapy Evaluation is: Low complexity.    Previous and current functional limitations:  (See Goal Flow Sheet for this information)    Short term and Long term goals: (See Goal Flow Sheet for this information)     Communication ability:  Patient appears to be able to clearly communicate and understand verbal and written communication and follow directions correctly.  Treatment Explanation - The following has been discussed with the patient:   RX ordered/plan of care  Anticipated outcomes  Possible risks and side effects  This patient would benefit from PT intervention to resume normal activities.   Rehab potential is good.    Frequency:  1 X week, once daily  Duration:  for 6 weeks  Discharge Plan:  Achieve all LTG.  Independent in home treatment program.  Return to previous functional level by discharge.  Reach maximal therapeutic benefit.    Please refer to the daily flowsheet for treatment today, total  treatment time and time spent performing 1:1 timed codes.

## 2019-05-21 ENCOUNTER — THERAPY VISIT (OUTPATIENT)
Dept: PHYSICAL THERAPY | Facility: CLINIC | Age: 59
End: 2019-05-21
Payer: COMMERCIAL

## 2019-05-21 DIAGNOSIS — M25.561 ACUTE PAIN OF RIGHT KNEE: Primary | ICD-10-CM

## 2019-05-21 PROCEDURE — 97530 THERAPEUTIC ACTIVITIES: CPT | Mod: GP | Performed by: PHYSICAL THERAPIST

## 2019-05-21 PROCEDURE — 97110 THERAPEUTIC EXERCISES: CPT | Mod: GP | Performed by: PHYSICAL THERAPIST

## 2019-05-28 ENCOUNTER — THERAPY VISIT (OUTPATIENT)
Dept: PHYSICAL THERAPY | Facility: CLINIC | Age: 59
End: 2019-05-28
Payer: COMMERCIAL

## 2019-05-28 DIAGNOSIS — M25.561 ACUTE PAIN OF RIGHT KNEE: Primary | ICD-10-CM

## 2019-05-28 PROCEDURE — 97530 THERAPEUTIC ACTIVITIES: CPT | Mod: GP | Performed by: PHYSICAL THERAPIST

## 2019-05-28 PROCEDURE — 97110 THERAPEUTIC EXERCISES: CPT | Mod: GP | Performed by: PHYSICAL THERAPIST

## 2019-05-31 ENCOUNTER — TRANSFERRED RECORDS (OUTPATIENT)
Dept: HEALTH INFORMATION MANAGEMENT | Facility: CLINIC | Age: 59
End: 2019-05-31

## 2019-05-31 PROBLEM — M25.531 PAIN IN BOTH WRISTS: Status: RESOLVED | Noted: 2018-11-28 | Resolved: 2019-05-31

## 2019-05-31 PROBLEM — M25.532 PAIN IN BOTH WRISTS: Status: RESOLVED | Noted: 2018-11-28 | Resolved: 2019-05-31

## 2019-07-23 NOTE — PROGRESS NOTES
Subjective     Clara Boykin is a 59 year old female who presents to clinic today for the following health issues:    HPI   Back Pain     Duration: x 7 weeks        Specific cause: lower    Description:   Location of pain: low back left  Character of pain: sharp  Pain radiation:radiates into the left leg  New numbness or weakness in legs, not attributed to pain:  YES left leg     Intensity: Currently 3/10, moderate    History:   Pain interferes with job: YES, No  History of back problems: recurrent self limited episodes of low back pain in the past  Any previous MRI or X-rays: Yes- at [facility].  Date 01/2004  Sees a specialist for back pain:  No  Therapies tried without relief: alive     Alleviating factors:   Improved by: cold , massage     Precipitating factors:  Worsened by: Lying Flat      Acute midline low back pain with left-sided sciatica : Has had 2 weeks of fairly sudden onset severe low back pain with radicular symptoms into her left buttocks and lateral calf.  As far she knows she has not had nickolas weakness.  This is in the setting of known osteoporosis.  She has been familiar with this since she has had intermittent back problems for many years.  She sent me MRI report from 2004 prior to this visit.  She is concerned that she has a herniated disc.  Her pain symptoms have fairly improved for many days she really could not get out of bed or sit.  At this point she can do those things and can stand from sitting.  She still has worse pain in the morning when she gets up.  Current pain is in her lower lumbar spine and across her iliac crest bilaterally but more on the left side.  She did not have any trauma associated with this no specific bending or twisting.  She is very fit she already has a regimen of back rehab and stretches that she does fairly fastidiously.        Problem list, Medication list, Allergies, and Medical/Social/Surgical histories reviewed in [facility] and updated as appropriate.  Labs reviewed in  EPIC  BP Readings from Last 3 Encounters:   07/24/19 104/70   04/10/19 103/62   12/03/18 113/68    Wt Readings from Last 3 Encounters:   07/24/19 55.3 kg (122 lb)   04/10/19 56 kg (123 lb 8 oz)   12/03/18 55.3 kg (122 lb)                  Patient Active Problem List   Diagnosis     iamJOINT PAIN-PELVIS     Pain in joint, upper arm     Skin exam, screening for cancer     CARDIOVASCULAR SCREENING; LDL GOAL LESS THAN 160     Insomnia     History of anesthesia reaction     Small vessel disease, cerebrovascular     Right foot pain     Hip pain, right     Bunion, left     Past Surgical History:   Procedure Laterality Date     APPENDECTOMY       AS KNEE SCOPE, DIAGNOSTIC       BUNIONECTOMY Right 2016    times 2     BUNIONECTOMY Left 10/22/2018    Procedure: LEFT REVISION BUNION ;  Surgeon: Johanna Lund MD;  [facility]     HERNIORRHAPHY VENTRAL N/A 7/7/2016    Procedure: HERNIORRHAPHY VENTRAL;  Surgeon: Ash Thompson MD;  [facility]     left clavical       NECK SURGERY       RECESSION RESECTION (REPAIR STRABISMUS) Right 4/10/2017    Procedure: RECESSION RESECTION (REPAIR STRABISMUS);  Surgeon: Lyle Leggett MD;  [facility]     WRIST SURGERY         Social History     Tobacco Use     Smoking status: Never Smoker     Smokeless tobacco: Never Used   Substance Use Topics     Alcohol use: Yes     Comment: rare     Family History   Problem Relation Age of Onset     Cancer Mother         skin cancer     Diabetes Mother         borderline/Type 2     Hypertension Mother         3 meds: 4.5cm asc aortic aneurysm     Hyperlipidemia Mother         hi chol & triglycerides     Cerebrovascular Disease Mother         many small, cog. impaired     Osteoporosis Mother         fosamax x 5 yrs     Obesity Mother         BMI 35 or so     Unknown/Adopted Mother         dermatomyositis since 1974     Cancer Father         skin cancer     Coronary Artery Disease Father         angioplasty ~1980      Hypertension Father         1 med, mild. Age 94     Depression Father         2x: age 87 [facility]     Cancer Maternal Grandmother         skin cancer     Cerebrovascular Disease Maternal Grandmother          from multiple CVA     Obesity Maternal Grandmother         overweight     Diabetes Maternal Grandfather         borderline/Type 2     Coronary Artery Disease Maternal Grandfather         2-3 MIs; worked after each     Cerebrovascular Disease Maternal Grandfather          from CVA postop hernia     Obesity Maternal Grandfather         was overwt to obese     Diabetes Cousin         prediabetes     Hypertension Brother         1 med for 20 yrs     Substance Abuse Brother         hx of EtOH     Cerebrovascular Disease Other         cog. impairment like my mom     Mental Illness Sister         ?bipolar; past chem dep     Substance Abuse Sister         hx of: prescript drug& EtOH     Unknown/Adopted Sister         sister is adopted     Anesthesia Reaction Other         naus/vomit 1-8 hr postop unless tx'd     Anesthesia Reaction No family hx of          Current Outpatient Medications   Medication Sig Dispense Refill     CALCIUM PO Take 1,200 mg by mouth daily       diphenhydrAMINE (BENADRYL) 25 MG tablet Take 25 mg by mouth nightly as needed Reported on 3/31/2017       lisinopril (PRINIVIL/ZESTRIL) 5 MG tablet Take 1 tablet (5 mg) by mouth daily (Patient taking differently: Take 2.5 mg by mouth At Bedtime ) 90 tablet 3     multivitamin, therapeutic (THERA-VIT) TABS tablet Take 1 tablet by mouth daily       Omega-3 Fatty Acids (OMEGA 3 PO) Take 1 g by mouth daily Reported on 3/31/2017       traZODone (DESYREL) 50 MG tablet 1/2 pill nightly as needed. (Patient taking differently: Take 12.5-25 mg by mouth nightly as needed (1/4 to 1/2 tablet)) 45 tablet 3     VITAMIN D, CHOLECALCIFEROL, PO Take 1,000 Units by mouth daily       Allergies   Allergen Reactions     Exparel [Bupivacaine] GI Disturbance      "Patient had severe abdominal distention     Darvocet [Propoxyphene N-Apap] Other (See Comments)     confusion     Liposomes GI Disturbance     Oxycodone      Penicillins Itching     Percocet [Oxycodone-Acetaminophen] Other (See Comments)     confusion     Tape [Adhesive Tape] Rash     Once after surgical tape     Vistaril [Hydroxyzine] Rash     Intense flushing/redness of face      Recent Labs   Lab Test 04/10/19  1545 10/09/18  0900 04/20/18  0845 10/10/16  0828  09/04/15  0841   LDL  --   --  118* 119*  --  129   HDL  --   --  73 67  --  72   TRIG  --   --  73 66  --  62   ALT  --   --  38  --   --   --    CR 0.69 0.73 0.64  --    < >  --    GFRESTIMATED >90 81 >90  --    < >  --    GFRESTBLACK >90 >90 >90  --    < >  --    POTASSIUM 4.3 4.0 3.7  --    < >  --    TSH  --   --  1.44  --   --  1.22    < > = values in this interval not displayed.        ROS:  Constitutional, HEENT, cardiovascular, pulmonary, GI, , musculoskeletal, neuro, skin, endocrine and psych systems are negative, except as otherwise noted.        OBJECTIVE:  /70 (BP Location: Left arm, Patient Position: Sitting, Cuff Size: Adult Regular)   Pulse 72   Temp 98  F (36.7  C) (Oral)   Resp 12   Ht 1.651 m (5' 5\")   Wt 55.3 kg (122 lb)   LMP 09/06/2006   SpO2 97%   BMI 20.30 kg/m      EXAM:  GENERAL APPEARANCE: healthy, alert and no distress  Posture:  Appears comfortable with sitting and standing up.  Palpation:  Pain with palpation of lumbar spine in area of L3-5, no sciatic notch pain with palpation. No greater trochanteric pain with palpation. Moderate pain with palpation of bilateral paralumbar muscles.    Strength: (out of five)   Right side  Hip flexion 5  Hip extension 5  Hip abduction 5  Hip adduction 5  Knee flexion 5  Knee extension 5  Ankle dorsiflexion 5  Ankle plantarflexion  5    Left side  Hip flexion 5  Hip extension 5  Hip abduction 5  Hip adduction 5  Knee flexion 5  Knee extension 5  Ankle dorsiflexion 5  Ankle " plantarflexion 5    Able to stand on toes and heels    DTRs 2+ at BR, Patella and achilles, no clonus bilaterally.     Straight leg raise normal on right.  Straight leg raise normal on left.       ASSESSMENT AND PLAN  Patient Instructions   Mri lumbar spine and sacrum at [facility]  to eval for fracture/disc herniation and then we can decide on further plans such as injection based on results.  Continue icing , the stretching and rehab that you are doing.     Follow up as needed .    Plans to follow-up with me August 12 to further discuss osteoporosis.    ASSESSMENT AND PLAN  1. Acute midline low back pain with left-sided sciatica              Joel Wegener, MD

## 2019-07-24 ENCOUNTER — OFFICE VISIT (OUTPATIENT)
Dept: FAMILY MEDICINE | Facility: CLINIC | Age: 59
End: 2019-07-24
Payer: COMMERCIAL

## 2019-07-24 VITALS
HEIGHT: 65 IN | TEMPERATURE: 98 F | WEIGHT: 122 LBS | RESPIRATION RATE: 12 BRPM | BODY MASS INDEX: 20.33 KG/M2 | SYSTOLIC BLOOD PRESSURE: 104 MMHG | OXYGEN SATURATION: 97 % | HEART RATE: 72 BPM | DIASTOLIC BLOOD PRESSURE: 70 MMHG

## 2019-07-24 DIAGNOSIS — M54.42 ACUTE MIDLINE LOW BACK PAIN WITH LEFT-SIDED SCIATICA: Primary | ICD-10-CM

## 2019-07-24 PROCEDURE — 99214 OFFICE O/P EST MOD 30 MIN: CPT | Performed by: FAMILY MEDICINE

## 2019-07-24 ASSESSMENT — MIFFLIN-ST. JEOR: SCORE: 1119.27

## 2019-07-24 NOTE — PATIENT INSTRUCTIONS
Mri lumbar spine and sacrum at [facility]  to eval for fracture/disc herniation.,    Continue icing , the stretching and rehab that you are doing.     Follow up as needed .

## 2019-07-25 ENCOUNTER — TRANSFERRED RECORDS (OUTPATIENT)
Dept: HEALTH INFORMATION MANAGEMENT | Facility: CLINIC | Age: 59
End: 2019-07-25

## 2019-07-29 ENCOUNTER — TRANSFERRED RECORDS (OUTPATIENT)
Dept: HEALTH INFORMATION MANAGEMENT | Facility: CLINIC | Age: 59
End: 2019-07-29

## 2019-08-01 ENCOUNTER — MYC MEDICAL ADVICE (OUTPATIENT)
Dept: FAMILY MEDICINE | Facility: CLINIC | Age: 59
End: 2019-08-01

## 2019-08-01 DIAGNOSIS — M54.42 ACUTE BILATERAL LOW BACK PAIN WITH LEFT-SIDED SCIATICA: Primary | ICD-10-CM

## 2019-08-01 NOTE — TELEPHONE ENCOUNTER
Dr Wegener - Please review and comment on results to patient.  What is next step?  Brandy Marcos RN

## 2019-08-02 ENCOUNTER — MYC MEDICAL ADVICE (OUTPATIENT)
Dept: FAMILY MEDICINE | Facility: CLINIC | Age: 59
End: 2019-08-02

## 2019-08-02 NOTE — TELEPHONE ENCOUNTER
"D. Wegener,   I do not see order--writer unsure how to pend back injection.     Please place order, with Patient request: \" I d like Hector Costa MD to do it if possible.\"    Thanks! Keshia Grant RN    "

## 2019-08-02 NOTE — TELEPHONE ENCOUNTER
Dr. Wegener,     Spoke with [facility] team.  Dr. Costa does give Epidural ingestions.   You can place order  and I will fax to Dr. Costa at [facility] to #[phone]      Thanks! Keshia Grant RN

## 2019-08-02 NOTE — TELEPHONE ENCOUNTER
See my response.  Please print a copy of the MRI reports and leave at  for her to .    Joel Wegener,MD

## 2019-08-02 NOTE — TELEPHONE ENCOUNTER
Faxed referral to Dr. Costa at [facility] [phone].    Momo Alvarez, CMA

## 2019-08-06 NOTE — TELEPHONE ENCOUNTER
Cancelled patient appt with Dr Wegener on the 12th of August just like she mentioned in my chart message

## 2019-08-08 ENCOUNTER — TRANSFERRED RECORDS (OUTPATIENT)
Dept: HEALTH INFORMATION MANAGEMENT | Facility: CLINIC | Age: 59
End: 2019-08-08

## 2019-10-02 ENCOUNTER — HEALTH MAINTENANCE LETTER (OUTPATIENT)
Age: 59
End: 2019-10-02

## 2019-10-14 NOTE — PROGRESS NOTES
Discharge Note    Progress reporting period is from initial evaluation date Dec 7, 2018 to Jan 4, 2019.      Clara failed to follow up and current status is unknown.  Please see information below for last relevant information on current status.  Patient seen for 2 visits.    SUBJECTIVE  Subjective changes noted by patient:  Pt feels flexion stiffness but not extension in the toe.  She still feels pain in the L hip.  .  Current pain level is  .     Previous pain level was   .   Changes in function:  Yes (See Goal flowsheet attached for changes in current functional level)  Adverse reaction to treatment or activity: None    OBJECTIVE  Changes noted in objective findings: Amb WFL.       ASSESSMENT/PLAN  Diagnosis: S/p L bunion   Updated problem list and treatment plan:   Pain - HEP  Decreased ROM/flexibility - HEP  Decreased function - HEP  Decreased strength - HEP  Impaired muscle performance - HEP  Impaired gait - HEP  Impaired balance - HEP  Impaired posture - HEP  Decreased joint mobility - HEP  STG/LTGs have been met or progress has been made towards goals:  Yes, please see goal flowsheet for most current information  Assessment of Progress: current status is unknown.    Last current status:     Self Management Plans:  HEP  I have re-evaluated this patient and find that the nature, scope, duration and intensity of the therapy is appropriate for the medical condition of the patient.  Clara continues to require the following intervention to meet STG and LTG's:  HEP.    Recommendations:  Discharge with current home program.  Patient to follow up with MD as needed.    Please refer to the daily flowsheet for treatment today, total treatment time and time spent performing 1:1 timed codes.

## 2019-10-15 PROBLEM — M21.612 BUNION, LEFT: Status: RESOLVED | Noted: 2018-12-07 | Resolved: 2019-10-15

## 2019-10-30 ENCOUNTER — HEALTH MAINTENANCE LETTER (OUTPATIENT)
Age: 59
End: 2019-10-30

## 2019-12-03 ENCOUNTER — OFFICE VISIT (OUTPATIENT)
Dept: FAMILY MEDICINE | Facility: CLINIC | Age: 59
End: 2019-12-03
Payer: COMMERCIAL

## 2019-12-03 VITALS
DIASTOLIC BLOOD PRESSURE: 70 MMHG | SYSTOLIC BLOOD PRESSURE: 108 MMHG | BODY MASS INDEX: 20.41 KG/M2 | TEMPERATURE: 98.1 F | HEIGHT: 65 IN | OXYGEN SATURATION: 97 % | RESPIRATION RATE: 14 BRPM | WEIGHT: 122.5 LBS | HEART RATE: 64 BPM

## 2019-12-03 DIAGNOSIS — M85.851 OSTEOPENIA OF BOTH HIPS: Primary | ICD-10-CM

## 2019-12-03 DIAGNOSIS — M85.852 OSTEOPENIA OF BOTH HIPS: Primary | ICD-10-CM

## 2019-12-03 DIAGNOSIS — R10.13 EPIGASTRIC PAIN: ICD-10-CM

## 2019-12-03 PROCEDURE — 99214 OFFICE O/P EST MOD 30 MIN: CPT | Performed by: FAMILY MEDICINE

## 2019-12-03 ASSESSMENT — MIFFLIN-ST. JEOR: SCORE: 1121.54

## 2019-12-03 NOTE — PROGRESS NOTES
"Subjective     Clara Boykin is a 59 year old female who presents to clinic today for the following health issues:    HPI     Patient is here for follow up osteopenia and abdominal epigastric pain, concerned about abdonimal mesh.          Osteopenia of both hips:   Epigastric pain :perhaps more often after meals.  Concerned about mesh previously placed for periumbilical abdominal wall hernia although has not felt a bulge or pain at that site exactly.  Does feel like a \"pulling.\"  Sometimes worse after meals.   Still has gallbladder.     No nickolas heartburn.         Problem list, Medication list, Allergies, and Medical/Social/Surgical histories reviewed in [facility] and updated as appropriate.  Labs reviewed in EPIC  BP Readings from Last 3 Encounters:   12/03/19 108/70   07/24/19 104/70   04/10/19 103/62    Wt Readings from Last 3 Encounters:   12/03/19 55.6 kg (122 lb 8 oz)   07/24/19 55.3 kg (122 lb)   04/10/19 56 kg (123 lb 8 oz)                  Patient Active Problem List   Diagnosis     iamJOINT PAIN-PELVIS     Pain in joint, upper arm     Skin exam, screening for cancer     CARDIOVASCULAR SCREENING; LDL GOAL LESS THAN 160     Insomnia     History of anesthesia reaction     Small vessel disease, cerebrovascular     Right foot pain     Hip pain, right     Past Surgical History:   Procedure Laterality Date     APPENDECTOMY       AS KNEE SCOPE, DIAGNOSTIC       BUNIONECTOMY Right 2016    times 2     BUNIONECTOMY Left 10/22/2018    Procedure: LEFT REVISION BUNION ;  Surgeon: Johanna Lund MD;  Location: [facility]     HERNIORRHAPHY VENTRAL N/A 7/7/2016    Procedure: HERNIORRHAPHY VENTRAL;  Surgeon: Ash Thompson MD;  [facility]     left clavical       NECK SURGERY       RECESSION RESECTION (REPAIR STRABISMUS) Right 4/10/2017    Procedure: RECESSION RESECTION (REPAIR STRABISMUS);  Surgeon: Lyle Leggett MD;  Location:  OR     WRIST SURGERY         Social History     Tobacco Use     " Smoking status: Never Smoker     Smokeless tobacco: Never Used   Substance Use Topics     Alcohol use: Yes     Comment: rare     Family History   Problem Relation Age of Onset     Cancer Mother         skin cancer     Diabetes Mother         borderline/Type 2     Hypertension Mother         3 meds: 4.5cm asc aortic aneurysm     Hyperlipidemia Mother         hi chol & triglycerides     Cerebrovascular Disease Mother         many small, cog. impaired     Osteoporosis Mother         fosamax x 5 yrs     Obesity Mother         BMI 35 or so     Unknown/Adopted Mother         dermatomyositis since      Cancer Father         skin cancer     Coronary Artery Disease Father         angioplasty ~     Hypertension Father         1 med, mild. Age 94     Depression Father         2x: age 87 [facility]     Cancer Maternal Grandmother         skin cancer     Cerebrovascular Disease Maternal Grandmother          from multiple CVA     Obesity Maternal Grandmother         overweight     Diabetes Maternal Grandfather         borderline/Type 2     Coronary Artery Disease Maternal Grandfather         2-3 MIs; worked after each     Cerebrovascular Disease Maternal Grandfather          from CVA postop hernia     Obesity Maternal Grandfather         was overwt to obese     Diabetes Cousin         prediabetes     Hypertension Brother         1 med for 20 yrs     Substance Abuse Brother         hx of EtOH     Cerebrovascular Disease Other         cog. impairment like my mom     Mental Illness Sister         ?bipolar; past chem dep     Substance Abuse Sister         hx of: prescript drug& EtOH     Unknown/Adopted Sister         sister is adopted     Anesthesia Reaction Other         naus/vomit 1-8 hr postop unless tx'd     Anesthesia Reaction No family hx of          Current Outpatient Medications   Medication Sig Dispense Refill     CALCIUM PO Take 1,200 mg by mouth daily       diphenhydrAMINE (BENADRYL) 25 MG tablet Take 25  "mg by mouth nightly as needed Reported on 3/31/2017       multivitamin, therapeutic (THERA-VIT) TABS tablet Take 1 tablet by mouth daily       Omega-3 Fatty Acids (OMEGA 3 PO) Take 1 g by mouth daily Reported on 3/31/2017       traZODone (DESYREL) 50 MG tablet 1/2 pill nightly as needed. (Patient taking differently: Take 12.5-25 mg by mouth nightly as needed (1/4 to 1/2 tablet)) 45 tablet 3     VITAMIN D, CHOLECALCIFEROL, PO Take 1,000 Units by mouth daily       lisinopril (PRINIVIL/ZESTRIL) 5 MG tablet Take 1 tablet (5 mg) by mouth daily (Patient taking differently: Take 2.5 mg by mouth At Bedtime ) 90 tablet 3     Allergies   Allergen Reactions     Exparel [Bupivacaine] GI Disturbance     Patient had severe abdominal distention     Darvocet [Propoxyphene N-Apap] Other (See Comments)     confusion     Liposomes GI Disturbance     Oxycodone      Penicillins Itching     Percocet [Oxycodone-Acetaminophen] Other (See Comments)     confusion     Tape [Adhesive Tape] Rash     Once after surgical tape     Vistaril [Hydroxyzine] Rash     Intense flushing/redness of face      Recent Labs   Lab Test 04/10/19  1545 10/09/18  0900 04/20/18  0845 10/10/16  0828  09/04/15  0841   LDL  --   --  118* 119*  --  129   HDL  --   --  73 67  --  72   TRIG  --   --  73 66  --  62   ALT  --   --  38  --   --   --    CR 0.69 0.73 0.64  --    < >  --    GFRESTIMATED >90 81 >90  --    < >  --    GFRESTBLACK >90 >90 >90  --    < >  --    POTASSIUM 4.3 4.0 3.7  --    < >  --    TSH  --   --  1.44  --   --  1.22    < > = values in this interval not displayed.        ROS:  Constitutional, HEENT, cardiovascular, pulmonary, GI, , musculoskeletal, neuro, skin, endocrine and psych systems are negative, except as otherwise noted.        OBJECTIVE:  /70 (BP Location: Left arm, Patient Position: Sitting, Cuff Size: Adult Regular)   Pulse 64   Temp 98.1  F (36.7  C) (Oral)   Resp 14   Ht 1.651 m (5' 5\")   Wt 55.6 kg (122 lb 8 oz)   LMP " 09/06/2006   SpO2 97%   BMI 20.39 kg/m      EXAM:  GENERAL APPEARANCE: healthy, alert and no distress  RESP: lungs clear to auscultation - no rales, rhonchi or wheezes  CV: regular rates and rhythm, normal S1 S2, no S3 or S4 and no murmur, click or rub -  ABDOMEN:  No palpable abdominal wall hernia with valsalva/sit up.  Mild to moderate epigastric pain with palpation (not using any signifiant nsaids noted), no pain with palpation of liver/gallbladder even with inspiration.         Just had colonoscopy April 2019.     ASSESSMENT AND PLAN  Patient Instructions   Will do gallbladder and abodminal ultrasound to evaluate gallbladder and mesh at hernia site .    Reviewed FRAX score 14-27% depending if we count previous fractures as fragility fractures.     RADIOLOGY:  [facility]:  [phone]   [facility]: [phone]      ASSESSMENT AND PLAN  1. Osteopenia of both hips  Maternal history of hip fracture (fracture around hardware after hip replacement) in her 90s.  She has hot been a heavy drinker, no nicotine, no chronic steroids.     It is not clear if her previous fractures would clearly be considered fragility fractures.  The most likely was hand fracture after slip on ice although remembers landing very hard.  Certainly has not had any spontaneous fractures without trauma.     In any event we reviewed her frax score with several scenarios.  If we do not count her fractures or family history then she is clearly below the WHO cut off recommendations to consider medications ( bisphoshphonate or other).  If we do consider those her overall fracture risk is as high as 25% which is above current treatment guidelines.     We reviewed calcium/vitamin D in diet/supplements which is adequate and continues to get weight bearing exercise.     I offered endocrinology specialty referral although declined at this time.     For now elects to not start treatment which I feel is reasonable.  Recommend repeat dexa  two years.       2. Epigastric pain  Very reassuring had recent colonoscopy.     Exam overal benign today.     Will do ultrasound to evaluate for gallstones and also to evaluate for recurrent hernia as a start.     I recommended a trial of acid blocker (cimetidine or over the counter PPI) for one month to see if this improves as well although low risk by lifestyle for ulcer/gerd.    - US Abdomen Complete; Future    Had flu shot through work.     I spent greater than 1/2 of a 40 minute face to face encounter counseling regarding the rational for the assessment and plan noted above.           Joel Wegener, MD

## 2019-12-03 NOTE — PATIENT INSTRUCTIONS
Will do gallbladder and abodminal ultrasound to evaluate gallbladder and mesh at hernia site .    Reviewed FRAX score 14-27% depending if we count previous fractures as fragility fractures.     RADIOLOGY:  [facility]:  [phone]   [facility]: [phone]

## 2019-12-03 NOTE — PROGRESS NOTES
"Subjective     Clara Boykin is a 59 year old female who presents to clinic today for the following health issues:    HPI   Back Pain      Duration: x 4 months         Specific cause: lower    Description:   Location of pain: low back left  Character of pain: sharp  Pain radiation:radiates into the left leg  New numbness or weakness in legs, not attributed to pain:  YES left leg     Intensity: Currently 3/10, moderate    History:   Pain interferes with job: YES, No   History of back problems: recurrent self limited episodes of low back pain in the past  Any previous MRI or X-rays: Yes- at [facility].  Date 01/2004  Sees a specialist for back pain:  No  Therapies tried without relief: alive     Alleviating factors:   Improved by: cold , massage     Precipitating factors:  Worsened by: Lying [place]  {additonal problems for provider to add (Optional):252883}    {HIST REVIEW/ LINKS 2 (Optional):115363}    {Additional problems for the provider to add (optional):501916}  Reviewed and updated as needed this visit by Provider         Review of Systems   {ROS COMP (Optional):146337}      Objective    LMP 09/06/2006   There is no height or weight on file to calculate BMI.  Physical Exam   {Exam List (Optional):054117}    {Diagnostic Test Results (Optional):377238::\"Diagnostic Test Results:\",\"Labs reviewed in Epic\"}        {PROVIDER CHARTING PREFERENCE:028299}      "

## 2020-01-02 ENCOUNTER — ANCILLARY PROCEDURE (OUTPATIENT)
Dept: ULTRASOUND IMAGING | Facility: CLINIC | Age: 60
End: 2020-01-02
Attending: FAMILY MEDICINE

## 2020-01-02 ENCOUNTER — MYC MEDICAL ADVICE (OUTPATIENT)
Dept: FAMILY MEDICINE | Facility: CLINIC | Age: 60
End: 2020-01-02

## 2020-01-02 DIAGNOSIS — R10.13 EPIGASTRIC PAIN: ICD-10-CM

## 2020-01-02 DIAGNOSIS — K43.9 VENTRAL HERNIA WITHOUT OBSTRUCTION OR GANGRENE: Primary | ICD-10-CM

## 2020-01-02 NOTE — TELEPHONE ENCOUNTER
Dr. Wegener,   Please see patient mychart. Do you have any suggestions for patient regarding MD recommendation for hernia repair?

## 2020-01-23 ENCOUNTER — TELEPHONE (OUTPATIENT)
Dept: FAMILY MEDICINE | Facility: CLINIC | Age: 60
End: 2020-01-23

## 2020-01-23 ENCOUNTER — APPOINTMENT (OUTPATIENT)
Dept: GENERAL RADIOLOGY | Facility: CLINIC | Age: 60
DRG: 281 | End: 2020-01-23
Attending: HOSPITALIST
Payer: COMMERCIAL

## 2020-01-23 ENCOUNTER — HOSPITAL ENCOUNTER (INPATIENT)
Facility: CLINIC | Age: 60
LOS: 2 days | Discharge: HOME | DRG: 281 | End: 2020-01-25
Attending: EMERGENCY MEDICINE | Admitting: HOSPITALIST
Payer: COMMERCIAL

## 2020-01-23 DIAGNOSIS — I21.4 NSTEMI (NON-ST ELEVATED MYOCARDIAL INFARCTION) (H): ICD-10-CM

## 2020-01-23 LAB
ANION GAP SERPL CALCULATED.3IONS-SCNC: 5 MMOL/L (ref 3–14)
BASOPHILS # BLD AUTO: 0 10E9/L (ref 0–0.2)
BASOPHILS NFR BLD AUTO: 0.5 %
BUN SERPL-MCNC: 28 MG/DL (ref 7–30)
CALCIUM SERPL-MCNC: 9.2 MG/DL (ref 8.5–10.1)
CHLORIDE SERPL-SCNC: 107 MMOL/L (ref 94–109)
CO2 SERPL-SCNC: 28 MMOL/L (ref 20–32)
CREAT SERPL-MCNC: 0.61 MG/DL (ref 0.52–1.04)
DIFFERENTIAL METHOD BLD: NORMAL
EOSINOPHIL # BLD AUTO: 0.1 10E9/L (ref 0–0.7)
EOSINOPHIL NFR BLD AUTO: 1.2 %
ERYTHROCYTE [DISTWIDTH] IN BLOOD BY AUTOMATED COUNT: 13.4 % (ref 10–15)
GFR SERPL CREATININE-BSD FRML MDRD: >90 ML/MIN/{1.73_M2}
GLUCOSE SERPL-MCNC: 116 MG/DL (ref 70–99)
HBA1C MFR BLD: 5.8 % (ref 0–5.6)
HCT VFR BLD AUTO: 40.9 % (ref 35–47)
HGB BLD-MCNC: 13.6 G/DL (ref 11.7–15.7)
IMM GRANULOCYTES # BLD: 0 10E9/L (ref 0–0.4)
IMM GRANULOCYTES NFR BLD: 0.2 %
INTERPRETATION ECG - MUSE: NORMAL
LYMPHOCYTES # BLD AUTO: 1.8 10E9/L (ref 0.8–5.3)
LYMPHOCYTES NFR BLD AUTO: 29.6 %
MCH RBC QN AUTO: 30.6 PG (ref 26.5–33)
MCHC RBC AUTO-ENTMCNC: 33.3 G/DL (ref 31.5–36.5)
MCV RBC AUTO: 92 FL (ref 78–100)
MONOCYTES # BLD AUTO: 0.4 10E9/L (ref 0–1.3)
MONOCYTES NFR BLD AUTO: 7.1 %
NEUTROPHILS # BLD AUTO: 3.6 10E9/L (ref 1.6–8.3)
NEUTROPHILS NFR BLD AUTO: 61.4 %
NRBC # BLD AUTO: 0 10*3/UL
NRBC BLD AUTO-RTO: 0 /100
PLATELET # BLD AUTO: 266 10E9/L (ref 150–450)
POTASSIUM SERPL-SCNC: 3.8 MMOL/L (ref 3.4–5.3)
RBC # BLD AUTO: 4.45 10E12/L (ref 3.8–5.2)
SODIUM SERPL-SCNC: 140 MMOL/L (ref 133–144)
TROPONIN I SERPL-MCNC: 0.07 UG/L (ref 0–0.04)
TROPONIN I SERPL-MCNC: 0.09 UG/L (ref 0–0.04)
TROPONIN I SERPL-MCNC: 0.09 UG/L (ref 0–0.04)
WBC # BLD AUTO: 5.9 10E9/L (ref 4–11)

## 2020-01-23 PROCEDURE — 84484 ASSAY OF TROPONIN QUANT: CPT | Performed by: HOSPITALIST

## 2020-01-23 PROCEDURE — 93005 ELECTROCARDIOGRAM TRACING: CPT

## 2020-01-23 PROCEDURE — 21000001 ZZH R&B HEART CARE

## 2020-01-23 PROCEDURE — 99285 EMERGENCY DEPT VISIT HI MDM: CPT | Mod: 25

## 2020-01-23 PROCEDURE — 80048 BASIC METABOLIC PNL TOTAL CA: CPT | Performed by: EMERGENCY MEDICINE

## 2020-01-23 PROCEDURE — 25000132 ZZH RX MED GY IP 250 OP 250 PS 637: Performed by: HOSPITALIST

## 2020-01-23 PROCEDURE — 25000128 H RX IP 250 OP 636

## 2020-01-23 PROCEDURE — 25000132 ZZH RX MED GY IP 250 OP 250 PS 637: Performed by: EMERGENCY MEDICINE

## 2020-01-23 PROCEDURE — 96365 THER/PROPH/DIAG IV INF INIT: CPT

## 2020-01-23 PROCEDURE — 84484 ASSAY OF TROPONIN QUANT: CPT | Performed by: EMERGENCY MEDICINE

## 2020-01-23 PROCEDURE — 83036 HEMOGLOBIN GLYCOSYLATED A1C: CPT | Performed by: HOSPITALIST

## 2020-01-23 PROCEDURE — 71046 X-RAY EXAM CHEST 2 VIEWS: CPT

## 2020-01-23 PROCEDURE — 99207 ZZC CDG-MDM COMPONENT: MEETS MODERATE - UP CODED: CPT | Performed by: HOSPITALIST

## 2020-01-23 PROCEDURE — 36415 COLL VENOUS BLD VENIPUNCTURE: CPT | Performed by: HOSPITALIST

## 2020-01-23 PROCEDURE — 99223 1ST HOSP IP/OBS HIGH 75: CPT | Mod: AI | Performed by: HOSPITALIST

## 2020-01-23 PROCEDURE — 25800030 ZZH RX IP 258 OP 636: Performed by: HOSPITALIST

## 2020-01-23 PROCEDURE — 85025 COMPLETE CBC W/AUTO DIFF WBC: CPT | Performed by: EMERGENCY MEDICINE

## 2020-01-23 RX ORDER — LISINOPRIL 5 MG/1
2.5 TABLET ORAL EVERY EVENING
COMMUNITY
End: 2020-02-21

## 2020-01-23 RX ORDER — LISINOPRIL 2.5 MG/1
2.5 TABLET ORAL AT BEDTIME
Status: DISCONTINUED | OUTPATIENT
Start: 2020-01-23 | End: 2020-01-25 | Stop reason: HOSPADM

## 2020-01-23 RX ORDER — LIDOCAINE 40 MG/G
CREAM TOPICAL
Status: DISCONTINUED | OUTPATIENT
Start: 2020-01-23 | End: 2020-01-25 | Stop reason: HOSPADM

## 2020-01-23 RX ORDER — ASPIRIN 81 MG/1
162 TABLET, CHEWABLE ORAL ONCE
Status: COMPLETED | OUTPATIENT
Start: 2020-01-23 | End: 2020-01-23

## 2020-01-23 RX ORDER — SODIUM CHLORIDE 9 MG/ML
INJECTION, SOLUTION INTRAVENOUS CONTINUOUS
Status: DISCONTINUED | OUTPATIENT
Start: 2020-01-24 | End: 2020-01-24

## 2020-01-23 RX ORDER — NALOXONE HYDROCHLORIDE 0.4 MG/ML
.1-.4 INJECTION, SOLUTION INTRAMUSCULAR; INTRAVENOUS; SUBCUTANEOUS
Status: DISCONTINUED | OUTPATIENT
Start: 2020-01-23 | End: 2020-01-24

## 2020-01-23 RX ORDER — SODIUM CHLORIDE 9 MG/ML
INJECTION, SOLUTION INTRAVENOUS CONTINUOUS
Status: DISCONTINUED | OUTPATIENT
Start: 2020-01-23 | End: 2020-01-23

## 2020-01-23 RX ORDER — DIPHENHYDRAMINE HCL 12.5MG/5ML
12.5-25 LIQUID (ML) ORAL
Status: DISCONTINUED | OUTPATIENT
Start: 2020-01-23 | End: 2020-01-25 | Stop reason: HOSPADM

## 2020-01-23 RX ORDER — LANOLIN ALCOHOL/MO/W.PET/CERES
3 CREAM (GRAM) TOPICAL
COMMUNITY

## 2020-01-23 RX ORDER — ONDANSETRON 2 MG/ML
4 INJECTION INTRAMUSCULAR; INTRAVENOUS EVERY 30 MIN PRN
Status: DISCONTINUED | OUTPATIENT
Start: 2020-01-23 | End: 2020-01-23

## 2020-01-23 RX ORDER — MORPHINE SULFATE 2 MG/ML
2 INJECTION, SOLUTION INTRAMUSCULAR; INTRAVENOUS
Status: DISCONTINUED | OUTPATIENT
Start: 2020-01-23 | End: 2020-01-23

## 2020-01-23 RX ORDER — HEPARIN SODIUM 10000 [USP'U]/100ML
650 INJECTION, SOLUTION INTRAVENOUS CONTINUOUS
Status: DISCONTINUED | OUTPATIENT
Start: 2020-01-23 | End: 2020-01-24

## 2020-01-23 RX ADMIN — SODIUM CHLORIDE, PRESERVATIVE FREE: 5 INJECTION INTRAVENOUS at 23:52

## 2020-01-23 RX ADMIN — Medication 3200 UNITS: at 15:55

## 2020-01-23 RX ADMIN — LISINOPRIL 2.5 MG: 2.5 TABLET ORAL at 21:28

## 2020-01-23 RX ADMIN — HEPARIN SODIUM 650 UNITS/HR: 10000 INJECTION, SOLUTION INTRAVENOUS at 15:55

## 2020-01-23 RX ADMIN — Medication 1 MG: at 21:33

## 2020-01-23 RX ADMIN — DIPHENHYDRAMINE HYDROCHLORIDE 25 MG: 25 SOLUTION ORAL at 21:28

## 2020-01-23 RX ADMIN — ASPIRIN 81 MG 162 MG: 81 TABLET ORAL at 15:37

## 2020-01-23 ASSESSMENT — ACTIVITIES OF DAILY LIVING (ADL): ADLS_ACUITY_SCORE: 17

## 2020-01-23 NOTE — H&P
[facility]    History and Physical - Hospitalist Service       Date of Admission:  1/23/2020    Assessment & Plan   Clara Boykin is a 59 year old female admitted on 1/23/2020 with NSTEMI    NSTEMI  Presented with initially exertional fatigue and shortness of breath that started 5 days prior to admission  She kyle her own troponin III days prior to admission that was minimally elevated at 0.13 per her report  Troponin now minimally elevated at 0.078  EKG does show ST depressions in the inferolateral leads  NSTEMI makes the most sense here, especially with the EKG changes, however her symptoms time course and the correlation with minimal troponin changes suggest considering other differentials including demand, arrhythmia, or even PE  She has no symptoms of shortness of breath currently, no leg pain, no surgical or long travel history, and given her lack of current symptoms this seems unlikely.   At this point, agree with continuing to treat NSTEMI, obtain an echocardiogram and monitor on telemetry  Given aspirin already  Plan  - CXR  - admit to inpatient  - serial troponins  - echocardiogram   - consult cardiology    History of Hypertension  - continue lisinopril     Diet: cardiac, npo at midnight  DVT Prophylaxis: Heparin  [place] Catheter: not present  Code Status: full code    Disposition Plan   Expected discharge: 2 - 3 days, recommended to prior living arrangement once cardiac workup complete.  Entered: Thai Vo DO 01/23/2020, 4:28 PM     The patient's care was discussed with the Patient.    Thai Vo DO  [facility]    ______________________________________________________________________    Chief Complaint       History is obtained from the patient    History of Present Illness   Clara Boykin is a 59 year old female who was rough raking on Saturday when she felt some progressive fatigue. She felt like it was extremely difficult to rake she was so tired.  She felt her heart rate was high, but when she checked her pulse it was 71 upon returning inside.    She felt a little winded on Sunday as well when she was working outside.     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 She sent her own troponin on Tuesday, and then returned elevated 0.13 today and so she decided to come in. So she found a sitter for her dog and then came to the ED.    She ran a 10 mile in October but hasnt been able to run lately    She states that she has LDL has been hard to keep less than 100.     Review of Systems    The 10 point Review of Systems is negative other than noted in the HPI or here.     Past Medical History    I have reviewed this patient's medical history and updated it with pertinent information if needed.   Past Medical History:   Diagnosis Date     Diplopia      H/O CT scan      H/O magnetic resonance imaging      Paralytic strabismus      Pneumonia      PONV (postoperative nausea and vomiting)        Past Surgical History   I have reviewed this patient's surgical history and updated it with pertinent information if needed.  Past Surgical History:   Procedure Laterality Date     APPENDECTOMY       AS KNEE SCOPE, DIAGNOSTIC       BUNIONECTOMY Right 2016    times 2     BUNIONECTOMY Left 10/22/2018    Procedure: LEFT REVISION BUNION ;  Surgeon: Johanna Lund MD;  Location: [facility]     HERNIORRHAPHY VENTRAL N/A 7/7/2016    Procedure: HERNIORRHAPHY VENTRAL;  Surgeon: Ash Thompson MD;  [facility] OR     left clavical       NECK SURGERY       RECESSION RESECTION (REPAIR STRABISMUS) Right 4/10/2017    Procedure: RECESSION RESECTION (REPAIR STRABISMUS);  Surgeon: Lyle Leggett MD;  Location:  OR     WRIST SURGERY         Social History   I have reviewed this patient's social history and updated it with pertinent information if needed.  Social History     Tobacco Use     Smoking  status: Never Smoker     Smokeless tobacco: Never Used   Substance Use Topics     Alcohol use: Yes     Comment: rare     Drug use: No       Family History   I have reviewed this patient's family history and updated it with pertinent information if needed.   Family History   Problem Relation Age of Onset     Cancer Mother         skin cancer     Diabetes Mother         borderline/Type 2     Hypertension Mother         3 meds: 4.5cm asc aortic aneurysm     Hyperlipidemia Mother         hi chol & triglycerides     Cerebrovascular Disease Mother         many small, cog. impaired     Osteoporosis Mother         fosamax x 5 yrs     Obesity Mother         BMI 35 or so     Unknown/Adopted Mother         dermatomyositis since      Cancer Father         skin cancer     Coronary Artery Disease Father         angioplasty ~     Hypertension Father         1 med, mild. Age 94     Depression Father         2x: age 87 [facility]     Cancer Maternal Grandmother         skin cancer     Cerebrovascular Disease Maternal Grandmother          from multiple CVA     Obesity Maternal Grandmother         overweight     Diabetes Maternal Grandfather         borderline/Type 2     Coronary Artery Disease Maternal Grandfather         2-3 MIs; worked after each     Cerebrovascular Disease Maternal Grandfather          from CVA postop hernia     Obesity Maternal Grandfather         was overwt to obese     Diabetes Cousin         prediabetes     Hypertension Brother         1 med for 20 yrs     Substance Abuse Brother         hx of EtOH     Cerebrovascular Disease Other         cog. impairment like my mom     Mental Illness Sister         ?bipolar; past chem dep     Substance Abuse Sister         hx of: prescript drug& EtOH     Unknown/Adopted Sister         sister is adopted     Anesthesia Reaction Other         naus/vomit 1-8 hr postop unless tx'd     Anesthesia Reaction No family hx of        Prior to Admission Medications    Prior to Admission Medications   Prescriptions Last Dose Informant Patient Reported? Taking?   CALCIUM PO   Yes No   Sig: Take 1,200 mg by mouth daily   Omega-3 Fatty Acids (OMEGA 3 PO)   Yes No   Sig: Take 1 g by mouth daily Reported on 3/31/2017   VITAMIN D, CHOLECALCIFEROL, PO   Yes No   Sig: Take 1,000 Units by mouth daily   diphenhydrAMINE (BENADRYL) 25 MG tablet   Yes No   Sig: Take 25 mg by mouth nightly as needed Reported on 3/31/2017   lisinopril (PRINIVIL/ZESTRIL) 5 MG tablet   No No   Sig: Take 1 tablet (5 mg) by mouth daily   Patient taking differently: Take 2.5 mg by mouth At Bedtime    multivitamin, therapeutic (THERA-VIT) TABS tablet   Yes No   Sig: Take 1 tablet by mouth daily   traZODone (DESYREL) 50 MG tablet   No No   Si/2 pill nightly as needed.   Patient taking differently: Take 12.5-25 mg by mouth nightly as needed (1/4 to 1/2 tablet)      Facility-Administered Medications: None     Allergies   Allergies   Allergen Reactions     Exparel [Bupivacaine] GI Disturbance     Patient had severe abdominal distention     Darvocet [Propoxyphene N-Apap] Other (See Comments)     confusion     Liposomes GI Disturbance     Oxycodone      Penicillins Itching     Percocet [Oxycodone-Acetaminophen] Other (See Comments)     confusion     Tape [Adhesive Tape] Rash     Once after surgical tape     Vistaril [Hydroxyzine] Rash     Intense flushing/redness of face        Physical Exam   Vital Signs: Temp: 97.9  F (36.6  C) Temp src: Oral BP: (!) 146/90 Pulse: 67 Heart Rate: 75 Resp: 15 SpO2: 98 % O2 Device: None (Room air)    Weight: 120 lbs 0 oz    Physical Exam  Vitals signs and nursing note reviewed.   Constitutional:       Appearance: Normal appearance.   HENT:      Head: Normocephalic and atraumatic.   Eyes:      Extraocular Movements: Extraocular movements intact.      Pupils: Pupils are equal, round, and reactive to light.   Cardiovascular:      Rate and Rhythm: Normal rate and regular rhythm.    Pulmonary:      Effort: Pulmonary effort is normal.      Breath sounds: Normal breath sounds.   Abdominal:      General: Abdomen is flat. Bowel sounds are normal.      Palpations: Abdomen is soft.   Neurological:      General: No focal deficit present.      Mental Status: She is alert and oriented to person, place, and time.   Psychiatric:         Mood and Affect: Mood normal.         Behavior: Behavior normal.         Thought Content: Thought content normal.         Data   Data reviewed today: I reviewed all medications, new labs and imaging results over the last 24 hours. I personally reviewed the EKG tracing showing NSR and ST depressions in the inferolateral leads.    Recent Labs   Lab 01/23/20  1523   WBC 5.9   HGB 13.6   MCV 92         POTASSIUM 3.8   CHLORIDE 107   CO2 28   BUN 28   CR 0.61   ANIONGAP 5   NO 9.2   *   TROPI 0.071*     Most Recent 3 CBC's:  Recent Labs   Lab Test 01/23/20  1523 04/10/19  1545 10/19/18  0735   WBC 5.9 4.9 4.4   HGB 13.6 13.4 13.8   MCV 92 94 93    289 274     Most Recent 3 BMP's:  Recent Labs   Lab Test 01/23/20  1523 04/10/19  1545 10/09/18  0900    139 138   POTASSIUM 3.8 4.3 4.0   CHLORIDE 107 105 101   CO2 28 28 29   BUN 28 26 20   CR 0.61 0.69 0.73   ANIONGAP 5 6 8   NO 9.2 8.9 9.2   * 96 87     Most Recent 2 LFT's:  Recent Labs   Lab Test 04/20/18  0845   AST 35   ALT 38   ALKPHOS 60   BILITOTAL 0.6     Most Recent 3 INR's:No lab results found.  No results found for this or any previous visit (from the past 24 hour(s)).

## 2020-01-23 NOTE — ED TRIAGE NOTES
States she ran a troponin I at her work on Tuesday and was called because it was elevated. States she does not have the usual amount energy.

## 2020-01-23 NOTE — TELEPHONE ENCOUNTER
Patient reporting that she had chest pain on Saturday.  Call transferred to emergent line.    Rosey King

## 2020-01-23 NOTE — PHARMACY-ADMISSION MEDICATION HISTORY
Pharmacy Medication History  Admission medication history interview status for the 1/23/2020  admission is complete. See EPIC admission navigator for prior to admission medications     Medication history sources: Patient  Medication history source reliability: Good  Adherence assessment: unclear, pt doesn't seem to take lisinopril regularly    Medication reconciliation completed by provider prior to medication history? No    Time spent in this activity: 10 minutes      Prior to Admission medications    Medication Sig Last Dose Taking? Auth Provider   CALCIUM PO Take 1,200 mg by mouth daily 1/23/2020 at Unknown time Yes Reported, Patient   diphenhydrAMINE (BENADRYL) 25 MG tablet Take 12.5-25 mg by mouth nightly as needed Reported on 3/31/2017 prn Yes Reported, Patient   lisinopril (PRINIVIL/ZESTRIL) 5 MG tablet Take 2.5 mg by mouth every evening 1/17/2020 Yes Unknown, Entered By History   melatonin 3 MG tablet Take 3 mg by mouth nightly as needed for sleep prn Yes Unknown, Entered By History   multivitamin, therapeutic (THERA-VIT) TABS tablet Take 1 tablet by mouth daily 1/23/2020 at Unknown time Yes Reported, Patient   Omega-3 Fatty Acids (OMEGA 3 PO) Take 1 g by mouth daily Reported on 3/31/2017 1/23/2020 at Unknown time Yes Reported, Patient   traZODone (DESYREL) 50 MG tablet 1/2 pill nightly as needed.  Patient taking differently: Take 12.5-25 mg by mouth nightly as needed (1/4 to 1/2 tablet) prn Yes Wegener, Joel Daniel Irwin, MD   VITAMIN D, CHOLECALCIFEROL, PO Take 1,000 Units by mouth daily 1/23/2020 at Unknown time Yes Reported, Patient

## 2020-01-23 NOTE — TELEPHONE ENCOUNTER
"S-(situation): pt had episode of chest pain Saturday when roof raking. She went in house, laid down, took a couple aspirin. Her heart rate was going up high for doing very little. 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 She got results just now and troponin I elevated. It was 0.13 and normal range is up to 0.05  No chest pain now and not since Saturday      B-(background): healthy, ran 10 miles one month ago , states she is low risk for cardiac issues, not overweight    A-(assessment): discussed with Dr CRISTI Elliott and she needs to go to ED now    R-(recommendations): GO to ED now, not appropriate for clinic or UC. They will need to do troponin serial levels and full assessment    Pt states \"ok\" and ended call    Eloise Baptiste, RN, BSN         "

## 2020-01-23 NOTE — ED PROVIDER NOTES
"  History     Chief Complaint:  Chest Discomfort     The history is provided by the patient.      Clara Boykin is a 59 year old female who presents for evaluation of chest discomfort for the past five days. The patient states that she was roof raking and shoveling five days ago when she began experiencing chest pain, which was worse with the overhead raking. She states that she took an aspiring and began drinking water afterwards as she was concerned for dehydration. She notes that she has had chest discomfort since that time. 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 She notes that today she received a call that told her that the Troponin was elevated at 0.13, and that she should immediately be seen. She states that she currently has no pain, but that she has had the discomfort. She notes that she felt well yesterday while walking her dog, but states that she \"did not want to do any more activity than that\" due to the discomfort.     She also notes that she believes she has \"morning surge hypertension,\" for which she monitors her blood pressure at home. She notes that for some time she has had an increased blood pressure between the hours of 3am and 6am. She notes she is currently not on any antihypertensives, but that she was on half a pill of lisinopril in the past. She presents today concerned that she may have had a heart attack.    Allergies:  Exparel [Bupivacaine]  Darvocet [Propoxyphene N-Apap]  Liposomes  Oxycodone  Penicillins  Percocet [Oxycodone-Acetaminophen]  Tape [Adhesive Tape]  Vistaril [Hydroxyzine]     Medications:    Desyrel    Past Medical History:    Diplopia  Paralytic strabismus  PONV    Past Surgical History:    Appendectomy  Bunionectomy, right  Bunionectomy, left  Herniorrhaphy ventral  Left clavicle repair  Neck surgery  Recession resection (repair " strabismus)  Wrist surgery    Family History:    Skin cancer  Diabetes, type 2  Hypertension  Hyperlipidemia  Cerebrovascular disease  Osteoporosis  Obesity  Angioplasty  Depression  Alcohol abuse  Bipolar    Social History:  The patient presents to the ED alone.  Smoking Status: Never Smoker  Smokeless Tobacco: Never Used  Alcohol Use: Yes  Drug Use: No  PCP: Wegener, Joel Daniel Irwin     Review of Systems   Cardiovascular: Negative for chest pain.   All other systems reviewed and are negative.      Physical Exam     Patient Vitals for the past 24 hrs:   BP Temp Temp src Pulse Heart Rate Resp SpO2 Weight   01/23/20 1808 (!) 139/94 98.3  F (36.8  C) Oral 63 -- 15 98 % --   01/23/20 1745 134/82 -- -- 65 63 13 97 % --   01/23/20 1730 (!) 143/91 -- -- 67 80 15 98 % --   01/23/20 1715 136/81 -- -- 68 75 11 97 % --   01/23/20 1700 135/85 -- -- 69 73 23 96 % --   01/23/20 1645 (!) 144/91 -- -- 71 66 15 97 % --   01/23/20 1630 (!) 146/129 -- -- 73 80 25 96 % --   01/23/20 1615 127/83 -- -- 66 72 17 97 % --   01/23/20 1600 (!) 146/90 -- -- 67 75 15 98 % --   01/23/20 1545 134/84 -- -- 73 68 10 97 % --   01/23/20 1513 (!) 147/65 97.9  F (36.6  C) Oral 78 -- 18 -- 54.4 kg (120 lb)       Physical Exam  Vitals: reviewed by me  General: Pt seen on [facility], pleasant, cooperative, and alert to conversation, nondiaphoretic, non-ill-appearing.  Eyes: Tracking well, clear conjunctiva BL  ENT: MMM, midline trachea.   Lungs:  No tachypnea, no accessory muscle use. No respiratory distress.   CV: Rate as above, regular rhythm.    Abd: Soft, non tender, no guarding, no rebound. Non distended  MSK: no peripheral edema or joint effusion.  No evidence of trauma  Skin: No rash, normal turgor and temperature  Neuro: Clear speech and no facial droop.  Psych: Not RIS, no e/o AH/VH      Emergency Department Course     ECG:  Indication: Chest Discomfort  Time: 1523  Vent. Rate 71 bpm. CA interval 160. QRS duration 86. QT/QTc 372/404.  P-R-T axis 80 88 21. Normal sinus rhythm. Biatrial enlargement. Septal infarct, age undetermined. ST & T wave abnormality, consider lateral ischemia. Abnormal ECG. Read time: 1550    Laboratory:  Laboratory findings were communicated with the patient and Admitting MD who voiced understanding of the findings.    BMP: Glucose 116 (high), o/w WNL (Creatinine: 0.61)    CBC: WBC: 5.9, HGB: 13.6, PLT: 266    1523 Troponin: 0.071 (high)      Interventions:  1537 Aspirin 162mg PO  1555 Heparin 3,200 units IV  1555 Heparin 25,000 units in 0.45 NaCl, 650 units/hr IV    Emergency Department Course:  Past medical records, nursing notes, and vitals reviewed.    1545 I performed an exam of the patient as documented above.     EKG obtained in the ED, see results above.   IV was inserted and blood was drawn for laboratory testing, results above.    1603 I consulted with Dr. Vo, Hospitalist, regarding the patient's history and presentation here in the emergency department. Accepted for admission.    1620 I rechecked the patient and discussed the results of her workup thus far.     Findings and plan explained to the patient who consents to admission. Discussed the patient with Dr. Vo, who will admit the patient to a cardiac specialty care bed for further monitoring, evaluation, and treatment.    I personally reviewed the laboratory results with the patient and answered all related questions prior to admission.     Impression & Plan     Medical Decision Making:  Clara Boykin is a very pleasant 59 year old female who presents to the emergency room with what appears to be a NSTEMI. She sent her own Troponin two days ago, and today appears to be less than that reading, she has had no pain in between. She likely had an event Sunday or Monday I am guessing, but does need to be admitted as she has no medical therapy for any heart conditions and I think would benefit from further risk stratification and medications as  well. No pain here, will start heparin, which may be stopped upstairs. Admitted to Dr. Vo who has kindly agreed to take care of the patient.    Diagnosis:    ICD-10-CM    1. NSTEMI (non-ST elevated myocardial infarction) (H) I21.4        Disposition:  Admitted to Dr. Vo.    Scribe Disclosure:  I, Santos Styles, am serving as a scribe at 3:46 PM on 1/23/2020 to document services personally performed by Emeterio Martin MD based on my observations and the provider's statements to me.      Emeterio Martin MD  01/23/20 215

## 2020-01-23 NOTE — PROGRESS NOTES
RECEIVING UNIT ED HANDOFF REVIEW    ED Nurse Handoff Report was reviewed by: Ludmila Galloway RN on January 23, 2020 at 4:28 PM

## 2020-01-23 NOTE — ED NOTES
[facility]  ED Nurse Handoff Report    ED Chief complaint: Abnormal Labs      ED Diagnosis:   Final diagnoses:   NSTEMI (non-ST elevated myocardial infarction) (H)       Code Status: To be addressed by admitting MD.     Allergies:   Allergies   Allergen Reactions     Exparel [Bupivacaine] GI Disturbance     Patient had severe abdominal distention     Darvocet [Propoxyphene N-Apap] Other (See Comments)     confusion     Liposomes GI Disturbance     Oxycodone      Penicillins Itching     Percocet [Oxycodone-Acetaminophen] Other (See Comments)     confusion     Tape [Adhesive Tape] Rash     Once after surgical tape     Vistaril [Hydroxyzine] Rash     Intense flushing/redness of face        Patient Story: Pt reports running her own troponin at work. Clinic called that troponin was elevated.   Focused Assessment:  Pt's troponin elevated in ED. Pt has heparin running 6.5ml/hr. Pt given 162mg of ASA. Pt alert and orientedx4. Pt independent with care in ED.      Treatments and/or interventions provided: ASA, Heparin   Patient's response to treatments and/or interventions:     To be done/followed up on inpatient unit:  Cardiac monitoring, Heparin    Does this patient have any cognitive concerns?: NA    Activity level - Baseline/Home:  Independent  Activity Level - Current:   Independent    Patient's Preferred language: English   Needed?: No    Isolation: None  Infection: Not Applicable  Bariatric?: No    Vital Signs:   Vitals:    01/23/20 1513 01/23/20 1545 01/23/20 1600   BP: (!) 147/65 134/84 (!) 146/90   Pulse: 78 73 67   Resp: 18 10 15   Temp: 97.9  F (36.6  C)     TempSrc: Oral     SpO2:  97% 98%   Weight: 54.4 kg (120 lb)         Cardiac Rhythm:     Was the PSS-3 completed:   Yes  What interventions are required if any?               Family Comments: No family at bedside in ED.   OBS brochure/video discussed/provided to patient/family: N/A              Name of person given brochure if not  patient: NA              Relationship to patient: NA    For the majority of the shift this patient's behavior was Green.   Behavioral interventions performed were Frequent rounding.    ED NURSE PHONE NUMBER: [id number]

## 2020-01-24 ENCOUNTER — SURGERY (OUTPATIENT)
Age: 60
End: 2020-01-24
Payer: COMMERCIAL

## 2020-01-24 ENCOUNTER — APPOINTMENT (OUTPATIENT)
Dept: CARDIOLOGY | Facility: CLINIC | Age: 60
DRG: 281 | End: 2020-01-24
Attending: HOSPITALIST
Payer: COMMERCIAL

## 2020-01-24 LAB
ALBUMIN SERPL-MCNC: 3.4 G/DL (ref 3.4–5)
ALP SERPL-CCNC: 60 U/L (ref 40–150)
ALT SERPL W P-5'-P-CCNC: 27 U/L (ref 0–50)
ANION GAP SERPL CALCULATED.3IONS-SCNC: 5 MMOL/L (ref 3–14)
AST SERPL W P-5'-P-CCNC: 26 U/L (ref 0–45)
BASOPHILS # BLD AUTO: 0 10E9/L (ref 0–0.2)
BASOPHILS NFR BLD AUTO: 0.9 %
BILIRUB SERPL-MCNC: 0.6 MG/DL (ref 0.2–1.3)
BUN SERPL-MCNC: 25 MG/DL (ref 7–30)
CALCIUM SERPL-MCNC: 8.8 MG/DL (ref 8.5–10.1)
CHLORIDE SERPL-SCNC: 109 MMOL/L (ref 94–109)
CHOLEST SERPL-MCNC: 194 MG/DL
CO2 SERPL-SCNC: 24 MMOL/L (ref 20–32)
CREAT SERPL-MCNC: 0.62 MG/DL (ref 0.52–1.04)
DIFFERENTIAL METHOD BLD: NORMAL
EOSINOPHIL # BLD AUTO: 0.2 10E9/L (ref 0–0.7)
EOSINOPHIL NFR BLD AUTO: 3.9 %
ERYTHROCYTE [DISTWIDTH] IN BLOOD BY AUTOMATED COUNT: 13.5 % (ref 10–15)
GFR SERPL CREATININE-BSD FRML MDRD: >90 ML/MIN/{1.73_M2}
GLUCOSE SERPL-MCNC: 90 MG/DL (ref 70–99)
HCT VFR BLD AUTO: 40.7 % (ref 35–47)
HDLC SERPL-MCNC: 74 MG/DL
HGB BLD-MCNC: 13.5 G/DL (ref 11.7–15.7)
IMM GRANULOCYTES # BLD: 0 10E9/L (ref 0–0.4)
IMM GRANULOCYTES NFR BLD: 0 %
LDLC SERPL CALC-MCNC: 111 MG/DL
LMWH PPP CHRO-ACNC: 0.14 IU/ML
LMWH PPP CHRO-ACNC: 0.18 IU/ML
LMWH PPP CHRO-ACNC: 0.2 IU/ML
LYMPHOCYTES # BLD AUTO: 2.1 10E9/L (ref 0.8–5.3)
LYMPHOCYTES NFR BLD AUTO: 47.7 %
MCH RBC QN AUTO: 30.4 PG (ref 26.5–33)
MCHC RBC AUTO-ENTMCNC: 33.2 G/DL (ref 31.5–36.5)
MCV RBC AUTO: 92 FL (ref 78–100)
MONOCYTES # BLD AUTO: 0.5 10E9/L (ref 0–1.3)
MONOCYTES NFR BLD AUTO: 10.2 %
NEUTROPHILS # BLD AUTO: 1.6 10E9/L (ref 1.6–8.3)
NEUTROPHILS NFR BLD AUTO: 37.3 %
NONHDLC SERPL-MCNC: 120 MG/DL
NRBC # BLD AUTO: 0 10*3/UL
NRBC BLD AUTO-RTO: 0 /100
PLATELET # BLD AUTO: 242 10E9/L (ref 150–450)
POTASSIUM SERPL-SCNC: 4 MMOL/L (ref 3.4–5.3)
PROT SERPL-MCNC: 6.8 G/DL (ref 6.8–8.8)
RBC # BLD AUTO: 4.44 10E12/L (ref 3.8–5.2)
SODIUM SERPL-SCNC: 138 MMOL/L (ref 133–144)
TRIGL SERPL-MCNC: 47 MG/DL
TROPONIN I SERPL-MCNC: 0.07 UG/L (ref 0–0.04)
WBC # BLD AUTO: 4.4 10E9/L (ref 4–11)

## 2020-01-24 PROCEDURE — 25500064 ZZH RX 255 OP 636: Performed by: HOSPITALIST

## 2020-01-24 PROCEDURE — 80061 LIPID PANEL: CPT | Performed by: HOSPITALIST

## 2020-01-24 PROCEDURE — B2111ZZ FLUOROSCOPY OF MULTIPLE CORONARY ARTERIES USING LOW OSMOLAR CONTRAST: ICD-10-PCS | Performed by: INTERNAL MEDICINE

## 2020-01-24 PROCEDURE — 25000132 ZZH RX MED GY IP 250 OP 250 PS 637: Performed by: INTERNAL MEDICINE

## 2020-01-24 PROCEDURE — 99152 MOD SED SAME PHYS/QHP 5/>YRS: CPT

## 2020-01-24 PROCEDURE — 99207 ZZC MOONLIGHTING INDICATOR: CPT | Performed by: PHYSICIAN ASSISTANT

## 2020-01-24 PROCEDURE — 80053 COMPREHEN METABOLIC PANEL: CPT | Performed by: HOSPITALIST

## 2020-01-24 PROCEDURE — 99152 MOD SED SAME PHYS/QHP 5/>YRS: CPT | Performed by: INTERNAL MEDICINE

## 2020-01-24 PROCEDURE — 84484 ASSAY OF TROPONIN QUANT: CPT | Performed by: HOSPITALIST

## 2020-01-24 PROCEDURE — 25000132 ZZH RX MED GY IP 250 OP 250 PS 637: Performed by: HOSPITALIST

## 2020-01-24 PROCEDURE — 25000125 ZZHC RX 250: Performed by: INTERNAL MEDICINE

## 2020-01-24 PROCEDURE — 4A023N7 MEASUREMENT OF CARDIAC SAMPLING AND PRESSURE, LEFT HEART, PERCUTANEOUS APPROACH: ICD-10-PCS | Performed by: INTERNAL MEDICINE

## 2020-01-24 PROCEDURE — 21000001 ZZH R&B HEART CARE

## 2020-01-24 PROCEDURE — 36415 COLL VENOUS BLD VENIPUNCTURE: CPT | Performed by: HOSPITALIST

## 2020-01-24 PROCEDURE — 93458 L HRT ARTERY/VENTRICLE ANGIO: CPT | Mod: 26 | Performed by: INTERNAL MEDICINE

## 2020-01-24 PROCEDURE — B2151ZZ FLUOROSCOPY OF LEFT HEART USING LOW OSMOLAR CONTRAST: ICD-10-PCS | Performed by: INTERNAL MEDICINE

## 2020-01-24 PROCEDURE — 99232 SBSQ HOSP IP/OBS MODERATE 35: CPT | Performed by: PHYSICIAN ASSISTANT

## 2020-01-24 PROCEDURE — 93458 L HRT ARTERY/VENTRICLE ANGIO: CPT | Performed by: INTERNAL MEDICINE

## 2020-01-24 PROCEDURE — C1894 INTRO/SHEATH, NON-LASER: HCPCS | Performed by: INTERNAL MEDICINE

## 2020-01-24 PROCEDURE — 93306 TTE W/DOPPLER COMPLETE: CPT | Mod: 26 | Performed by: INTERNAL MEDICINE

## 2020-01-24 PROCEDURE — 25000132 ZZH RX MED GY IP 250 OP 250 PS 637: Performed by: PHYSICIAN ASSISTANT

## 2020-01-24 PROCEDURE — 27210794 ZZH OR GENERAL SUPPLY STERILE: Performed by: INTERNAL MEDICINE

## 2020-01-24 PROCEDURE — 85520 HEPARIN ASSAY: CPT | Performed by: HOSPITALIST

## 2020-01-24 PROCEDURE — 25000128 H RX IP 250 OP 636

## 2020-01-24 PROCEDURE — 85025 COMPLETE CBC W/AUTO DIFF WBC: CPT | Performed by: HOSPITALIST

## 2020-01-24 PROCEDURE — 25000128 H RX IP 250 OP 636: Performed by: INTERNAL MEDICINE

## 2020-01-24 PROCEDURE — 99222 1ST HOSP IP/OBS MODERATE 55: CPT | Mod: 25 | Performed by: INTERNAL MEDICINE

## 2020-01-24 RX ORDER — ACETAMINOPHEN 325 MG/1
650 TABLET ORAL EVERY 4 HOURS PRN
Status: DISCONTINUED | OUTPATIENT
Start: 2020-01-24 | End: 2020-01-25 | Stop reason: HOSPADM

## 2020-01-24 RX ORDER — SODIUM CHLORIDE 9 MG/ML
INJECTION, SOLUTION INTRAVENOUS CONTINUOUS
Status: DISCONTINUED | OUTPATIENT
Start: 2020-01-24 | End: 2020-01-24 | Stop reason: HOSPADM

## 2020-01-24 RX ORDER — FENTANYL CITRATE 50 UG/ML
25-50 INJECTION, SOLUTION INTRAMUSCULAR; INTRAVENOUS
Status: ACTIVE | OUTPATIENT
Start: 2020-01-24 | End: 2020-01-25

## 2020-01-24 RX ORDER — DOBUTAMINE HYDROCHLORIDE 200 MG/100ML
2-20 INJECTION INTRAVENOUS CONTINUOUS PRN
Status: DISCONTINUED | OUTPATIENT
Start: 2020-01-24 | End: 2020-01-24 | Stop reason: HOSPADM

## 2020-01-24 RX ORDER — LORAZEPAM 0.5 MG/1
0.5 TABLET ORAL
Status: DISCONTINUED | OUTPATIENT
Start: 2020-01-24 | End: 2020-01-24 | Stop reason: HOSPADM

## 2020-01-24 RX ORDER — PROCHLORPERAZINE 25 MG
25 SUPPOSITORY, RECTAL RECTAL EVERY 12 HOURS PRN
Status: DISCONTINUED | OUTPATIENT
Start: 2020-01-24 | End: 2020-01-25 | Stop reason: HOSPADM

## 2020-01-24 RX ORDER — HEPARIN SODIUM 10000 [USP'U]/100ML
100-1000 INJECTION, SOLUTION INTRAVENOUS CONTINUOUS PRN
Status: DISCONTINUED | OUTPATIENT
Start: 2020-01-24 | End: 2020-01-24 | Stop reason: HOSPADM

## 2020-01-24 RX ORDER — ARGATROBAN 1 MG/ML
350 INJECTION, SOLUTION INTRAVENOUS
Status: DISCONTINUED | OUTPATIENT
Start: 2020-01-24 | End: 2020-01-24 | Stop reason: HOSPADM

## 2020-01-24 RX ORDER — DOPAMINE HYDROCHLORIDE 160 MG/100ML
2-20 INJECTION, SOLUTION INTRAVENOUS CONTINUOUS PRN
Status: DISCONTINUED | OUTPATIENT
Start: 2020-01-24 | End: 2020-01-24 | Stop reason: HOSPADM

## 2020-01-24 RX ORDER — ONDANSETRON 4 MG/1
4 TABLET, ORALLY DISINTEGRATING ORAL EVERY 6 HOURS PRN
Status: DISCONTINUED | OUTPATIENT
Start: 2020-01-24 | End: 2020-01-25 | Stop reason: HOSPADM

## 2020-01-24 RX ORDER — POTASSIUM CHLORIDE 1500 MG/1
20 TABLET, EXTENDED RELEASE ORAL
Status: CANCELLED | OUTPATIENT
Start: 2020-01-24

## 2020-01-24 RX ORDER — LIDOCAINE 40 MG/G
CREAM TOPICAL
Status: CANCELLED | OUTPATIENT
Start: 2020-01-24

## 2020-01-24 RX ORDER — SODIUM CHLORIDE 9 MG/ML
INJECTION, SOLUTION INTRAVENOUS CONTINUOUS
Status: CANCELLED | OUTPATIENT
Start: 2020-01-24

## 2020-01-24 RX ORDER — PROCHLORPERAZINE MALEATE 5 MG
5-10 TABLET ORAL EVERY 6 HOURS PRN
Status: DISCONTINUED | OUTPATIENT
Start: 2020-01-24 | End: 2020-01-25 | Stop reason: HOSPADM

## 2020-01-24 RX ORDER — FENTANYL CITRATE 50 UG/ML
INJECTION, SOLUTION INTRAMUSCULAR; INTRAVENOUS
Status: DISCONTINUED | OUTPATIENT
Start: 2020-01-24 | End: 2020-01-24 | Stop reason: HOSPADM

## 2020-01-24 RX ORDER — EPTIFIBATIDE 2 MG/ML
2 INJECTION, SOLUTION INTRAVENOUS CONTINUOUS PRN
Status: DISCONTINUED | OUTPATIENT
Start: 2020-01-24 | End: 2020-01-24 | Stop reason: HOSPADM

## 2020-01-24 RX ORDER — LISINOPRIL 2.5 MG/1
2.5 TABLET ORAL EVERY EVENING
Status: DISCONTINUED | OUTPATIENT
Start: 2020-01-24 | End: 2020-01-24

## 2020-01-24 RX ORDER — SODIUM CHLORIDE 9 MG/ML
INJECTION, SOLUTION INTRAVENOUS CONTINUOUS
Status: DISCONTINUED | OUTPATIENT
Start: 2020-01-24 | End: 2020-01-25 | Stop reason: HOSPADM

## 2020-01-24 RX ORDER — NITROGLYCERIN 20 MG/100ML
.07-2 INJECTION INTRAVENOUS CONTINUOUS PRN
Status: DISCONTINUED | OUTPATIENT
Start: 2020-01-24 | End: 2020-01-24 | Stop reason: HOSPADM

## 2020-01-24 RX ORDER — EPTIFIBATIDE 2 MG/ML
180 INJECTION, SOLUTION INTRAVENOUS EVERY 10 MIN PRN
Status: DISCONTINUED | OUTPATIENT
Start: 2020-01-24 | End: 2020-01-24 | Stop reason: HOSPADM

## 2020-01-24 RX ORDER — ONDANSETRON 2 MG/ML
4 INJECTION INTRAMUSCULAR; INTRAVENOUS EVERY 6 HOURS PRN
Status: DISCONTINUED | OUTPATIENT
Start: 2020-01-24 | End: 2020-01-25 | Stop reason: HOSPADM

## 2020-01-24 RX ORDER — ATROPINE SULFATE 0.1 MG/ML
0.5 INJECTION INTRAVENOUS EVERY 5 MIN PRN
Status: ACTIVE | OUTPATIENT
Start: 2020-01-24 | End: 2020-01-25

## 2020-01-24 RX ORDER — POTASSIUM CHLORIDE 1500 MG/1
20 TABLET, EXTENDED RELEASE ORAL
Status: DISCONTINUED | OUTPATIENT
Start: 2020-01-24 | End: 2020-01-24 | Stop reason: HOSPADM

## 2020-01-24 RX ORDER — NALOXONE HYDROCHLORIDE 0.4 MG/ML
.2-.4 INJECTION, SOLUTION INTRAMUSCULAR; INTRAVENOUS; SUBCUTANEOUS
Status: DISCONTINUED | OUTPATIENT
Start: 2020-01-24 | End: 2020-01-24

## 2020-01-24 RX ORDER — NALOXONE HYDROCHLORIDE 0.4 MG/ML
.1-.4 INJECTION, SOLUTION INTRAMUSCULAR; INTRAVENOUS; SUBCUTANEOUS
Status: DISCONTINUED | OUTPATIENT
Start: 2020-01-24 | End: 2020-01-25 | Stop reason: HOSPADM

## 2020-01-24 RX ORDER — ONDANSETRON 2 MG/ML
INJECTION INTRAMUSCULAR; INTRAVENOUS
Status: COMPLETED
Start: 2020-01-24 | End: 2020-01-24

## 2020-01-24 RX ORDER — ARGATROBAN 1 MG/ML
150 INJECTION, SOLUTION INTRAVENOUS
Status: DISCONTINUED | OUTPATIENT
Start: 2020-01-24 | End: 2020-01-24 | Stop reason: HOSPADM

## 2020-01-24 RX ORDER — LORAZEPAM 2 MG/ML
0.5 INJECTION INTRAMUSCULAR
Status: DISCONTINUED | OUTPATIENT
Start: 2020-01-24 | End: 2020-01-24 | Stop reason: HOSPADM

## 2020-01-24 RX ORDER — LIDOCAINE 40 MG/G
CREAM TOPICAL
Status: DISCONTINUED | OUTPATIENT
Start: 2020-01-24 | End: 2020-01-24

## 2020-01-24 RX ORDER — ACETAMINOPHEN 325 MG/1
650 TABLET ORAL EVERY 4 HOURS PRN
Status: DISCONTINUED | OUTPATIENT
Start: 2020-01-24 | End: 2020-01-24

## 2020-01-24 RX ORDER — NOREPINEPHRINE BITARTRATE 0.06 MG/ML
.03-.4 INJECTION, SOLUTION INTRAVENOUS CONTINUOUS PRN
Status: DISCONTINUED | OUTPATIENT
Start: 2020-01-24 | End: 2020-01-24 | Stop reason: HOSPADM

## 2020-01-24 RX ORDER — LORAZEPAM 2 MG/ML
0.5 INJECTION INTRAMUSCULAR
Status: CANCELLED | OUTPATIENT
Start: 2020-01-24

## 2020-01-24 RX ORDER — FLUMAZENIL 0.1 MG/ML
0.2 INJECTION, SOLUTION INTRAVENOUS
Status: ACTIVE | OUTPATIENT
Start: 2020-01-24 | End: 2020-01-25

## 2020-01-24 RX ORDER — LANOLIN ALCOHOL/MO/W.PET/CERES
3 CREAM (GRAM) TOPICAL
Status: DISCONTINUED | OUTPATIENT
Start: 2020-01-24 | End: 2020-01-25 | Stop reason: HOSPADM

## 2020-01-24 RX ORDER — PHENYLEPHRINE HCL IN 0.9% NACL 50MG/250ML
.5-6 PLASTIC BAG, INJECTION (ML) INTRAVENOUS CONTINUOUS PRN
Status: DISCONTINUED | OUTPATIENT
Start: 2020-01-24 | End: 2020-01-24 | Stop reason: HOSPADM

## 2020-01-24 RX ORDER — LORAZEPAM 0.5 MG/1
0.5 TABLET ORAL
Status: CANCELLED | OUTPATIENT
Start: 2020-01-24

## 2020-01-24 RX ADMIN — MIDAZOLAM 1 MG: 1 INJECTION INTRAMUSCULAR; INTRAVENOUS at 15:45

## 2020-01-24 RX ADMIN — HUMAN ALBUMIN MICROSPHERES AND PERFLUTREN 6 ML: 10; .22 INJECTION, SOLUTION INTRAVENOUS at 09:57

## 2020-01-24 RX ADMIN — ACETAMINOPHEN 650 MG: 325 TABLET, FILM COATED ORAL at 10:08

## 2020-01-24 RX ADMIN — ONDANSETRON 4 MG: 2 INJECTION INTRAMUSCULAR; INTRAVENOUS at 10:09

## 2020-01-24 RX ADMIN — FENTANYL CITRATE 50 MCG: 50 INJECTION, SOLUTION INTRAMUSCULAR; INTRAVENOUS at 15:43

## 2020-01-24 RX ADMIN — MELATONIN 3 MG: 3 TAB ORAL at 21:47

## 2020-01-24 RX ADMIN — FENTANYL CITRATE 50 MCG: 50 INJECTION, SOLUTION INTRAMUSCULAR; INTRAVENOUS at 15:45

## 2020-01-24 RX ADMIN — MIDAZOLAM 1 MG: 1 INJECTION INTRAMUSCULAR; INTRAVENOUS at 15:43

## 2020-01-24 RX ADMIN — LIDOCAINE HYDROCHLORIDE 6 ML: 10 INJECTION, SOLUTION EPIDURAL; INFILTRATION; INTRACAUDAL; PERINEURAL at 15:43

## 2020-01-24 RX ADMIN — ACETAMINOPHEN 325 MG: 325 TABLET, FILM COATED ORAL at 19:54

## 2020-01-24 RX ADMIN — DIPHENHYDRAMINE HYDROCHLORIDE 25 MG: 25 SOLUTION ORAL at 21:50

## 2020-01-24 RX ADMIN — ASPIRIN 325 MG: 325 TABLET, COATED ORAL at 12:23

## 2020-01-24 RX ADMIN — LISINOPRIL 2.5 MG: 2.5 TABLET ORAL at 21:47

## 2020-01-24 ASSESSMENT — ACTIVITIES OF DAILY LIVING (ADL)
ADLS_ACUITY_SCORE: 13
ADLS_ACUITY_SCORE: 11
ADLS_ACUITY_SCORE: 11
ADLS_ACUITY_SCORE: 12
ADLS_ACUITY_SCORE: 11

## 2020-01-24 NOTE — PLAN OF CARE
BP stable. Up independently in room. Denies CP, SOB. C/o HA which started in ED yesterday and has progressivly worsened. Gave PRN tylenol with minimal relief. Pt became nauseous, gave PRN IV zofran with reported decrease in sx. Plan for Angio today. Pt NPO.

## 2020-01-24 NOTE — PLAN OF CARE
A&Ox4. Ind in room. Denies CP. VSS on RA. CMS intact. Tele NSR. Denies SOB. CXR done. Hep gtt 6.5. Trop 0.071-0.086. NPO midnight. Plan for echo and Cards consult.

## 2020-01-24 NOTE — PRE-PROCEDURE
GENERAL PRE-PROCEDURE:   Procedure:  Coronary angiogram  Date/Time:  1/24/2020 3:35 PM    Verbal consent obtained?: Yes    Written consent obtained?: Yes    Risks and benefits: Risks, benefits and alternatives were discussed    Consent given by:  Patient  Patient states understanding of procedure being performed: Yes    Patient's understanding of procedure matches consent: Yes    Procedure consent matches procedure scheduled: Yes    Expected level of sedation:  Moderate  Appropriately NPO:  Yes  ASA Class:  Class 3- Severe systemic disease, definite functional limitations  Mallampati  :  Grade 2- soft palate, base of uvula, tonsillar pillars, and portion of posterior pharyngeal wall visible  Lungs:  Lungs clear with good breath sounds bilaterally  Heart:  Normal heart sounds and rate  History & Physical reviewed:  History and physical reviewed and no updates needed  Statement of review:  I have reviewed the lab findings, diagnostic data, medications, and the plan for sedation

## 2020-01-24 NOTE — PLAN OF CARE
A&Ox4.  VSS.  RA.  Denied chest pain or shortness of breath.  Headache, tylenol, ice and heat utilized. Hep drip infusing.  IVF increased per pre-procedure orders.  Independent-calls if feels she wants someone to walk to bathroom with her.  Tele SR.  Plan for angio this afternoon.  NPO.

## 2020-01-24 NOTE — PLAN OF CARE
Pt A+Ox4. VSS on RA. Denies Pain. Denies SOB. Denies Nausea. Moving IND. NPO since 0000. Continent and Voiding adequately. IVF running 50mL/hr. Heparin gtt running 6.5mL/hr. Tele: SR with abnormal T-wave. Troponin trending downwards. Cardiology consulted and echo ordered for today. Will continue to monitor.

## 2020-01-24 NOTE — ACP (ADVANCE CARE PLANNING)
SPIRITUAL HEALTH SERVICES Progress Note  [facility]    Initial visit per Advance Directive consult order.  SH brought pt the [employer] HCD form and explained its content/intent.  Pt asked questions which SH answered.  Pt intends to complete it later, and expressed awareness of the [employer] website to use if/as needed.                                                                                                                                            Nathan Bro M.Div., [facility]  Staff   Pager [phone]

## 2020-01-24 NOTE — PROGRESS NOTES
"[facility]  Hospitalist Progress Note  Date of Service (when I saw the patient): 01/24/2020    Assessment & Plan   Clara Bokyin is a 59 year old year old female without a significant PMH other than HTN. Pt was admitted to [facility] on 1/23/2020.  The following problems were addressed during her hospitalization:    Summary:  NSTEMI  Presented initially with exertional fatigue and shortness of breath that started 5 days prior to admission. She kyle her own troponin III days prior to admission that was minimally elevated at 0.13 per her report. Troponin now minimally elevated overnight, but trending down (0.071 -->0.078 --> 0.086 --> 0.088 --> 0.071). EKG does show ST depressions in the inferolateral leads. NSTEMI makes the most sense here, especially with the EKG changes, however her symptom-time course and the correlation with minimal troponin changes suggest considering other differentials including demand, arrhythmia, or even PE. She has no symptoms of shortness of breath currently, no leg pain, no surgical or long travel history, and given her lack of current symptoms this seems unlikely.   - CXR without acute abnormality  - Serial troponins completed and trending down as above  - Echocardiogram completed 1/24 showing \"ejection fraction is estimated at 60-65%, severe apical wall hypokinesis, mild to moderate (1-2+) tricuspid regurgitation, trace to mild aortic regurgitation\"  - Cardiology consulted and recommend coronary angiogram today  - Continue Heparin drip, 325mg Aspirin daily until coronary angiogram. Defer management to cardiology recs  - Pt reports Heparin drip is causing Headache worsened by no caffeine. This is causing nausea and vomiting. Pt refusing most interventions as she wants minimal medication possible as she feels she's hyper-responsive and doesn't believe in the efficacy of many medications (including Tylenol). I have made PO Tylenol available PRN (pt " refusing IV/supp), just wants a heat pack. Zofran and Compazine has also been made available, but pt mostly refusing that    History of Hypertension  - continue PTA lisinopril    DVT Prophylaxis: Heparin drip  Code Status: Full Code  Disposition: Expected discharge in 1-2 days once cardiac work-up complete.    Lisseth Rodriguez PA-C    Interval History   Pt reports that she's feeling much worse today than when she came in. She had a headache that started in the ED that she believe was caused by the Heparin drip, but this has worsened with caffeine withdrawal. She would like minimal interventions though.     -Data reviewed today: I reviewed all new labs and imaging results over the last 24 hours. I personally reviewed no images or EKG's today.    Physical Exam   Temp: 98.2  F (36.8  C) Temp src: Oral BP: 116/74 Pulse: 60 Heart Rate: 70 Resp: 16 SpO2: 98 % O2 Device: None (Room air)    Vitals:    01/23/20 1513 01/23/20 1808 01/24/20 0630   Weight: 54.4 kg (120 lb) 55 kg (121 lb 4.8 oz) 55 kg (121 lb 4.4 oz)     Vital Signs with Ranges  Temp:  [97.9  F (36.6  C)-98.6  F (37  C)] 98.2  F (36.8  C)  Pulse:  [59-78] 60  Heart Rate:  [63-80] 70  Resp:  [10-25] 16  BP: (116-147)/() 116/74  SpO2:  [96 %-99 %] 98 %  I/O last 3 completed shifts:  In: 1106.67 [P.O.:900; I.V.:206.67]  Out: 1625 [Urine:1625]    Constitutional: alert, oriented, no acute distress  Eyes: No scleral icterus or injection  ENT: Normocephalic, atraumatic  Respiratory: No secondary muscle use or labored breathing. Lungs clear to auscultation bilaterally without wheezes or rhonchi   Cardiovascular: RRR without murmur. No chest pain  GI: Abdomen soft, non-tender to palpation, non-distended.  Bowel sounds active  MSK: able to move extremities on command without weakness, walks without weakness or ataxia  Skin/Integumen: warm, dry, in tact without rash or hives  Lymph: no LE edema      Medications     HEParin 650 Units/hr (01/24/20 0157)     - MEDICATION  INSTRUCTIONS -       - MEDICATION INSTRUCTIONS -       sodium chloride       sodium chloride 50 mL/hr at 20 2352       aspirin  325 mg Oral Daily     lisinopril  2.5 mg Oral At Bedtime     sodium chloride (PF)  3 mL Intracatheter Q8H       Data   Recent Labs   Lab 20  0557 20  0130 20  2232 20  1902 20  1523   WBC 4.4  --   --   --  5.9   HGB 13.5  --   --   --  13.6   MCV 92  --   --   --  92     --   --   --  266     --   --   --  140   POTASSIUM 4.0  --   --   --  3.8   CHLORIDE 109  --   --   --  107   CO2 24  --   --   --  28   BUN 25  --   --   --  28   CR 0.62  --   --   --  0.61   ANIONGAP 5  --   --   --  5   NO 8.8  --   --   --  9.2   GLC 90  --   --   --  116*   ALBUMIN 3.4  --   --   --   --    PROTTOTAL 6.8  --   --   --   --    BILITOTAL 0.6  --   --   --   --    ALKPHOS 60  --   --   --   --    ALT 27  --   --   --   --    AST 26  --   --   --   --    TROPI  --  0.071* 0.088* 0.086* 0.071*       Recent Results (from the past 24 hour(s))   XR Chest 2 Views    Narrative    CHEST TWO VIEWS 2020 8:36 PM     HISTORY: Chest discomfort    COMPARISON: None.    FINDINGS: Heart size and pulmonary vascularity are within normal  limits. The lungs are clear. No pneumothorax or pleural effusion.       Impression    IMPRESSION: No radiographic evidence of acute chest abnormality.     DUANE MARIEE MD   Echocardiogram Complete    Narrative    [id number]  [id number]  [id number]  [id number]^PHILIP^ANTONIETTA^[facility]  Echocardiography Laboratory  [address] [place] [id number]        Name: RAMIN OJNES  MRN: [id number]  : 1960  Study Date: 2020 09:30 AM  Age: 59 yrs  Gender: Female  Patient Location: [facility]  Reason For Study: Chest Pain  Ordering Physician: ANTONIETTA PALACIOS  Performed By: Skye Almazan     BSA: 1.6 m2  Height: 65 in  Weight: 120 lb  HR: 55  BP: 125/77  mmHg  _____________________________________________________________________________  __        Procedure  Complete Portable Echo Adult. Optison (NDC #4017-4703) given intravenously.  _____________________________________________________________________________  __        Interpretation Summary     No previous echocardiogram for comparison  The visual ejection fraction is estimated at 60-65%.  There is severe apical wall hypokinesis.  There is mild to moderate (1-2+) tricuspid regurgitation.  There is trace to mild aortic regurgitation.  _____________________________________________________________________________  __        Left Ventricle  The left ventricle is normal in size. There is normal left ventricular wall  thickness. The visual ejection fraction is estimated at 60-65%. Left  ventricular diastolic function is indeterminate. There is severe apical wall  hypokinesis. No evidence of left ventricular mass or tumors.     Right Ventricle  The right ventricle is normal in structure, function and size.     Atria  Normal left atrial size. Right atrial size is normal.     Mitral Valve  The mitral valve leaflets appear normal. There is no evidence of stenosis,  fluttering, or prolapse. There is trace mitral regurgitation.        Tricuspid Valve  There is mild to moderate (1-2+) tricuspid regurgitation.     Aortic Valve  There is trivial trileaflet aortic sclerosis. There is trace to mild aortic  regurgitation.     Pulmonic Valve  Normal pulmonic valve.     Vessels  The aortic root is normal size.     Pericardium  The pericardium appears normal.        Rhythm  Sinus rhythm was noted.  _____________________________________________________________________________  __  MMode/2D Measurements & Calculations     IVSd: 0.95 cm  LVIDd: 4.0 cm  LVIDs: 1.9 cm  LVPWd: 0.95 cm  FS: 54.1 %  LV mass(C)d: 120.0 grams  LV mass(C)dI: 75.3 grams/m2  Ao root diam: 2.9 cm  LA dimension: 2.9 cm  asc Aorta Diam: 3.5 cm  LA/Ao: 1.0  LVOT  diam: 1.8 cm  LVOT area: 2.5 cm2  LA Volume (BP): 44.1 ml  LA Volume Index (BP): 27.7 ml/m2  RWT: 0.47           Doppler Measurements & Calculations  MV E max ernesto: 113.0 cm/sec  MV A max renesto: 74.7 cm/sec  MV E/A: 1.5  MV max P.0 mmHg  MV mean P.1 mmHg  MV V2 VTI: 38.6 cm  MV dec slope: 530.4 cm/sec2  PA V2 max: 98.5 cm/sec  PA max PG: 3.9 mmHg  PA acc time: 0.17 sec  TR max ernesto: 241.1 cm/sec  TR max P.3 mmHg  E/E' av.5  Lateral E/e': 12.1  Medial E/e': 12.8              _____________________________________________________________________________  __        Report approved by: Sarah Garnett 2020 01:16 PM

## 2020-01-24 NOTE — PROGRESS NOTES
SPIRITUAL HEALTH SERVICES Progress Note  [facility]    Initial visit per Advance Directive consult order. SH brought pt HCD form and explained its content.  Pt asked questions which SH answered.  Pt intends to complete it later. Pt also asked for prayer which SH provided.                                                                                                                                            Nathan Bro M.Div., [facility]  Staff   Pager [phone]

## 2020-01-24 NOTE — PLAN OF CARE
Vitals: WNL  Lungs: WNL  CV: WNL at rest. NSR/SB  GI: WNL  Uro: WNL  CMS: WNL  Neuro: Headache, improving post angio. Coffee given.   Skin: WNL except right groin puncture site. Dry and intact.   Psych:  WNL  MSK: WNL. Bedrest post agio. Independent post bedrest.   Pain: Headache improving post angio. Sipping on coffee.   Labs: Trop flat. WNL  Tests/Procedures: Echo done. Angio done. No intervention.   Other:  No intervention during Angio. Awaiting further plan of care from cardiology. Heparin gtt stopped.

## 2020-01-24 NOTE — CONSULTS
Cardiology Consultation      Clara Boykin MRN# [id number]   YOB: 1960 Age: 59 year old   Date of Admission: 1/23/2020     Reason for consult: NSTEMI, SOB           Assessment and Plan:     59-year-old female with a past medical history significant for hypertension, admitted to [facility] with a 5-day history of dyspnea on exertion and mild chest discomfort.  Cardiac troponins are mildly elevated with an EKG demonstrating new inferolateral ST-T wave abnormalities.      1. Chest discomfort/ACOSTA with mildly elevated troponins and abnormal EKG with inferolateral ST abnormalities  2. HTN    PLAN  -Coronary angiography today.  I have explained the indications, risks and benefits of coronary angiography to the patient in detail who is agreeable with proceeding.  -Will review echocardiogram (already ordered)  -She may need outpatient event monitoring if her angiogram is within normal limits, given that she was also experiencing palpitations prior to admission.             Chief Complaint:   Abnormal Labs           History of Present Illness:   This patient is a 59 year old female who carries a diagnosis of mild hypertension.  She is a very active individual who runs regularly, but states that in the past week or so she is noted progressive fatigue, palpitations and some chest discomfort.  The symptoms were occurring on a fairly regular basis over the past few days and she actually ordered a troponin on herself earlier this week.  This was elevated at 0.13 and she decided to come in for further evaluation.    Here she was noted to have an abnormal EKG on presentation with inferolateral ST-T wave abnormalities.  Cardiac troponins were also mildly abnormal although flat in trajectory.  Given these abnormalities, she was started on intravenous heparin and cardiology were consulted.    As stated above, she is a very healthy individual who ran a 10 mile race as recently as October.  She denies any PND  orthopnea or lower extremity edema.  Interestingly, she was experiencing a sensation of a rapid heartbeat prior to admission.         Physical Exam:   Vitals were reviewed  Blood pressure 125/77, pulse 69, temperature 98.2  F (36.8  C), temperature source Oral, resp. rate 16, weight 55 kg (121 lb 4.4 oz), last menstrual period 2006, SpO2 97 %, not currently breastfeeding.  Temperatures:  Current - Temp: 98.2  F (36.8  C); Max - Temp  Av.2  F (36.8  C)  Min: 97.9  F (36.6  C)  Max: 98.6  F (37  C)  Respiration range: Resp  Av.2  Min: 10  Max: 25  Pulse range: Pulse  Av.1  Min: 63  Max: 78  Blood pressure range: Systolic (24hrs), Av , Min:120 , Max:147   ; Diastolic (24hrs), Av, Min:65, Max:129    Pulse oximetry range: SpO2  Av.1 %  Min: 96 %  Max: 98 %    Intake/Output Summary (Last 24 hours) at 2020 0918  Last data filed at 2020 0617  Gross per 24 hour   Intake 1106.67 ml   Output 1625 ml   Net -518.33 ml     Constitutional:   awake, alert, cooperative, no apparent distress, and appears stated age     Eyes:   Lids and lashes normal, pupils equal, round and reactive to light, extra ocular muscles intact, sclera clear, conjunctiva normal     Neck:   supple, symmetrical, trachea midline, no JVD     Back:   symmetric     Lungs:   No increased work of breathing, good air exchange, clear to auscultation bilaterally, no crackles or wheezing  clear to auscultation     Cardiovascular:   Normal apical impulse, regular rate and rhythm, normal S1 and S2, no S3 or S4, and no murmur noted.        Abdomen:   non-tender     Musculoskeletal:   motor strength is 5 out of 5 all extremities bilaterally     Neurologic:   Grossly nonfocal     Skin:   no bruising or bleeding     Additional findings:     No edema               Past Medical History:   I have reviewed this patient's past medical history  Past Medical History:   Diagnosis Date     Diplopia      H/O CT scan      H/O magnetic  resonance imaging      Paralytic strabismus      Pneumonia      PONV (postoperative nausea and vomiting)              Past Surgical History:   I have reviewed this patient's past surgical history  Past Surgical History:   Procedure Laterality Date     APPENDECTOMY       AS KNEE SCOPE, DIAGNOSTIC       BUNIONECTOMY Right     times 2     BUNIONECTOMY Left 10/22/2018    Procedure: LEFT REVISION BUNION ;  Surgeon: Johanna Lund MD;  Location: [facility]     HERNIORRHAPHY VENTRAL N/A 2016    Procedure: HERNIORRHAPHY VENTRAL;  Surgeon: Ash Thompson MD;  Location: [facility] OR     left clavical       NECK SURGERY       RECESSION RESECTION (REPAIR STRABISMUS) Right 4/10/2017    Procedure: RECESSION RESECTION (REPAIR STRABISMUS);  Surgeon: Lyle Leggett MD;  Location: [facility] OR     WRIST SURGERY                 Social History:   I have reviewed this patient's social history  Social History     Tobacco Use     Smoking status: Never Smoker     Smokeless tobacco: Never Used   Substance Use Topics     Alcohol use: Yes     Comment: rare             Family History:   I have reviewed this patient's family history  Family History   Problem Relation Age of Onset     Cancer Mother         skin cancer     Diabetes Mother         borderline/Type 2     Hypertension Mother         3 meds: 4.5cm asc aortic aneurysm     Hyperlipidemia Mother         hi chol & triglycerides     Cerebrovascular Disease Mother         many small, cog. impaired     Osteoporosis Mother         fosamax x 5 yrs     Obesity Mother         BMI 35 or so     Unknown/Adopted Mother         dermatomyositis since      Cancer Father         skin cancer     Coronary Artery Disease Father         angioplasty ~     Hypertension Father         1 med, mild. Age 94     Depression Father         2x: age 87 [facility]     Cancer Maternal Grandmother         skin cancer     Cerebrovascular Disease Maternal Grandmother          from multiple  CVA     Obesity Maternal Grandmother         overweight     Diabetes Maternal Grandfather         borderline/Type 2     Coronary Artery Disease Maternal Grandfather         2-3 MIs; worked after each     Cerebrovascular Disease Maternal Grandfather          from CVA postop hernia     Obesity Maternal Grandfather         was overwt to obese     Diabetes Cousin         prediabetes     Hypertension Brother         1 med for 20 yrs     Substance Abuse Brother         hx of EtOH     Cerebrovascular Disease Other         cog. impairment like my mom     Mental Illness Sister         ?bipolar; past chem dep     Substance Abuse Sister         hx of: prescript drug& EtOH     Unknown/Adopted Sister         sister is adopted     Anesthesia Reaction Other         naus/vomit 1-8 hr postop unless tx'd     Anesthesia Reaction No family hx of              Allergies:     Allergies   Allergen Reactions     Exparel [Bupivacaine] GI Disturbance     Patient had severe abdominal distention     Darvocet [Propoxyphene N-Apap] Other (See Comments)     confusion     Liposomes GI Disturbance     Oxycodone      Penicillins Itching     Percocet [Oxycodone-Acetaminophen] Other (See Comments)     confusion     Tape [Adhesive Tape] Rash     Once after surgical tape     Vistaril [Hydroxyzine] Rash     Intense flushing/redness of face              Medications:   I have reviewed this patient's current medications  Medications Prior to Admission   Medication Sig Dispense Refill Last Dose     CALCIUM PO Take 1,200 mg by mouth daily   2020 at Unknown time     diphenhydrAMINE (BENADRYL) 25 MG tablet Take 12.5-25 mg by mouth nightly as needed Reported on 3/31/2017   prn     lisinopril (PRINIVIL/ZESTRIL) 5 MG tablet Take 2.5 mg by mouth every evening   2020     melatonin 3 MG tablet Take 3 mg by mouth nightly as needed for sleep   prn     multivitamin, therapeutic (THERA-VIT) TABS tablet Take 1 tablet by mouth daily   2020 at Unknown time      Omega-3 Fatty Acids (OMEGA 3 PO) Take 1 g by mouth daily Reported on 3/31/2017   1/23/2020 at Unknown time     traZODone (DESYREL) 50 MG tablet 1/2 pill nightly as needed. (Patient taking differently: Take 12.5-25 mg by mouth nightly as needed (1/4 to 1/2 tablet)) 45 tablet 3 prn     VITAMIN D, CHOLECALCIFEROL, PO Take 1,000 Units by mouth daily   1/23/2020 at Unknown time     Current Facility-Administered Medications Ordered in Epic   Medication Dose Route Frequency Last Rate Last Dose     diphenhydrAMINE (BENADRYL) solution 12.5-25 mg  12.5-25 mg Oral At Bedtime PRN   25 mg at 01/23/20 2128     heparin 25,000 units in 0.45% NaCl 250 mL ANTICOAGULANT  infusion  650 Units/hr Intravenous Continuous 6.5 mL/hr at 01/24/20 0157 650 Units/hr at 01/24/20 0157     lidocaine (LMX4) cream   Topical Q1H PRN         lidocaine 1 % 0.1-1 mL  0.1-1 mL Other Q1H PRN         lisinopril (PRINIVIL/Zestril) tablet 2.5 mg  2.5 mg Oral At Bedtime   2.5 mg at 01/23/20 2128     melatonin tablet 1 mg  1 mg Oral At Bedtime PRN   1 mg at 01/23/20 2133     naloxone (NARCAN) injection 0.1-0.4 mg  0.1-0.4 mg Intravenous Q2 Min PRN         Patient is already receiving anticoagulation with heparin, enoxaparin (LOVENOX), warfarin (COUMADIN)  or other anticoagulant medication   Does not apply Continuous PRN         sodium chloride (PF) 0.9% PF flush 3 mL  3 mL Intracatheter q1 min prn   3 mL at 01/23/20 2352     sodium chloride (PF) 0.9% PF flush 3 mL  3 mL Intracatheter Q8H   3 mL at 01/24/20 0008     sodium chloride 0.9% infusion   Intravenous Continuous 50 mL/hr at 01/23/20 2352       No current [facility]-ordered outpatient medications on file.             Review of Systems:   The 10 point Review of Systems is negative other than noted in the HPI            Data:   All laboratory data reviewed  Results for orders placed or performed during the hospital encounter of 01/23/20 (from the past 24 hour(s))   CBC with platelets differential   Result  Value Ref Range    WBC 5.9 4.0 - 11.0 10e9/L    RBC Count 4.45 3.8 - 5.2 10e12/L    Hemoglobin 13.6 11.7 - 15.7 g/dL    Hematocrit 40.9 35.0 - 47.0 %    MCV 92 78 - 100 fl    MCH 30.6 26.5 - 33.0 pg    MCHC 33.3 31.5 - 36.5 g/dL    RDW 13.4 10.0 - 15.0 %    Platelet Count 266 150 - 450 10e9/L    Diff Method Automated Method     % Neutrophils 61.4 %    % Lymphocytes 29.6 %    % Monocytes 7.1 %    % Eosinophils 1.2 %    % Basophils 0.5 %    % Immature Granulocytes 0.2 %    Nucleated RBCs 0 0 /100    Absolute Neutrophil 3.6 1.6 - 8.3 10e9/L    Absolute Lymphocytes 1.8 0.8 - 5.3 10e9/L    Absolute Monocytes 0.4 0.0 - 1.3 10e9/L    Absolute Eosinophils 0.1 0.0 - 0.7 10e9/L    Absolute Basophils 0.0 0.0 - 0.2 10e9/L    Abs Immature Granulocytes 0.0 0 - 0.4 10e9/L    Absolute Nucleated RBC 0.0    Basic metabolic panel   Result Value Ref Range    Sodium 140 133 - 144 mmol/L    Potassium 3.8 3.4 - 5.3 mmol/L    Chloride 107 94 - 109 mmol/L    Carbon Dioxide 28 20 - 32 mmol/L    Anion Gap 5 3 - 14 mmol/L    Glucose 116 (H) 70 - 99 mg/dL    Urea Nitrogen 28 7 - 30 mg/dL    Creatinine 0.61 0.52 - 1.04 mg/dL    GFR Estimate >90 >60 mL/min/[1.73_m2]    GFR Estimate If Black >90 >60 mL/min/[1.73_m2]    Calcium 9.2 8.5 - 10.1 mg/dL   Troponin I   Result Value Ref Range    Troponin I ES 0.071 (H) 0.000 - 0.045 ug/L   EKG 12-lead, tracing only   Result Value Ref Range    Interpretation ECG Click View Image link to view waveform and result    Hemoglobin A1c   Result Value Ref Range    Hemoglobin A1C 5.8 (H) 0 - 5.6 %   Troponin I   Result Value Ref Range    Troponin I ES 0.086 (H) 0.000 - 0.045 ug/L   XR Chest 2 Views    Narrative    CHEST TWO VIEWS 1/23/2020 8:36 PM     HISTORY: Chest discomfort    COMPARISON: None.    FINDINGS: Heart size and pulmonary vascularity are within normal  limits. The lungs are clear. No pneumothorax or pleural effusion.       Impression    IMPRESSION: No radiographic evidence of acute chest abnormality.      DUANE MARIEE MD   Troponin I   Result Value Ref Range    Troponin I ES 0.088 (H) 0.000 - 0.045 ug/L   Troponin I   Result Value Ref Range    Troponin I ES 0.071 (H) 0.000 - 0.045 ug/L   Heparin 10a Level   Result Value Ref Range    Heparin 10A Level 0.20 IU/mL   CBC with platelets differential   Result Value Ref Range    WBC 4.4 4.0 - 11.0 10e9/L    RBC Count 4.44 3.8 - 5.2 10e12/L    Hemoglobin 13.5 11.7 - 15.7 g/dL    Hematocrit 40.7 35.0 - 47.0 %    MCV 92 78 - 100 fl    MCH 30.4 26.5 - 33.0 pg    MCHC 33.2 31.5 - 36.5 g/dL    RDW 13.5 10.0 - 15.0 %    Platelet Count 242 150 - 450 10e9/L    Diff Method Automated Method     % Neutrophils 37.3 %    % Lymphocytes 47.7 %    % Monocytes 10.2 %    % Eosinophils 3.9 %    % Basophils 0.9 %    % Immature Granulocytes 0.0 %    Nucleated RBCs 0 0 /100    Absolute Neutrophil 1.6 1.6 - 8.3 10e9/L    Absolute Lymphocytes 2.1 0.8 - 5.3 10e9/L    Absolute Monocytes 0.5 0.0 - 1.3 10e9/L    Absolute Eosinophils 0.2 0.0 - 0.7 10e9/L    Absolute Basophils 0.0 0.0 - 0.2 10e9/L    Abs Immature Granulocytes 0.0 0 - 0.4 10e9/L    Absolute Nucleated RBC 0.0    Comprehensive metabolic panel   Result Value Ref Range    Sodium 138 133 - 144 mmol/L    Potassium 4.0 3.4 - 5.3 mmol/L    Chloride 109 94 - 109 mmol/L    Carbon Dioxide 24 20 - 32 mmol/L    Anion Gap 5 3 - 14 mmol/L    Glucose 90 70 - 99 mg/dL    Urea Nitrogen 25 7 - 30 mg/dL    Creatinine 0.62 0.52 - 1.04 mg/dL    GFR Estimate >90 >60 mL/min/[1.73_m2]    GFR Estimate If Black >90 >60 mL/min/[1.73_m2]    Calcium 8.8 8.5 - 10.1 mg/dL    Bilirubin Total 0.6 0.2 - 1.3 mg/dL    Albumin 3.4 3.4 - 5.0 g/dL    Protein Total 6.8 6.8 - 8.8 g/dL    Alkaline Phosphatase 60 40 - 150 U/L    ALT 27 0 - 50 U/L    AST 26 0 - 45 U/L   Heparin Xa (10a) Level   Result Value Ref Range    Heparin 10A Level 0.14 IU/mL   Lipid panel reflex to direct LDL   Result Value Ref Range    Cholesterol 194 <200 mg/dL    Triglycerides 47 <150 mg/dL    HDL  Cholesterol 74 >49 mg/dL    LDL Cholesterol Calculated 111 (H) <100 mg/dL    Non HDL Cholesterol 120 <130 mg/dL     EKG results: NSR with inferolateral ST depressions, new c/w EKG from 04/2019.

## 2020-01-25 ENCOUNTER — APPOINTMENT (OUTPATIENT)
Dept: CARDIOLOGY | Facility: CLINIC | Age: 60
DRG: 281 | End: 2020-01-25
Attending: INTERNAL MEDICINE
Payer: COMMERCIAL

## 2020-01-25 VITALS
TEMPERATURE: 97.5 F | HEART RATE: 57 BPM | DIASTOLIC BLOOD PRESSURE: 75 MMHG | BODY MASS INDEX: 20.1 KG/M2 | SYSTOLIC BLOOD PRESSURE: 120 MMHG | RESPIRATION RATE: 16 BRPM | OXYGEN SATURATION: 97 % | WEIGHT: 120.8 LBS

## 2020-01-25 LAB
ANION GAP SERPL CALCULATED.3IONS-SCNC: 1 MMOL/L (ref 3–14)
BASOPHILS # BLD AUTO: 0 10E9/L (ref 0–0.2)
BASOPHILS NFR BLD AUTO: 0.5 %
BUN SERPL-MCNC: 21 MG/DL (ref 7–30)
CALCIUM SERPL-MCNC: 9.1 MG/DL (ref 8.5–10.1)
CHLORIDE SERPL-SCNC: 108 MMOL/L (ref 94–109)
CO2 SERPL-SCNC: 30 MMOL/L (ref 20–32)
CREAT SERPL-MCNC: 0.68 MG/DL (ref 0.52–1.04)
DIFFERENTIAL METHOD BLD: NORMAL
EOSINOPHIL # BLD AUTO: 0.1 10E9/L (ref 0–0.7)
EOSINOPHIL NFR BLD AUTO: 2.1 %
ERYTHROCYTE [DISTWIDTH] IN BLOOD BY AUTOMATED COUNT: 13.4 % (ref 10–15)
GFR SERPL CREATININE-BSD FRML MDRD: >90 ML/MIN/{1.73_M2}
GLUCOSE SERPL-MCNC: 101 MG/DL (ref 70–99)
HCT VFR BLD AUTO: 41.6 % (ref 35–47)
HGB BLD-MCNC: 14 G/DL (ref 11.7–15.7)
IMM GRANULOCYTES # BLD: 0 10E9/L (ref 0–0.4)
IMM GRANULOCYTES NFR BLD: 0 %
LYMPHOCYTES # BLD AUTO: 1.5 10E9/L (ref 0.8–5.3)
LYMPHOCYTES NFR BLD AUTO: 35.7 %
MCH RBC QN AUTO: 31 PG (ref 26.5–33)
MCHC RBC AUTO-ENTMCNC: 33.7 G/DL (ref 31.5–36.5)
MCV RBC AUTO: 92 FL (ref 78–100)
MONOCYTES # BLD AUTO: 0.4 10E9/L (ref 0–1.3)
MONOCYTES NFR BLD AUTO: 10.3 %
NEUTROPHILS # BLD AUTO: 2.2 10E9/L (ref 1.6–8.3)
NEUTROPHILS NFR BLD AUTO: 51.4 %
NRBC # BLD AUTO: 0 10*3/UL
NRBC BLD AUTO-RTO: 0 /100
PLATELET # BLD AUTO: 249 10E9/L (ref 150–450)
POTASSIUM SERPL-SCNC: 4.1 MMOL/L (ref 3.4–5.3)
RBC # BLD AUTO: 4.52 10E12/L (ref 3.8–5.2)
SODIUM SERPL-SCNC: 139 MMOL/L (ref 133–144)
WBC # BLD AUTO: 4.3 10E9/L (ref 4–11)

## 2020-01-25 PROCEDURE — 99232 SBSQ HOSP IP/OBS MODERATE 35: CPT | Mod: 25 | Performed by: INTERNAL MEDICINE

## 2020-01-25 PROCEDURE — 99239 HOSP IP/OBS DSCHRG MGMT >30: CPT | Performed by: INTERNAL MEDICINE

## 2020-01-25 PROCEDURE — 93270 REMOTE 30 DAY ECG REV/REPORT: CPT

## 2020-01-25 PROCEDURE — 25000132 ZZH RX MED GY IP 250 OP 250 PS 637: Performed by: INTERNAL MEDICINE

## 2020-01-25 PROCEDURE — 80048 BASIC METABOLIC PNL TOTAL CA: CPT | Performed by: PHYSICIAN ASSISTANT

## 2020-01-25 PROCEDURE — 36415 COLL VENOUS BLD VENIPUNCTURE: CPT | Performed by: PHYSICIAN ASSISTANT

## 2020-01-25 PROCEDURE — 85025 COMPLETE CBC W/AUTO DIFF WBC: CPT | Performed by: PHYSICIAN ASSISTANT

## 2020-01-25 PROCEDURE — 93272 ECG/REVIEW INTERPRET ONLY: CPT | Performed by: INTERNAL MEDICINE

## 2020-01-25 RX ORDER — ATORVASTATIN CALCIUM 20 MG/1
20 TABLET, FILM COATED ORAL EVERY EVENING
Status: DISCONTINUED | OUTPATIENT
Start: 2020-01-25 | End: 2020-01-25 | Stop reason: HOSPADM

## 2020-01-25 RX ORDER — ASPIRIN 81 MG/1
81 TABLET, CHEWABLE ORAL DAILY
Status: DISCONTINUED | OUTPATIENT
Start: 2020-01-25 | End: 2020-01-25 | Stop reason: HOSPADM

## 2020-01-25 RX ORDER — ASPIRIN 81 MG/1
81 TABLET, CHEWABLE ORAL DAILY
Qty: 90 TABLET | Refills: 1 | Status: SHIPPED | OUTPATIENT
Start: 2020-01-26

## 2020-01-25 RX ORDER — METOPROLOL TARTRATE 25 MG/1
12.5 TABLET, FILM COATED ORAL 2 TIMES DAILY
Qty: 60 TABLET | Refills: 1 | Status: SHIPPED | OUTPATIENT
Start: 2020-01-25 | End: 2020-02-21

## 2020-01-25 RX ORDER — ATORVASTATIN CALCIUM 20 MG/1
20 TABLET, FILM COATED ORAL EVERY EVENING
Qty: 90 TABLET | Refills: 1 | Status: SHIPPED | OUTPATIENT
Start: 2020-01-25 | End: 2020-03-10

## 2020-01-25 RX ADMIN — METOPROLOL TARTRATE 12.5 MG: 25 TABLET, FILM COATED ORAL at 12:09

## 2020-01-25 RX ADMIN — ASPIRIN 81 MG 81 MG: 81 TABLET ORAL at 12:09

## 2020-01-25 ASSESSMENT — ACTIVITIES OF DAILY LIVING (ADL)
ADLS_ACUITY_SCORE: 11

## 2020-01-25 NOTE — DISCHARGE INSTRUCTIONS
Cardiac Angiogram Discharge Instructions - Femoral    After you go home:      Have an adult stay with you until tomorrow.    Drink extra fluids for 2 days.    You may resume your normal diet.    No smoking       For 24 hours - due to the sedation you received:    Relax and take it easy.    Do NOT make any important or legal decisions.    Do NOT drive or operate machines at home or at work.    Do NOT drink alcohol.    Care of Groin Puncture Site:      For the first 24 hrs - check the puncture site every 1-2 hours while awake.    For 2 days, when you cough, sneeze, laugh or move your bowels, hold your hand over the puncture site and press firmly.    Remove the bandaid after 24 hours. If there is minor oozing, apply another bandaid and remove it after 12 hours.    It is normal to have a small bruise or pea size lump at the site.    You may shower today. Do NOT take a bath, or use a hot tub or pool for at least 3 days. Do NOT scrub the site. Do not use lotion or powder near the puncture site.    Activity:            For 2 days:    No stooping or squatting    Do NOT do any heavy activity such as exercise, lifting, or straining.     No housework, yard work or any activity that make you sweat    Do NOT lift more than 10 pounds    Bleeding:      If you start bleeding from the site in your groin, lie down flat and press firmly on/above the site for 10 minutes.     Once bleeding stops, lay flat for 2 hours.     Call [facility] as soon as you can.       Call 911 right away if you have heavy bleeding or bleeding that does not stop.        Follow Up Appointments:      Follow up with [facility] Nurse Practitioner at [facility] of patient preference in 7-10 days.    Call the clinic if:      You have increased pain or a large or growing hard lump around the site.    The site is red, swollen, hot or tender.    Blood or fluid is draining from the site.    You have chills or a fever greater than 101 F (38 C).    Your leg feels  numb, cool or changes color.    You have hives, a rash or unusual itching.    New pain in the back or belly that you cannot control with Tylenol.    Any questions or concerns.          [facility] at [place]:    [phone] UMP (7 days a week)

## 2020-01-25 NOTE — PLAN OF CARE
Vitals: WNL  Lungs: WNL  CV: NSR  GI: WNL  Uro: WNL  CMS: WNL  Neuro: WNL  Skin: Right groin dry and intact   Psych: WNL  MSK: WNL. Indp   Pain: Small residual headache. Improved from yesterday.   Labs: WNL  Tests/Procedures: na  Other:  Discharing home. Education on care/restrictions, when to seek medical attention, medications, and follow up plan. Discharged with event monitor and new medications.

## 2020-01-25 NOTE — DISCHARGE SUMMARY
[facility] Discharge Summary    Clara Boykin MRN# [id number]   Age: 59 year old YOB: 1960     Date of Admission:  1/23/2020  Date of Discharge::  1/25/2020  Admitting Physician:  Thai Vo DO  Discharge Physician:  Tolu Foy MD  Primary Physician: Wegener, Joel Daniel Irwin  Transferring Facility: N/A     Home clinic: unknown          Admission Diagnoses:   NSTEMI (non-ST elevated myocardial infarction) (H) [I21.4]          Discharge Diagnosis:     Principle diagnosis: NSTEMI  Secondary diagnoses:  Patient Active Problem List   Diagnosis     iamJOINT PAIN-PELVIS     Pain in joint, upper arm     Skin exam, screening for cancer     CARDIOVASCULAR SCREENING; LDL GOAL LESS THAN 160     Insomnia     History of anesthesia reaction     Small vessel disease, cerebrovascular     Right foot pain     Hip pain, right     NSTEMI (non-ST elevated myocardial infarction) (H)          Brief History of Presenting Illness:   As per admit hx  Clara Boykin is a 59 year old female who was rough raking on Saturday when she felt some progressive fatigue. She felt like it was extremely difficult to rake she was so tired. She felt her heart rate was high, but when she checked her pulse it was 71 upon returning inside.     She felt a little winded on Sunday as well when she was working outside.      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 She sent her own troponin on Tuesday, and then returned elevated 0.13 today and so she decided to come in. So she found a sitter for her dog and then came to the ED.     She ran a 10 mile in October but hasnt been able to run lately    She states that she has LDL has been hard to keep less than 100.      ECHO-Interpretation Summary     No previous echocardiogram for comparison  The visual ejection fraction is estimated at 60-65%.  There is severe apical wall hypokinesis.  There is mild  to moderate (1-2+) tricuspid regurgitation.  There is trace to mild aortic regurgitation.    CATH-1.  Mild, non-obstructive CAD  2.  Normal LVEF with mild apical anterior hypokinesis on ventriculogram  3.  Mildly elevated LVEDP of 17 mmHg  _____________________________________________________________________________         Hospital Course: 1.   Myocardial infarction with evidence of apical hypokinesis but nonobstructive coronary artery disease on angiography.       Presented with sob/ chest discomfort. See detail sin admit hx. Had mildly positive trops and EKG changes. Was on hepatin gtt. S/p cath-non obstructive CAD. Differential would include a stress-induced cardiomyopathy (she does admit to high levels of stress at home) versus an embolic event or spontaneous plaque rupture with recanalization.  --started on aspirin/statin/beta-blocker therapy in the short-term.  A 30-day event monitor will be ordered to rule out occult atrial fibrillation, and a cardiac MRI will be performed for a follow-up assessment in 3 to 4 weeks    2. HTN  Was already on lisinopril. Continue along with above meds.    3.Dyslipidemia  Started on lipitor.           Procedures:   CATH as above         Allergies:      Allergies   Allergen Reactions     Exparel [Bupivacaine] GI Disturbance     Patient had severe abdominal distention     Darvocet [Propoxyphene N-Apap] Other (See Comments)     confusion     Liposomes GI Disturbance     Oxycodone      Penicillins Itching     Percocet [Oxycodone-Acetaminophen] Other (See Comments)     confusion     Tape [Adhesive Tape] Rash     Once after surgical tape     Vistaril [Hydroxyzine] Rash     Intense flushing/redness of face              Medications Prior to Admission:     Medications Prior to Admission   Medication Sig Dispense Refill Last Dose     CALCIUM PO Take 1,200 mg by mouth daily   1/23/2020 at Unknown time     diphenhydrAMINE (BENADRYL) 25 MG tablet Take 12.5-25 mg by mouth nightly as needed  Reported on 3/31/2017   prn     lisinopril (PRINIVIL/ZESTRIL) 5 MG tablet Take 2.5 mg by mouth every evening   1/17/2020     melatonin 3 MG tablet Take 3 mg by mouth nightly as needed for sleep   prn     multivitamin, therapeutic (THERA-VIT) TABS tablet Take 1 tablet by mouth daily   1/23/2020 at Unknown time     Omega-3 Fatty Acids (OMEGA 3 PO) Take 1 g by mouth daily Reported on 3/31/2017   1/23/2020 at Unknown time     traZODone (DESYREL) 50 MG tablet 1/2 pill nightly as needed. (Patient taking differently: Take 12.5-25 mg by mouth nightly as needed (1/4 to 1/2 tablet)) 45 tablet 3 prn     VITAMIN D, CHOLECALCIFEROL, PO Take 1,000 Units by mouth daily   1/23/2020 at Unknown time             Discharge Medications:     Current Discharge Medication List      START taking these medications    Details   aspirin (ASA) 81 MG chewable tablet Take 1 tablet (81 mg) by mouth daily  Qty: 90 tablet, Refills: 1    Associated Diagnoses: NSTEMI (non-ST elevated myocardial infarction) (H)      atorvastatin (LIPITOR) 20 MG tablet Take 1 tablet (20 mg) by mouth every evening  Qty: 90 tablet, Refills: 1    Associated Diagnoses: NSTEMI (non-ST elevated myocardial infarction) (H)      metoprolol tartrate (LOPRESSOR) 25 MG tablet Take 0.5 tablets (12.5 mg) by mouth 2 times daily  Qty: 60 tablet, Refills: 1    Associated Diagnoses: NSTEMI (non-ST elevated myocardial infarction) (H)         CONTINUE these medications which have NOT CHANGED    Details   CALCIUM PO Take 1,200 mg by mouth daily      diphenhydrAMINE (BENADRYL) 25 MG tablet Take 12.5-25 mg by mouth nightly as needed Reported on 3/31/2017      lisinopril (PRINIVIL/ZESTRIL) 5 MG tablet Take 2.5 mg by mouth every evening      melatonin 3 MG tablet Take 3 mg by mouth nightly as needed for sleep      multivitamin, therapeutic (THERA-VIT) TABS tablet Take 1 tablet by mouth daily      Omega-3 Fatty Acids (OMEGA 3 PO) Take 1 g by mouth daily Reported on 3/31/2017      traZODone  (DESYREL) 50 MG tablet 1/2 pill nightly as needed.  Qty: 45 tablet, Refills: 3    Associated Diagnoses: Primary insomnia      VITAMIN D, CHOLECALCIFEROL, PO Take 1,000 Units by mouth daily                   Consultations:   Consultation during this admission received from cardiology            Discharge Exam:   Blood pressure 120/75, pulse 57, temperature 97.5  F (36.4  C), temperature source Oral, resp. rate 16, weight 54.8 kg (120 lb 12.8 oz), last menstrual period 09/06/2006, SpO2 97 %, not currently breastfeeding.  GENERAL APPEARANCE: healthy, alert and no distress  EYES: conjunctiva clear, eyes grossly normal  HENT: external ears and nose normal   NECK: supple, no masses or adenopathy  RESP: lungs clear to auscultation - no rales, rhonchi or wheezes  CV: regular rate and rhythm, normal S1 S2, no S3 or S4 and no murmur, click or rub   ABDOMEN: soft, nontender, no HSM or masses and bowel sounds normal  MS: no clubbing, cyanosis; no edema  SKIN: clear without significant rashes or lesions  NEURO: Normal strength and tone, sensory exam grossly normal, mentation intact and speech normal    Unresulted Labs Ordered in the Past 30 Days of this Admission     No orders found from 12/24/2019 to 1/24/2020.          Recent Results (from the past 24 hour(s))   Cardiac Catheterization    Narrative    1.  Mild, non-obstructive CAD  2.  Normal LVEF with mild apical anterior hypokinesis on ventriculogram  3.  Mildly elevated LVEDP of 17 mmHg            Pending Tests at Discharge:   None         Discharge Instructions and Follow-Up:     Discharge diet: Regular   Discharge activity: Activity as tolerated   Discharge follow-up: Cards as scheduled           Discharge Disposition:     Discharged to home      Attestation:  I have reviewed today's vital signs, notes, medications, labs and imaging.    Time Spent on this Encounter   I, Tolu Foy MD, personally saw the patient today and spent greater than 30 minutes discharging this  patient.    Tolu Foy MD

## 2020-01-25 NOTE — PLAN OF CARE
VSS. Tele SR. Independent in room. Headache pain treated with heat and tylenol. Patient declined treatment of back pain. Plan for today will be to discharge after cardiology follow up.

## 2020-01-25 NOTE — PROGRESS NOTES
Cardiology Progress Note          Assessment and Plan:         59-year-old female with a past medical history significant for hypertension, admitted to [facility] with a 5-day history of dyspnea on exertion and mild chest discomfort.  Cardiac troponins mildly elevated with an EKG demonstrating new inferolateral ST-T wave abnormalities.  Although her echocardiogram demonstrated hypokinesis of the left ventricular apex, subsequent coronary angiography demonstrated only mild nonobstructive coronary artery disease.        1. Myocardial infarction with evidence of apical hypokinesis but nonobstructive coronary artery disease on angiography.  2. HTN  3. Dyslipidemia    PLAN  -I reviewed the echocardiogram and angiographic findings with the patient in detail.  At this point in time the differential would include a stress-induced cardiomyopathy (she does admit to high levels of stress at home) versus an embolic event or spontaneous plaque rupture with recanalization.  There also appears to be a relative increase in wall thickness at the level of the apex, although this may be due to foreshortening of these images.  -I have recommended initiation of aspirin/statin/beta-blocker therapy in the short-term.  A 30-day event monitor will be ordered to rule out occult atrial fibrillation, and a cardiac MRI will be performed for a follow-up assessment in 3 to 4 weeks.  I have asked her to refrain from heavy exertion until the work-up is complete.  -Assuming her clinical status remains stable, I think she is appropriate for discharge today from a cardiac standpoint.           Interval History:     No chest discomfort or shortness of breath.       Review of Systems:   As per subjective, otherwise 5 systems reviewed and negative.           Physical Exam:   Blood pressure 102/63, pulse 60, temperature 97.5  F (36.4  C), temperature source Oral, resp. rate 16, weight 54.8 kg (120 lb 12.8 oz), last menstrual period 09/06/2006,  SpO2 96 %, not currently breastfeeding.      Vital Sign Ranges  Temperature Temp  Av.8  F (36.6  C)  Min: 97.5  F (36.4  C)  Max: 98.2  F (36.8  C)   Blood pressure Systolic (24hrs), Av , Min:102 , Max:135        Diastolic (24hrs), Av, Min:59, Max:83      Pulse Pulse  Av.5  Min: 59  Max: 60   Respirations Resp  Av  Min: 16  Max: 16   Pulse oximetry SpO2  Av.3 %  Min: 96 %  Max: 99 %         Intake/Output Summary (Last 24 hours) at 2020 1001  Last data filed at 2020 0800  Gross per 24 hour   Intake 780 ml   Output --   Net 780 ml       Constitutional: NAD  Skin: Warm and dry  Neck: No JVD  Lungs: CTA  Cardiovascular: RRR, no m/r/g  Abdomen: Soft, non tender.  Extremities and Back: No LE edema  Neurological: Nonfocal           Medications:     I have reviewed this patient's current medications.              Data:     Labs reviewed.     Tele: SHASHA Dudley MD, M.D.

## 2020-01-27 ENCOUNTER — TELEPHONE (OUTPATIENT)
Dept: FAMILY MEDICINE | Facility: CLINIC | Age: 60
End: 2020-01-27

## 2020-01-27 ENCOUNTER — TELEPHONE (OUTPATIENT)
Dept: CARDIOLOGY | Facility: CLINIC | Age: 60
End: 2020-01-27

## 2020-01-27 ENCOUNTER — PATIENT OUTREACH (OUTPATIENT)
Dept: CARE COORDINATION | Facility: CLINIC | Age: 60
End: 2020-01-27

## 2020-01-27 NOTE — PROGRESS NOTES
Clinic Care Coordination Contact    Clinical Product Navigator RN reviewed chart; patient on payer product coverage.  Review results: Not met any any referral criteria at this time.  Will monitor for future needs    Shagufta Frausto RN/Clinical Product Navigator

## 2020-01-27 NOTE — TELEPHONE ENCOUNTER
30 day [employer] monitor recommended at discharge 1- to R/O A Fib.    Strip baseline 1- noted SR rate 66 bpm.   Strip in file.

## 2020-01-27 NOTE — TELEPHONE ENCOUNTER
"Patient was evaluated by cardiology while inpatient for 5 day c/o SOB and chest pain-NSTEMI. 1/24/20: Coronary angiogram via RFA showed mild nonocclusive CAD. Apical hypokinesis noted on Echo with normal EF. Per Dr. Dudley's IP note and plan of care: \"I have recommended initiation of aspirin/statin/beta-blocker therapy in the short-term.  A 30-day event monitor will be ordered to rule out occult atrial fibrillation, and a cardiac MRI will be performed for a follow-up assessment in 3 to 4 weeks.  I have asked her to refrain from heavy exertion until the work-up is complete.\" Writer attempted to call patient to discuss any post hospital d/c questions she may have, review medication changes, and confirm f/u appts, but no answer. VM left to return my phone call. RN will confirm with patient that she has an apt scheduled on 1/25/20 to have cardiac event monitor placed. 2/14/20 for cMRI and on 2/18/20 with JURGEN Tatianna Cooney. ANY Rushing RN.  "

## 2020-01-28 ENCOUNTER — MYC MEDICAL ADVICE (OUTPATIENT)
Dept: FAMILY MEDICINE | Facility: CLINIC | Age: 60
End: 2020-01-28

## 2020-01-28 NOTE — TELEPHONE ENCOUNTER
Writer called back and denies any chest pain, SOB or lightheadedness. Denies any medication questions. RFA access site is healing without signs of infection, swelling or drainage. Wearing 30 day cardiac event monitor. Reviewed f/u OV's as below and pt verbalized understanding without further questions. ANY Rushing RN.

## 2020-01-28 NOTE — TELEPHONE ENCOUNTER
"Hospital/TCU/ED for chronic condition Discharge Protocol    \"Hi, my name is Willow Daniels RN, a registered nurse, and I am calling from [facility].  I am calling to follow up and see how things are going for you after your recent emergency visit/hospital/TCU stay.\"    Tell me how you are doing now that you are home?\" Doing well.  No chest pain or fluttering feeling.    Heparin and NPO status gave her a headache.  Small headache still present.    Discussed with patient headache could also be side effect of one of new medications and typically side effects resolve after a couple weeks.    Patient plans to contact PCP clinic if unable to get temporary disability parking certificate form completed by cardiology clinic.      Discharge Instructions    \"Let's review your discharge instructions.  What is/are the follow-up recommendations?  Pt. Response: Cardiac MRI on 2/14/20 and then follow up with cardiology provider.    \"Has an appointment with your primary care provider been scheduled?\"   No, does not plan to have PCP follow up at this time    \"When you see the provider, I would recommend that you bring your medications with you.\"    Medications    \"Tell me what changed about your medicines when you discharged?\"    Changes to chronic meds?    3 medications but was told may not have to be on Metoprolol for life.    \"What questions do you have about your medications?\"    None     New diagnoses of heart failure, COPD, diabetes, or MI?    Had NSTEMI and is being overwhelmed with follow up calls.  Has follow up plan in place and does not think she needs any more help at this time.              Post Discharge Medication Reconciliation Status: unable to reconcile discharge medications due to patient stated she had to get to work.    Was MTM referral placed (*Make sure to put transitions as reason for referral)?   No    Call Summary    \"What questions or concerns do you have about your recent visit and your follow-up " "care?\"     none    \"If you have questions or things don't continue to improve, we encourage you contact us through the main clinic number (give number).  Even if the clinic is not open, triage nurses are available 24/7 to help you.     We would like you to know that our clinic has extended hours (provide information).  We also have urgent care (provide details on closest location and hours/contact info)\"      \"Thank you for your time and take care!\"             "

## 2020-01-28 NOTE — TELEPHONE ENCOUNTER
Dr. Wegener-Please advise if you would be willing to complete Handicap parking permit for patient?    Thank you!  VALENTÍN ShepardN, RN

## 2020-01-28 NOTE — TELEPHONE ENCOUNTER
Dr. Wegener-please review.     Writer is able to locate cardiac catheterization result dated 1/23/20.    Thank you!  VALENTÍN ShepardN, RN

## 2020-01-29 NOTE — TELEPHONE ENCOUNTER
Dr. Wegener-Please review patient's response.    Application for Disability Parking Certificate printed and placed at Dr. Wegener's desk in Forms folder.    Thank you!  .VALENTÍN ShepardN, RN

## 2020-01-29 NOTE — TELEPHONE ENCOUNTER
Pt called and states she awoke during the middle of the night to use the restroom and noticed her HR was 51. States normally it is in the upper 50's. . Pt asking if this is normal with Metoprolol. Instructed pt that slowed HR with Metoprolol is expected, and that HR's greater than 50 are acceptable, especially since pt is asymptomatic. Instructed pt that not uncommon for slowed HR during sleeping hours in addition. Instructed pt that if HR consistently in the 40's or she becomes symptomatic, call us back. Pt verbalized understanding. ANY Rushing RN.

## 2020-02-04 ENCOUNTER — MYC MEDICAL ADVICE (OUTPATIENT)
Dept: FAMILY MEDICINE | Facility: CLINIC | Age: 60
End: 2020-02-04

## 2020-02-04 ENCOUNTER — MYC REFILL (OUTPATIENT)
Dept: FAMILY MEDICINE | Facility: CLINIC | Age: 60
End: 2020-02-04

## 2020-02-04 DIAGNOSIS — F51.01 PRIMARY INSOMNIA: ICD-10-CM

## 2020-02-04 DIAGNOSIS — I21.4 NSTEMI (NON-ST ELEVATED MYOCARDIAL INFARCTION) (H): Primary | ICD-10-CM

## 2020-02-04 PROBLEM — E78.5 HYPERLIPIDEMIA LDL GOAL <70: Status: ACTIVE | Noted: 2020-02-04

## 2020-02-04 PROBLEM — I25.10 CORONARY ARTERY DISEASE INVOLVING NATIVE CORONARY ARTERY OF NATIVE HEART WITHOUT ANGINA PECTORIS: Status: ACTIVE | Noted: 2020-02-04

## 2020-02-04 RX ORDER — TRAZODONE HYDROCHLORIDE 50 MG/1
TABLET, FILM COATED ORAL
Qty: 45 TABLET | Refills: 1 | Status: SHIPPED | OUTPATIENT
Start: 2020-02-04 | End: 2021-07-08

## 2020-02-04 RX ORDER — LISINOPRIL 2.5 MG/1
2.5 TABLET ORAL DAILY
Qty: 90 TABLET | Refills: 1 | Status: SHIPPED | OUTPATIENT
Start: 2020-02-04 | End: 2020-02-21

## 2020-02-04 NOTE — TELEPHONE ENCOUNTER
"Requested Prescriptions   Pending Prescriptions Disp Refills     traZODone (DESYREL) 50 MG tablet 45 tablet 3     Si/2 pill nightly as needed.       Serotonin Modulators Passed - 2020 11:41 AM        Passed - Recent (12 mo) or future (30 days) visit within the authorizing provider's specialty     Patient has had an office visit with the authorizing provider or a provider within the authorizing providers department within the previous 12 mos or has a future within next 30 days. See \"Patient Info\" tab in inbasket, or \"Choose Columns\" in Meds & Orders section of the refill encounter.              Passed - Medication is active on med list        Passed - Patient is age 18 or older        Passed - No active pregnancy on record        Passed - No positive pregnancy test in past 12 months        Signed Prescriptions:                        Disp   Refills    traZODone (DESYREL) 50 MG tablet           45 tab*1        Si/2 pill nightly as needed.  Authorizing Provider: WEGENER, JOEL DANIEL IRWIN  Ordering User: DANIEL FARRELL      "

## 2020-02-04 NOTE — TELEPHONE ENCOUNTER
Dr. Wegener-Please review and sign if agree.    Med and pharmacy pended.    Lisinopril 2.5 mg is listed as historical but was entered into chart on 1/23/20 by pharmacist: NIKHIL Brock formerly [facility], which was during patient's hospitalization.    Thank you!  LAISHA Daniels, BSN, RN

## 2020-02-13 ENCOUNTER — TRANSFERRED RECORDS (OUTPATIENT)
Dept: HEALTH INFORMATION MANAGEMENT | Facility: CLINIC | Age: 60
End: 2020-02-13

## 2020-02-14 ENCOUNTER — HOSPITAL ENCOUNTER (OUTPATIENT)
Dept: CARDIOLOGY | Facility: CLINIC | Age: 60
Discharge: HOME | End: 2020-02-14
Attending: INTERNAL MEDICINE | Admitting: INTERNAL MEDICINE
Payer: COMMERCIAL

## 2020-02-14 DIAGNOSIS — I21.4 NSTEMI (NON-ST ELEVATED MYOCARDIAL INFARCTION) (H): ICD-10-CM

## 2020-02-14 PROCEDURE — 75561 CARDIAC MRI FOR MORPH W/DYE: CPT | Mod: 26 | Performed by: INTERNAL MEDICINE

## 2020-02-14 PROCEDURE — 75561 CARDIAC MRI FOR MORPH W/DYE: CPT

## 2020-02-14 PROCEDURE — 25500064 ZZH RX 255 OP 636: Performed by: INTERNAL MEDICINE

## 2020-02-14 PROCEDURE — A9585 GADOBUTROL INJECTION: HCPCS | Performed by: INTERNAL MEDICINE

## 2020-02-14 RX ORDER — GADOBUTROL 604.72 MG/ML
5-65 INJECTION INTRAVENOUS ONCE
Status: COMPLETED | OUTPATIENT
Start: 2020-02-14 | End: 2020-02-14

## 2020-02-14 RX ADMIN — GADOBUTROL 7.5 ML: 604.72 INJECTION INTRAVENOUS at 13:00

## 2020-02-18 ENCOUNTER — MYC MEDICAL ADVICE (OUTPATIENT)
Dept: FAMILY MEDICINE | Facility: CLINIC | Age: 60
End: 2020-02-18

## 2020-02-19 ENCOUNTER — MYC MEDICAL ADVICE (OUTPATIENT)
Dept: FAMILY MEDICINE | Facility: CLINIC | Age: 60
End: 2020-02-19

## 2020-02-19 NOTE — TELEPHONE ENCOUNTER
Routing to provider - Shon - please review - do you have any additional recommendations?  Do you want annual office visit?    Patient update:  Hyperlipidemia - see mychart      Cholesterol 01/24/2020 194  <200 mg/dL Final     Triglycerides 01/24/2020 47  <150 mg/dL Final     HDL Cholesterol 01/24/2020 74  >49 mg/dL Final     LDL Cholesterol Calculated 01/24/2020 111* <100 mg/dL Final    Comment: Above desirable:  100-129 mg/dl  Borderline High:  130-159 mg/dL  High:             160-189 mg/dL  Very high:       >189 mg/dl       Non HDL Cholesterol 01/24/2020 120  <130 mg/dL Final     Medications:  atorvastatin (LIPITOR) 20 MG tablet - once daily    Currently prescribed by Tolu Foy MD    Last office visit Wegener - 12/3/19

## 2020-02-21 ENCOUNTER — OFFICE VISIT (OUTPATIENT)
Dept: CARDIOLOGY | Facility: CLINIC | Age: 60
End: 2020-02-21
Attending: INTERNAL MEDICINE
Payer: COMMERCIAL

## 2020-02-21 VITALS
DIASTOLIC BLOOD PRESSURE: 64 MMHG | HEIGHT: 65 IN | HEART RATE: 59 BPM | OXYGEN SATURATION: 98 % | SYSTOLIC BLOOD PRESSURE: 112 MMHG | WEIGHT: 121.11 LBS | BODY MASS INDEX: 20.18 KG/M2

## 2020-02-21 DIAGNOSIS — I21.4 NSTEMI (NON-ST ELEVATED MYOCARDIAL INFARCTION) (H): ICD-10-CM

## 2020-02-21 DIAGNOSIS — I42.2 HYPERTROPHIC NONOBSTRUCTIVE CARDIOMYOPATHY (H): Primary | ICD-10-CM

## 2020-02-21 PROCEDURE — 99215 OFFICE O/P EST HI 40 MIN: CPT | Performed by: PHYSICIAN ASSISTANT

## 2020-02-21 RX ORDER — METOPROLOL TARTRATE 25 MG/1
25 TABLET, FILM COATED ORAL 2 TIMES DAILY
Qty: 60 TABLET | Refills: 5 | Status: SHIPPED | OUTPATIENT
Start: 2020-02-21 | End: 2020-03-10

## 2020-02-21 ASSESSMENT — MIFFLIN-ST. JEOR: SCORE: 1115.23

## 2020-02-21 NOTE — LETTER
2/21/2020    Joel Daniel Wegener, MD  [address]  [facility] [address]    RE: Clara Boykin       Dear Colleague,    I had the pleasure of seeing Clara Boykin in the [facility].      Cardiology Clinic Progress Note    Clara Boykin MRN# [id number]   YOB: 1960 Age: 59 year old   Primary cardiologist: Dr. Dudley         Assessment and Plan:     In summary, Clara Boykin presents today for a hospital f/u visit.     1. NSTEMI, with mild CAD on cath - currently wearing event monitor to rule out thrombotic MI, low suspicion.   2. Apical HCM with hyperdynamic LV  3. HTN  4. Mild-moderate TR  5. Carotid artery disease  6. Hyperlipidemia -  --> 63 after one month of statin therapy.     Plan:  - Stop lisinopril, increase Lopressor to 25 mg BID.   - Will review event monitor once results have returned.   - Referred to .   - Advised that she may continue activity as tolerated. This type of HCM is a much more benign form and associated with less arrhythmias than the septal form.     Follow-up:  - Me in 1 month.   - Dr. Dudley in 3 months.        History of Presenting Illness:      Clara Boykin is a pleasant 59 year old patient who presents today for a hospital f/u visit.    The patient has a history of the following -   # Apical hypertrophic cardiomyopathy  # Hypertension  # Hyperlipidemia  # Social - father (93 yo) also has apical hypokinesis on ECHO, just diagnosed. No children. Exercises and runs races occasionally. Works as part-time faculty at [employer]     Clara was recently admitted to [facility] with a 5-day history of dyspnea on exertion and mild chest discomfort.  Cardiac troponins mildly elevated with an EKG demonstrating new inferolateral ST-T wave abnormalities.  Although her echocardiogram demonstrated hypokinesis of the left ventricular apex, subsequent coronary angiography demonstrated only mild nonobstructive coronary artery disease.  "She was discharged with aspirin, statin, beta blocker and an event monitor to rule out arrhythmia possibly contributing to an embolic MI.   Outpatient Cardiac MRI showed a relative thickness of >1.5 of apical-basal wall thickness across multiple long and short axis measurements, and a very small focal area of late gadolinium enhancement imaging involving the true left ventricular apex. This is suggestive of apical hypertrophic cardiomyopathy. The LV was hyperdynamic with an EF of 75%.     Today, she's feeling quite well. Patient denies chest pain, shortness of breath, PND, orthopnea, edema, claudication, palpitations, near syncope or syncope. No history of syncope or near-syncope. She has many questions regarding her diagnosis which I answered to the best of my ability.   She brings in a lab report from 2/13 from [facility] which shows an LDL of 63, HDL 77, triglycerides 71, cholesterol total 144. This demonstrates improvement from her pre-statin LDL of 111.         Review of Systems:     12-pt ROS is negative except for as noted in the HPI.          Physical Exam:     Vitals: /64   Pulse 59   Ht 1.651 m (5' 5\")   Wt 54.9 kg (121 lb 1.8 oz)   LMP 09/06/2006   SpO2 98%   BMI 20.15 kg/m     Wt Readings from Last 10 Encounters:   02/21/20 54.9 kg (121 lb 1.8 oz)   01/25/20 54.8 kg (120 lb 12.8 oz)   12/03/19 55.6 kg (122 lb 8 oz)   07/24/19 55.3 kg (122 lb)   04/10/19 56 kg (123 lb 8 oz)   12/03/18 55.3 kg (122 lb)   11/15/18 54.4 kg (120 lb)   11/08/18 54.4 kg (120 lb)   10/22/18 53.4 kg (117 lb 12.8 oz)   10/09/18 54.4 kg (120 lb)       Constitutional:  Patient is pleasant, alert, cooperative, and in NAD.  HEENT:  NCAT. PERRLA. EOM's intact.   Neck:  CVP appears normal. No carotid bruits.   Pulmonary: Normal respiratory effort. CTAB.   Cardiac: RRR, normal S1/S2, no S3/S4, no murmur or rub.   Abdomen:  Non-tender abdomen, no hepatosplenomegaly appreciated.   Vascular: Pulses in the upper and lower " extremities are 2+ and equal bilaterally.  Extremities: No edema, erythema, cyanosis or tenderness appreciated.  Skin:  No rashes or lesions appreciated.   Neurological:  No gross motor or sensory deficits.   Psych: Appropriate affect.        Data:     Labs reviewed:  Recent Labs   Lab Test 01/24/20  0557 04/20/18  0845 10/10/16  0828 09/04/15  0841   * 118* 119* 129   HDL 74 73 67 72   NHDL 120 133* 132*  --    CHOL 194 206* 199 213*   TRIG 47 73 66 62   TSH  --  1.44  --  1.22       Lab Results   Component Value Date    WBC 4.3 01/25/2020    RBC 4.52 01/25/2020    HGB 14.0 01/25/2020    HCT 41.6 01/25/2020    MCV 92 01/25/2020    MCH 31.0 01/25/2020    MCHC 33.7 01/25/2020    RDW 13.4 01/25/2020     01/25/2020       Lab Results   Component Value Date     01/25/2020    POTASSIUM 4.1 01/25/2020    CHLORIDE 108 01/25/2020    CO2 30 01/25/2020    ANIONGAP 1 (L) 01/25/2020     (H) 01/25/2020    BUN 21 01/25/2020    CR 0.68 01/25/2020    GFRESTIMATED >90 01/25/2020    GFRESTBLACK >90 01/25/2020    NO 9.1 01/25/2020      Lab Results   Component Value Date    AST 26 01/24/2020    ALT 27 01/24/2020       Lab Results   Component Value Date    A1C 5.8 (H) 01/23/2020       No results found for: INR        Problem List:     Patient Active Problem List   Diagnosis     iamJOINT PAIN-PELVIS     Pain in joint, upper arm     Skin exam, screening for cancer     CARDIOVASCULAR SCREENING; LDL GOAL LESS THAN 160     Insomnia     History of anesthesia reaction     Small vessel disease, cerebrovascular     Right foot pain     Hip pain, right     NSTEMI (non-ST elevated myocardial infarction) (H)     Coronary artery disease involving native coronary artery of native heart without angina pectoris     Hyperlipidemia LDL goal <70           Medications:     Current Outpatient Medications   Medication Sig Dispense Refill     aspirin (ASA) 81 MG chewable tablet Take 1 tablet (81 mg) by mouth daily 90 tablet 1      atorvastatin (LIPITOR) 20 MG tablet Take 1 tablet (20 mg) by mouth every evening 90 tablet 1     CALCIUM PO Take 1,200 mg by mouth daily       diphenhydrAMINE (BENADRYL) 25 MG tablet Take 12.5-25 mg by mouth nightly as needed Reported on 3/31/2017       lisinopril (PRINIVIL/ZESTRIL) 5 MG tablet Take 2.5 mg by mouth every evening       melatonin 3 MG tablet Take 3 mg by mouth nightly as needed for sleep       metoprolol tartrate (LOPRESSOR) 25 MG tablet Take 0.5 tablets (12.5 mg) by mouth 2 times daily 60 tablet 1     multivitamin, therapeutic (THERA-VIT) TABS tablet Take 1 tablet by mouth daily       Omega-3 Fatty Acids (OMEGA 3 PO) Take 1 g by mouth daily Reported on 3/31/2017       traZODone (DESYREL) 50 MG tablet 1/2 pill nightly as needed. 45 tablet 1     VITAMIN D, CHOLECALCIFEROL, PO Take 1,000 Units by mouth daily       lisinopril (PRINIVIL/ZESTRIL) 2.5 MG tablet Take 1 tablet (2.5 mg) by mouth daily 90 tablet 1           Past Medical History:     Past Medical History:   Diagnosis Date     Coronary artery disease involving native coronary artery of native heart without angina pectoris 2/4/2020    NSTEMI Jan 2020     Diplopia      H/O CT scan      H/O magnetic resonance imaging      Paralytic strabismus      Pneumonia      PONV (postoperative nausea and vomiting)      Past Surgical History:   Procedure Laterality Date     APPENDECTOMY       AS KNEE SCOPE, DIAGNOSTIC       BUNIONECTOMY Right 2016    times 2     BUNIONECTOMY Left 10/22/2018    Procedure: LEFT REVISION BUNION ;  Surgeon: Johanna Lund MD;  Location: [facility]     CV CORONARY ANGIOGRAM N/A 1/24/2020    Procedure: Coronary Angiogram;  Surgeon: Criss Green MD;  Location:  HEART CARDIAC CATH LAB     CV LEFT HEART CATH N/A 1/24/2020    Procedure: Left Heart Cath;  Surgeon: Criss Green MD;  Location:  HEART CARDIAC CATH LAB     CV LEFT VENTRICULOGRAM N/A 1/24/2020    Procedure: Left Ventriculogram;  Surgeon: Peter  Criss Ceballos MD;  Location:  HEART CARDIAC CATH LAB     HERNIORRHAPHY VENTRAL N/A 2016    Procedure: HERNIORRHAPHY VENTRAL;  Surgeon: Ash Thompson MD;  Location: [facility]     left clavical       NECK SURGERY       RECESSION RESECTION (REPAIR STRABISMUS) Right 4/10/2017    Procedure: RECESSION RESECTION (REPAIR STRABISMUS);  Surgeon: Lyle Leggett MD;  Location: [facility] OR     WRIST SURGERY       Family History   Problem Relation Age of Onset     Cancer Mother         skin cancer     Diabetes Mother         borderline/Type 2     Hypertension Mother         3 meds: 4.5cm asc aortic aneurysm     Hyperlipidemia Mother         hi chol & triglycerides     Cerebrovascular Disease Mother         many small, cog. impaired     Osteoporosis Mother         fosamax x 5 yrs     Obesity Mother         BMI 35 or so     Unknown/Adopted Mother         dermatomyositis since      Cancer Father         skin cancer     Coronary Artery Disease Father         angioplasty ~     Hypertension Father         1 med, mild. Age 94     Depression Father         2x: age 87 [facility]     Cancer Maternal Grandmother         skin cancer     Cerebrovascular Disease Maternal Grandmother          from multiple CVA     Obesity Maternal Grandmother         overweight     Diabetes Maternal Grandfather         borderline/Type 2     Coronary Artery Disease Maternal Grandfather         2-3 MIs; worked after each     Cerebrovascular Disease Maternal Grandfather          from CVA postop hernia     Obesity Maternal Grandfather         was overwt to obese     Diabetes Cousin         prediabetes     Hypertension Brother         1 med for 20 yrs     Substance Abuse Brother         hx of EtOH     Cerebrovascular Disease Other         cog. impairment like my mom     Mental Illness Sister         ?bipolar; past chem dep     Substance Abuse Sister         hx of: prescript drug& EtOH     Unknown/Adopted Sister         sister  is adopted     Anesthesia Reaction Other         naus/vomit 1-8 hr postop unless tx'd     Anesthesia Reaction No family hx of      Social History     Socioeconomic History     Marital status:      Spouse name: Not on file     Number of children: Not on file     Years of education: Not on file     Highest education level: Not on file   Occupational History     Not on file   Social Needs     Financial resource strain: Not on file     Food insecurity:     Worry: Not on file     Inability: Not on file     Transportation needs:     Medical: Not on file     Non-medical: Not on file   Tobacco Use     Smoking status: Never Smoker     Smokeless tobacco: Never Used   Substance and Sexual Activity     Alcohol use: Yes     Comment: rare     Drug use: No     Sexual activity: Never   Lifestyle     Physical activity:     Days per week: Not on file     Minutes per session: Not on file     Stress: Not on file   Relationships     Social connections:     Talks on phone: Not on file     Gets together: Not on file     Attends Baptism service: Not on file     Active member of club or organization: Not on file     Attends meetings of clubs or organizations: Not on file     Relationship status: Not on file     Intimate partner violence:     Fear of current or ex partner: Not on file     Emotionally abused: Not on file     Physically abused: Not on file     Forced sexual activity: Not on file   Other Topics Concern     Parent/sibling w/ CABG, MI or angioplasty before 65F 55M? No   Social History Narrative     Not on file           Allergies:   Exparel [bupivacaine]; Darvocet [propoxyphene n-apap]; Liposomes; Oxycodone; Penicillins; Percocet [oxycodone-acetaminophen]; Tape [adhesive tape]; and Vistaril [hydroxyzine]      Tatianna Cooney PA-C  [facility]  Pager: [phone]    Thank you for allowing me to participate in the care of your patient.    Sincerely,     Tatianna Cooney PA-C     [facility]  [facility]

## 2020-02-21 NOTE — PATIENT INSTRUCTIONS
Today's Plan:   - Your MRI is suggestive of something called Hypertrophic cardiomyopathy. I will find out if he would like you to see a  regarding this issue.   - Please stop lisinopril and increase the metoprolol to 25 mg twice daily.   - Come back to see me in 1 month and Dr. Dudley in 3 months with a cholesterol blood draw.  - Call me in the meantime if you notice any lightheadedness/dizziness or feeling like you might pass out.    If you have questions or concerns please call my nurse team at [phone].  Scheduling phone number: [phone]  Reminder: Please bring in all current medications, over the counter supplements and vitamin bottles to your next appointment.    It was a pleasure seeing you today!     Tatianna Cooney PA-C

## 2020-02-21 NOTE — PROGRESS NOTES
Cardiology Clinic Progress Note    Clara Boykin MRN# [id number]   YOB: 1960 Age: 59 year old   Primary cardiologist: Dr. Dudley         Assessment and Plan:     In summary, Clara Boykin presents today for a hospital f/u visit.     1. NSTEMI, with mild CAD on cath - currently wearing event monitor to rule out thrombotic MI, low suspicion.   2. Apical HCM with hyperdynamic LV  3. HTN  4. Mild-moderate TR  5. Carotid artery disease  6. Hyperlipidemia -  --> 63 after one month of statin therapy.     Plan:  - Stop lisinopril, increase Lopressor to 25 mg BID.   - Will review event monitor once results have returned.   - Referred to .   - Advised that she may continue activity as tolerated. This type of HCM is a much more benign form and associated with less arrhythmias than the septal form.     Follow-up:  - Me in 1 month.   - Dr. Dudley in 3 months.        History of Presenting Illness:      Clara Boykin is a pleasant 59 year old patient who presents today for a hospital f/u visit.    The patient has a history of the following -   # Apical hypertrophic cardiomyopathy  # Hypertension  # Hyperlipidemia  # Social - father (93 yo) also has apical hypokinesis on ECHO, just diagnosed. No children. Exercises and runs races occasionally. 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     Clara was recently admitted to [facility] with a 5-day history of dyspnea on exertion and mild chest discomfort.  Cardiac troponins mildly elevated with an EKG demonstrating new inferolateral ST-T wave abnormalities.  Although her echocardiogram demonstrated hypokinesis of the left ventricular apex, subsequent coronary angiography demonstrated only mild nonobstructive coronary artery disease. She was discharged with aspirin, statin, beta blocker and an event monitor to rule out arrhythmia possibly contributing to an embolic MI.   Outpatient Cardiac MRI showed a relative thickness of >1.5 of apical-basal  "wall thickness across multiple long and short axis measurements, and a very small focal area of late gadolinium enhancement imaging involving the true left ventricular apex. This is suggestive of apical hypertrophic cardiomyopathy. The LV was hyperdynamic with an EF of 75%.     Today, she's feeling quite well. Patient denies chest pain, shortness of breath, PND, orthopnea, edema, claudication, palpitations, near syncope or syncope. No history of syncope or near-syncope. She has many questions regarding her diagnosis which I answered to the best of my ability.   She brings in a lab report from 2/13 from [facility] which shows an LDL of 63, HDL 77, triglycerides 71, cholesterol total 144. This demonstrates improvement from her pre-statin LDL of 111.         Review of Systems:     12-pt ROS is negative except for as noted in the HPI.          Physical Exam:     Vitals: /64   Pulse 59   Ht 1.651 m (5' 5\")   Wt 54.9 kg (121 lb 1.8 oz)   LMP 09/06/2006   SpO2 98%   BMI 20.15 kg/m    Wt Readings from Last 10 Encounters:   02/21/20 54.9 kg (121 lb 1.8 oz)   01/25/20 54.8 kg (120 lb 12.8 oz)   12/03/19 55.6 kg (122 lb 8 oz)   07/24/19 55.3 kg (122 lb)   04/10/19 56 kg (123 lb 8 oz)   12/03/18 55.3 kg (122 lb)   11/15/18 54.4 kg (120 lb)   11/08/18 54.4 kg (120 lb)   10/22/18 53.4 kg (117 lb 12.8 oz)   10/09/18 54.4 kg (120 lb)       Constitutional:  Patient is pleasant, alert, cooperative, and in NAD.  HEENT:  NCAT. PERRLA. EOM's intact.   Neck:  CVP appears normal. No carotid bruits.   Pulmonary: Normal respiratory effort. CTAB.   Cardiac: RRR, normal S1/S2, no S3/S4, no murmur or rub.   Abdomen:  Non-tender abdomen, no hepatosplenomegaly appreciated.   Vascular: Pulses in the upper and lower extremities are 2+ and equal bilaterally.  Extremities: No edema, erythema, cyanosis or tenderness appreciated.  Skin:  No rashes or lesions appreciated.   Neurological:  No gross motor or sensory deficits.   Psych: " Appropriate affect.        Data:     Labs reviewed:  Recent Labs   Lab Test 01/24/20  0557 04/20/18  0845 10/10/16  0828 09/04/15  0841   * 118* 119* 129   HDL 74 73 67 72   NHDL 120 133* 132*  --    CHOL 194 206* 199 213*   TRIG 47 73 66 62   TSH  --  1.44  --  1.22       Lab Results   Component Value Date    WBC 4.3 01/25/2020    RBC 4.52 01/25/2020    HGB 14.0 01/25/2020    HCT 41.6 01/25/2020    MCV 92 01/25/2020    MCH 31.0 01/25/2020    MCHC 33.7 01/25/2020    RDW 13.4 01/25/2020     01/25/2020       Lab Results   Component Value Date     01/25/2020    POTASSIUM 4.1 01/25/2020    CHLORIDE 108 01/25/2020    CO2 30 01/25/2020    ANIONGAP 1 (L) 01/25/2020     (H) 01/25/2020    BUN 21 01/25/2020    CR 0.68 01/25/2020    GFRESTIMATED >90 01/25/2020    GFRESTBLACK >90 01/25/2020    NO 9.1 01/25/2020      Lab Results   Component Value Date    AST 26 01/24/2020    ALT 27 01/24/2020       Lab Results   Component Value Date    A1C 5.8 (H) 01/23/2020       No results found for: INR        Problem List:     Patient Active Problem List   Diagnosis     iamJOINT PAIN-PELVIS     Pain in joint, upper arm     Skin exam, screening for cancer     CARDIOVASCULAR SCREENING; LDL GOAL LESS THAN 160     Insomnia     History of anesthesia reaction     Small vessel disease, cerebrovascular     Right foot pain     Hip pain, right     NSTEMI (non-ST elevated myocardial infarction) (H)     Coronary artery disease involving native coronary artery of native heart without angina pectoris     Hyperlipidemia LDL goal <70           Medications:     Current Outpatient Medications   Medication Sig Dispense Refill     aspirin (ASA) 81 MG chewable tablet Take 1 tablet (81 mg) by mouth daily 90 tablet 1     atorvastatin (LIPITOR) 20 MG tablet Take 1 tablet (20 mg) by mouth every evening 90 tablet 1     CALCIUM PO Take 1,200 mg by mouth daily       diphenhydrAMINE (BENADRYL) 25 MG tablet Take 12.5-25 mg by mouth nightly as  needed Reported on 3/31/2017       lisinopril (PRINIVIL/ZESTRIL) 5 MG tablet Take 2.5 mg by mouth every evening       melatonin 3 MG tablet Take 3 mg by mouth nightly as needed for sleep       metoprolol tartrate (LOPRESSOR) 25 MG tablet Take 0.5 tablets (12.5 mg) by mouth 2 times daily 60 tablet 1     multivitamin, therapeutic (THERA-VIT) TABS tablet Take 1 tablet by mouth daily       Omega-3 Fatty Acids (OMEGA 3 PO) Take 1 g by mouth daily Reported on 3/31/2017       traZODone (DESYREL) 50 MG tablet 1/2 pill nightly as needed. 45 tablet 1     VITAMIN D, CHOLECALCIFEROL, PO Take 1,000 Units by mouth daily       lisinopril (PRINIVIL/ZESTRIL) 2.5 MG tablet Take 1 tablet (2.5 mg) by mouth daily 90 tablet 1           Past Medical History:     Past Medical History:   Diagnosis Date     Coronary artery disease involving native coronary artery of native heart without angina pectoris 2/4/2020    NSTEMI Jan 2020     Diplopia      H/O CT scan      H/O magnetic resonance imaging      Paralytic strabismus      Pneumonia      PONV (postoperative nausea and vomiting)      Past Surgical History:   Procedure Laterality Date     APPENDECTOMY       AS KNEE SCOPE, DIAGNOSTIC       BUNIONECTOMY Right 2016    times 2     BUNIONECTOMY Left 10/22/2018    Procedure: LEFT REVISION BUNION ;  Surgeon: Johanna Lund MD;  Location: [facility]     CV CORONARY ANGIOGRAM N/A 1/24/2020    Procedure: Coronary Angiogram;  Surgeon: Criss Green MD;  Location:  HEART CARDIAC CATH LAB     CV LEFT HEART CATH N/A 1/24/2020    Procedure: Left Heart Cath;  Surgeon: Criss Green MD;  Location:  HEART CARDIAC CATH LAB     CV LEFT VENTRICULOGRAM N/A 1/24/2020    Procedure: Left Ventriculogram;  Surgeon: Criss Green MD;  Location:  HEART CARDIAC CATH LAB     HERNIORRHAPHY VENTRAL N/A 7/7/2016    Procedure: HERNIORRHAPHY VENTRAL;  Surgeon: Ash Thompson MD;  Location: [facility]     left clavical       NECK SURGERY        RECESSION RESECTION (REPAIR STRABISMUS) Right 4/10/2017    Procedure: RECESSION RESECTION (REPAIR STRABISMUS);  Surgeon: Lyle Leggett MD;  Location: [facility]     WRIST SURGERY       Family History   Problem Relation Age of Onset     Cancer Mother         skin cancer     Diabetes Mother         borderline/Type 2     Hypertension Mother         3 meds: 4.5cm asc aortic aneurysm     Hyperlipidemia Mother         hi chol & triglycerides     Cerebrovascular Disease Mother         many small, cog. impaired     Osteoporosis Mother         fosamax x 5 yrs     Obesity Mother         BMI 35 or so     Unknown/Adopted Mother         dermatomyositis since      Cancer Father         skin cancer     Coronary Artery Disease Father         angioplasty ~     Hypertension Father         1 med, mild. Age 94     Depression Father         2x: age 87 [facility]     Cancer Maternal Grandmother         skin cancer     Cerebrovascular Disease Maternal Grandmother          from multiple CVA     Obesity Maternal Grandmother         overweight     Diabetes Maternal Grandfather         borderline/Type 2     Coronary Artery Disease Maternal Grandfather         2-3 MIs; worked after each     Cerebrovascular Disease Maternal Grandfather          from CVA postop hernia     Obesity Maternal Grandfather         was overwt to obese     Diabetes Cousin         prediabetes     Hypertension Brother         1 med for 20 yrs     Substance Abuse Brother         hx of EtOH     Cerebrovascular Disease Other         cog. impairment like my mom     Mental Illness Sister         ?bipolar; past chem dep     Substance Abuse Sister         hx of: prescript drug& EtOH     Unknown/Adopted Sister         sister is adopted     Anesthesia Reaction Other         naus/vomit 1-8 hr postop unless tx'd     Anesthesia Reaction No family hx of      Social History     Socioeconomic History     Marital status:      Spouse name: Not on  file     Number of children: Not on file     Years of education: Not on file     Highest education level: Not on file   Occupational History     Not on file   Social Needs     Financial resource strain: Not on file     Food insecurity:     Worry: Not on file     Inability: Not on file     Transportation needs:     Medical: Not on file     Non-medical: Not on file   Tobacco Use     Smoking status: Never Smoker     Smokeless tobacco: Never Used   Substance and Sexual Activity     Alcohol use: Yes     Comment: rare     Drug use: No     Sexual activity: Never   Lifestyle     Physical activity:     Days per week: Not on file     Minutes per session: Not on file     Stress: Not on file   Relationships     Social connections:     Talks on phone: Not on file     Gets together: Not on file     Attends [employer]ism service: Not on file     Active member of club or organization: Not on file     Attends meetings of clubs or organizations: Not on file     Relationship status: Not on file     Intimate partner violence:     Fear of current or ex partner: Not on file     Emotionally abused: Not on file     Physically abused: Not on file     Forced sexual activity: Not on file   Other Topics Concern     Parent/sibling w/ CABG, MI or angioplasty before 65F 55M? No   Social History Narrative     Not on file           Allergies:   Exparel [bupivacaine]; Darvocet [propoxyphene n-apap]; Liposomes; Oxycodone; Penicillins; Percocet [oxycodone-acetaminophen]; Tape [adhesive tape]; and Vistaril [hydroxyzine]      Tatianna Cooney PA-C  [facility]  Pager: [phone]

## 2020-02-25 NOTE — TELEPHONE ENCOUNTER
Edil End of Summary report noted - 1 baseline strip of SR rate 66 bpm.  Strip 2- SB with 3 beat atrial run   Next JURGEN OV 3-.   Dr. Dudley OV May 2020.   Dr. Dudley to sign

## 2020-02-27 NOTE — TELEPHONE ENCOUNTER
RECORDS RECEIVED FROM: Internal   DATE RECEIVED: 3-10-20   NOTES STATUS DETAILS   OFFICE NOTE from referring provider    Internal Dr. Cooney 2-21-20   OFFICE NOTE from other cardiologist    Internal Dr. Cooney 2-21-20   DISCHARGE SUMMARY from hospital    Internal 1-23-20   DISCHARGE REPORT from the ER   N/A    OPERATIVE REPORT    N/A    MEDICATION LIST   Internal    LABS     BMP   Internal 1-25-20   CBC   Internal 1-25-20   CMP   Internal 1-24-20   Lipids   Internal 4-20-18   TSH   Internal 4-20-18   DIAGNOSTIC PROCEDURES     EKG   Internal 1-23-20   Monitor Reports   In process Placed 1-25-20   IMAGING (DISC & REPORT)      Echo   Internal 1-24-20   Stress Tests   N/A    Cath   Internal 1-24-20   MRI/MRA   Internal 2-14-20   CT/CTA   N/A

## 2020-02-29 ENCOUNTER — MYC MEDICAL ADVICE (OUTPATIENT)
Dept: CARDIOLOGY | Facility: CLINIC | Age: 60
End: 2020-02-29

## 2020-03-02 ENCOUNTER — MYC MEDICAL ADVICE (OUTPATIENT)
Dept: FAMILY MEDICINE | Facility: CLINIC | Age: 60
End: 2020-03-02

## 2020-03-02 NOTE — TELEPHONE ENCOUNTER
Referral team - I support referral to [place].  Can you assist with process?  [place] previous [facility] note she outlined her insurance requirements I believe.     Joel Wegener,MD

## 2020-03-03 ENCOUNTER — MYC MEDICAL ADVICE (OUTPATIENT)
Dept: FAMILY MEDICINE | Facility: CLINIC | Age: 60
End: 2020-03-03

## 2020-03-03 ENCOUNTER — TELEPHONE (OUTPATIENT)
Dept: CARDIOLOGY | Facility: CLINIC | Age: 60
End: 2020-03-03

## 2020-03-03 NOTE — TELEPHONE ENCOUNTER
Called Clara and offered appointment within our [facility] on March 10. Provided callback information.

## 2020-03-09 NOTE — PROGRESS NOTES
Chief complaint: HOCM evaluation    HPI:   Clara Boykin is a 59 year-old female with a past medical history notable for nonobstructive coronary artery disease, carotid artery disease, hypertension, and newly diagnosed hypertrophic cardiomyopathy who presents for evaluation of hypertrophic cardiomyopathy.  The patient states that she initially developed symptoms in January of this past year.  She was shoveling snow and developed severe dyspnea.  This feeling of dyspnea persisted for a few days.  Eventually, she ordered a troponin for herself and this later returned to be elevated.  After she had the elevated troponin, she presented to [facility].  Given the elevated troponin, shortness of breath, and abnormal EKG (which is new from prior), she underwent a coronary angiogram which showed nonobstructive coronary artery disease.  She was given aspirin, atorvastatin, and metoprolol on discharge.  She later underwent a cardiac MRI which showed signs consistent with apical hypertrophic cardiomyopathy.  She presents here today for further evaluation.    With the exception of the issue that led to her being admitted to the hospital, she has been generally asymptomatic.  She states that she does feel more short of breath than typical with significant exertion, however has not been exerting herself this past winter.  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  She notably has no palpitations, presyncope, and syncope.  She states that she had one syncopal event that was associated with a seizure in the 1970s, which she was told was due to dehydration.  She has since worn an event monitor which showed no episodes of NSVT.  She has a family history of 2 siblings dying at a very young age, which was presumably due to Rh disease.  In addition, she did have 1 uncle who  while swimming, however he was in his late 70s to early 80s at the time.  Her father has been told that he is apical hypokinesis, though  this was presumed to be due to a myocardial infarction.  She has no children and she has 1 living brother.    PAST MEDICAL HISTORY:  Past Medical History:   Diagnosis Date     Apical variant hypertrophic cardiomyopathy (H) 3/10/2020     Coronary artery disease involving native coronary artery of native heart without angina pectoris 2/4/2020    NSTEMI Jan 2020     Diplopia      H/O CT scan      H/O magnetic resonance imaging      Non-obstructive CAD without angina (coronary angiogram 1/2020) 2/4/2020    NSTEMI Jan 2020     Paralytic strabismus      Pneumonia      PONV (postoperative nausea and vomiting)        CURRENT MEDICATIONS:  Current Outpatient Medications   Medication Sig Dispense Refill     aspirin (ASA) 81 MG chewable tablet Take 1 tablet (81 mg) by mouth daily 90 tablet 1     atorvastatin (LIPITOR) 20 MG tablet Take 1 tablet (20 mg) by mouth every evening 90 tablet 1     CALCIUM PO Take 1,200 mg by mouth daily       Co-Enzyme Q-10 60 MG CAPS        diphenhydrAMINE (BENADRYL) 25 MG tablet Take 12.5-25 mg by mouth nightly as needed Reported on 3/31/2017       melatonin 3 MG tablet Take 3 mg by mouth nightly as needed for sleep       metoprolol tartrate 25 MG PO tablet Take 1 tablet (25 mg) by mouth 2 times daily 60 tablet 5     multivitamin, therapeutic (THERA-VIT) TABS tablet Take 1 tablet by mouth daily       Omega-3 Fatty Acids (OMEGA 3 PO) Take 1 g by mouth daily Reported on 3/31/2017       traZODone (DESYREL) 50 MG tablet 1/2 pill nightly as needed. 45 tablet 1     VITAMIN D, CHOLECALCIFEROL, PO Take 1,000 Units by mouth daily         PAST SURGICAL HISTORY:  Past Surgical History:   Procedure Laterality Date     APPENDECTOMY       AS KNEE SCOPE, DIAGNOSTIC       BUNIONECTOMY Right 2016    times 2     BUNIONECTOMY Left 10/22/2018    Procedure: LEFT REVISION BUNION ;  Surgeon: Johanna Lund MD;  Location: [facility] CORONARY ANGIOGRAM N/A 1/24/2020    Procedure: Coronary Angiogram;  Surgeon:  Criss Green MD;  Location:  HEART CARDIAC CATH LAB     CV LEFT HEART CATH N/A 1/24/2020    Procedure: Left Heart Cath;  Surgeon: Criss Green MD;  Location:  HEART CARDIAC CATH LAB     CV LEFT VENTRICULOGRAM N/A 1/24/2020    Procedure: Left Ventriculogram;  Surgeon: Criss Green MD;  Location:  HEART CARDIAC CATH LAB     HERNIORRHAPHY VENTRAL N/A 7/7/2016    Procedure: HERNIORRHAPHY VENTRAL;  Surgeon: Ash Thompson MD;  Location: [facility] OR     left clavical       NECK SURGERY       RECESSION RESECTION (REPAIR STRABISMUS) Right 4/10/2017    Procedure: RECESSION RESECTION (REPAIR STRABISMUS);  Surgeon: Lyle Leggett MD;  Location: [facility] OR     WRIST SURGERY         ALLERGIES:     Allergies   Allergen Reactions     Exparel [Bupivacaine] GI Disturbance     Patient had severe abdominal distention     Darvocet [Propoxyphene N-Apap] Other (See Comments)     confusion     Liposomes GI Disturbance     Oxycodone      Penicillins Itching     Percocet [Oxycodone-Acetaminophen] Other (See Comments)     confusion     Tape [Adhesive Tape] Rash     Once after surgical tape     Vistaril [Hydroxyzine] Rash     Intense flushing/redness of face        FAMILY HISTORY:  Family History   Problem Relation Age of Onset     Cancer Mother         skin cancer     Diabetes Mother         borderline/Type 2     Hypertension Mother         3 meds: 4.5cm asc aortic aneurysm     Hyperlipidemia Mother         hi chol & triglycerides     Cerebrovascular Disease Mother         many small, cog. impaired     Osteoporosis Mother         fosamax x 5 yrs     Obesity Mother         BMI 35 or so     Unknown/Adopted Mother         dermatomyositis since 1974     Cancer Father         skin cancer     Coronary Artery Disease Father         angioplasty ~1980     Hypertension Father         1 med, mild. Age 94     Depression Father         2x: age 87 [facility]     Cancer Maternal Grandmother         skin  cancer     Cerebrovascular Disease Maternal Grandmother          from multiple CVA     Obesity Maternal Grandmother         overweight     Diabetes Maternal Grandfather         borderline/Type 2     Coronary Artery Disease Maternal Grandfather         2-3 MIs; worked after each     Cerebrovascular Disease Maternal Grandfather          from CVA postop hernia     Obesity Maternal Grandfather         was overwt to obese     Diabetes Cousin         prediabetes     Hypertension Brother         1 med for 20 yrs     Substance Abuse Brother         hx of EtOH     Cerebrovascular Disease Other         cog. impairment like my mom     Mental Illness Sister         ?bipolar; past chem dep     Substance Abuse Sister         hx of: prescript drug& EtOH     Unknown/Adopted Sister         sister is adopted     Anesthesia Reaction Other         naus/vomit 1-8 hr postop unless tx'd     Anesthesia Reaction No family hx of      No family history of premature CAD or sudden death.    SOCIAL HISTORY:  Social History     Tobacco Use     Smoking status: Never Smoker     Smokeless tobacco: Never Used   Substance Use Topics     Alcohol use: Yes     Comment: rare     Drug use: No       ROS:   A comprehensive 14 point review of systems is negative other than as mentioned in HPI.    Exam:  /69 (BP Location: Right arm, Patient Position: Chair, Cuff Size: Adult Regular)   Pulse 59   Wt 54.4 kg (120 lb)   LMP 2006   SpO2 98%   BMI 19.97 kg/m    GENERAL APPEARANCE: healthy, alert and no distress  EYES: no icterus, no xanthelasmas  ENT: normal palate, mucosa moist, no central cyanosis  NECK: JVP not elevated  RESPIRATORY: lungs clear to auscultation - no rales, rhonchi or wheezes, no use of accessory muscles, no retractions, respirations are unlabored, normal respiratory rate  CARDIOVASCULAR: regular rhythm, normal S1 with physiologic split S2, no S3 or S4 and no murmur, click or rub.  GI: soft, non tender, bowel sounds  normal,no abdominal bruits  EXTREMITIES: no edema, no bruits  NEURO: alert and oriented to person/place/time, normal speech, gait and affect  VASC: Radial, dorsalis pedis and posterior tibialis pulses 2+ bilaterally.  SKIN: no ecchymoses, no rashes.  PSYCH: cooperative, affect appropriate.     Labs:  Reviewed.     Testing/Procedures:  I personally visualized and interpreted:  CMR 2/14/20:  Apical HCM with apical segments ~1.3 cm (vs. 0.9 cm basal anterosetpum and 0.7 cm basal inferolateral.) Hyperdynamic LV function and normal RV function, LVEF=75% RVEF=67%. No LVOT or intracavitary obstruction. Very small apical LGE per report-- per my review, there is no convincing fibrosis on late gadolinium enhancement imaging.      Coronary angiogram 1/24/2020: non-obstructive CAD (30-40% mLAD/D1, 40% RCA)      EKG 1/24/20  infero-lateral ST t wave changes, nsr    Cardiac Event Monitor 1/25/20: single run of nonsustained SVT. No VT.      Assessment and Plan:   In summary, this is a 59-year-old female with a past medical history notable for coronary artery disease (nonobstructive), hypertrophic cardiomyopathy, carotid arterial disease, and hypertension who is presenting for further evaluation.    1.  Apical hypertrophic cardiomyopathy: The patient's MRI is consistent with apical hypertrophic cardiomyopathy.  While her apical hypertrophic cardiomyopathy is unlikely to lead to an LVOT outlet obstruction, she is at risk for malignant ventricular arrhythmias and could potentially develop heart failure with preserved ejection physiology.  Regarding the ventricular arrhythmias, she has worn an event monitor which shows no arrhythmias.  In addition, in the absence of a strong family history and an LV thickness greater than 3 cm, there is no indication for an ICD placement.    Her symptoms of dyspnea may be related to her apical hypertrophic cardiomyopathy.  Though she has no LVOT outlet obstruction, she may develop symptoms such as  heart failure with preserved ejection fraction.    She does have 1 brother whom she is already contacted regarding potential screening.  We will have her meet with a genetic counselor today regarding a possible genetic screen.    In the absence of an LVOT gradient, there is no strong indication for beta-blocker, however it is currently treating her hypertension.    Based on this, we will plan for the following:  -Decrease metoprolol to 12.5 mg twice per day (patient was symptomatic with higher doses)  -Check cardiopulmonary stress test in 3 months  -Follow-up in 3 months    2.  Coronary artery disease: Nonobstructive on coronary angiogram.  Continue aspirin and atorvastatin (decrease to 10 mg per patient preference). Recheck lipids in three months.  3.  Carotid artery disease: Noted on previous CTA.  Continue atorvastatin.  4.  Hypertension, essential: Patient reports a history of morning hypertension.  Previously treated with lisinopril.  Currently on metoprolol.    The patient states understanding and is agreeable with plan.     Celso Roth MD  Cardiolgoy Fellow    ATTENDING ATTESTATION     Patient was seen and evaluated with Dr. Roth. History was confirmed personally by me. Labs, imaging studies, EKGs, and telemetry were reviewed. Agree with assessment and plan as outlined above. The note has been edited by me as needed to produce a single, cohesive document.     75 minutes spent (greater than allotted), >50% of which were spent with the patient on in-person counseling and discussion regarding the nature and natural history of apical-variant HCM, its prognosis, outlining the diagnostic and therapeutic plan described above, reviewing imaging with the patient, and answering all questions.    Uriel Mendes MD  Staff Cardiologist          CC  SELF, REFERRED

## 2020-03-10 ENCOUNTER — OFFICE VISIT (OUTPATIENT)
Dept: CARDIOLOGY | Facility: CLINIC | Age: 60
End: 2020-03-10
Attending: PHYSICIAN ASSISTANT
Payer: COMMERCIAL

## 2020-03-10 ENCOUNTER — OFFICE VISIT (OUTPATIENT)
Dept: CARDIOLOGY | Facility: CLINIC | Age: 60
End: 2020-03-10
Attending: INTERNAL MEDICINE
Payer: COMMERCIAL

## 2020-03-10 ENCOUNTER — PRE VISIT (OUTPATIENT)
Dept: CARDIOLOGY | Facility: CLINIC | Age: 60
End: 2020-03-10

## 2020-03-10 VITALS
SYSTOLIC BLOOD PRESSURE: 109 MMHG | BODY MASS INDEX: 19.97 KG/M2 | WEIGHT: 120 LBS | OXYGEN SATURATION: 98 % | HEART RATE: 59 BPM | DIASTOLIC BLOOD PRESSURE: 69 MMHG

## 2020-03-10 DIAGNOSIS — I42.2 HYPERTROPHIC NONOBSTRUCTIVE CARDIOMYOPATHY (H): ICD-10-CM

## 2020-03-10 DIAGNOSIS — I21.4 NSTEMI (NON-ST ELEVATED MYOCARDIAL INFARCTION) (H): ICD-10-CM

## 2020-03-10 DIAGNOSIS — I42.2 APICAL VARIANT HYPERTROPHIC CARDIOMYOPATHY (H): Primary | ICD-10-CM

## 2020-03-10 DIAGNOSIS — I25.10 CORONARY ARTERY DISEASE INVOLVING NATIVE CORONARY ARTERY OF NATIVE HEART WITHOUT ANGINA PECTORIS: ICD-10-CM

## 2020-03-10 LAB — MISCELLANEOUS TEST: NORMAL

## 2020-03-10 PROCEDURE — 93005 ELECTROCARDIOGRAM TRACING: CPT | Mod: ZF

## 2020-03-10 PROCEDURE — 99214 OFFICE O/P EST MOD 30 MIN: CPT | Mod: GC | Performed by: INTERNAL MEDICINE

## 2020-03-10 PROCEDURE — G0463 HOSPITAL OUTPT CLINIC VISIT: HCPCS

## 2020-03-10 PROCEDURE — 93010 ELECTROCARDIOGRAM REPORT: CPT | Mod: ZP | Performed by: INTERNAL MEDICINE

## 2020-03-10 PROCEDURE — 96040 ZZH GENETIC COUNSELING, EACH 30 MINUTES: CPT | Mod: ZF | Performed by: GENETIC COUNSELOR, MS

## 2020-03-10 RX ORDER — METOPROLOL TARTRATE 25 MG/1
12.5 TABLET, FILM COATED ORAL 2 TIMES DAILY
Qty: 90 TABLET | Refills: 3 | Status: SHIPPED | OUTPATIENT
Start: 2020-03-10 | End: 2020-06-09

## 2020-03-10 RX ORDER — ATORVASTATIN CALCIUM 10 MG/1
10 TABLET, FILM COATED ORAL EVERY EVENING
Qty: 90 TABLET | Refills: 3 | Status: SHIPPED | OUTPATIENT
Start: 2020-03-10 | End: 2021-04-12

## 2020-03-10 ASSESSMENT — PAIN SCALES - GENERAL: PAINLEVEL: NO PAIN (0)

## 2020-03-10 NOTE — NURSING NOTE
Cardiac Testing: Patient given instructions regarding cardiopulmonary stress test. Discussed purpose, preparation, procedure and when to expect results reported back to the patient. Patient demonstrated understanding of this information and agreed to call with further questions or concerns.    Diet: Patient instructed regarding a heart healthy diet, including discussion of reduced fat and sodium intake. Patient demonstrated understanding of this information and agreed to call with further questions or concerns.    Med Reconcile: Reviewed and verified all current medications with the patient. The updated medication list was printed and given to the patient.    Return Appointment: Patient given instructions regarding scheduling next clinic visit. Patient demonstrated understanding of this information and agreed to call with further questions or concerns.    Medication Change: Patient was educated regarding prescribed medication change, including discussion of the indication, administration, side effects, and when to report to MD or RN. Patient demonstrated understanding of this information and agreed to call with further questions or concerns.    Patient stated she understood all health information given and agreed to call with further questions or concerns.    Tatianna Durham, MSN, RN, CNL  Cardiology Care Coordinator  [facility]  [phone]

## 2020-03-10 NOTE — PROGRESS NOTES
Here is a copy of the progress note from your recent genetic counseling visit [facility] on Date: 3/10/2020.    PROGRESS NOTE:Clara was referred by JEVON Roldan for genetic counseling due to her recent diagnosis of apical hypertrophic cardiomyopathy.  I had the opportunity to meet with Clara today to discuss the genetic component of DISEASE and testing options available to him/her.     MEDICAL HISTORY: Clara was admitted to the hospital in January due to ongoing chest discomfort and the suspicion of a heart attack.  It appears that she had a NSTEMI. She sent her own Troponin two days prior to ED visit.  She was admitted for further cardiac evaluation.  At that time apical hypertrophic cardiomyopathy was identified with a hyperdynamic left ventricle.      FAMILY HISTORY: A detailed family history was obtained at office visit on 3/10/2020.  (Please see scanned pedigree for details).  Family history was significant for the following: Clara reports that her 97 year old father was diagnosed with a mild stroke and STEMI in February.  He was also found to have apical hypokinesis on ECHO.    Her paternal uncle drown in a pool in his 70's.  He did not have any previous heart issues to their knowledge.  Paternal grandmother  at 54 years from a cerebral aneurysm.  Paternal grandfather  at 53 years from lung cancer.      Clara's mother  at 88 years with multiple health issues but no known heart issues.  Maternal uncle  in his 80's from vascular dementia.  Maternal grandmother  at 96 years with a history of strokes.  Maternal grandfather  in his 80's with a history of strokes and three heart attacks.      There is no additional history of cardiomyopathy, arrythmias, heart attacks, fainting, sudden cardiac death, genetic conditions, or birth defects.    DISCUSSION:  Reviewed definition of hypertrophic cardiomyopathy (HCM). Explained that a good portion of HCM  cases have a genetic component.     Reviewed autosomal dominant (AD) inheritance pattern most commonly associated with HCM, including the 50% risk for recurrence. Briefly reviewed autosomal recessive and X-linked inheritance. Explained that HCM gene mutations are associated with reduced penetrance and variable expressivity, meaning that individuals who carry a gene mutation may or may not get the disease and onset and severity can vary from one family member to the next. This explains why you may not see cardiomyopathy in each generation of a family.     Currently over 25 genes have been found to be related to HCM. Genetic testing currently identifies mutations in about 50-60% of idiopathic cases. Reviewed capabilities, limitations, and logistics of testing.    Explained three possible outcomes of genetic testing including: positive identification of a mutation, no mutation identified, and identification of a variant of unknown significance (VUS). If a mutation is identified, presymptomatic testing would be available to at risk family members. If no mutation is identified, it does not rule out the possibility of a genetic component to this disease. Family members could still be at risk for developing the same condition. If a VUS is identified, it is unclear if the mutation is disease causing or just a normal variation. It may take time and possibly additional testing to determine the meaning of a VUS result.     Test results take approximately 2-4 weeks on average. Discussed cost of testing through commercial labs. Explained that the lab will work with insurance to determine coverage and contact patient if out of pocket costs are expected to exceed $100.     Discussed pros and cons of genetic testing. Explained that results could determine the cause for HCM. If a mutation is identified, presymptomatic testing is available to all at risk relatives. Reviewed possible issues associated with presymptomatic testing  including genetic discrimination, current laws to prevent discrimination (ie. [place]), insurance issues, and emotional and psychosocial outcomes of testing.     Recommend clinical evaluation for all first degree relatives (parents, siblings, and children) of an affected individual regardless of decision to pursue genetic testing. Based on the Heart Failure Society of [place] Practice Guidelines (Shonda et al, 2009), clinical evaluation should be performed at least every 3 years, except yearly during puberty, and should include history, cardiac exam, ECHO, EKG, Holter monitoring, and exercise treadmill.    All questions answered at this time.     PLAN: Clara elected to proceed with genetic testing.  Requisition and consent forms were completed and signed.  Blood was drawn and sample was sent to [facility]. I will contact patient when results are available.    TOTAL TIME SPENT IN COUNSELIN minutes    Tatianna Short MS  Licensed Genetic Counselor  [facility]

## 2020-03-10 NOTE — NURSING NOTE
Chief Complaint   Patient presents with     New Patient     [facility]etics 03/10/2020: 59 year old female with history of CAD/NSTEMI 1/23/2020, on cMRI apical HCM seen, presenting for evaluation.     Vitals were taken, medications reconciled and EKG performed.    Mohinder Devries CMA    4:07 PM

## 2020-03-10 NOTE — TELEPHONE ENCOUNTER
"UPDATE:  3/11/20 Telephone Encounter states Pt \"not needing the [place] referral at this time\".    MARCY Kwon [facility] Referral Rep            Spoke with Pt about [employer]'s \"Out-of-Network Prior Authorization Request Form\". Pt will call Referral Rep back with the following information that is required to complete the above Out of Network form.    Provider Name that Patient will be seeing  NPI Number  Federal Tax ID Number  Clinic Contact Name  Clinic Name, Address, City, [place], Zip  Telephone & Fax Numbers    MARCY Kwon [facility] Referral Rep      "

## 2020-03-10 NOTE — LETTER
3/10/2020      RE: Clara Boykin  [address]  [facility] [address]       Dear Colleague,    Thank you for the opportunity to participate in the care of your patient, Clara Boykin, at the [facility] at [facility]. Please see a copy of my visit note below.    Chief complaint: HOCM evaluation    HPI:   Clara Boykin is a 59 year-old female with a past medical history notable for nonobstructive coronary artery disease, carotid artery disease, hypertension, and newly diagnosed hypertrophic cardiomyopathy who presents for evaluation of hypertrophic cardiomyopathy.  The patient states that she initially developed symptoms in January of this past year.  She was shoveling snow and developed severe dyspnea.  This feeling of dyspnea persisted for a few days.  Eventually, she ordered a troponin for herself and this later returned to be elevated.  After she had the elevated troponin, she presented to [facility].  Given the elevated troponin, shortness of breath, and abnormal EKG (which is new from prior), she underwent a coronary angiogram which showed nonobstructive coronary artery disease.  She was given aspirin, atorvastatin, and metoprolol on discharge.  She later underwent a cardiac MRI which showed signs consistent with apical hypertrophic cardiomyopathy.  She presents here today for further evaluation.    With the exception of the issue that led to her being admitted to the hospital, she has been generally asymptomatic.  She states that she does feel more short of breath than typical with significant exertion, however has not been exerting herself this past winter.  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  She notably has no palpitations, presyncope, and syncope.  She states that she had one syncopal event that was associated with a seizure in the 1970s, which she was told was due to dehydration.  She has since worn an event monitor which  showed no episodes of NSVT.  She has a family history of 2 siblings dying at a very young age, which was presumably due to Rh disease.  In addition, she did have 1 uncle who  while swimming, however he was in his late 70s to early 80s at the time.  Her father has been told that he is apical hypokinesis, though this was presumed to be due to a myocardial infarction.  She has no children and she has 1 living brother.    PAST MEDICAL HISTORY:  Past Medical History:   Diagnosis Date     Apical variant hypertrophic cardiomyopathy (H) 3/10/2020     Coronary artery disease involving native coronary artery of native heart without angina pectoris 2020    NSTEMI 2020     Diplopia      H/O CT scan      H/O magnetic resonance imaging      Non-obstructive CAD without angina (coronary angiogram 2020) 2020    NSTEMI 2020     Paralytic strabismus      Pneumonia      PONV (postoperative nausea and vomiting)        CURRENT MEDICATIONS:  Current Outpatient Medications   Medication Sig Dispense Refill     aspirin (ASA) 81 MG chewable tablet Take 1 tablet (81 mg) by mouth daily 90 tablet 1     atorvastatin (LIPITOR) 20 MG tablet Take 1 tablet (20 mg) by mouth every evening 90 tablet 1     CALCIUM PO Take 1,200 mg by mouth daily       Co-Enzyme Q-10 60 MG CAPS        diphenhydrAMINE (BENADRYL) 25 MG tablet Take 12.5-25 mg by mouth nightly as needed Reported on 3/31/2017       melatonin 3 MG tablet Take 3 mg by mouth nightly as needed for sleep       metoprolol tartrate 25 MG PO tablet Take 1 tablet (25 mg) by mouth 2 times daily 60 tablet 5     multivitamin, therapeutic (THERA-VIT) TABS tablet Take 1 tablet by mouth daily       Omega-3 Fatty Acids (OMEGA 3 PO) Take 1 g by mouth daily Reported on 3/31/2017       traZODone (DESYREL) 50 MG tablet 1/2 pill nightly as needed. 45 tablet 1     VITAMIN D, CHOLECALCIFEROL, PO Take 1,000 Units by mouth daily         PAST SURGICAL HISTORY:  Past Surgical History:   Procedure  Laterality Date     APPENDECTOMY       AS KNEE SCOPE, DIAGNOSTIC       BUNIONECTOMY Right 2016    times 2     BUNIONECTOMY Left 10/22/2018    Procedure: LEFT REVISION BUNION ;  Surgeon: Johanna Lund MD;  Location:  [facility]     CV CORONARY ANGIOGRAM N/A 1/24/2020    Procedure: Coronary Angiogram;  Surgeon: Criss Green MD;  Location:  HEART CARDIAC CATH LAB     CV LEFT HEART CATH N/A 1/24/2020    Procedure: Left Heart Cath;  Surgeon: Criss Green MD;  Location:  HEART CARDIAC CATH LAB     CV LEFT VENTRICULOGRAM N/A 1/24/2020    Procedure: Left Ventriculogram;  Surgeon: Criss Green MD;  Location:  HEART CARDIAC CATH LAB     HERNIORRHAPHY VENTRAL N/A 7/7/2016    Procedure: HERNIORRHAPHY VENTRAL;  Surgeon: Ash Thompson MD;  Location: [facility]     left clavical       NECK SURGERY       RECESSION RESECTION (REPAIR STRABISMUS) Right 4/10/2017    Procedure: RECESSION RESECTION (REPAIR STRABISMUS);  Surgeon: Lyle Leggett MD;  Location: [facility] OR     WRIST SURGERY         ALLERGIES:     Allergies   Allergen Reactions     Exparel [Bupivacaine] GI Disturbance     Patient had severe abdominal distention     Darvocet [Propoxyphene N-Apap] Other (See Comments)     confusion     Liposomes GI Disturbance     Oxycodone      Penicillins Itching     Percocet [Oxycodone-Acetaminophen] Other (See Comments)     confusion     Tape [Adhesive Tape] Rash     Once after surgical tape     Vistaril [Hydroxyzine] Rash     Intense flushing/redness of face        FAMILY HISTORY:  Family History   Problem Relation Age of Onset     Cancer Mother         skin cancer     Diabetes Mother         borderline/Type 2     Hypertension Mother         3 meds: 4.5cm asc aortic aneurysm     Hyperlipidemia Mother         hi chol & triglycerides     Cerebrovascular Disease Mother         many small, cog. impaired     Osteoporosis Mother         fosamax x 5 yrs     Obesity Mother         BMI 35 or so      Unknown/Adopted Mother         dermatomyositis since      Cancer Father         skin cancer     Coronary Artery Disease Father         angioplasty ~     Hypertension Father         1 med, mild. Age 94     Depression Father         2x: age 87 [facility]     Cancer Maternal Grandmother         skin cancer     Cerebrovascular Disease Maternal Grandmother          from multiple CVA     Obesity Maternal Grandmother         overweight     Diabetes Maternal Grandfather         borderline/Type 2     Coronary Artery Disease Maternal Grandfather         2-3 MIs; worked after each     Cerebrovascular Disease Maternal Grandfather          from CVA postop hernia     Obesity Maternal Grandfather         was overwt to obese     Diabetes Cousin         prediabetes     Hypertension Brother         1 med for 20 yrs     Substance Abuse Brother         hx of EtOH     Cerebrovascular Disease Other         cog. impairment like my mom     Mental Illness Sister         ?bipolar; past chem dep     Substance Abuse Sister         hx of: prescript drug& EtOH     Unknown/Adopted Sister         sister is adopted     Anesthesia Reaction Other         naus/vomit 1-8 hr postop unless tx'd     Anesthesia Reaction No family hx of      No family history of premature CAD or sudden death.    SOCIAL HISTORY:  Social History     Tobacco Use     Smoking status: Never Smoker     Smokeless tobacco: Never Used   Substance Use Topics     Alcohol use: Yes     Comment: rare     Drug use: No       ROS:   A comprehensive 14 point review of systems is negative other than as mentioned in HPI.    Exam:  /69 (BP Location: Right arm, Patient Position: Chair, Cuff Size: Adult Regular)   Pulse 59   Wt 54.4 kg (120 lb)   LMP 2006   SpO2 98%   BMI 19.97 kg/m    GENERAL APPEARANCE: healthy, alert and no distress  EYES: no icterus, no xanthelasmas  ENT: normal palate, mucosa moist, no central cyanosis  NECK: JVP not  elevated  RESPIRATORY: lungs clear to auscultation - no rales, rhonchi or wheezes, no use of accessory muscles, no retractions, respirations are unlabored, normal respiratory rate  CARDIOVASCULAR: regular rhythm, normal S1 with physiologic split S2, no S3 or S4 and no murmur, click or rub.  GI: soft, non tender, bowel sounds normal,no abdominal bruits  EXTREMITIES: no edema, no bruits  NEURO: alert and oriented to person/place/time, normal speech, gait and affect  VASC: Radial, dorsalis pedis and posterior tibialis pulses 2+ bilaterally.  SKIN: no ecchymoses, no rashes.  PSYCH: cooperative, affect appropriate.     Labs:  Reviewed.     Testing/Procedures:  I personally visualized and interpreted:  CMR 2/14/20:  Apical HCM with apical segments ~1.3 cm (vs. 0.9 cm basal anterosetpum and 0.7 cm basal inferolateral.) Hyperdynamic LV function and normal RV function, LVEF=75% RVEF=67%. No LVOT or intracavitary obstruction. Very small apical LGE per report-- per my review, there is no convincing fibrosis on late gadolinium enhancement imaging.      Coronary angiogram 1/24/2020: non-obstructive CAD (30-40% mLAD/D1, 40% RCA)      EKG 1/24/20  infero-lateral ST t wave changes, nsr    Cardiac Event Monitor 1/25/20: single run of nonsustained SVT. No VT.      Assessment and Plan:   In summary, this is a 59-year-old female with a past medical history notable for coronary artery disease (nonobstructive), hypertrophic cardiomyopathy, carotid arterial disease, and hypertension who is presenting for further evaluation.    1.  Apical hypertrophic cardiomyopathy: The patient's MRI is consistent with apical hypertrophic cardiomyopathy.  While her apical hypertrophic cardiomyopathy is unlikely to lead to an LVOT outlet obstruction, she is at risk for malignant ventricular arrhythmias and could potentially develop heart failure with preserved ejection physiology.  Regarding the ventricular arrhythmias, she has worn an event monitor which  shows no arrhythmias.  In addition, in the absence of a strong family history and an LV thickness greater than 3 cm, there is no indication for an ICD placement.    Her symptoms of dyspnea may be related to her apical hypertrophic cardiomyopathy.  Though she has no LVOT outlet obstruction, she may develop symptoms such as heart failure with preserved ejection fraction.    She does have 1 brother whom she is already contacted regarding potential screening.  We will have her meet with a genetic counselor today regarding a possible genetic screen.    In the absence of an LVOT gradient, there is no strong indication for beta-blocker, however it is currently treating her hypertension.    Based on this, we will plan for the following:  -Decrease metoprolol to 12.5 mg twice per day (patient was symptomatic with higher doses)  -Check cardiopulmonary stress test in 3 months  -Follow-up in 3 months    2.  Coronary artery disease: Nonobstructive on coronary angiogram.  Continue aspirin and atorvastatin (decrease to 10 mg per patient preference). Recheck lipids in three months.  3.  Carotid artery disease: Noted on previous CTA.  Continue atorvastatin.  4.  Hypertension, essential: Patient reports a history of morning hypertension.  Previously treated with lisinopril.  Currently on metoprolol.    The patient states understanding and is agreeable with plan.     Celso Roth MD  Cardiolgoy Fellow    ATTENDING ATTESTATION     Patient was seen and evaluated with Dr. Roth. History was confirmed personally by me. Labs, imaging studies, EKGs, and telemetry were reviewed. Agree with assessment and plan as outlined above. The note has been edited by me as needed to produce a single, cohesive document.     75 minutes spent (greater than allotted), >50% of which were spent with the patient on in-person counseling and discussion regarding the nature and natural history of apical-variant HCM, its prognosis, outlining the diagnostic and  therapeutic plan described above, reviewing imaging with the patient, and answering all questions.    Uriel Mendes MD  Staff Cardiologist          CC  SELF, REFERRED

## 2020-03-10 NOTE — PATIENT INSTRUCTIONS
You were seen today in the Adult Congenital and Cardiovascular Genetics Clinic at the [facility].    Cardiology Providers you saw during your visit:  Uriel Mendes MD    Diagnosis:  HCM    Results:  Uriel Mendes MD reviewed the results of your previous cMRI testing today in clinic.    Recommendations:    1. Continue to eat a heart healthy, low salt diet.  2. Continue to get 20-30 minutes of aerobic activity, 4-5 days per week.  Examples of aerobic activity include walking, running, swimming, cycling, etc.  3. Continue to observe good oral hygiene, with regular dental visits.  4. Decrease metoprolol to 12.5 mg twice daily.  5. Decrease atorvastatin to 10 mg daily.  6. Please follow the exercise guidelines as per the Circulation journal article.  7. Please have lipids drawn in advance of your next appointment.    Your cardiac MRI can be viewed at [email or web address]    CardioPulmonary Stress Test (CPX)  CPX is a maximal (meaning you exercise to exhaustion, not to achieve a heart rate) exercise test where we measure how well your heart/body uses oxygen or energy. It is the gold standard for measuring functional capacity and helps us differentiate limitations due to lungs, heart, or fitness.   A CPX is NOT a typical stress test. You will NOT be asked to hold your Beta Blocker medication.   You will be scheduled for a CardioPulmonary Stress Test at the [facility] ([address], [facility] [address], [phone]).     Follow these instructions:    1. Report to the GOLD waiting room in the [facility] hospital.  2. Nothing to eat for 3 hours prior to your test. You may have clear liquids up to the time of your test  3. Please wear loose, two-piece clothing and comfortable, rubber soled shoes for walking     For Patients with Diabetes: Your RN Coordinator will give you instructions on adjusting your diabetic medications for the day of your test                      If you have questions please contact your diabetic care team                     Remember to  bring your glucometer & insulin with you to take after your test if needed      SBE prophylaxis:   Yes____  No_X___    Lifelong Bacterial Endocarditis Prophylaxis:  YES____  NO____    If YES is checked, follow the recommendations outlined below:  1. Take antibiotic(s) prior to recommended dental procedures and procedures on the respiratory tract or with infected skin, muscle or bones. SBE prophylaxis is not needed for routine GI and  procedures (ie. Colonoscopy or vaginal delivery)  2. Observe good oral hygiene daily, as advised by your dentist. Get regular professional dental care.  3. Keep cuts clean.  4. Infections should be treated promptly.  5. Symptoms of Infective Endocarditis could include: fever lasting more than 4-5 days or a recurrent fever that initially resolves but returns within 1-2 days)    Exercise restrictions:   Yes__X__  No____         If yes, list restrictions:  Must be allowed to rest if fatigued or SOB    Work restrictions:  Yes____  No_X___         If yes, list restrictions:    FASTING CHOLESTEROL was checked in the last 5 years YES_X__  NO___ (2020)  Continue to eat a heart healthy, low salt diet.         ____ Fasting lipid panel order today         ____ No changes in medications          ____ I recommend the following changes in your cholesterol medications.:          ____ Please follow up for cholesterol screening at your primary care physician      Follow-up:  Follow up with Dr. Mendes in 3 months with a cardiopulmonary stress test and fasting lipid prior.    If you have questions or concerns please contact us at:    Tatianna Durham, MSN, RN, CNL    Kati Kraus (Scheduling)  Nurse Care Coordinator     Clinic   Adult Congenital and CV Genetics   Adult Congenital and CV Genetic  [facility]   [facility]  (P)  [phone]     (P) [phone]  [email or web address]   (F) [phone]        For after hours urgent needs, call [phone] and ask to speak to the Adult Congenital Physician on call.  Mention Job Code 0401.    For emergencies call 911.    [facility]  [facility]  Mail Code [id number]  [address] [place]  [address]

## 2020-03-10 NOTE — LETTER
3/10/2020      RE: Clara Boykin  [address]  [facility] [address]       Dear Colleague,    Thank you for the opportunity to participate in the care of your patient, Clara Boykin, at the [facility] HEART [facility] at [facility]. Please see a copy of my visit note below.    Here is a copy of the progress note from your recent genetic counseling visit to the Adult Congenital and Cardiovascular Genetics Center on Date: 3/10/2020.    PROGRESS NOTE:Clara was referred by EJVON Roldan for genetic counseling due to her recent diagnosis of apical hypertrophic cardiomyopathy.  I had the opportunity to meet with Clara today to discuss the genetic component of DISEASE and testing options available to him/her.     MEDICAL HISTORY: Clara was admitted to the hospital in January due to ongoing chest discomfort and the suspicion of a heart attack.  It appears that she had a NSTEMI. She sent her own Troponin two days prior to ED visit.  She was admitted for further cardiac evaluation.  At that time apical hypertrophic cardiomyopathy was identified with a hyperdynamic left ventricle.      FAMILY HISTORY: A detailed family history was obtained at office visit on 3/10/2020.  (Please see scanned pedigree for details).  Family history was significant for the following: Clara reports that her 97 year old father was diagnosed with a mild stroke and STEMI in February.  He was also found to have apical hypokinesis on ECHO.    Her paternal uncle drown in a pool in his 70's.  He did not have any previous heart issues to their knowledge.  Paternal grandmother  at 54 years from a cerebral aneurysm.  Paternal grandfather  at 53 years from lung cancer.      Clara's mother  at 88 years with multiple health issues but no known heart issues.  Maternal uncle  in his 80's from vascular dementia.  Maternal grandmother  at 96 years with a history of strokes.  Maternal grandfather  in his 80's  with a history of strokes and three heart attacks.      There is no additional history of cardiomyopathy, arrythmias, heart attacks, fainting, sudden cardiac death, genetic conditions, or birth defects.    DISCUSSION:  Reviewed definition of hypertrophic cardiomyopathy (HCM). Explained that a good portion of HCM cases have a genetic component.     Reviewed autosomal dominant (AD) inheritance pattern most commonly associated with HCM, including the 50% risk for recurrence. Briefly reviewed autosomal recessive and X-linked inheritance. Explained that HCM gene mutations are associated with reduced penetrance and variable expressivity, meaning that individuals who carry a gene mutation may or may not get the disease and onset and severity can vary from one family member to the next. This explains why you may not see cardiomyopathy in each generation of a family.     Currently over 25 genes have been found to be related to HCM. Genetic testing currently identifies mutations in about 50-60% of idiopathic cases. Reviewed capabilities, limitations, and logistics of testing.    Explained three possible outcomes of genetic testing including: positive identification of a mutation, no mutation identified, and identification of a variant of unknown significance (VUS). If a mutation is identified, presymptomatic testing would be available to at risk family members. If no mutation is identified, it does not rule out the possibility of a genetic component to this disease. Family members could still be at risk for developing the same condition. If a VUS is identified, it is unclear if the mutation is disease causing or just a normal variation. It may take time and possibly additional testing to determine the meaning of a VUS result.     Test results take approximately 2-4 weeks on average. Discussed cost of testing through commercial labs. Explained that the lab will work with insurance to determine coverage and contact patient if  out of pocket costs are expected to exceed $100.     Discussed pros and cons of genetic testing. Explained that results could determine the cause for HCM. If a mutation is identified, presymptomatic testing is available to all at risk relatives. Reviewed possible issues associated with presymptomatic testing including genetic discrimination, current laws to prevent discrimination (ie. BARBARA), insurance issues, and emotional and psychosocial outcomes of testing.     Recommend clinical evaluation for all first degree relatives (parents, siblings, and children) of an affected individual regardless of decision to pursue genetic testing. Based on the Heart Failure Society of [place] Practice Guidelines (Shonda et al, 2009), clinical evaluation should be performed at least every 3 years, except yearly during puberty, and should include history, cardiac exam, ECHO, EKG, Holter monitoring, and exercise treadmill.    All questions answered at this time.     PLAN: Clara elected to proceed with genetic testing.  Requisition and consent forms were completed and signed.  Blood was drawn and sample was sent to [facility] laboratory. I will contact patient when results are available.    TOTAL TIME SPENT IN COUNSELIN minutes    Tatianna Short MS  Licensed Genetic Counselor  [facility]

## 2020-03-11 ENCOUNTER — MYC MEDICAL ADVICE (OUTPATIENT)
Dept: FAMILY MEDICINE | Facility: CLINIC | Age: 60
End: 2020-03-11

## 2020-03-11 LAB — INTERPRETATION ECG - MUSE: NORMAL

## 2020-03-30 ENCOUNTER — TELEPHONE (OUTPATIENT)
Dept: CARDIOLOGY | Facility: CLINIC | Age: 60
End: 2020-03-30

## 2020-03-30 NOTE — TELEPHONE ENCOUNTER
Spoke with Clara today to review results of genetic testing. She underwent genetic testing for the HCMNext panel. Blood was drawn on March 10, 2020 and sent to [facility] laboratory.   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  A variant of unknown significance means that there is a genetic change in which we do not have enough information to determine if it is disease causing or not. Labs review published data, functional studies, presences in population databases, similarity to normal sequence, and computer prediction models.    This particular variant occurs in a portion of the gene that is highly conserved in available vertebrate species.  This variant occurs in an intron after exon 23.  A splice prediction tool predicts this alteration will abolish the native donor site; however, direct evidence is unavailable. This variant has not been previously reported as a disease causing mutation or as a normal variation.  Therefore, based on the current information it is unclear if this VUS is associated with disease or not.   Risk to relatives could still be as high as 50% but genetic testing is not predictive or recommended because we cannot interpret the results and make predictions.    Reviewed recommendation for cardiac screening in all first degree relatives (parents, siblings, children).  Clinical screening should include physical exam with a cardiologist, history, EKG, and ECHO. In addition, Holter montior and MRI may be recommended.  If any family members are found to have HCM, complimentary genetic testing could be offered to those affected individuals.  A summary letter and copy of the results will be sent to patient. All questions answered at this time.   Tatianna Short MS  Licensed Genetic Counselor  Adult Congenital and Cardiovascular Genetics Center  [facility]

## 2020-04-03 LAB — LAB SCANNED RESULT: NORMAL

## 2020-04-18 ENCOUNTER — MYC MEDICAL ADVICE (OUTPATIENT)
Dept: FAMILY MEDICINE | Facility: CLINIC | Age: 60
End: 2020-04-18

## 2020-04-20 NOTE — TELEPHONE ENCOUNTER
Ideally yes she wouudl come for a visit/xray , mainly to evaluate for pneumothorax, sounds like she landed pretty hard.  If she is seen they could potentially offer her some pain medicine for a few days as well (usually taken at night) which can improve sleep and healing. You are actually supposed to sleep on the affected side at night so the ribs don't expand as much when breathing and this improves healing.     Reassure her that currently quite safe to come to clinic - we are screening patients, have few patients in clinic, etc.     Joel Wegener,MD

## 2020-04-23 ENCOUNTER — DOCUMENTATION ONLY (OUTPATIENT)
Dept: CARDIOLOGY | Facility: CLINIC | Age: 60
End: 2020-04-23

## 2020-05-19 ENCOUNTER — OFFICE VISIT (OUTPATIENT)
Dept: FAMILY MEDICINE | Facility: CLINIC | Age: 60
End: 2020-05-19
Payer: COMMERCIAL

## 2020-05-19 VITALS
WEIGHT: 123.6 LBS | HEART RATE: 60 BPM | TEMPERATURE: 98 F | SYSTOLIC BLOOD PRESSURE: 101 MMHG | OXYGEN SATURATION: 97 % | BODY MASS INDEX: 20.57 KG/M2 | DIASTOLIC BLOOD PRESSURE: 64 MMHG

## 2020-05-19 DIAGNOSIS — B07.0 PLANTAR WARTS: ICD-10-CM

## 2020-05-19 DIAGNOSIS — Z87.01 HISTORY OF PNEUMONIA: Primary | ICD-10-CM

## 2020-05-19 DIAGNOSIS — Z23 NEED FOR VACCINATION FOR STREP PNEUMONIAE WITH PCV 7: ICD-10-CM

## 2020-05-19 DIAGNOSIS — S22.32XD CLOSED FRACTURE OF ONE RIB OF LEFT SIDE WITH ROUTINE HEALING, SUBSEQUENT ENCOUNTER: ICD-10-CM

## 2020-05-19 PROCEDURE — 17110 DESTRUCTION B9 LES UP TO 14: CPT | Performed by: FAMILY MEDICINE

## 2020-05-19 PROCEDURE — 99213 OFFICE O/P EST LOW 20 MIN: CPT | Mod: 25 | Performed by: FAMILY MEDICINE

## 2020-05-19 PROCEDURE — 90471 IMMUNIZATION ADMIN: CPT | Performed by: FAMILY MEDICINE

## 2020-05-19 PROCEDURE — 90732 PPSV23 VACC 2 YRS+ SUBQ/IM: CPT | Performed by: FAMILY MEDICINE

## 2020-05-19 NOTE — PROGRESS NOTES
Subjective     Clara Boykin is a 60 year old female who presents to clinic today for the following health issues:    HPI   WART(S)  Onset: unit(s) 1 month ago     Description:   Location: right foot   Number of warts: 1   Painful: YES    Accompanying Signs & Symptoms:  Signs of infection: no     History:   History of trauma: no   Prior warts: YES    Therapies Tried and outcome: none  Compound w. Almost always needs freezing.          Plantar warts  History of pneumonia  Need for vaccination for Strep pneumoniae with PCV 7  Closed fracture of one rib of left side with routine healing, subsequent encounter :mainly needing small painful plantar wart right plantar heel treated. In discussion of covid prevention discussed previous h/o pneumonia and desire for pneumonia vaccine as well as in context of healing (although still somewhat painful) rib fracture pain.         Problem list, Medication list, Allergies, and Medical/Social/Surgical histories reviewed in [facility] and updated as appropriate.  Labs reviewed in [facility]  BP Readings from Last 3 Encounters:   06/09/20 112/70   05/19/20 101/64   03/10/20 109/69    Wt Readings from Last 3 Encounters:   05/19/20 56.1 kg (123 lb 9.6 oz)   03/10/20 54.4 kg (120 lb)   02/21/20 54.9 kg (121 lb 1.8 oz)                  Patient Active Problem List   Diagnosis     iamJOINT PAIN-PELVIS     Pain in joint, upper arm     Skin exam, screening for cancer     CARDIOVASCULAR SCREENING; LDL GOAL LESS THAN 160     Insomnia     History of anesthesia reaction     Small vessel disease, cerebrovascular     Right foot pain     Hip pain, right     NSTEMI (non-ST elevated myocardial infarction) (H)     Non-obstructive CAD without angina (coronary angiogram 1/2020)     Hyperlipidemia LDL goal <70     Apical variant hypertrophic cardiomyopathy (H)     Past Surgical History:   Procedure Laterality Date     APPENDECTOMY       AS KNEE SCOPE, DIAGNOSTIC       BUNIONECTOMY Right 2016    times 2      BUNIONECTOMY Left 10/22/2018    Procedure: LEFT REVISION BUNION ;  Surgeon: Johanna Lund MD;  [facility]     CV CORONARY ANGIOGRAM N/A 2020    Procedure: Coronary Angiogram;  Surgeon: Criss Green MD;  Location:  HEART CARDIAC CATH LAB     CV LEFT HEART CATH N/A 2020    Procedure: Left Heart Cath;  Surgeon: Criss Green MD;  Location:  HEART CARDIAC CATH LAB     CV LEFT VENTRICULOGRAM N/A 2020    Procedure: Left Ventriculogram;  Surgeon: Criss Green MD;  Location:  HEART CARDIAC CATH LAB     HERNIORRHAPHY VENTRAL N/A 2016    Procedure: HERNIORRHAPHY VENTRAL;  Surgeon: Ash Thompson MD;  [facility]: [facility]     left clavical       NECK SURGERY       RECESSION RESECTION (REPAIR STRABISMUS) Right 4/10/2017    Procedure: RECESSION RESECTION (REPAIR STRABISMUS);  Surgeon: Lyle Leggett MD;  Location: [facility]     WRIST SURGERY         Social History     Tobacco Use     Smoking status: Never Smoker     Smokeless tobacco: Never Used   Substance Use Topics     Alcohol use: Yes     Comment: rare     Family History   Problem Relation Age of Onset     Cancer Mother         skin cancer     Diabetes Mother         borderline/Type 2     Hypertension Mother         3 meds: 4.5cm asc aortic aneurysm     Hyperlipidemia Mother         hi chol & triglycerides     Cerebrovascular Disease Mother         many small, cog. impaired     Osteoporosis Mother         fosamax x 5 yrs     Obesity Mother         BMI 35 or so     Unknown/Adopted Mother         dermatomyositis since      Cancer Father         skin cancer     Coronary Artery Disease Father         angioplasty ~     Hypertension Father         1 med, mild. Age 94     Depression Father         2x: age 87 [facility]     Cancer Maternal Grandmother         skin cancer     Cerebrovascular Disease Maternal Grandmother          from multiple CVA     Obesity Maternal Grandmother          overweight     Diabetes Maternal Grandfather         borderline/Type 2     Coronary Artery Disease Maternal Grandfather         2-3 MIs; worked after each     Cerebrovascular Disease Maternal Grandfather          from CVA postop hernia     Obesity Maternal Grandfather         was overwt to obese     Diabetes Cousin         prediabetes     Hypertension Brother         1 med for 20 yrs     Substance Abuse Brother         hx of EtOH     Cerebrovascular Disease Other         cog. impairment like my mom     Mental Illness Sister         ?bipolar; past chem dep     Substance Abuse Sister         hx of: prescript drug& EtOH     Unknown/Adopted Sister         sister is adopted     Anesthesia Reaction Other         naus/vomit 1-8 hr postop unless tx'd     Anesthesia Reaction No family hx of          Current Outpatient Medications   Medication Sig Dispense Refill     aspirin (ASA) 81 MG chewable tablet Take 1 tablet (81 mg) by mouth daily 90 tablet 1     atorvastatin (LIPITOR) 10 MG tablet Take 1 tablet (10 mg) by mouth every evening 90 tablet 3     CALCIUM PO Take 1,200 mg by mouth daily       Co-Enzyme Q-10 60 MG CAPS        melatonin 3 MG tablet Take 3 mg by mouth nightly as needed for sleep       multivitamin, therapeutic (THERA-VIT) TABS tablet Take 1 tablet by mouth daily       Omega-3 Fatty Acids (OMEGA 3 PO) Take 1 g by mouth daily Reported on 3/31/2017       traZODone (DESYREL) 50 MG tablet 1/2 pill nightly as needed. (Patient not taking: Reported on 2020) 45 tablet 1     VITAMIN D, CHOLECALCIFEROL, PO Take 1,000 Units by mouth daily       diphenhydrAMINE (BENADRYL) 25 MG tablet Take 12.5-25 mg by mouth nightly as needed Reported on 3/31/2017       Allergies   Allergen Reactions     Exparel [Bupivacaine] GI Disturbance     Patient had severe abdominal distention     Darvocet [Propoxyphene N-Apap] Other (See Comments)     confusion     Liposomes GI Disturbance     Oxycodone      Penicillins Itching      Percocet [Oxycodone-Acetaminophen] Other (See Comments)     confusion     Tape [Adhesive Tape] Rash     Once after surgical tape     Vistaril [Hydroxyzine] Rash     Intense flushing/redness of face      Recent Labs   Lab Test 05/26/20  0852 01/25/20  0738 01/24/20  0557 01/23/20  1902  04/20/18  0845  09/04/15  0841   A1C  --   --   --  5.8*  --   --   --   --    LDL 75  --  111*  --   --  118*   < > 129   HDL 60  --  74  --   --  73   < > 72   TRIG 100  --  47  --   --  73   < > 62   ALT 36  --  27  --   --  38  --   --    CR  --  0.68 0.62  --    < > 0.64   < >  --    GFRESTIMATED  --  >90 >90  --    < > >90   < >  --    GFRESTBLACK  --  >90 >90  --    < > >90   < >  --    POTASSIUM  --  4.1 4.0  --    < > 3.7   < >  --    TSH  --   --   --   --   --  1.44  --  1.22    < > = values in this interval not displayed.        ROS:  Constitutional, HEENT, cardiovascular, pulmonary, GI, , musculoskeletal, neuro, skin, endocrine and psych systems are negative, except as otherwise noted.        OBJECTIVE:  /64   Pulse 60   Temp 98  F (36.7  C) (Oral)   Wt 56.1 kg (123 lb 9.6 oz)   LMP 09/06/2006   SpO2 97%   BMI 20.57 kg/m      EXAM:  GENERAL APPEARANCE: healthy, alert and no distress  RESP: lungs clear to auscultation - no rales, rhonchi or wheezes  CV: regular rates and rhythm, normal S1 S2, no S3 or S4 and no murmur, click or rub -      Right heel with 3mm round callous with centrall verrucous clearing about 1 mm.   ASSESSMENT AND PLAN  1. Plantar warts  Shaved down callous and treated with crotherapy x three today.       2. History of pneumonia    - Pneumococcal vaccine 23 valent PPSV23  (Pneumovax) [32809]    3. Need for vaccination for Strep pneumoniae with PCV 7    - Pneumococcal vaccine 23 valent PPSV23  (Pneumovax) [28668]    4. Closed fracture of one rib of left side with routine healing, subsequent encounter  Continues to heal on track, less painful.     Plans to return for physical in July.        Joel Wegener, MD

## 2020-05-19 NOTE — NURSING NOTE
Prior to immunization administration, verified patients identity using patient s name and date of birth. Please see Immunization Activity for additional information.     Screening Questionnaire for Adult Immunization    Are you sick today?   No   Do you have allergies to medications, food, a vaccine component or latex?   No   Have you ever had a serious reaction after receiving a vaccination?   No   Do you have a long-term health problem with heart, lung, kidney, or metabolic disease (e.g., diabetes), asthma, a blood disorder, no spleen, complement component deficiency, a cochlear implant, or a spinal fluid leak?  Are you on long-term aspirin therapy?   No   Do you have cancer, leukemia, HIV/AIDS, or any other immune system problem?   No   Do you have a parent, brother, or sister with an immune system problem?   No   In the past 3 months, have you taken medications that affect  your immune system, such as prednisone, other steroids, or anticancer drugs; drugs for the treatment of rheumatoid arthritis, Crohn s disease, or psoriasis; or have you had radiation treatments?   No   Have you had a seizure, or a brain or other nervous system problem?   No   During the past year, have you received a transfusion of blood or blood    products, or been given immune (gamma) globulin or antiviral drug?   No   For women: Are you pregnant or is there a chance you could become       pregnant during the next month?   No   Have you received any vaccinations in the past 4 weeks?   No     Immunization questionnaire answers were all negative.        Per orders of Dr. Wegener, injection of Pneumo-23 given by Yohana Whitman MA. Patient instructed to remain in clinic for 15 minutes afterwards, and to report any adverse reaction to me immediately.       Screening performed by Yohana Whitman MA on 5/19/2020 at 4:41 PM.

## 2020-05-26 DIAGNOSIS — I25.10 CORONARY ARTERY DISEASE INVOLVING NATIVE CORONARY ARTERY OF NATIVE HEART WITHOUT ANGINA PECTORIS: ICD-10-CM

## 2020-05-26 DIAGNOSIS — I42.2 APICAL VARIANT HYPERTROPHIC CARDIOMYOPATHY (H): ICD-10-CM

## 2020-05-26 DIAGNOSIS — I21.4 NSTEMI (NON-ST ELEVATED MYOCARDIAL INFARCTION) (H): ICD-10-CM

## 2020-05-26 LAB
ALT SERPL W P-5'-P-CCNC: 36 U/L (ref 0–50)
CHOLEST SERPL-MCNC: 155 MG/DL
HDLC SERPL-MCNC: 60 MG/DL
LDLC SERPL CALC-MCNC: 75 MG/DL
NONHDLC SERPL-MCNC: 95 MG/DL
TRIGL SERPL-MCNC: 100 MG/DL

## 2020-05-26 PROCEDURE — 36415 COLL VENOUS BLD VENIPUNCTURE: CPT | Performed by: INTERNAL MEDICINE

## 2020-05-26 PROCEDURE — 84460 ALANINE AMINO (ALT) (SGPT): CPT | Performed by: INTERNAL MEDICINE

## 2020-05-26 PROCEDURE — 80061 LIPID PANEL: CPT | Performed by: INTERNAL MEDICINE

## 2020-06-08 NOTE — PROGRESS NOTES
"Clara Boykin is a 60 year old female who is being evaluated via a billable video visit.      The patient has been notified of following:     \"This video visit will be conducted via a call between you and your physician/provider. We have found that certain health care needs can be provided without the need for an in-person physical exam.  This service lets us provide the care you need with a video conversation.  If a prescription is necessary we can send it directly to your pharmacy.  If lab work is needed we can place an order for that and you can then stop by our lab to have the test done at a later time.    Video visits are billed at different rates depending on your insurance coverage.  Please reach out to your insurance provider with any questions.    If during the course of the call the physician/provider feels a video visit is not appropriate, you will not be charged for this service.\"    Video-Visit Details    Type of service:  Video Visit    Video Start Time: 12:06 PM  Video End Time: 12:36 PM    Originating Location (pt. Location): Home    Distant Location (provider location):  [facility]     Platform used for Video Visit: [employer]    Chief complaint: HOCM evaluation    HPI:   Clara Boykin is a 59 year-old female with a past medical history notable for nonobstructive coronary artery disease, carotid artery disease, hypertension, and newly diagnosed hypertrophic cardiomyopathy who presents for evaluation of hypertrophic cardiomyopathy.  The patient states that she initially developed symptoms in January of this past year.  She was shoveling snow and developed severe dyspnea.  This feeling of dyspnea persisted for a few days.  Eventually, she ordered a troponin for herself and this later returned to be elevated.  After she had the elevated troponin, she presented to [facility].  Given the elevated troponin, shortness of breath, and abnormal EKG (which is new from prior), she underwent a coronary " angiogram which showed nonobstructive coronary artery disease.  She was given aspirin, atorvastatin, and metoprolol on discharge.  She later underwent a cardiac MRI which showed signs consistent with apical hypertrophic cardiomyopathy.  She presents here today for further evaluation.    With the exception of the issue that led to her being admitted to the hospital, she has been generally asymptomatic.  She states that she does feel more short of breath than typical with significant exertion, however has not been exerting herself this past winter.  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  She notably has no palpitations, presyncope, and syncope.  She states that she had one syncopal event that was associated with a seizure in the , which she was told was due to dehydration.  She has since worn an event monitor which showed no episodes of NSVT.  She has a family history of 2 siblings dying at a very young age, which was presumably due to Rh disease.  In addition, she did have 1 uncle who  while swimming, however he was in his late 70s to early 80s at the time.  Her father has been told that he is apical hypokinesis, though this was presumed to be due to a myocardial infarction.  She has no children and she has 1 living brother.    Interval History:  At the patient's last visit, her metoprolol was decreased to 12.5 mg twice per day (mostly treating hypertension).  We also ordered a cardiopulmonary stress test, which is yet to be completed.    The patient reports that since her last visit she has had occasional episodes of shortness of breath.  This is been worse recently with the higher heat and fires.  She has had intermittent presyncopal episodes, however no syncopal episodes.  She reports that her heart rate is been mainly in the 50s.  Occasionally, her systolic blood pressure will be in the 90s at which point she will hold 1 dose of metoprolol.    She also obtained the autopsy  report from a paternal uncle who  while swimming.  He was found to have hypertrophic cardiomyopathy.  Her father has recently had a stroke and echocardiogram was performed at that time.    She denies orthopnea, PND, or lower extremity swelling.    PAST MEDICAL HISTORY:  Past Medical History:   Diagnosis Date     Apical variant hypertrophic cardiomyopathy (H) 3/10/2020     Coronary artery disease involving native coronary artery of native heart without angina pectoris 2020    NSTEMI 2020     Diplopia      H/O CT scan      H/O magnetic resonance imaging      Non-obstructive CAD without angina (coronary angiogram 2020) 2020    NSTEMI 2020     Paralytic strabismus      Pneumonia      PONV (postoperative nausea and vomiting)        CURRENT MEDICATIONS:  Current Outpatient Medications   Medication Sig Dispense Refill     aspirin (ASA) 81 MG chewable tablet Take 1 tablet (81 mg) by mouth daily 90 tablet 1     atorvastatin (LIPITOR) 10 MG tablet Take 1 tablet (10 mg) by mouth every evening 90 tablet 3     CALCIUM PO Take 1,200 mg by mouth daily       Co-Enzyme Q-10 60 MG CAPS        diphenhydrAMINE (BENADRYL) 25 MG tablet Take 12.5-25 mg by mouth nightly as needed Reported on 3/31/2017       melatonin 3 MG tablet Take 3 mg by mouth nightly as needed for sleep       metoprolol tartrate (LOPRESSOR) 25 MG tablet Take 0.5 tablets (12.5 mg) by mouth 2 times daily 90 tablet 3     multivitamin, therapeutic (THERA-VIT) TABS tablet Take 1 tablet by mouth daily       Omega-3 Fatty Acids (OMEGA 3 PO) Take 1 g by mouth daily Reported on 3/31/2017       traZODone (DESYREL) 50 MG tablet 1/2 pill nightly as needed. 45 tablet 1     VITAMIN D, CHOLECALCIFEROL, PO Take 1,000 Units by mouth daily         PAST SURGICAL HISTORY:  Past Surgical History:   Procedure Laterality Date     APPENDECTOMY       AS KNEE SCOPE, DIAGNOSTIC       BUNIONECTOMY Right 2016    times 2     BUNIONECTOMY Left 10/22/2018    Procedure: LEFT  REVISION BUNION ;  Surgeon: Johanna Lund MD;  [facility]     CV CORONARY ANGIOGRAM N/A 1/24/2020    Procedure: Coronary Angiogram;  Surgeon: Criss Green MD;  Location:  HEART CARDIAC CATH LAB     CV LEFT HEART CATH N/A 1/24/2020    Procedure: Left Heart Cath;  Surgeon: Criss Green MD;  Location:  HEART CARDIAC CATH LAB     CV LEFT VENTRICULOGRAM N/A 1/24/2020    Procedure: Left Ventriculogram;  Surgeon: Criss Green MD;  Location:  HEART CARDIAC CATH LAB     HERNIORRHAPHY VENTRAL N/A 7/7/2016    Procedure: HERNIORRHAPHY VENTRAL;  Surgeon: Ash Thompson MD;  [facility]     left clavical       NECK SURGERY       RECESSION RESECTION (REPAIR STRABISMUS) Right 4/10/2017    Procedure: RECESSION RESECTION (REPAIR STRABISMUS);  Surgeon: Lyle Leggett MD;  [facility]     WRIST SURGERY         ALLERGIES:     Allergies   Allergen Reactions     Exparel [Bupivacaine] GI Disturbance     Patient had severe abdominal distention     Darvocet [Propoxyphene N-Apap] Other (See Comments)     confusion     Liposomes GI Disturbance     Oxycodone      Penicillins Itching     Percocet [Oxycodone-Acetaminophen] Other (See Comments)     confusion     Tape [Adhesive Tape] Rash     Once after surgical tape     Vistaril [Hydroxyzine] Rash     Intense flushing/redness of face        FAMILY HISTORY:  Family History   Problem Relation Age of Onset     Cancer Mother         skin cancer     Diabetes Mother         borderline/Type 2     Hypertension Mother         3 meds: 4.5cm asc aortic aneurysm     Hyperlipidemia Mother         hi chol & triglycerides     Cerebrovascular Disease Mother         many small, cog. impaired     Osteoporosis Mother         fosamax x 5 yrs     Obesity Mother         BMI 35 or so     Unknown/Adopted Mother         dermatomyositis since 1974     Cancer Father         skin cancer     Coronary Artery Disease Father         angioplasty ~1980      Hypertension Father         1 med, mild. Age 94     Depression Father         2x: age 87 [facility]     Cancer Maternal Grandmother         skin cancer     Cerebrovascular Disease Maternal Grandmother          from multiple CVA     Obesity Maternal Grandmother         overweight     Diabetes Maternal Grandfather         borderline/Type 2     Coronary Artery Disease Maternal Grandfather         2-3 MIs; worked after each     Cerebrovascular Disease Maternal Grandfather          from CVA postop hernia     Obesity Maternal Grandfather         was overwt to obese     Diabetes Cousin         prediabetes     Hypertension Brother         1 med for 20 yrs     Substance Abuse Brother         hx of EtOH     Cerebrovascular Disease Other         cog. impairment like my mom     Mental Illness Sister         ?bipolar; past chem dep     Substance Abuse Sister         hx of: prescript drug& EtOH     Unknown/Adopted Sister         sister is adopted     Anesthesia Reaction Other         naus/vomit 1-8 hr postop unless tx'd     Anesthesia Reaction No family hx of      No family history of premature CAD or sudden death.    SOCIAL HISTORY:  Social History     Tobacco Use     Smoking status: Never Smoker     Smokeless tobacco: Never Used   Substance Use Topics     Alcohol use: Yes     Comment: rare     Drug use: No       ROS:   A comprehensive 14 point review of systems is negative other than as mentioned in HPI.    Exam:  CONSITUTIONAL: no acute distress  HEENT: no icterus, sclerae white  CV: no visible edema of visualized upper extremities, no gross JVD  CHEST: respirations nonlabored, no audible wheezing  NEURO: AA&Ox3, speech fluent/appropriate, motor grossly nonfocal  PSYCH: cooperative, affect appropriate  DERM: no rashes on visualized face/neck/upper extremities    The rest of a comprehensive physical examination is deferred due to PHE (public health emergency) video visit restrictions.    Labs:  Reviewed.      Testing/Procedures:  I personally visualized and interpreted:  CMR 2/14/20:  Apical HCM with apical segments ~1.3 cm (vs. 0.9 cm basal anterosetpum and 0.7 cm basal inferolateral.) Hyperdynamic LV function and normal RV function, LVEF=75% RVEF=67%. No LVOT or intracavitary obstruction. Very small apical LGE per report-- per my review, there is no convincing fibrosis on late gadolinium enhancement imaging.      Coronary angiogram 1/24/2020: non-obstructive CAD (30-40% mLAD/D1, 40% RCA)      EKG 1/24/20  infero-lateral ST t wave changes, nsr    Cardiac Event Monitor 1/25/20: single run of nonsustained SVT. No VT.      Assessment and Plan:   In summary, this is a 59-year-old female with a past medical history notable for coronary artery disease (nonobstructive), hypertrophic cardiomyopathy, carotid arterial disease, and hypertension who is presenting for further evaluation.    1.  Apical hypertrophic cardiomyopathy: The patient's MRI is consistent with apical hypertrophic cardiomyopathy.  While her apical hypertrophic cardiomyopathy is unlikely to lead to an LVOT outlet obstruction, she is at risk for malignant ventricular arrhythmias and could potentially develop heart failure with preserved ejection physiology.      Regarding the ventricular arrhythmias, the presence of a sudden cardiac death in her family (uncle) in someone who had autopsy confirmed hypertrophic cardiomyopathy is somewhat concerning.  The patient has had no ventricular arrhythmias on ZIO Patch testing and she has no significant scar burden on MRI.    Her symptoms of dyspnea may be related to her apical hypertrophic cardiomyopathy.  Though she has no LVOT outlet obstruction, she may develop symptoms such as heart failure with preserved ejection fraction.  However, suspect that her symptoms are predominantly noncardiac in origin.  They may be iatrogenic in the setting of metoprolol use, it is reasonable for her to stop the metoprolol.    Given the  presence of a genetic mutation of undetermined significance and a paternal uncle with known hypertrophic cardiomyopathy, we discussed at length with the patient regarding the need for her family members, including brothers, father, and cousins to be tested with an echocardiogram for the phenotype of apical HCM.  The patient will hopefully be able to send us these echocardiograms.    We will plan to see her again in 9 months with a repeated ZIO Patch at that time.    2.  Coronary artery disease: Nonobstructive on coronary angiogram.  Continue aspirin and atorvastatin (decreased to 10 mg per patient preference). Recheck lipids in three months.  3.  Carotid artery disease: Noted on previous CTA.  Continue atorvastatin.  4.  Hypertension, essential: Patient reports a history of morning hypertension.  Plan for monitoring at this time.    The patient states understanding and is agreeable with plan.     Celso Roth MD    ATTENDING ATTESTATION     I was present for the entire video visit, including all critical portions and decision-making.     Patient was seen and evaluated with Dr. Roth History was confirmed personally by me. Labs, imaging studies, and EKGs were reviewed.     Agree with assessment and plan as outlined above. The note has been edited by me as needed to produce a single, cohesive document.     Total video visit time: 30 minutes    Uriel Mendes MD  Staff Cardiologist

## 2020-06-09 ENCOUNTER — VIRTUAL VISIT (OUTPATIENT)
Dept: CARDIOLOGY | Facility: CLINIC | Age: 60
End: 2020-06-09
Attending: INTERNAL MEDICINE
Payer: COMMERCIAL

## 2020-06-09 VITALS — SYSTOLIC BLOOD PRESSURE: 112 MMHG | DIASTOLIC BLOOD PRESSURE: 70 MMHG | HEART RATE: 54 BPM

## 2020-06-09 DIAGNOSIS — I42.2 APICAL VARIANT HYPERTROPHIC CARDIOMYOPATHY (H): ICD-10-CM

## 2020-06-09 DIAGNOSIS — I25.10 CORONARY ARTERY DISEASE INVOLVING NATIVE CORONARY ARTERY OF NATIVE HEART WITHOUT ANGINA PECTORIS: ICD-10-CM

## 2020-06-09 DIAGNOSIS — I21.4 NSTEMI (NON-ST ELEVATED MYOCARDIAL INFARCTION) (H): ICD-10-CM

## 2020-06-09 PROCEDURE — 99214 OFFICE O/P EST MOD 30 MIN: CPT | Mod: 95 | Performed by: INTERNAL MEDICINE

## 2020-06-09 ASSESSMENT — PAIN SCALES - GENERAL: PAINLEVEL: NO PAIN (0)

## 2020-06-09 NOTE — PROGRESS NOTES
"Clara Boykin is a 60 year old female who is being evaluated via a billable video visit.      The patient has been notified of following:     \"This video visit will be conducted via a call between you and your physician/provider. We have found that certain health care needs can be provided without the need for an in-person physical exam.  This service lets us provide the care you need with a video conversation.  If a prescription is necessary we can send it directly to your pharmacy.  If lab work is needed we can place an order for that and you can then stop by our lab to have the test done at a later time.    Video visits are billed at different rates depending on your insurance coverage.  Please reach out to your insurance provider with any questions.    If during the course of the call the physician/provider feels a video visit is not appropriate, you will not be charged for this service.\"    Patient has given verbal consent for Video visit? Yes    How would you like to obtain your AVS? Mail a copy    Patient would like the video invitation sent by: Send to e-mail at: [email or web address]    Will anyone else be joining your video visit? No      "

## 2020-06-09 NOTE — LETTER
6/9/2020      RE: Clara Boykin  [address]  [facility] [address]       Dear Colleague,    Thank you for the opportunity to participate in the care of your patient, Clara Boykin, at the [facility]. Please see a copy of my visit note below.    Clara Boykin is a 60 year old female who is being evaluated via a billable video visit.      Chief complaint: HOCM evaluation    HPI:   Clara Boykin is a 59 year-old female with a past medical history notable for nonobstructive coronary artery disease, carotid artery disease, hypertension, and newly diagnosed hypertrophic cardiomyopathy who presents for evaluation of hypertrophic cardiomyopathy.  The patient states that she initially developed symptoms in January of this past year.  She was shoveling snow and developed severe dyspnea.  This feeling of dyspnea persisted for a few days.  Eventually, she ordered a troponin for herself and this later returned to be elevated.  After she had the elevated troponin, she presented to [facility].  Given the elevated troponin, shortness of breath, and abnormal EKG (which is new from prior), she underwent a coronary angiogram which showed nonobstructive coronary artery disease.  She was given aspirin, atorvastatin, and metoprolol on discharge.  She later underwent a cardiac MRI which showed signs consistent with apical hypertrophic cardiomyopathy.  She presents here today for further evaluation.    With the exception of the issue that led to her being admitted to the hospital, she has been generally asymptomatic.  She states that she does feel more short of breath than typical with significant exertion, however has not been exerting herself this past winter.  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  She notably has no palpitations, presyncope, and syncope.  She states that she had one syncopal event that was associated with a seizure in the  , which she was told was due to dehydration.  She has since worn an event monitor which showed no episodes of NSVT.  She has a family history of 2 siblings dying at a very young age, which was presumably due to Rh disease.  In addition, she did have 1 uncle who  while swimming, however he was in his late 70s to early 80s at the time.  Her father has been told that he is apical hypokinesis, though this was presumed to be due to a myocardial infarction.  She has no children and she has 1 living brother.    Interval History:  At the patient's last visit, her metoprolol was decreased to 12.5 mg twice per day (mostly treating hypertension).  We also ordered a cardiopulmonary stress test, which is yet to be completed.    The patient reports that since her last visit she has had occasional episodes of shortness of breath.  This is been worse recently with the higher heat and fires.  She has had intermittent presyncopal episodes, however no syncopal episodes.  She reports that her heart rate is been mainly in the 50s.  Occasionally, her systolic blood pressure will be in the 90s at which point she will hold 1 dose of metoprolol.    She also obtained the autopsy report from a paternal uncle who  while swimming.  He was found to have hypertrophic cardiomyopathy.  Her father has recently had a stroke and echocardiogram was performed at that time.    She denies orthopnea, PND, or lower extremity swelling.    PAST MEDICAL HISTORY:  Past Medical History:   Diagnosis Date     Apical variant hypertrophic cardiomyopathy (H) 3/10/2020     Coronary artery disease involving native coronary artery of native heart without angina pectoris 2020    NSTEMI 2020     Diplopia      H/O CT scan      H/O magnetic resonance imaging      Non-obstructive CAD without angina (coronary angiogram 2020) 2020    NSTEMI 2020     Paralytic strabismus      Pneumonia      PONV (postoperative nausea and vomiting)        CURRENT  MEDICATIONS:  Current Outpatient Medications   Medication Sig Dispense Refill     aspirin (ASA) 81 MG chewable tablet Take 1 tablet (81 mg) by mouth daily 90 tablet 1     atorvastatin (LIPITOR) 10 MG tablet Take 1 tablet (10 mg) by mouth every evening 90 tablet 3     CALCIUM PO Take 1,200 mg by mouth daily       Co-Enzyme Q-10 60 MG CAPS        diphenhydrAMINE (BENADRYL) 25 MG tablet Take 12.5-25 mg by mouth nightly as needed Reported on 3/31/2017       melatonin 3 MG tablet Take 3 mg by mouth nightly as needed for sleep       metoprolol tartrate (LOPRESSOR) 25 MG tablet Take 0.5 tablets (12.5 mg) by mouth 2 times daily 90 tablet 3     multivitamin, therapeutic (THERA-VIT) TABS tablet Take 1 tablet by mouth daily       Omega-3 Fatty Acids (OMEGA 3 PO) Take 1 g by mouth daily Reported on 3/31/2017       traZODone (DESYREL) 50 MG tablet 1/2 pill nightly as needed. 45 tablet 1     VITAMIN D, CHOLECALCIFEROL, PO Take 1,000 Units by mouth daily         PAST SURGICAL HISTORY:  Past Surgical History:   Procedure Laterality Date     APPENDECTOMY       AS KNEE SCOPE, DIAGNOSTIC       BUNIONECTOMY Right 2016    times 2     BUNIONECTOMY Left 10/22/2018    Procedure: LEFT REVISION BUNION ;  Surgeon: Johanna Lund MD;  Location: [facility]     CV CORONARY ANGIOGRAM N/A 1/24/2020    Procedure: Coronary Angiogram;  Surgeon: Criss Green MD;  Location:  HEART CARDIAC CATH LAB     CV LEFT HEART CATH N/A 1/24/2020    Procedure: Left Heart Cath;  Surgeon: Criss Green MD;  Location:  HEART CARDIAC CATH LAB     CV LEFT VENTRICULOGRAM N/A 1/24/2020    Procedure: Left Ventriculogram;  Surgeon: Criss Green MD;  Location:  HEART CARDIAC CATH LAB     HERNIORRHAPHY VENTRAL N/A 7/7/2016    Procedure: HERNIORRHAPHY VENTRAL;  Surgeon: Ash Thompson MD;  Location: [facility] OR     left clavical       NECK SURGERY       RECESSION RESECTION (REPAIR STRABISMUS) Right 4/10/2017    Procedure: RECESSION  RESECTION (REPAIR STRABISMUS);  Surgeon: Lyle Leggett MD;  Location: [facility] OR     WRIST SURGERY         ALLERGIES:     Allergies   Allergen Reactions     Exparel [Bupivacaine] GI Disturbance     Patient had severe abdominal distention     Darvocet [Propoxyphene N-Apap] Other (See Comments)     confusion     Liposomes GI Disturbance     Oxycodone      Penicillins Itching     Percocet [Oxycodone-Acetaminophen] Other (See Comments)     confusion     Tape [Adhesive Tape] Rash     Once after surgical tape     Vistaril [Hydroxyzine] Rash     Intense flushing/redness of face        FAMILY HISTORY:  Family History   Problem Relation Age of Onset     Cancer Mother         skin cancer     Diabetes Mother         borderline/Type 2     Hypertension Mother         3 meds: 4.5cm asc aortic aneurysm     Hyperlipidemia Mother         hi chol & triglycerides     Cerebrovascular Disease Mother         many small, cog. impaired     Osteoporosis Mother         fosamax x 5 yrs     Obesity Mother         BMI 35 or so     Unknown/Adopted Mother         dermatomyositis since      Cancer Father         skin cancer     Coronary Artery Disease Father         angioplasty ~     Hypertension Father         1 med, mild. Age 94     Depression Father         2x: age 87 [facility]     Cancer Maternal Grandmother         skin cancer     Cerebrovascular Disease Maternal Grandmother          from multiple CVA     Obesity Maternal Grandmother         overweight     Diabetes Maternal Grandfather         borderline/Type 2     Coronary Artery Disease Maternal Grandfather         2-3 MIs; worked after each     Cerebrovascular Disease Maternal Grandfather          from CVA postop hernia     Obesity Maternal Grandfather         was overwt to obese     Diabetes Cousin         prediabetes     Hypertension Brother         1 med for 20 yrs     Substance Abuse Brother         hx of EtOH     Cerebrovascular Disease Other         cog.  impairment like my mom     Mental Illness Sister         ?bipolar; past chem dep     Substance Abuse Sister         hx of: prescript drug& EtOH     Unknown/Adopted Sister         sister is adopted     Anesthesia Reaction Other         naus/vomit 1-8 hr postop unless tx'd     Anesthesia Reaction No family hx of      No family history of premature CAD or sudden death.    SOCIAL HISTORY:  Social History     Tobacco Use     Smoking status: Never Smoker     Smokeless tobacco: Never Used   Substance Use Topics     Alcohol use: Yes     Comment: rare     Drug use: No       ROS:   A comprehensive 14 point review of systems is negative other than as mentioned in HPI.    Exam:  CONSITUTIONAL: no acute distress  HEENT: no icterus, sclerae white  CV: no visible edema of visualized upper extremities, no gross JVD  CHEST: respirations nonlabored, no audible wheezing  NEURO: AA&Ox3, speech fluent/appropriate, motor grossly nonfocal  PSYCH: cooperative, affect appropriate  DERM: no rashes on visualized face/neck/upper extremities    The rest of a comprehensive physical examination is deferred due to PHE (public health emergency) video visit restrictions.    Labs:  Reviewed.     Testing/Procedures:  I personally visualized and interpreted:  CMR 2/14/20:  Apical HCM with apical segments ~1.3 cm (vs. 0.9 cm basal anterosetpum and 0.7 cm basal inferolateral.) Hyperdynamic LV function and normal RV function, LVEF=75% RVEF=67%. No LVOT or intracavitary obstruction. Very small apical LGE per report-- per my review, there is no convincing fibrosis on late gadolinium enhancement imaging.      Coronary angiogram 1/24/2020: non-obstructive CAD (30-40% mLAD/D1, 40% RCA)      EKG 1/24/20  infero-lateral ST t wave changes, nsr    Cardiac Event Monitor 1/25/20: single run of nonsustained SVT. No VT.      Assessment and Plan:   In summary, this is a 59-year-old female with a past medical history notable for coronary artery disease (nonobstructive),  hypertrophic cardiomyopathy, carotid arterial disease, and hypertension who is presenting for further evaluation.    1.  Apical hypertrophic cardiomyopathy: The patient's MRI is consistent with apical hypertrophic cardiomyopathy.  While her apical hypertrophic cardiomyopathy is unlikely to lead to an LVOT outlet obstruction, she is at risk for malignant ventricular arrhythmias and could potentially develop heart failure with preserved ejection physiology.      Regarding the ventricular arrhythmias, the presence of a sudden cardiac death in her family (uncle) in someone who had autopsy confirmed hypertrophic cardiomyopathy is somewhat concerning.  The patient has had no ventricular arrhythmias on ZIO Patch testing and she has no significant scar burden on MRI.    Her symptoms of dyspnea may be related to her apical hypertrophic cardiomyopathy.  Though she has no LVOT outlet obstruction, she may develop symptoms such as heart failure with preserved ejection fraction.  However, suspect that her symptoms are predominantly noncardiac in origin.  They may be iatrogenic in the setting of metoprolol use, it is reasonable for her to stop the metoprolol.    Given the presence of a genetic mutation of undetermined significance and a paternal uncle with known hypertrophic cardiomyopathy, we discussed at length with the patient regarding the need for her family members, including brothers, father, and cousins to be tested with an echocardiogram for the phenotype of apical HCM.  The patient will hopefully be able to send us these echocardiograms.    We will plan to see her again in 9 months with a repeated ZIO Patch at that time.    2.  Coronary artery disease: Nonobstructive on coronary angiogram.  Continue aspirin and atorvastatin (decreased to 10 mg per patient preference). Recheck lipids in three months.  3.  Carotid artery disease: Noted on previous CTA.  Continue atorvastatin.  4.  Hypertension, essential: Patient reports a  history of morning hypertension.  Plan for monitoring at this time.    The patient states understanding and is agreeable with plan.     Celso Roth MD    ATTENDING ATTESTATION     I was present for the entire video visit, including all critical portions and decision-making.     Patient was seen and evaluated with Dr. Roth History was confirmed personally by me. Labs, imaging studies, and EKGs were reviewed.     Agree with assessment and plan as outlined above. The note has been edited by me as needed to produce a single, cohesive document.     Total video visit time: 30 minutes    Uriel Mendes MD  Staff Cardiologist    Please do not hesitate to contact me if you have any questions/concerns.     Sincerely,     Uriel Mendes MD

## 2020-06-09 NOTE — PATIENT INSTRUCTIONS
1. As we discussed today, you have a genetic mutation of unclear significance. Because you have this mutation and both you and your uncle have hypertrophic cardiomyopathy, we recommend screening with an echocardiogram for your father, brother, and cousins. We would like to see the echocardiograms if possible.    2. It is OK for you to stop the metoprolol and see how you are feeling.     3. We will check a 14 day monitor again in March of 2021. We will also schedule your follow up with us in that time.    4. Please contact our office if you would like to be seen sooner or if your have any questions or concerns!

## 2020-07-17 ENCOUNTER — OFFICE VISIT (OUTPATIENT)
Dept: FAMILY MEDICINE | Facility: CLINIC | Age: 60
End: 2020-07-17
Payer: COMMERCIAL

## 2020-07-17 VITALS
WEIGHT: 121 LBS | TEMPERATURE: 98.7 F | BODY MASS INDEX: 20.14 KG/M2 | HEART RATE: 66 BPM | SYSTOLIC BLOOD PRESSURE: 100 MMHG | OXYGEN SATURATION: 97 % | DIASTOLIC BLOOD PRESSURE: 62 MMHG

## 2020-07-17 DIAGNOSIS — B07.0 PLANTAR WARTS: Primary | ICD-10-CM

## 2020-07-17 PROCEDURE — 99213 OFFICE O/P EST LOW 20 MIN: CPT | Performed by: PHYSICIAN ASSISTANT

## 2020-07-17 NOTE — PATIENT INSTRUCTIONS
Be aggressive! Cut away ALL tissue that doesn't hurt every night, then treat with a wart treatment (salicylic acid treatment), then cover. This will probably several months but will ultimately be successful.    You can also use duct tape: file or cut down all dead skin down to skin that bleeds, apply duct tape every night, take off in the morning, and repeat daily for at least 4 weeks (more likely longer).    For warts:  Mix 5 drops of Anderson oil, 10 drops of lemon in 2 teaspoons of apple cider vinegar - apply twice daily until the wart is gone.    Return to clinic in (10 days - 4 weeks) for re-treatment if lesions fail to fully resolve.

## 2020-07-17 NOTE — PROGRESS NOTES
Subjective     Clara Boykin is a 60 year old female who presents to clinic today for the following health issues:    HPI     WART(S)      Onset: 4 months    Description (location/number): right foot- 1    Accompanying signs and symptoms: Painful: YES    History: prior warts: YES    Therapies tried and outcome: liquid nitrogen- helps         Patient Active Problem List   Diagnosis     iamJOINT PAIN-PELVIS     Pain in joint, upper arm     Skin exam, screening for cancer     CARDIOVASCULAR SCREENING; LDL GOAL LESS THAN 160     Insomnia     History of anesthesia reaction     Small vessel disease, cerebrovascular     Right foot pain     Hip pain, right     NSTEMI (non-ST elevated myocardial infarction) (H)     Non-obstructive CAD without angina (coronary angiogram 1/2020)     Hyperlipidemia LDL goal <70     Apical variant hypertrophic cardiomyopathy (H)     Past Surgical History:   Procedure Laterality Date     ABDOMEN SURGERY  July 2016    Abd hernia repair (which has reoccurred form coughing 2018)     APPENDECTOMY       AS KNEE SCOPE, DIAGNOSTIC       BACK SURGERY  1999    Left C6-7 foraminotomy     BIOPSY  2014    lipoma excision (near R scapula)     BUNIONECTOMY Right 2016    times 2     BUNIONECTOMY Left 10/22/2018    Procedure: LEFT REVISION BUNION ;  Surgeon: Johanna Lund MD;  [facility]     COLONOSCOPY  2019 1 sm precancer polyp     CV CORONARY ANGIOGRAM N/A 1/24/2020    Procedure: Coronary Angiogram;  Surgeon: Criss Green MD;  Location:  [facility]     CV LEFT HEART CATH N/A 1/24/2020    Procedure: Left Heart Cath;  Surgeon: Criss Green MD;  Location:  [facility]     CV LEFT VENTRICULOGRAM N/A 1/24/2020    Procedure: Left Ventriculogram;  Surgeon: Criss Green MD;  Location:  [facility]     HERNIORRHAPHY VENTRAL N/A 7/7/2016    Procedure: HERNIORRHAPHY VENTRAL;  Surgeon: Ash Thompson MD;  Location:  OR      left clavical       NECK SURGERY       ORTHOPEDIC SURGERY  [id number]    ORIF L clavicle/hardware out; bilat bunion/forefoot (4 ops)     RECESSION RESECTION (REPAIR STRABISMUS) Right 4/10/2017    Procedure: RECESSION RESECTION (REPAIR STRABISMUS);  Surgeon: Lyle Leggett MD;  [facility]     SOFT TISSUE SURGERY      L wrist: scapholunate lig reconstruction post L wristfx     WRIST SURGERY         Social History     Tobacco Use     Smoking status: Never Smoker     Smokeless tobacco: Never Used     Tobacco comment: 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 impaired,  18     Osteoporosis Mother         fosamax x5 yrs:poststeroids     Obesity Mother         BMI 35+     Unknown/Adopted Mother         dermatomyositis since      Depression Mother      Mental Illness Mother         probably bordeline personality disorder; vascular dementia     Anesthesia Reaction Mother         ?     Cancer Father         skin cancer     Coronary Artery Disease Father         angioplasty ~     Hypertension Father         1 med, mild. Age 94     Depression Father         2x: age 87 [facility]     Hyperlipidemia Father         2020 after CVA, lipids not elevated     Cerebrovascular Disease Father         2 sm. foci occipito-parietal 20     Cancer Maternal Grandmother         skin cancer     Cerebrovascular Disease Maternal Grandmother         - multiple CVAs     Obesity Maternal Grandmother         overweight     Diabetes Maternal Grandfather         borderline/Type 2     Coronary  Artery Disease Maternal Grandfather         2-3 MIs; worked after each     Cerebrovascular Disease Maternal Grandfather          from CVA postop hernia     Obesity Maternal Grandfather         was overwt to obese     Diabetes Cousin         prediabetes     Hypertension Brother         1 med for 20 yrs     Substance Abuse Brother         hx of EtOH     Cerebrovascular Disease Other         cog. impairment like my mom     Mental Illness Sister         ?bipolar; past chem dep     Substance Abuse Sister         hx of: prescript drug& EtOH     Unknown/Adopted Sister         sister is adopted     Anesthesia Reaction Other         naus/vomit 1-8 hr postop unless tx'd     Cerebrovascular Disease Other         cog. impairment like my mom         Current Outpatient Medications   Medication Sig Dispense Refill     aspirin (ASA) 81 MG chewable tablet Take 1 tablet (81 mg) by mouth daily 90 tablet 1     atorvastatin (LIPITOR) 10 MG tablet Take 1 tablet (10 mg) by mouth every evening 90 tablet 3     CALCIUM PO Take 1,200 mg by mouth daily       Co-Enzyme Q-10 60 MG CAPS        diphenhydrAMINE (BENADRYL) 25 MG tablet Take 12.5-25 mg by mouth nightly as needed Reported on 3/31/2017       melatonin 3 MG tablet Take 3 mg by mouth nightly as needed for sleep       multivitamin, therapeutic (THERA-VIT) TABS tablet Take 1 tablet by mouth daily       Omega-3 Fatty Acids (OMEGA 3 PO) Take 1 g by mouth daily Reported on 3/31/2017       traZODone (DESYREL) 50 MG tablet 1/2 pill nightly as needed. 45 tablet 1     VITAMIN D, CHOLECALCIFEROL, PO Take 1,000 Units by mouth daily       Allergies   Allergen Reactions     Exparel [Bupivacaine] GI Disturbance     Patient had severe abdominal distention     Darvocet [Propoxyphene N-Apap] Other (See Comments)     confusion     Liposomes GI Disturbance     Oxycodone      Penicillins Itching     Percocet [Oxycodone-Acetaminophen] Other (See Comments)     confusion     Tape [Adhesive Tape] Rash      Once after surgical tape     Vistaril [Hydroxyzine] Rash     Intense flushing/redness of face      Recent Labs   Lab Test 05/26/20  0852 01/25/20  0738 01/24/20  0557 01/23/20  1902  04/20/18  0845  09/04/15  0841   A1C  --   --   --  5.8*  --   --   --   --    LDL 75  --  111*  --   --  118*   < > 129   HDL 60  --  74  --   --  73   < > 72   TRIG 100  --  47  --   --  73   < > 62   ALT 36  --  27  --   --  38  --   --    CR  --  0.68 0.62  --    < > 0.64   < >  --    GFRESTIMATED  --  >90 >90  --    < > >90   < >  --    GFRESTBLACK  --  >90 >90  --    < > >90   < >  --    POTASSIUM  --  4.1 4.0  --    < > 3.7   < >  --    TSH  --   --   --   --   --  1.44  --  1.22    < > = values in this interval not displayed.      BP Readings from Last 3 Encounters:   07/17/20 100/62   06/09/20 112/70   05/19/20 101/64    Wt Readings from Last 3 Encounters:   07/17/20 54.9 kg (121 lb)   05/19/20 56.1 kg (123 lb 9.6 oz)   03/10/20 54.4 kg (120 lb)                Reviewed and updated as needed this visit by Provider  Tobacco  Allergies  Meds  Problems  Med Hx  Surg Hx  Fam Hx         Review of Systems   Constitutional, HEENT, cardiovascular, pulmonary, GI, , musculoskeletal, neuro, skin, endocrine and psych systems are negative, except as otherwise noted.      Objective    /62 (BP Location: Left arm, Patient Position: Sitting, Cuff Size: Adult Regular)   Pulse 66   Temp 98.7  F (37.1  C) (Oral)   Wt 54.9 kg (121 lb)   LMP 09/06/2006   SpO2 97%   BMI 20.14 kg/m    Body mass index is 20.14 kg/m .  Physical Exam   GENERAL: healthy, alert and no distress  RESP: lungs clear to auscultation - no rales, rhonchi or wheezes  CV: regular rate and rhythm, normal S1 S2, no S3 or S4, no murmur, click or rub, no peripheral edema and peripheral pulses strong  MS: no gross musculoskeletal defects noted, no edema  WART:  Dome shaped grey/brown hyperkeratotic lesion(s) with verrucous appearance, black dots on surface.  Located  on bottom of right foot.    PROCEDURE:  Liquid nitrogen was applied for 10 seconds to the skin lesion(s) x 3 with intermediate thaw.  The expected redness, blistering or scabbing reaction was explained.  The patient was reminded of the risk of scarring from the procedure. Instructed to place duct tape over warts to help with resolution. The patient was instructed to return (10 days - 4 weeks) if lesions fail to fully resolve. Patient tolerated the procedure well.    Diagnostic Test Results:  Labs reviewed in Epic        Assessment & Plan       ICD-10-CM    1. Plantar warts  B07.0          Patient Instructions   Be aggressive! Cut away ALL tissue that doesn't hurt every night, then treat with a wart treatment (salicylic acid treatment), then cover. This will probably several months but will ultimately be successful.    You can also use duct tape: file or cut down all dead skin down to skin that bleeds, apply duct tape every night, take off in the morning, and repeat daily for at least 4 weeks (more likely longer).    For warts:  Mix 5 drops of Diamond oil, 10 drops of lemon in 2 teaspoons of apple cider vinegar - apply twice daily until the wart is gone.    Return to clinic in (10 days - 4 weeks) for re-treatment if lesions fail to fully resolve.       Return in about 3 months (around 10/17/2020) for Routine visit, or sooner with worsening symptoms.    Arielle Ellsworth PA-C  [facility]

## 2020-08-13 ENCOUNTER — OFFICE VISIT (OUTPATIENT)
Dept: FAMILY MEDICINE | Facility: CLINIC | Age: 60
End: 2020-08-13
Payer: COMMERCIAL

## 2020-08-13 VITALS
WEIGHT: 122 LBS | DIASTOLIC BLOOD PRESSURE: 68 MMHG | SYSTOLIC BLOOD PRESSURE: 100 MMHG | TEMPERATURE: 98.3 F | RESPIRATION RATE: 19 BRPM | HEART RATE: 69 BPM | BODY MASS INDEX: 20.3 KG/M2 | OXYGEN SATURATION: 97 %

## 2020-08-13 DIAGNOSIS — B07.0 PLANTAR WARTS: Primary | ICD-10-CM

## 2020-08-13 PROCEDURE — 17110 DESTRUCTION B9 LES UP TO 14: CPT | Performed by: PHYSICIAN ASSISTANT

## 2020-08-13 NOTE — PROGRESS NOTES
Subjective     Clara Boykin is a 60 year old female who presents to clinic today for the following health issues:    HPI       WART(S)      Onset: on going for months    Description (location/number): right foot-1    Accompanying signs and symptoms: Painful: no    History: prior warts: YES    Therapies tried and outcome: liquid nitrogen- helps ; last done ~ 1 month ago        Patient Active Problem List   Diagnosis     iamJOINT PAIN-PELVIS     Pain in joint, upper arm     Skin exam, screening for cancer     CARDIOVASCULAR SCREENING; LDL GOAL LESS THAN 160     Insomnia     History of anesthesia reaction     Small vessel disease, cerebrovascular     Right foot pain     Hip pain, right     NSTEMI (non-ST elevated myocardial infarction) (H)     Non-obstructive CAD without angina (coronary angiogram 1/2020)     Hyperlipidemia LDL goal <70     Apical variant hypertrophic cardiomyopathy (H)     Past Surgical History:   Procedure Laterality Date     ABDOMEN SURGERY  July 2016    Abd hernia repair (which has reoccurred form coughing 2018)     APPENDECTOMY       AS KNEE SCOPE, DIAGNOSTIC       BACK SURGERY  1999    Left C6-7 foraminotomy     BIOPSY  2014    lipoma excision (near R scapula)     BUNIONECTOMY Right 2016    times 2     BUNIONECTOMY Left 10/22/2018    Procedure: LEFT REVISION BUNION ;  Surgeon: Johanna Lund MD;  [facility]     COLONOSCOPY  2019    1 sm precancer polyp     CV CORONARY ANGIOGRAM N/A 1/24/2020    Procedure: Coronary Angiogram;  Surgeon: Criss Green MD;  Location:  [facility]     CV LEFT HEART CATH N/A 1/24/2020    Procedure: Left Heart Cath;  Surgeon: Criss Green MD;  Location:  [facility]     CV LEFT VENTRICULOGRAM N/A 1/24/2020    Procedure: Left Ventriculogram;  Surgeon: Criss Green MD;  Location:  [facility]     HERNIORRHAPHY VENTRAL N/A 7/7/2016    Procedure: HERNIORRHAPHY VENTRAL;  Surgeon: Donna  Ash Tucker MD;  [facility]     left clavical       NECK SURGERY       ORTHOPEDIC SURGERY  [id number]    ORIF L clavicle/hardware out; bilat bunion/forefoot (4 ops)     RECESSION RESECTION (REPAIR STRABISMUS) Right 4/10/2017    Procedure: RECESSION RESECTION (REPAIR STRABISMUS);  Surgeon: Lyle Leggett MD;  [facility]     SOFT TISSUE SURGERY      L wrist: scapholunate lig reconstruction post L wristfx     WRIST SURGERY         Social History     Tobacco Use     Smoking status: Never Smoker     Smokeless tobacco: Never Used     Tobacco comment: 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 impaired,  18     Osteoporosis Mother         fosamax x5 yrs:poststeroids     Obesity Mother         BMI 35+     Unknown/Adopted Mother         dermatomyositis since      Depression Mother      Mental Illness Mother         probably bordeline personality disorder; vascular dementia     Anesthesia Reaction Mother         ?     Cancer Father         skin cancer     Coronary Artery Disease Father         angioplasty ~     Hypertension Father         1 med, mild. Age 94     Depression Father         2x: age 87 [facility]     Hyperlipidemia Father         2020 after CVA, lipids not elevated     Cerebrovascular Disease Father         2 sm. foci occipito-parietal 20     Cancer Maternal Grandmother         skin cancer     Cerebrovascular Disease Maternal Grandmother         - multiple CVAs     Obesity Maternal Grandmother         overweight     Diabetes Maternal  Grandfather         borderline/Type 2     Coronary Artery Disease Maternal Grandfather         2-3 MIs; worked after each     Cerebrovascular Disease Maternal Grandfather          from CVA postop hernia     Obesity Maternal Grandfather         was overwt to obese     Diabetes Cousin         prediabetes     Hypertension Brother         1 med for 20 yrs     Substance Abuse Brother         hx of EtOH     Cerebrovascular Disease Other         cog. impairment like my mom     Mental Illness Sister         ?bipolar; past chem dep     Substance Abuse Sister         hx of: prescript drug& EtOH     Unknown/Adopted Sister         sister is adopted     Anesthesia Reaction Other         naus/vomit 1-8 hr postop unless tx'd     Cerebrovascular Disease Other         cog. impairment like my mom         Current Outpatient Medications   Medication Sig Dispense Refill     aspirin (ASA) 81 MG chewable tablet Take 1 tablet (81 mg) by mouth daily 90 tablet 1     atorvastatin (LIPITOR) 10 MG tablet Take 1 tablet (10 mg) by mouth every evening 90 tablet 3     CALCIUM PO Take 1,200 mg by mouth daily       Co-Enzyme Q-10 60 MG CAPS        diphenhydrAMINE (BENADRYL) 25 MG tablet Take 12.5-25 mg by mouth nightly as needed Reported on 3/31/2017       melatonin 3 MG tablet Take 3 mg by mouth nightly as needed for sleep       multivitamin, therapeutic (THERA-VIT) TABS tablet Take 1 tablet by mouth daily       Omega-3 Fatty Acids (OMEGA 3 PO) Take 1 g by mouth daily Reported on 3/31/2017       traZODone (DESYREL) 50 MG tablet 1/2 pill nightly as needed. 45 tablet 1     VITAMIN D, CHOLECALCIFEROL, PO Take 1,000 Units by mouth daily       Allergies   Allergen Reactions     Exparel [Bupivacaine] GI Disturbance     Patient had severe abdominal distention     Darvocet [Propoxyphene N-Apap] Other (See Comments)     confusion     Liposomes GI Disturbance     Oxycodone      Penicillins Itching     Percocet [Oxycodone-Acetaminophen] Other (See  Comments)     confusion     Tape [Adhesive Tape] Rash     Once after surgical tape     Vistaril [Hydroxyzine] Rash     Intense flushing/redness of face        Reviewed and updated as needed this visit by Provider  Tobacco  Allergies  Meds  Problems  Med Hx  Surg Hx  Fam Hx         Review of Systems   Constitutional, HEENT, cardiovascular, pulmonary, GI, , musculoskeletal, neuro, skin, endocrine and psych systems are negative, except as otherwise noted.      Objective    /68 (BP Location: Right arm, Patient Position: Sitting, Cuff Size: Adult Regular)   Pulse 69   Temp 98.3  F (36.8  C) (Oral)   Resp 19   Wt 55.3 kg (122 lb)   LMP 09/06/2006   SpO2 97%   BMI 20.30 kg/m    Body mass index is 20.3 kg/m .  Physical Exam   GENERAL: healthy, alert and no distress  RESP: lungs clear to auscultation - no rales, rhonchi or wheezes  CV: regular rate and rhythm, normal S1 S2, no S3 or S4, no murmur, click or rub, no peripheral edema and peripheral pulses strong  PSYCH: mentation appears normal, affect normal/bright    WART:  Dome shaped grey/brown hyperkeratotic lesion(s) with verrucous appearance, black dots on surface.  Located on bottom of right foot.     PROCEDURE:  Liquid nitrogen was applied for 10 seconds to the skin lesion(s) x 3 with intermediate thaw.  The expected redness, blistering or scabbing reaction was explained.  The patient was reminded of the risk of scarring from the procedure. Instructed to place duct tape over warts to help with resolution. The patient was instructed to return (10 days - 4 weeks) if lesions fail to fully resolve. Patient tolerated the procedure well.    Diagnostic Test Results:  Labs reviewed in Epic        Assessment & Plan       ICD-10-CM    1. Plantar warts  B07.0          Return in about 4 weeks (around 9/10/2020) for or sooner with worsening symptoms.    Arielle Ellsworth PA-C  [facility]

## 2020-09-02 ENCOUNTER — TELEPHONE (OUTPATIENT)
Dept: CARDIOLOGY | Facility: CLINIC | Age: 60
End: 2020-09-02

## 2020-09-03 NOTE — TELEPHONE ENCOUNTER
Clara had left a message to update me on family history information. Today when we spoke she states that her father (97 yrs) had a stroke in February and again in May.  He has had a few ECHOs during those work up and would like Dr. Mendes to review.  She uploaded his ECHOS into her chart (under media tab).  She also states that her father was diagnosed with Afib.    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  Although learning status of one additional family member is unlikely to change the classification of this variant, it may help better understand this variant.    Clara is understandably worries about risk for her brother as well as her paternal cousins.  She had one paternal uncle who  while swimming.  Autopsy (also found under media tab) showed significant coronary artery disease, hypertensive disease, and hypertrophy.    She has told cousins about recommendation for clinical screening for hypertrophic cardiomyopathy (HCM).  Reviewed clinical screening recommendations including ECHO/MRI, EKG, heart monitor, examination with a cardiologist, and stress testing.    She will wait to hear back from Dr. Mendes and then will set up a virtual visit for her father to pursue genetic testing for the VUS.    All questions answered at this time.    Tatianna Short MS, CGC  Licensed, Certified Genetic Counselor  [facility]  [facility]

## 2020-09-17 ENCOUNTER — OFFICE VISIT (OUTPATIENT)
Dept: SURGERY | Facility: CLINIC | Age: 60
End: 2020-09-17
Payer: COMMERCIAL

## 2020-09-17 DIAGNOSIS — K43.2 RECURRENT VENTRAL HERNIA: Primary | ICD-10-CM

## 2020-09-17 PROCEDURE — 99244 OFF/OP CNSLTJ NEW/EST MOD 40: CPT | Performed by: SURGERY

## 2020-09-17 NOTE — PROGRESS NOTES
[facility]  Surgery Consultation    CONSULTATION REQUESTED BY:  Wegener, Joel Daniel Irwin [phone]    HPI: This patient is a 60-year-old female referred by the above-mentioned provider for consultation regarding recurrent ventral hernia.  Patient underwent an open supraumbilical/ventral hernia repair in 2016.  She reports that about 2 years after that she had a severe episode of bronchitis and while coughing she felt something pop and tear.  She has had intermittent discomfort in the site ever since.  She has had no signs or symptoms to suggest incarceration or strangulation.  No bowel obstruction symptoms.  She is continued to be very active.  She exercises on a regular basis.  She does not feel that the discomfort and pain has significantly limited or changed her activity patterns.  She did undergo an ultrasound of the area which showed a small fat containing recurrent hernia.    PMH:   has a past medical history of Apical variant hypertrophic cardiomyopathy (H) (3/10/2020), Arthritis (?), Coronary artery disease involving native coronary artery of native heart without angina pectoris (2/4/2020), Diplopia, H/O CT scan, H/O magnetic resonance imaging, History of blood transfusion, Hypertension (< =2018), Non-obstructive CAD without angina (coronary angiogram 1/2020) (2/4/2020), Paralytic strabismus, Pneumonia, and PONV (postoperative nausea and vomiting).  PSH:    has a past surgical history that includes Herniorrhaphy ventral (N/A, 7/7/2016); Bunionectomy (Right, 2016); Recession resection (repair strabismus) (Right, 4/10/2017); left clavical; Wrist surgery; Neck surgery; appendectomy; KNEE SCOPE,DIAGNOSTIC; Bunionectomy (Left, 10/22/2018); Left Heart Cath (N/A, 1/24/2020); Left Ventriculogram (N/A, 1/24/2020); Coronary Angiogram (N/A, 1/24/2020); Abdomen surgery (July 2016); biopsy (2014); colonoscopy (2019); orthopedic surgery (6223-6402); Soft tissue surgery (2007); and back surgery  (1999).  Social History:   reports that she has never smoked. She has never used smokeless tobacco. She reports current alcohol use. She reports that she does not use drugs.  Family History:  family history includes Anesthesia Reaction in her mother and another family member; Cancer in her father, maternal grandmother, and mother; Cerebrovascular Disease in her father, maternal grandfather, maternal grandmother, mother, and other family members; Coronary Artery Disease in her father and maternal grandfather; Depression in her father and mother; Diabetes in her cousin, maternal grandfather, and mother; Hyperlipidemia in her father and mother; Hypertension in her brother, father, and mother; Mental Illness in her mother and sister; Obesity in her maternal grandfather, maternal grandmother, and mother; Osteoporosis in her mother; Substance Abuse in her brother and sister; Unknown/Adopted in her mother and sister.  Medications/Allergies: Home medications and allergies reviewed.    ROS:  The 10 point Review of Systems is negative other than noted in the HPI.    Physical Exam:  [facility] 09/06/2006   GENERAL: Generally appears well.  Psych: Alert and Oriented.  Normal affect  Eyes: Sclera clear  Respiratory:  Lungs clear to ausculation bilaterally with good air excursion  Cardiovascular:  Regular Rate and Rhythm with no murmurs gallops or rubs, normal peripheral pulses  GI: Abdomen Non Distended Mild tenderness to palpation periumbilical I could not definitively palpate a recurrence of hernia.  But her pain is located immediately underneath her supraumbilical incision which corresponds to the area of abnormality on her abdominal ultrasound..  Lymphatic/Hematologic/Immune:  No femoral or cervical lymphadenopathy.  Integumentary:  No rashes  Neurological: grossly intact     All new lab and imaging data was reviewed.     Impression and Plan:  Patient is a 60 year old female with small recurrent ventral hernia following prior open  repair with 6 cm mesh patch.    PLAN: I discussed with her her management options.  It is my impression that the primary closure of the fascia likely gave way but there is still posterior coverage of mesh.  This likely is leading to a circumstance where some preperitoneal fat that was between the mesh and the abdominal wall is able to slip through this persistent weakness.  We discussed at length her management options.  I outlined robotic versus hybrid surgery.  In this situation given her relatively small body size I think that she would be best served with a hybrid operation that would involve open abdominal exploration with explant of the prior prosthetic and redo hernia repair with laparoscopic mesh placement.  This was all discussed with her in detail.  Her questions were answered to her satisfaction.  She has not yet at this time prepared to proceed with surgery as she does not feel her symptoms are limiting enough.  I also think that her likelihood of necessity of urgent surgery is extremely low.  She was encouraged to call back should she decide to proceed.  I discussed the pathophysiology of hernias and options for repair including laparoscopic VS open.  The risks associated with the procedure including, but not limited to, recurrence, nerve entrapment or injury, persistence of pain, injury to the bowel/bladder, infertility, hematoma, mesh migration, mesh infection, MI, and PE were discussed with the patient. She indicated understanding of the discussion, asked appropriate questions, and provided consent. Signs and symptoms of incarceration were discussed. If these develop in the interim, she promises to call or go straight to the ER. I have provided the patient with an information pamphlet.    Thank you very much for this consult.    Shawn Durant M.D.  [facility]  [phone]    Please route or send letter to:  Primary Care Provider (PCP) and Referring Provider

## 2020-09-29 ENCOUNTER — HOSPITAL ENCOUNTER (OUTPATIENT)
Dept: CARDIOLOGY | Facility: CLINIC | Age: 60
Discharge: HOME | End: 2020-09-29
Attending: INTERNAL MEDICINE | Admitting: INTERNAL MEDICINE
Payer: COMMERCIAL

## 2020-09-29 DIAGNOSIS — I42.2 APICAL VARIANT HYPERTROPHIC CARDIOMYOPATHY (H): ICD-10-CM

## 2020-09-29 DIAGNOSIS — I21.4 NSTEMI (NON-ST ELEVATED MYOCARDIAL INFARCTION) (H): ICD-10-CM

## 2020-09-29 DIAGNOSIS — I25.10 CORONARY ARTERY DISEASE INVOLVING NATIVE CORONARY ARTERY OF NATIVE HEART WITHOUT ANGINA PECTORIS: ICD-10-CM

## 2020-09-29 PROCEDURE — 94621 CARDIOPULM EXERCISE TESTING: CPT

## 2020-09-29 PROCEDURE — 94621 CARDIOPULM EXERCISE TESTING: CPT | Mod: 26 | Performed by: INTERNAL MEDICINE

## 2020-10-05 NOTE — PROGRESS NOTES
"Clara Boykin is a 60 year old female who is being evaluated via a billable video visit.      The patient has been notified of following:     \"This video visit will be conducted via a call between you and your physician/provider. We have found that certain health care needs can be provided without the need for an in-person physical exam.  This service lets us provide the care you need with a video conversation.  If a prescription is necessary we can send it directly to your pharmacy.  If lab work is needed we can place an order for that and you can then stop by our lab to have the test done at a later time. Video visits are billed at different rates depending on your insurance coverage.  Please reach out to your insurance provider with any questions. If during the course of the call the physician/provider feels a video visit is not appropriate, you will not be charged for this service.\"    Patient has given verbal consent for Video visit? Yes      Video Start Time: 3:10PM  Pt will be connecting with [employer] @2:50pm    Chief complaint: Apical HCM follow-up     HPI:   Clara Boykin is a 59 year-old female with a past medical history notable for nonobstructive coronary artery disease, carotid artery disease, hypertension, apical hypertrophic cardiomyopathy who presents for follow-up of hypertrophic cardiomyopathy.     Cardiac history:  The patient states that she initially developed symptoms in January 2020.  She was shoveling snow and developed severe dyspnea.  This feeling of dyspnea persisted for a few days.  Eventually, she ordered a troponin for herself and this later returned to be elevated.  After she had the elevated troponin, she presented to [facility].  Given the elevated troponin, shortness of breath, and abnormal EKG (which is new from prior), she underwent a coronary angiogram which showed nonobstructive coronary artery disease.  She was given aspirin, atorvastatin, and metoprolol on discharge.  She " "later underwent a cardiac MRI which showed signs consistent with apical hypertrophic cardiomyopathy.  She presents here today for further evaluation.     With the exception of the issue that led to her being admitted to the hospital, she has been generally asymptomatic.  She states that she does feel more short of breath than typical with significant exertion, however has not been exerting herself this past winter.  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  She notably has no palpitations, presyncope, and syncope.  She states that she had one syncopal event that was associated with a seizure in the , which she was told was due to dehydration.  She has since worn an event monitor which showed no episodes of NSVT.  She has a family history of 2 siblings dying at a very young age, which was presumably due to Rh disease.  In addition, she did have 1 uncle who  while swimming, however he was in his late 70s to early 80s at the time. She obtained the autopsy report from a paternal uncle who  while swimming.  He was found to have hypertrophic cardiomyopathy.  Her father has been told that he is apical hypokinesis, though this was presumed to be due to a myocardial infarction.  She has no children and she has 1 living brother.     Interval History (10/6/20):  BPs are about upper 130-140s. 161/87 last week. Reports that he BPs have been fluctuating. Reports no SOB, no CP. Reports occasional \"skipping beats\", but no lightheadedness, no syncope.    PAST MEDICAL HISTORY:  Past Medical History:   Diagnosis Date     Apical variant hypertrophic cardiomyopathy (H) 3/10/2020     Arthritis ?    thumbs, ? mild other sites     Coronary artery disease involving native coronary artery of native heart without angina pectoris 2020    NSTEMI 2020     Diplopia      H/O CT scan      H/O magnetic resonance imaging      History of blood transfusion     2x at birth; mom was RH neg     Hypertension < " =2018    Morning-surge HTN: mild     Non-obstructive CAD without angina (coronary angiogram 1/2020) 2/4/2020    NSTEMI Jan 2020     Paralytic strabismus      Pneumonia      PONV (postoperative nausea and vomiting)        CURRENT MEDICATIONS:  Current Outpatient Medications   Medication Sig Dispense Refill     aspirin (ASA) 81 MG chewable tablet Take 1 tablet (81 mg) by mouth daily 90 tablet 1     atorvastatin (LIPITOR) 10 MG tablet Take 1 tablet (10 mg) by mouth every evening 90 tablet 3     CALCIUM PO Take 1,200 mg by mouth daily       Co-Enzyme Q-10 60 MG CAPS        diphenhydrAMINE (BENADRYL) 25 MG tablet Take 12.5-25 mg by mouth nightly as needed Reported on 3/31/2017       melatonin 3 MG tablet Take 3 mg by mouth nightly as needed for sleep       multivitamin, therapeutic (THERA-VIT) TABS tablet Take 1 tablet by mouth daily       Omega-3 Fatty Acids (OMEGA 3 PO) Take 1 g by mouth daily Reported on 3/31/2017       traZODone (DESYREL) 50 MG tablet 1/2 pill nightly as needed. 45 tablet 1     VITAMIN D, CHOLECALCIFEROL, PO Take 1,000 Units by mouth daily         PAST SURGICAL HISTORY:  Past Surgical History:   Procedure Laterality Date     ABDOMEN SURGERY  July 2016    Abd hernia repair (which has reoccurred form coughing 2018)     APPENDECTOMY       AS KNEE SCOPE, DIAGNOSTIC       BACK SURGERY  1999    Left C6-7 foraminotomy     BIOPSY  2014    lipoma excision (near R scapula)     BUNIONECTOMY Right 2016    times 2     BUNIONECTOMY Left 10/22/2018    Procedure: LEFT REVISION BUNION ;  Surgeon: Johanna Lund MD;  Location:  [facility]     COLONOSCOPY  2019    1 sm precancer polyp     CV CORONARY ANGIOGRAM N/A 1/24/2020    Procedure: Coronary Angiogram;  Surgeon: Criss Green MD;  Location:  HE[facility] CARDIAC CATH LAB     CV LEFT HEART CATH N/A 1/24/2020    Procedure: Left Heart Cath;  Surgeon: Criss Green MD;  Location:  HE[facility] CARDIAC CATH LAB     CV LEFT VENTRICULOGRAM N/A 1/24/2020     Procedure: Left Ventriculogram;  Surgeon: Criss Green MD;  Location:  HEART CARDIAC CATH LAB     HERNIORRHAPHY VENTRAL N/A 2016    Procedure: HERNIORRHAPHY VENTRAL;  Surgeon: Ash Thompson MD;  Location: [facility] OR     left clavical       NECK SURGERY       ORTHOPEDIC SURGERY  [id number]    ORIF L clavicle/hardware out; bilat bunion/forefoot (4 ops)     RECESSION RESECTION (REPAIR STRABISMUS) Right 4/10/2017    Procedure: RECESSION RESECTION (REPAIR STRABISMUS);  Surgeon: Lyle Leggett MD;  Location:  OR     SOFT TISSUE SURGERY      L wrist: scapholunate lig reconstruction post L wristfx     WRIST SURGERY         ALLERGIES:     Allergies   Allergen Reactions     Exparel [Bupivacaine] GI Disturbance     Patient had severe abdominal distention     Darvocet [Propoxyphene N-Apap] Other (See Comments)     confusion     Liposomes GI Disturbance     Oxycodone      Penicillins Itching     Percocet [Oxycodone-Acetaminophen] Other (See Comments)     confusion     Tape [Adhesive Tape] Rash     Once after surgical tape     Vistaril [Hydroxyzine] Rash     Intense flushing/redness of face        FAMILY HISTORY:  Family History   Problem Relation Age of Onset     Cancer Mother         skin cancer     Diabetes Mother         borderline/Type 2     Hypertension Mother         3 meds: 4.5cm asc aortic aneurysm     Hyperlipidemia Mother         chol & very high triglycerides     Cerebrovascular Disease Mother         many small, cog. impaired,  18     Osteoporosis Mother         fosamax x5 yrs:poststeroids     Obesity Mother         BMI 35+     Unknown/Adopted Mother         dermatomyositis since      Depression Mother      Mental Illness Mother         probably bordeline personality disorder; vascular dementia     Anesthesia Reaction Mother         ?     Cancer Father         skin cancer     Coronary Artery Disease Father         angioplasty ~     Hypertension Father         1  med, mild. Age 94     Depression Father         2x: age 87 [facility]     Hyperlipidemia Father         2020 after CVA, lipids not elevated     Cerebrovascular Disease Father         2 sm. foci occipito-parietal 20     Cancer Maternal Grandmother         skin cancer     Cerebrovascular Disease Maternal Grandmother         - multiple CVAs     Obesity Maternal Grandmother         overweight     Diabetes Maternal Grandfather         borderline/Type 2     Coronary Artery Disease Maternal Grandfather         2-3 MIs; worked after each     Cerebrovascular Disease Maternal Grandfather          from CVA postop hernia     Obesity Maternal Grandfather         was overwt to obese     Diabetes Cousin         prediabetes     Hypertension Brother         1 med for 20 yrs     Substance Abuse Brother         hx of EtOH     Cerebrovascular Disease Other         cog. impairment like my mom     Mental Illness Sister         ?bipolar; past chem dep     Substance Abuse Sister         hx of: prescript drug& EtOH     Unknown/Adopted Sister         sister is adopted     Anesthesia Reaction Other         naus/vomit 1-8 hr postop unless tx'd     Cerebrovascular Disease Other         cog. impairment like my mom     SCD in Uncle while swimming. Autopsy report showed HCM.    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    Exam:  CONSITUTIONAL: no acute distress  HEENT: no icterus, sclerae white  CV: no visible edema of visualized upper extremities, no gross JVD  CHEST: respirations nonlabored, no audible wheezing  NEURO: AA&Ox3, speech fluent/appropriate, motor grossly nonfocal  PSYCH:  cooperative, affect appropriate  DERM: no rashes on visualized face/neck/upper extremities    The rest of a comprehensive physical examination is deferred due to PHE (public health emergency) video visit restrictions.      Labs:  Reviewed.     Cardiopulmonary stress test (9/29/20):  Exercised 12:17 (mm:ss) on the [facility] Ramp protocol. Stopped due to miscommunication (patient asked what her heart rate was which was interpreted as signal to stop.)  MVO2 30.5 mL/kg/min (9 METS, 120% predicted, class I)  RER 1.14  Ve/VCO2 slope 35.17  HR 84->160  /90->185/78  Isolated PACs and couplets PACs in recovery    CMR 2/14/20:  Apical HCM with apical segments ~1.3 cm (vs. 0.9  - cm basal anterosetpum and 0.7 cm basal inferolateral.) Hyperdynamic LV function and normal RV function, LVEF=75% RVEF=67%. No LVOT or intracavitary obstruction. Very small apical LGE per report-- per my review, there is no convincing fibrosis on late gadolinium enhancement imaging.        Coronary angiogram 1/24/2020: non-obstructive CAD (30-40% mLAD/D1, 40% RCA)        EKG 1/24/20  infero-lateral ST t wave changes, nsr     Cardiac Event Monitor 1/25/20: single run of nonsustained SVT. No VT.       Assessment and Plan:   In summary, this is a 59-year-old female with a past medical history notable for coronary artery disease (nonobstructive), hypertrophic cardiomyopathy, carotid arterial disease, and hypertension who is presenting for follow-up.     1.  Apical hypertrophic cardiomyopathy: The patient's MRI is consistent with apical hypertrophic cardiomyopathy.  While her apical hypertrophic cardiomyopathy is unlikely to lead to an LVOT outlet obstruction, she is at risk for malignant ventricular arrhythmias and could potentially develop heart failure with preserved ejection physiology.       Regarding the ventricular arrhythmias, the presence of a sudden cardiac death in her family (uncle) in someone who had autopsy confirmed hypertrophic  cardiomyopathy is somewhat concerning.  The patient has had no ventricular arrhythmias on ZIO Patch testing and she has no significant scar burden on MRI.    We will plan to see her again in 6 months.     2.  Coronary artery disease: Nonobstructive on coronary angiogram.  Continue aspirin and atorvastatin (decreased to 10 mg per patient preference).  3.  Carotid artery disease: Noted on previous CTA.  Continue atorvastatin.  4.  Hypertension, essential: Patient reports a history of morning hypertension. Plan to start lisinopril 2.5 mg PO QDay.     The patient states understanding and is agreeable with plan.    Plan discussed with Dr. Mendes.    Brennen Philip MD    ATTENDING ATTESTATION     I was present for the entire video visit, including all critical portions and decision-making.     Patient was seen and evaluated with Dr. Philip History was confirmed personally by me. Labs, imaging studies, and EKGs were reviewed.     Agree with assessment and plan as outlined above. The note has been edited by me as needed to produce a single, cohesive document.     Total video visit time: 21 minutes    Uriel Mendes MD  Staff Cardiologist      Video-Visit Details    Type of service:  Video Visit    Video End Time (time video stopped): 3:31PM    Video visit total time: 21 minutes    Originating Location (pt. Location): Home    Distant Location (provider location):  [facility]     Mode of Communication:  Video Conference via e|tab      SELF, REFERRED

## 2020-10-06 ENCOUNTER — VIRTUAL VISIT (OUTPATIENT)
Dept: CARDIOLOGY | Facility: CLINIC | Age: 60
End: 2020-10-06
Attending: INTERNAL MEDICINE
Payer: COMMERCIAL

## 2020-10-06 DIAGNOSIS — I10 ESSENTIAL HYPERTENSION: Primary | ICD-10-CM

## 2020-10-06 PROCEDURE — 99214 OFFICE O/P EST MOD 30 MIN: CPT | Mod: 95 | Performed by: INTERNAL MEDICINE

## 2020-10-06 RX ORDER — LISINOPRIL 2.5 MG/1
2.5 TABLET ORAL DAILY
Qty: 30 TABLET | Refills: 3 | Status: SHIPPED | OUTPATIENT
Start: 2020-10-06 | End: 2021-04-13

## 2020-10-06 NOTE — LETTER
"10/6/2020      RE: Clara Boykin  [address]  [facility] [address]       Dear Colleague,    Thank you for the opportunity to participate in the care of your patient, Clara Boykin, at the [facility] HEART [facility] at [facility]. Please see a copy of my visit note below.    Clara Boykin is a 60 year old female who is being evaluated via a billable video visit.      The patient has been notified of following:     \"This video visit will be conducted via a call between you and your physician/provider. We have found that certain health care needs can be provided without the need for an in-person physical exam.  This service lets us provide the care you need with a video conversation.  If a prescription is necessary we can send it directly to your pharmacy.  If lab work is needed we can place an order for that and you can then stop by our lab to have the test done at a later time. Video visits are billed at different rates depending on your insurance coverage.  Please reach out to your insurance provider with any questions. If during the course of the call the physician/provider feels a video visit is not appropriate, you will not be charged for this service.\"    Patient has given verbal consent for Video visit? Yes      Video Start Time: 3:10PM  Pt will be connecting with [employer] @2:50pm    Chief complaint: Apical HCM follow-up     HPI:   Clraa Boykin is a 59 year-old female with a past medical history notable for nonobstructive coronary artery disease, carotid artery disease, hypertension, apical hypertrophic cardiomyopathy who presents for follow-up of hypertrophic cardiomyopathy.     Cardiac history:  The patient states that she initially developed symptoms in January 2020.  She was shoveling snow and developed severe dyspnea.  This feeling of dyspnea persisted for a few days.  Eventually, she ordered a troponin for herself and this later returned to be " "elevated.  After she had the elevated troponin, she presented to [facility].  Given the elevated troponin, shortness of breath, and abnormal EKG (which is new from prior), she underwent a coronary angiogram which showed nonobstructive coronary artery disease.  She was given aspirin, atorvastatin, and metoprolol on discharge.  She later underwent a cardiac MRI which showed signs consistent with apical hypertrophic cardiomyopathy.  She presents here today for further evaluation.     With the exception of the issue that led to her being admitted to the hospital, she has been generally asymptomatic.  She states that she does feel more short of breath than typical with significant exertion, however has not been exerting herself this past winter.  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  She notably has no palpitations, presyncope, and syncope.  She states that she had one syncopal event that was associated with a seizure in the , which she was told was due to dehydration.  She has since worn an event monitor which showed no episodes of NSVT.  She has a family history of 2 siblings dying at a very young age, which was presumably due to Rh disease.  In addition, she did have 1 uncle who  while swimming, however he was in his late 70s to early 80s at the time. She obtained the autopsy report from a paternal uncle who  while swimming.  He was found to have hypertrophic cardiomyopathy.  Her father has been told that he is apical hypokinesis, though this was presumed to be due to a myocardial infarction.  She has no children and she has 1 living brother.     Interval History (10/6/20):  BPs are about upper 130-140s. 161/87 last week. Reports that he BPs have been fluctuating. Reports no SOB, no CP. Reports occasional \"skipping beats\", but no lightheadedness, no syncope.    PAST MEDICAL HISTORY:  Past Medical History:   Diagnosis Date     Apical variant hypertrophic cardiomyopathy (H) " 3/10/2020     Arthritis ?    thumbs, ? mild other sites     Coronary artery disease involving native coronary artery of native heart without angina pectoris 2/4/2020    NSTEMI Jan 2020     Diplopia      H/O CT scan      H/O magnetic resonance imaging      History of blood transfusion     2x at birth; mom was RH neg     Hypertension < =2018    Morning-surge HTN: mild     Non-obstructive CAD without angina (coronary angiogram 1/2020) 2/4/2020    NSTEMI Jan 2020     Paralytic strabismus      Pneumonia      PONV (postoperative nausea and vomiting)        CURRENT MEDICATIONS:  Current Outpatient Medications   Medication Sig Dispense Refill     aspirin (ASA) 81 MG chewable tablet Take 1 tablet (81 mg) by mouth daily 90 tablet 1     atorvastatin (LIPITOR) 10 MG tablet Take 1 tablet (10 mg) by mouth every evening 90 tablet 3     CALCIUM PO Take 1,200 mg by mouth daily       Co-Enzyme Q-10 60 MG CAPS        diphenhydrAMINE (BENADRYL) 25 MG tablet Take 12.5-25 mg by mouth nightly as needed Reported on 3/31/2017       melatonin 3 MG tablet Take 3 mg by mouth nightly as needed for sleep       multivitamin, therapeutic (THERA-VIT) TABS tablet Take 1 tablet by mouth daily       Omega-3 Fatty Acids (OMEGA 3 PO) Take 1 g by mouth daily Reported on 3/31/2017       traZODone (DESYREL) 50 MG tablet 1/2 pill nightly as needed. 45 tablet 1     VITAMIN D, CHOLECALCIFEROL, PO Take 1,000 Units by mouth daily         PAST SURGICAL HISTORY:  Past Surgical History:   Procedure Laterality Date     ABDOMEN SURGERY  July 2016    Abd hernia repair (which has reoccurred form coughing 2018)     APPENDECTOMY       AS KNEE SCOPE, DIAGNOSTIC       BACK SURGERY  1999    Left C6-7 foraminotomy     BIOPSY  2014    lipoma excision (near R scapula)     BUNIONECTOMY Right 2016    times 2     BUNIONECTOMY Left 10/22/2018    Procedure: LEFT REVISION BUNION ;  Surgeon: Johanna Lund MD;  [facility]     COLONOSCOPY  2019    1 sm precancer polyp      CV CORONARY ANGIOGRAM N/A 2020    Procedure: Coronary Angiogram;  Surgeon: Criss Green MD;  Location:  HEART CARDIAC CATH LAB     CV LEFT HEART CATH N/A 2020    Procedure: Left Heart Cath;  Surgeon: Criss Green MD;  Location:  HEART CARDIAC CATH LAB     CV LEFT VENTRICULOGRAM N/A 2020    Procedure: Left Ventriculogram;  Surgeon: Criss Green MD;  Location:  HEART CARDIAC CATH LAB     HERNIORRHAPHY VENTRAL N/A 2016    Procedure: HERNIORRHAPHY VENTRAL;  Surgeon: Ash Thompson MD;  [facility]     left clavical       NECK SURGERY       ORTHOPEDIC SURGERY  [id number]    ORIF L clavicle/hardware out; bilat bunion/forefoot (4 ops)     RECESSION RESECTION (REPAIR STRABISMUS) Right 4/10/2017    Procedure: RECESSION RESECTION (REPAIR STRABISMUS);  Surgeon: Lyle Leggett MD;  Location:  OR     SOFT TISSUE SURGERY      L wrist: scapholunate lig reconstruction post L wristfx     WRIST SURGERY         ALLERGIES:     Allergies   Allergen Reactions     Exparel [Bupivacaine] GI Disturbance     Patient had severe abdominal distention     Darvocet [Propoxyphene N-Apap] Other (See Comments)     confusion     Liposomes GI Disturbance     Oxycodone      Penicillins Itching     Percocet [Oxycodone-Acetaminophen] Other (See Comments)     confusion     Tape [Adhesive Tape] Rash     Once after surgical tape     Vistaril [Hydroxyzine] Rash     Intense flushing/redness of face        FAMILY HISTORY:  Family History   Problem Relation Age of Onset     Cancer Mother         skin cancer     Diabetes Mother         borderline/Type 2     Hypertension Mother         3 meds: 4.5cm asc aortic aneurysm     Hyperlipidemia Mother         chol & very high triglycerides     Cerebrovascular Disease Mother         many small, cog. impaired,  18     Osteoporosis Mother         fosamax x5 yrs:poststeroids     Obesity Mother         BMI 35+      Unknown/Adopted Mother         dermatomyositis since      Depression Mother      Mental Illness Mother         probably bordeline personality disorder; vascular dementia     Anesthesia Reaction Mother         ?     Cancer Father         skin cancer     Coronary Artery Disease Father         angioplasty ~     Hypertension Father         1 med, mild. Age 94     Depression Father         2x: age 87 [facility]     Hyperlipidemia Father         2020 after CVA, lipids not elevated     Cerebrovascular Disease Father         2 sm. foci occipito-parietal 20     Cancer Maternal Grandmother         skin cancer     Cerebrovascular Disease Maternal Grandmother         - multiple CVAs     Obesity Maternal Grandmother         overweight     Diabetes Maternal Grandfather         borderline/Type 2     Coronary Artery Disease Maternal Grandfather         2-3 MIs; worked after each     Cerebrovascular Disease Maternal Grandfather          from CVA postop hernia     Obesity Maternal Grandfather         was overwt to obese     Diabetes Cousin         prediabetes     Hypertension Brother         1 med for 20 yrs     Substance Abuse Brother         hx of EtOH     Cerebrovascular Disease Other         cog. impairment like my mom     Mental Illness Sister         ?bipolar; past chem dep     Substance Abuse Sister         hx of: prescript drug& EtOH     Unknown/Adopted Sister         sister is adopted     Anesthesia Reaction Other         naus/vomit 1-8 hr postop unless tx'd     Cerebrovascular Disease Other         cog. impairment like my mom     SCD in Uncle while swimming. Autopsy report showed HCM.    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    Exam:  CONSITUTIONAL: no acute distress  HEENT: no icterus, sclerae white  CV: no visible edema of visualized upper extremities, no gross JVD  CHEST: respirations nonlabored, no audible wheezing  NEURO: AA&Ox3, speech fluent/appropriate, motor grossly nonfocal  PSYCH: cooperative, affect appropriate  DERM: no rashes on visualized face/neck/upper extremities    The rest of a comprehensive physical examination is deferred due to PHE (public health emergency) video visit restrictions.      Labs:  Reviewed.     Cardiopulmonary stress test (9/29/20):  Exercised 12:17 (mm:ss) on the Jose G Ramp protocol. Stopped due to miscommunication (patient asked what her heart rate was which was interpreted as signal to stop.)  MVO2 30.5 mL/kg/min (9 METS, 120% predicted, class I)  RER 1.14  Ve/VCO2 slope 35.17  HR 84->160  /90->185/78  Isolated PACs and couplets PACs in recovery    CMR 2/14/20:  Apical HCM with apical segments ~1.3 cm (vs. 0.9  - cm basal anterosetpum and 0.7 cm basal inferolateral.) Hyperdynamic LV function and normal RV function, LVEF=75% RVEF=67%. No LVOT or intracavitary obstruction. Very small apical LGE per report-- per my review, there is no convincing fibrosis on late gadolinium enhancement imaging.        Coronary angiogram 1/24/2020: non-obstructive CAD (30-40% mLAD/D1, 40% RCA)        EKG 1/24/20  infero-lateral ST t wave changes, nsr     Cardiac Event Monitor 1/25/20: single run of nonsustained SVT. No VT.       Assessment and Plan:   In summary, this is a 59-year-old female with a past medical history notable for coronary artery disease (nonobstructive), hypertrophic cardiomyopathy, carotid arterial disease, and hypertension who is presenting for follow-up.     1.  Apical hypertrophic cardiomyopathy: The patient's MRI is consistent with apical hypertrophic cardiomyopathy.  While her apical hypertrophic  cardiomyopathy is unlikely to lead to an LVOT outlet obstruction, she is at risk for malignant ventricular arrhythmias and could potentially develop heart failure with preserved ejection physiology.       Regarding the ventricular arrhythmias, the presence of a sudden cardiac death in her family (uncle) in someone who had autopsy confirmed hypertrophic cardiomyopathy is somewhat concerning.  The patient has had no ventricular arrhythmias on ZIO Patch testing and she has no significant scar burden on MRI.    We will plan to see her again in 6 months.     2.  Coronary artery disease: Nonobstructive on coronary angiogram.  Continue aspirin and atorvastatin (decreased to 10 mg per patient preference).  3.  Carotid artery disease: Noted on previous CTA.  Continue atorvastatin.  4.  Hypertension, essential: Patient reports a history of morning hypertension. Plan to start lisinopril 2.5 mg PO QDay.     The patient states understanding and is agreeable with plan.    Plan discussed with Dr. Mendes.    Brennen Philip MD    ATTENDING ATTESTATION     I was present for the entire video visit, including all critical portions and decision-making.     Patient was seen and evaluated with Dr. Philip History was confirmed personally by me. Labs, imaging studies, and EKGs were reviewed.     Agree with assessment and plan as outlined above. The note has been edited by me as needed to produce a single, cohesive document.     Total video visit time: 21 minutes    Uriel Mendes MD  Staff Cardiologist      Video-Visit Details    Type of service:  Video Visit    Video End Time (time video stopped): 3:31PM    Video visit total time: 21 minutes    Originating Location (pt. Location): Home    Distant Location (provider location):  [facility] HEART [facility]     Mode of Communication:  Video Conference via [employer]      Please do not hesitate to contact me if you have any questions/concerns.     Sincerely,     Uriel  CHRISTOPHER Mendes MD

## 2020-10-12 ENCOUNTER — TELEPHONE (OUTPATIENT)
Dept: CARDIOLOGY | Facility: CLINIC | Age: 60
End: 2020-10-12

## 2020-10-12 NOTE — TELEPHONE ENCOUNTER
Clara requesting a callback from RN to discuss some questions in regards to some recommendations for a cardiologist for her friend.

## 2020-10-14 NOTE — TELEPHONE ENCOUNTER
Called Clara. Her friend has BAV, moderate stenosis, and dilation of the aortic root. Gave her Kati Kraus's number for her friend to call to do an intake form and get established. Patient verbalized understanding and is in agreement to the plan.

## 2020-10-27 ASSESSMENT — ENCOUNTER SYMPTOMS
PARESTHESIAS: 0
DYSURIA: 0
SORE THROAT: 0
HEADACHES: 0
NERVOUS/ANXIOUS: 0
BREAST MASS: 0
COUGH: 0
HEMATURIA: 0
ARTHRALGIAS: 0
WEAKNESS: 0
CHILLS: 0
PALPITATIONS: 0
FEVER: 0
HEMATOCHEZIA: 0
EYE PAIN: 0
JOINT SWELLING: 0
DIARRHEA: 0
FREQUENCY: 0
DIZZINESS: 0
NAUSEA: 0
MYALGIAS: 0
SHORTNESS OF BREATH: 0
ABDOMINAL PAIN: 1
HEARTBURN: 0
CONSTIPATION: 0

## 2020-10-28 ENCOUNTER — OFFICE VISIT (OUTPATIENT)
Dept: FAMILY MEDICINE | Facility: CLINIC | Age: 60
End: 2020-10-28
Payer: COMMERCIAL

## 2020-10-28 VITALS
HEIGHT: 65 IN | TEMPERATURE: 97.9 F | SYSTOLIC BLOOD PRESSURE: 99 MMHG | BODY MASS INDEX: 19.83 KG/M2 | HEART RATE: 67 BPM | RESPIRATION RATE: 16 BRPM | OXYGEN SATURATION: 99 % | WEIGHT: 119 LBS | DIASTOLIC BLOOD PRESSURE: 64 MMHG

## 2020-10-28 DIAGNOSIS — Z12.4 SCREENING FOR CERVICAL CANCER: ICD-10-CM

## 2020-10-28 DIAGNOSIS — Z23 NEED FOR INFLUENZA VACCINATION: ICD-10-CM

## 2020-10-28 DIAGNOSIS — Z00.00 ROUTINE GENERAL MEDICAL EXAMINATION AT A HEALTH CARE FACILITY: Primary | ICD-10-CM

## 2020-10-28 PROCEDURE — 87624 HPV HI-RISK TYP POOLED RSLT: CPT | Performed by: FAMILY MEDICINE

## 2020-10-28 PROCEDURE — 90471 IMMUNIZATION ADMIN: CPT | Performed by: FAMILY MEDICINE

## 2020-10-28 PROCEDURE — 99396 PREV VISIT EST AGE 40-64: CPT | Mod: 25 | Performed by: FAMILY MEDICINE

## 2020-10-28 PROCEDURE — 90682 RIV4 VACC RECOMBINANT DNA IM: CPT | Performed by: FAMILY MEDICINE

## 2020-10-28 PROCEDURE — G0145 SCR C/V CYTO,THINLAYER,RESCR: HCPCS | Performed by: FAMILY MEDICINE

## 2020-10-28 ASSESSMENT — ENCOUNTER SYMPTOMS
PARESTHESIAS: 0
COUGH: 0
HEARTBURN: 0
ARTHRALGIAS: 0
HEMATURIA: 0
FREQUENCY: 0
DYSURIA: 0
CONSTIPATION: 0
MYALGIAS: 0
HEMATOCHEZIA: 0
JOINT SWELLING: 0
DIZZINESS: 0
BREAST MASS: 0
NAUSEA: 0
HEADACHES: 0
ABDOMINAL PAIN: 1
NERVOUS/ANXIOUS: 0
CHILLS: 0
SHORTNESS OF BREATH: 0
DIARRHEA: 0
FEVER: 0
SORE THROAT: 0
WEAKNESS: 0
EYE PAIN: 0
PALPITATIONS: 0

## 2020-10-28 ASSESSMENT — MIFFLIN-ST. JEOR: SCORE: 1100.66

## 2020-10-28 NOTE — PROGRESS NOTES
SUBJECTIVE:   CC: Clara Boykin is an 60 year old woman who presents for preventive health visit.       Patient has been advised of split billing requirements and indicates understanding: Yes     Healthy Habits:     Getting at least 3 servings of Calcium per day:  Yes    Bi-annual eye exam:  Yes    Dental care twice a year:  Yes    Sleep apnea or symptoms of sleep apnea:  None    Diet:  Low salt and Low fat/cholesterol    Frequency of exercise:  4-5 days/week    Duration of exercise:  30-45 minutes    Taking medications regularly:  Yes    PHQ-2 Total Score: 0    Additional concerns today:  Yes    Today's PHQ-2 Score:   PHQ-2 ( 1999 [employer]) 10/27/2020   Q1: Little interest or pleasure in doing things 0   Q2: Feeling down, depressed or hopeless 0   PHQ-2 Score 0   Q1: Little interest or pleasure in doing things Not at all   Q2: Feeling down, depressed or hopeless Not at all   PHQ-2 Score 0       Abuse: Current or Past (Physical, Sexual or Emotional) - No  Do you feel safe in your environment? 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 No    Alcohol Use 10/27/2020   Prescreen: >3 drinks/day or >7 drinks/week? No   Prescreen: >3 drinks/day or >7 drinks/week? -   No flowsheet data found.    Reviewed orders with patient.  Reviewed health maintenance and updated orders accordingly - Yes  Lab work is in process    Mammogram Screening: Patient over age 50, mutual decision to screen reflected in health maintenance.    Pertinent mammograms are reviewed under the imaging tab.  History of abnormal Pap smear: NO - age 30-65 PAP every 5 years with negative HPV co-testing recommended  PAP / HPV Latest Ref Rng & Units  2/17/2017   PAP - NIL   HPV 16 DNA NEG Negative   HPV 18 DNA NEG Negative   OTHER HR HPV NEG Negative     Reviewed and updated as needed this visit by clinical staff                 Reviewed and updated as needed this visit by Provider                Past Medical History:   Diagnosis Date     Apical variant hypertrophic cardiomyopathy (H) 3/10/2020     Arthritis ?    thumbs, ? mild other sites     Coronary artery disease involving native coronary artery of native heart without angina pectoris 2/4/2020    NSTEMI Jan 2020     Diplopia      H/O CT scan      H/O magnetic resonance imaging      History of blood transfusion     2x at birth; mom was RH neg     Hypertension < =2018    Morning-surge HTN: mild     Non-obstructive CAD without angina (coronary angiogram 1/2020) 2/4/2020    NSTEMI Jan 2020     Paralytic strabismus      Pneumonia      PONV (postoperative nausea and vomiting)       Past Surgical History:   Procedure Laterality Date     ABDOMEN SURGERY  July 2016    Abd hernia repair (which has reoccurred form coughing 2018)     APPENDECTOMY       AS KNEE SCOPE, DIAGNOSTIC       BACK SURGERY  1999    Left C6-7 foraminotomy     BIOPSY  2014    lipoma excision (near R scapula)     BUNIONECTOMY Right 2016    times 2     BUNIONECTOMY Left 10/22/2018    Procedure: LEFT REVISION BUNION ;  Surgeon: Johanna Lund MD;  [facility]     COLONOSCOPY  2019    1 sm precancer polyp     CV CORONARY ANGIOGRAM N/A 1/24/2020    Procedure: Coronary Angiogram;  Surgeon: Criss Green MD;  Location:  HEART CARDIAC CATH LAB     CV LEFT HEART CATH N/A 1/24/2020    Procedure: Left Heart Cath;  Surgeon: Criss Green MD;  Location:  HEART CARDIAC CATH LAB     CV LEFT VENTRICULOGRAM N/A 1/24/2020    Procedure: Left Ventriculogram;  Surgeon: Criss Green MD;  Location:  HEART CARDIAC CATH LAB     HERNIORRHAPHY VENTRAL N/A 7/7/2016    Procedure: HERNIORRHAPHY VENTRAL;  Surgeon: Ash Thompson  MD Garrett;  [facility]     left clavical       NECK SURGERY       ORTHOPEDIC SURGERY  [id number]    ORIF L clavicle/hardware out; bilat bunion/forefoot (4 ops)     RECESSION RESECTION (REPAIR STRABISMUS) Right 4/10/2017    Procedure: RECESSION RESECTION (REPAIR STRABISMUS);  Surgeon: Lyle Leggett MD;  Location:  OR     SOFT TISSUE SURGERY  2007    L wrist: scapholunate lig reconstruction post L wristfx     WRIST SURGERY         Review of Systems   Constitutional: Negative for chills and fever.   HENT: Negative for congestion, ear pain, hearing loss and sore throat.    Eyes: Negative for pain and visual disturbance.   Respiratory: Negative for cough and shortness of breath.    Cardiovascular: Negative for chest pain, palpitations and peripheral edema.   Gastrointestinal: Positive for abdominal pain. Negative for constipation, diarrhea, heartburn, hematochezia and nausea.   Breasts:  Negative for tenderness, breast mass and discharge.   Genitourinary: Negative for dysuria, frequency, genital sores, hematuria, pelvic pain, urgency, vaginal bleeding and vaginal discharge.   Musculoskeletal: Negative for arthralgias, joint swelling and myalgias.   Skin: Negative for rash.   Neurological: Negative for dizziness, weakness, headaches and paresthesias.   Psychiatric/Behavioral: Negative for mood changes. The patient is not nervous/anxious.        OBJECTIVE:   LMP 09/06/2006   Physical Exam  GENERAL: healthy, alert and no distress  EYES: Eyes grossly normal to inspection, PERRL and conjunctivae and sclerae normal  HENT: ear canals and TM's normal, nose and mouth without ulcers or lesions  NECK: no adenopathy, no asymmetry, masses, or scars and thyroid normal to palpation  RESP: lungs clear to auscultation - no rales, rhonchi or wheezes  BREAST: normal without masses, tenderness or nipple discharge and no palpable axillary masses or adenopathy  CV: regular rate and rhythm, normal S1 S2, no S3 or S4, no  "murmur, click or rub, no peripheral edema and peripheral pulses strong  ABDOMEN: soft, nontender, no hepatosplenomegaly, no masses and bowel sounds normal   (female): normal female external genitalia, normal urethral meatus, vaginal mucosa pink, moist, well rugated, and normal cervix/adnexa/uterus without masses or discharge  MS: no gross musculoskeletal defects noted, no edema  SKIN: no suspicious lesions or rashes  NEURO: Normal strength and tone, mentation intact and speech normal  PSYCH: mentation appears normal, affect normal/bright        ASSESSMENT/PLAN:   1. Routine general medical examination at a health care facility  High level of stress due to father illness (age 98), personal diagnosis HCM last year. Following with cardiolgoy team. Work going well, doing covid research.     Pap smear today.  , flu vaccine.  Consider shingrix.     Flu shot, pap smear today.  utd on labs     Advanced directive.     Consider shingrix in future.     Due for mammogram, will call to schedule.     2. Need for influenza vaccination      3. Screening for cervical cancer    - Pap imaged thin layer screen with HPV - recommended age 30 - 65 years (select HPV order below)  - HPV High Risk Types DNA Cervical    Patient has been advised of split billing requirements and indicates understanding: Yes  COUNSELING:  Reviewed preventive health counseling, as reflected in patient instructions       Regular exercise       Healthy diet/nutrition       Colon cancer screening    Estimated body mass index is 20.3 kg/m  as calculated from the following:    Height as of 2/21/20: 1.651 m (5' 5\").    Weight as of 8/13/20: 55.3 kg (122 lb).        She reports that she has never smoked. She has never used smokeless tobacco.      Counseling Resources:  ATP IV Guidelines  Pooled Cohorts Equation Calculator  Breast Cancer Risk Calculator  BRCA-Related Cancer Risk Assessment: FHS-7 Tool  FRAX Risk Assessment  ICSI Preventive Guidelines  Dietary " Guidelines for Americans, 2010  USDA's MyPlate  ASA Prophylaxis  Lung CA Screening    Joel Daniel Wegener, MD  [facility]

## 2020-10-28 NOTE — PATIENT INSTRUCTIONS
"Flu shot, pap smear today.  utd on labs     Advanced directive.     Consider shingrix in future.     Due for mammogram, will call to schedule.     ASSESSMENT AND PLAN  1. Routine general medical examination at a health care facility          MARY SIGN UP: [email or web address] , [phone]    E-VISITS CAN BE DONE FOR CARE/PRESCRIPTIONS WHICH MAY NOT NEED AN IN-PERSON ASSESSMENT - click \"on-line care, then request e-visit\".      ONCARE VISIT/PRESCRIPTIONS: [email or web address]  - we treat nearly 50 common conditions with one hour response time     RADIOLOGY SCHEDULING  [facility]:  [phone]   [facility]: [phone]  [facility]: [phone]    Mammogram and Colonoscopy [phone]    Smoking Cessation: www.Publicateplan."[email or web address]", 5-512-155-PLAN ([phone])    Pharmacy savings card application: www.Abazab    CONSUMER PRICE LINE for estimates of test costs:  [phone]       Preventive Health Recommendations  Female Ages 50 - 64    Yearly exam: See your health care provider every year in order to  o Review health changes.   o Discuss preventive care.    o Review your medicines if your doctor has prescribed any.      Get a Pap test every three years (unless you have an abnormal result and your provider advises testing more often).    If you get Pap tests with HPV test, you only need to test every 5 years, unless you have an abnormal result.     You do not need a Pap test if your uterus was removed (hysterectomy) and you have not had cancer.    You should be tested each year for STDs (sexually transmitted diseases) if you're at risk.     Have a mammogram every 1 to 2 years.    Have a colonoscopy at age 50, or have a yearly FIT test (stool test). These exams screen for colon cancer.      Have a cholesterol test every 5 years, or more often if advised.    Have a diabetes test (fasting glucose) every three years. If you are at risk for diabetes, you should have this test more often. "     If you are at risk for osteoporosis (brittle bone disease), think about having a bone density scan (DEXA).    Shots: Get a flu shot each year. Get a tetanus shot every 10 years.    Nutrition:     Eat at least 5 servings of fruits and vegetables each day.    Eat whole-grain bread, whole-wheat pasta and brown rice instead of white grains and rice.    Get adequate Calcium and Vitamin D.     Lifestyle    Exercise at least 150 minutes a week (30 minutes a day, 5 days a week). This will help you control your weight and prevent disease.    Limit alcohol to one drink per day.    No smoking.     Wear sunscreen to prevent skin cancer.     See your dentist every six months for an exam and cleaning.    See your eye doctor every 1 to 2 years.

## 2020-10-30 LAB
COPATH REPORT: NORMAL
PAP: NORMAL

## 2020-11-02 LAB
FINAL DIAGNOSIS: NORMAL
HPV HR 12 DNA CVX QL NAA+PROBE: NEGATIVE
HPV16 DNA SPEC QL NAA+PROBE: NEGATIVE
HPV18 DNA SPEC QL NAA+PROBE: NEGATIVE
SPECIMEN DESCRIPTION: NORMAL
SPECIMEN SOURCE CVX/VAG CYTO: NORMAL

## 2020-11-17 ENCOUNTER — ANCILLARY PROCEDURE (OUTPATIENT)
Dept: MAMMOGRAPHY | Facility: CLINIC | Age: 60
End: 2020-11-17
Payer: COMMERCIAL

## 2020-11-17 DIAGNOSIS — Z00.00 PREVENTATIVE HEALTH CARE: ICD-10-CM

## 2020-11-17 PROCEDURE — 77067 SCR MAMMO BI INCL CAD: CPT | Mod: GC | Performed by: RADIOLOGY

## 2021-02-01 ENCOUNTER — ALLIED HEALTH/NURSE VISIT (OUTPATIENT)
Dept: CARDIOLOGY | Facility: CLINIC | Age: 61
End: 2021-02-01
Attending: INTERNAL MEDICINE
Payer: COMMERCIAL

## 2021-02-01 DIAGNOSIS — I42.2 APICAL VARIANT HYPERTROPHIC CARDIOMYOPATHY (H): ICD-10-CM

## 2021-02-06 PROCEDURE — 93248 EXT ECG>7D<15D REV&INTERPJ: CPT | Performed by: INTERNAL MEDICINE

## 2021-02-18 ENCOUNTER — MYC MEDICAL ADVICE (OUTPATIENT)
Dept: FAMILY MEDICINE | Facility: CLINIC | Age: 61
End: 2021-02-18

## 2021-03-11 DIAGNOSIS — I42.2 APICAL VARIANT HYPERTROPHIC CARDIOMYOPATHY (H): ICD-10-CM

## 2021-03-11 DIAGNOSIS — I25.10 CORONARY ARTERY DISEASE INVOLVING NATIVE CORONARY ARTERY OF NATIVE HEART WITHOUT ANGINA PECTORIS: ICD-10-CM

## 2021-03-11 DIAGNOSIS — I42.2 HYPERTROPHIC NONOBSTRUCTIVE CARDIOMYOPATHY (H): ICD-10-CM

## 2021-03-11 PROCEDURE — 80048 BASIC METABOLIC PNL TOTAL CA: CPT | Performed by: INTERNAL MEDICINE

## 2021-03-11 PROCEDURE — 36415 COLL VENOUS BLD VENIPUNCTURE: CPT | Performed by: INTERNAL MEDICINE

## 2021-03-11 PROCEDURE — 80061 LIPID PANEL: CPT | Performed by: INTERNAL MEDICINE

## 2021-03-12 LAB
ANION GAP SERPL CALCULATED.3IONS-SCNC: <1 MMOL/L (ref 3–14)
BUN SERPL-MCNC: 20 MG/DL (ref 7–30)
CALCIUM SERPL-MCNC: 8.7 MG/DL (ref 8.5–10.1)
CHLORIDE SERPL-SCNC: 105 MMOL/L (ref 94–109)
CHOLEST SERPL-MCNC: 158 MG/DL
CO2 SERPL-SCNC: 33 MMOL/L (ref 20–32)
CREAT SERPL-MCNC: 0.68 MG/DL (ref 0.52–1.04)
GFR SERPL CREATININE-BSD FRML MDRD: >90 ML/MIN/{1.73_M2}
GLUCOSE SERPL-MCNC: 98 MG/DL (ref 70–99)
HDLC SERPL-MCNC: 64 MG/DL
LDLC SERPL CALC-MCNC: 77 MG/DL
NONHDLC SERPL-MCNC: 94 MG/DL
POTASSIUM SERPL-SCNC: 4.1 MMOL/L (ref 3.4–5.3)
SODIUM SERPL-SCNC: 136 MMOL/L (ref 133–144)
TRIGL SERPL-MCNC: 86 MG/DL

## 2021-03-23 ENCOUNTER — CARE COORDINATION (OUTPATIENT)
Dept: CARDIOLOGY | Facility: CLINIC | Age: 61
End: 2021-03-23

## 2021-03-23 NOTE — RESULT ENCOUNTER NOTE
"ZioPatch 2/6/21-2/22/21: Baseline sinus rhythm, average VR 73 bpm (range  bpm.) No sustained/nonsustained VT. Rare (<1%) PACs and PVCs; rare nonsustained SVT, likely AT (longest 21.2 sec.) No atrial fibrillation. 7 patient-triggered events, which appeared to correlate with SVT and with PACs.     Of note, AT (\"atrial run\") was also noted on her 1/2020-2/2020 event monitor.    No concerning/dangerous rhythms were seen. If Clara's symptoms are bothersome to her, we could try starting a small dose of metoprolol (Toprol XL 25 mg daily) to see if it helps her symptoms. This would be purely for treatment of symptoms and it is up to Clara whether to try starting a beta blocker or to continue to monitor.    Uriel Mendes MD  Cardiology  "

## 2021-03-23 NOTE — PROGRESS NOTES
"Called Clara to review results. Left voicemail with callback information.    ZioPatch 2/6/21-2/22/21: Baseline sinus rhythm, average VR 73 bpm (range  bpm.) No sustained/nonsustained VT. Rare (<1%) PACs and PVCs; rare nonsustained SVT, likely AT (longest 21.2 sec.) No atrial fibrillation. 7 patient-triggered events, which appeared to correlate with SVT and with PACs.     Of note, AT (\"atrial run\") was also noted on her 1/2020-2/2020 event monitor.     No concerning/dangerous rhythms were seen. If Clara's symptoms are bothersome to her, we could try starting a small dose of metoprolol (Toprol XL 25 mg daily) to see if it helps her symptoms. This would be purely for treatment of symptoms and it is up to Clara whether to try starting a beta blocker or to continue to monitor.     Uriel Mendes MD   Cardiology   "

## 2021-04-08 DIAGNOSIS — I21.4 NSTEMI (NON-ST ELEVATED MYOCARDIAL INFARCTION) (H): ICD-10-CM

## 2021-04-09 NOTE — PROGRESS NOTES
"        Assessment & Plan     Nasal crusting  Use ointment for two weeks maximum (can damage nose) and follow up with ent, if does not go away will need biopsy.     Follow up October for physical    - OTOLARYNGOLOGY REFERRAL  - triamcinolone (KENALOG) 0.1 % external ointment  Dispense: 30 g; Refill: 0    Decreased hearing of both ears    - AUDIOLOGY ADULT REFERRAL                   Return in about 6 months (around 10/18/2021) for wellness/physical.    Joel Daniel Wegener, MD  [facility]   Clara is a 60 year old who presents for the following health issues     HPI     Pt states she has been having a sore inside right nostril for 3 months. Crusty.  Can pick off but comes back.     Also slowly decreasing hearing desires audiology check.         Review of Systems         Objective    /69   Pulse 67   Temp 96.7  F (35.9  C) (Tympanic)   Resp 16   Ht 1.651 m (5' 5\")   Wt 56.2 kg (124 lb)   LMP 09/06/2006   SpO2 98%   BMI 20.63 kg/m    Body mass index is 20.63 kg/m .  Physical Exam   White crusting lesion right medial nasal septum. About 1/2 cm oval.                   Joel Wegener,MD    "

## 2021-04-12 RX ORDER — ATORVASTATIN CALCIUM 10 MG/1
10 TABLET, FILM COATED ORAL EVERY EVENING
Qty: 90 TABLET | Refills: 1 | Status: SHIPPED | OUTPATIENT
Start: 2021-04-12 | End: 2021-10-12

## 2021-04-13 ENCOUNTER — OFFICE VISIT (OUTPATIENT)
Dept: FAMILY MEDICINE | Facility: CLINIC | Age: 61
End: 2021-04-13
Payer: COMMERCIAL

## 2021-04-13 VITALS
HEART RATE: 67 BPM | BODY MASS INDEX: 20.66 KG/M2 | HEIGHT: 65 IN | SYSTOLIC BLOOD PRESSURE: 107 MMHG | WEIGHT: 124 LBS | RESPIRATION RATE: 16 BRPM | DIASTOLIC BLOOD PRESSURE: 69 MMHG | OXYGEN SATURATION: 98 % | TEMPERATURE: 96.7 F

## 2021-04-13 DIAGNOSIS — H91.93 DECREASED HEARING OF BOTH EARS: ICD-10-CM

## 2021-04-13 DIAGNOSIS — J34.89 NASAL CRUSTING: Primary | ICD-10-CM

## 2021-04-13 PROCEDURE — 99213 OFFICE O/P EST LOW 20 MIN: CPT | Performed by: FAMILY MEDICINE

## 2021-04-13 RX ORDER — TRIAMCINOLONE ACETONIDE 1 MG/G
OINTMENT TOPICAL 2 TIMES DAILY
Qty: 30 G | Refills: 0 | Status: SHIPPED | OUTPATIENT
Start: 2021-04-13 | End: 2021-05-11

## 2021-04-13 ASSESSMENT — MIFFLIN-ST. JEOR: SCORE: 1123.34

## 2021-04-13 NOTE — TELEPHONE ENCOUNTER
FUTURE VISIT INFORMATION      FUTURE VISIT INFORMATION:    Date: 4/28/21    Time: 8 AM    Location: [facility]  REFERRAL INFORMATION:    Referring provider:  Dr. Joel Wegener    Referring providers clinic:  [facility]    Reason for visit/diagnosis: Nasal Crusting, Sore Inside septum that flakes and bleed right side    RECORDS REQUESTED FROM:       Clinic name Comments Records Status Imaging Status   [facility] 4/13/21, 12/3/18, 2/19/18 - [facility] OV [facility] Dr[facility] Wegener [facility] 2/9/17 - CTA Head Neck [facility] PACs   [facility] - Imaging 9/16/16 - MRI Brain Scanned In PACs

## 2021-04-27 NOTE — PROGRESS NOTES
[facility]                   New Patient Visit      Encounter date: April 28, 2021    Referring Provider:   Joel Daniel Irwin Wegener, MD  [address]  [place] [address]    Chief Complaint: Nasal crusting    History of Present Illness: Clara Boykin presents for crusting of right nasal cavity for over 3 months.  This started in January where she will occasionally have epistaxis that was mild.  She then subsequently developed crusting without congestion but feelings of tenderness and soreness in the right anterior nasal septum.  This was not improved with several topical medications including corticosteroids.  Today, however, she relates to me that it is not significantly improved and her symptoms are only fairly mild.        Review of systems: A 14-point review of systems has been conducted and was negative for any notable symptoms, except as dictated in the history of present illness.     Past Medical History:   Diagnosis Date     Apical variant hypertrophic cardiomyopathy (H) 3/10/2020     Arthritis ?    thumbs, ? mild other sites     Coronary artery disease involving native coronary artery of native heart without angina pectoris 2/4/2020    NSTEMI Jan 2020     Diplopia      H/O CT scan      H/O magnetic resonance imaging      History of blood transfusion     2x at birth; mom was RH neg     Hypertension < =2018    Morning-surge HTN: mild     Non-obstructive CAD without angina (coronary angiogram 1/2020) 2/4/2020    NSTEMI Jan 2020     Paralytic strabismus      Pneumonia      PONV (postoperative nausea and vomiting)         Past Surgical History:   Procedure Laterality Date     ABDOMEN SURGERY  July 2016    Abd hernia repair (which has reoccurred form coughing 2018)     APPENDECTOMY       AS KNEE SCOPE, DIAGNOSTIC       BACK SURGERY  1999    Left C6-7 foraminotomy     BIOPSY  2014    lipoma excision (near R scapula)     BUNIONECTOMY Right 2016    times 2      BUNIONECTOMY Left 10/22/2018    Procedure: LEFT REVISION BUNION ;  Surgeon: Johanna Lund MD;  Location:  [facility]     COLONOSCOPY  2019 sm precancer polyp     CV CORONARY ANGIOGRAM N/A 2020    Procedure: Coronary Angiogram;  Surgeon: Criss Green MD;  Location:  HEART CARDIAC CATH LAB     CV LEFT HEART CATH N/A 2020    Procedure: Left Heart Cath;  Surgeon: Criss Green MD;  Location:  HEART CARDIAC CATH LAB     CV LEFT VENTRICULOGRAM N/A 2020    Procedure: Left Ventriculogram;  Surgeon: Criss Green MD;  Location:  HEART CARDIAC CATH LAB     HERNIORRHAPHY VENTRAL N/A 2016    Procedure: HERNIORRHAPHY VENTRAL;  Surgeon: Ash Thompson MD;  Location:  OR     left clavical       NECK SURGERY       ORTHOPEDIC SURGERY  [id number]    ORIF L clavicle/hardware out; bilat bunion/forefoot (4 ops)     RECESSION RESECTION (REPAIR STRABISMUS) Right 4/10/2017    Procedure: RECESSION RESECTION (REPAIR STRABISMUS);  Surgeon: Lyle Leggett MD;  Location:  OR     SOFT TISSUE SURGERY      L wrist: scapholunate lig reconstruction post L wristfx     WRIST SURGERY          Family History   Problem Relation Age of Onset     Cancer Mother         skin cancer     Diabetes Mother         borderline/Type 2     Hypertension Mother         3 meds: 4.5cm asc aortic aneurysm     Hyperlipidemia Mother         chol & very high triglycerides     Cerebrovascular Disease Mother         many small, cog. impaired,  18     Osteoporosis Mother         fosamax x5 yrs:poststeroids     Obesity Mother         BMI 35+     Unknown/Adopted Mother         dermatomyositis since      Depression Mother      Mental Illness Mother         probably bordeline personality disorder; vascular dementia     Anesthesia Reaction Mother         ?     Cancer Father         skin cancer     Coronary Artery Disease Father         angioplasty ~     Hypertension Father          1 med, mild. Age 94     Depression Father         2x: age 87 [facility]     Hyperlipidemia Father         2020 after CVA, lipids not elevated     Cerebrovascular Disease Father         2 sm. foci occipito-parietal 20     Cancer Maternal Grandmother         skin cancer     Cerebrovascular Disease Maternal Grandmother         - multiple CVAs     Obesity Maternal Grandmother         overweight     Diabetes Maternal Grandfather         borderline/Type 2     Coronary Artery Disease Maternal Grandfather         2-3 MIs; worked after each     Cerebrovascular Disease Maternal Grandfather          from CVA postop hernia     Obesity Maternal Grandfather         was overwt to obese     Diabetes Cousin         prediabetes     Hypertension Brother         1 med for 20 yrs     Substance Abuse Brother         hx of EtOH     Cerebrovascular Disease Other         cog. impairment like my mom     Mental Illness Sister         ?bipolar; past chem dep     Substance Abuse Sister         hx of: prescript drug& EtOH     Unknown/Adopted Sister         sister is adopted     Breast Cancer Sister         stage 2-3,      Anesthesia Reaction Other         naus/vomit 1-8 hr postop unless tx'd     Cerebrovascular Disease Other         cog. 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   Substance and Sexual Activity     Alcohol use: Yes      Comment: < =4 ox wine/month or 1 small beer     Drug use: Never     Sexual activity: Not Currently   Lifestyle     Physical activity     Days per week: Not on file     Minutes per session: Not on file     Stress: Not on file   Relationships     Social connections     Talks on phone: Not on file     Gets together: Not on file     Attends [employer] service: Not on file     Active member of club or organization: Not on file     Attends meetings of clubs or organizations: Not on file     Relationship status: Not on file     Intimate partner violence     Fear of current or ex partner: Not on file     Emotionally abused: Not on file     Physically abused: Not on file     Forced sexual activity: Not on file   Other Topics Concern     Parent/sibling w/ CABG, MI or angioplasty before 65F 55M? No   Social History Narrative     Not on file        Physical Exam:  Vital signs: LMP 09/06/2006    General Appearance: No acute distress, appropriate demeanor, conversant  Eyes: moist conjunctivae; EOMI; pupils symmetric; visual acuity grossly intact; no proptosis  Head: normocephalic; overall symmetric appearance without deformity  Face: overall symmetric without deformity; HB I/VI  Ears: Normal appearance of external ear; external meatus normal in appearance; TMs intact without perforation bilaterally;   Nose: No external deformity; septum deviated to right; inferior turbinates without significant hypertrophy  Oral Cavity/oropharynx: Normal appearance of mucosa; tongue midline; no mass or lesions; oropharynx without obvious mucosal abnormality  Neck: no palpable lymphadenopathy; thyroid without palpable nodules  Lungs: symmetric chest rise; no wheezing  CV: Good distal perfusion; normal heart rate  Extremities: No deformity  Neurologic Exam: Cranial nerves II-XII are grossly intact; no focal deficit      Procedure Note  Procedure performed: Rigid nasal endoscopy  Indication: To evaluate for sinonasal pathology not visualized on  routine anterior rhinoscopy  Anesthesia: 4% topical lidocaine with 0.05% oxymetazoline  Description of procedure: A 30 degree, 3 mm rigid endoscope was inserted into bilateral nasal cavities and the nasal valve, nasal cavity, middle meatus, sphenoethmoid recess, and nasopharynx were thoroughly evaluated for evidence of obstruction, edema, purulence, polyps and/or mass/lesion.     Ulises-Kevin Endoscopic Scoring System  Endoscopic observation Right Left   Polyps in middle meatus (0 = absent, 1 = restricted to middle meatus, 2 = Beyond middle meatus) 0 0   Discharge (0 = absent, 1 = thin and clear, 2 = thick, purulent) 0 0   Edema (0 = absent, 1 = mild-moderate, 2 = moderate-severe) 0 0   Crusting (0 = absent, 1 = mild-moderate, 2 = moderate-severe) 0 0   Scarring (0= absent, 1 = mild-moderate, 2 = moderate-severe) 0 0   Total 1 0     Findings  RT: Mild crusting of anterior nasal septum with some mild squamous metaplasia; SER clear  LT: MM and SER clear    Nasopharynx clear    The patient tolerated the procedure well without complication.     Laboratory Review:  n/a    Imaging Review:  n/a    Pathology Review:  n/a    Assessment/Medical Decision Making/Plan:  Crusting of right nasal cavity  Chronic rhinitis  Mild epistaxis    I performed nasal endoscopy today.  There he has some mild right nasal crusting with some squamous metaplasia that I believe is due to frequent irritation and manipulation.  Advised judicious nose blowing and manipulation.  I have advised topical petroleum based ointment and prescribed mupirocin for symptom exacerbation.  She may follow-up with me as needed.    Torres Marsh MD    [facility]  Rhinology  Endoscopic Skull Base Surgery  [facility]  Department of Otolaryngology - Head & Neck Surgery    The above documentation was completed with the aid of voice recognition dictation software. Unexpected dictation errors may occur.

## 2021-04-28 ENCOUNTER — OFFICE VISIT (OUTPATIENT)
Dept: OTOLARYNGOLOGY | Facility: CLINIC | Age: 61
End: 2021-04-28
Attending: FAMILY MEDICINE
Payer: COMMERCIAL

## 2021-04-28 ENCOUNTER — PRE VISIT (OUTPATIENT)
Dept: OTOLARYNGOLOGY | Facility: CLINIC | Age: 61
End: 2021-04-28

## 2021-04-28 VITALS — WEIGHT: 125 LBS | OXYGEN SATURATION: 97 % | HEART RATE: 68 BPM | HEIGHT: 65 IN | BODY MASS INDEX: 20.83 KG/M2

## 2021-04-28 DIAGNOSIS — R04.0 FREQUENT EPISTAXIS: ICD-10-CM

## 2021-04-28 DIAGNOSIS — J34.89 NASAL LESION: Primary | ICD-10-CM

## 2021-04-28 PROCEDURE — 31231 NASAL ENDOSCOPY DX: CPT | Performed by: OTOLARYNGOLOGY

## 2021-04-28 PROCEDURE — 99242 OFF/OP CONSLTJ NEW/EST SF 20: CPT | Mod: 25 | Performed by: OTOLARYNGOLOGY

## 2021-04-28 RX ORDER — MUPIROCIN 20 MG/G
OINTMENT TOPICAL
Qty: 30 G | Refills: 0 | Status: SHIPPED | OUTPATIENT
Start: 2021-04-28 | End: 2021-05-11

## 2021-04-28 ASSESSMENT — MIFFLIN-ST. JEOR: SCORE: 1127.88

## 2021-04-28 ASSESSMENT — PAIN SCALES - GENERAL: PAINLEVEL: NO PAIN (0)

## 2021-04-28 NOTE — LETTER
4/28/2021       RE: Clara Boykin  [address]  [facility] [address]     Dear Colleague,    Thank you for referring your patient, Clara Boykin, to the [facility] EAR NOSE AND THROAT CLINIC [place] at [facility]. Please see a copy of my visit note below.                        [facility]                     New Patient Visit      Encounter date: April 28, 2021    Referring Provider:   Joel Daniel Irwin Wegener, MD  [address] [facility] [address]  [place] [address]    Chief Complaint: Nasal crusting    History of Present Illness: Clara Boykin presents for crusting of right nasal cavity for over 3 months.  This started in January where she will occasionally have epistaxis that was mild.  She then subsequently developed crusting without congestion but feelings of tenderness and soreness in the right anterior nasal septum.  This was not improved with several topical medications including corticosteroids.  Today, however, she relates to me that it is not significantly improved and her symptoms are only fairly mild.        Review of systems: A 14-point review of systems has been conducted and was negative for any notable symptoms, except as dictated in the history of present illness.     Past Medical History:   Diagnosis Date     Apical variant hypertrophic cardiomyopathy (H) 3/10/2020     Arthritis ?    thumbs, ? mild other sites     Coronary artery disease involving native coronary artery of native heart without angina pectoris 2/4/2020    NSTEMI Jan 2020     Diplopia      H/O CT scan      H/O magnetic resonance imaging      History of blood transfusion     2x at birth; mom was RH neg     Hypertension < =2018    Morning-surge HTN: mild     Non-obstructive CAD without angina (coronary angiogram 1/2020) 2/4/2020    NSTEMI Jan 2020     Paralytic strabismus      Pneumonia      PONV (postoperative nausea and vomiting)         Past Surgical  History:   Procedure Laterality Date     ABDOMEN SURGERY  2016    Abd hernia repair (which has reoccurred form coughing )     APPENDECTOMY       AS KNEE SCOPE, DIAGNOSTIC       BACK SURGERY      Left C6-7 foraminotomy     BIOPSY      lipoma excision (near R scapula)     BUNIONECTOMY Right 2016    times 2     BUNIONECTOMY Left 10/22/2018    Procedure: LEFT REVISION BUNION ;  Surgeon: Johanna Lund MD;  Location:  [facility]     COLONOSCOPY      1 sm precancer polyp     CV CORONARY ANGIOGRAM N/A 2020    Procedure: Coronary Angiogram;  Surgeon: Criss Green MD;  Location:  HEART CARDIAC CATH LAB     CV LEFT HEART CATH N/A 2020    Procedure: Left Heart Cath;  Surgeon: Criss Green MD;  Location:  HEART CARDIAC CATH LAB     CV LEFT VENTRICULOGRAM N/A 2020    Procedure: Left Ventriculogram;  Surgeon: Criss Green MD;  Location:  HEART CARDIAC CATH LAB     HERNIORRHAPHY VENTRAL N/A 2016    Procedure: HERNIORRHAPHY VENTRAL;  Surgeon: Ash Thompson MD;  Location:  OR     left clavical       NECK SURGERY       ORTHOPEDIC SURGERY  [id number]    ORIF L clavicle/hardware out; bilat bunion/forefoot (4 ops)     RECESSION RESECTION (REPAIR STRABISMUS) Right 4/10/2017    Procedure: RECESSION RESECTION (REPAIR STRABISMUS);  Surgeon: Lyle Leggett MD;  Location:  OR     SOFT TISSUE SURGERY      L wrist: scapholunate lig reconstruction post L wristfx     WRIST SURGERY          Family History   Problem Relation Age of Onset     Cancer Mother         skin cancer     Diabetes Mother         borderline/Type 2     Hypertension Mother         3 meds: 4.5cm asc aortic aneurysm     Hyperlipidemia Mother         chol & very high triglycerides     Cerebrovascular Disease Mother         many small, cog. impaired,  18     Osteoporosis Mother         fosamax x5 yrs:poststeroids     Obesity Mother         BMI 35+     Unknown/Adopted  Mother         dermatomyositis since      Depression Mother      Mental Illness Mother         probably bordeline personality disorder; vascular dementia     Anesthesia Reaction Mother         ?     Cancer Father         skin cancer     Coronary Artery Disease Father         angioplasty ~     Hypertension Father         1 med, mild. Age 94     Depression Father         2x: age 87 [facility]     Hyperlipidemia Father         2020 after CVA, lipids not elevated     Cerebrovascular Disease Father         2 sm. foci occipito-parietal 20     Cancer Maternal Grandmother         skin cancer     Cerebrovascular Disease Maternal Grandmother         - multiple CVAs     Obesity Maternal Grandmother         overweight     Diabetes Maternal Grandfather         borderline/Type 2     Coronary Artery Disease Maternal Grandfather         2-3 MIs; worked after each     Cerebrovascular Disease Maternal Grandfather          from CVA postop hernia     Obesity Maternal Grandfather         was overwt to obese     Diabetes Cousin         prediabetes     Hypertension Brother         1 med for 20 yrs     Substance Abuse Brother         hx of EtOH     Cerebrovascular Disease Other         cog. impairment like my mom     Mental Illness Sister         ?bipolar; past chem dep     Substance Abuse Sister         hx of: prescript drug& EtOH     Unknown/Adopted Sister         sister is adopted     Breast Cancer Sister         stage 2-3,      Anesthesia Reaction Other         naus/vomit 1-8 hr postop unless tx'd     Cerebrovascular Disease Other         cog. 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   Substance and Sexual Activity     Alcohol use: Yes     Comment: < =4 ox wine/month or 1 small beer     Drug use: Never     Sexual activity: Not Currently   Lifestyle     Physical activity     Days per week: Not on file     Minutes per session: Not on file     Stress: Not on file   Relationships     Social connections     Talks on phone: Not on file     Gets together: Not on file     Attends Taoist service: Not on file     Active member of club or organization: Not on file     Attends meetings of clubs or organizations: Not on file     Relationship status: Not on file     Intimate partner violence     Fear of current or ex partner: Not on file     Emotionally abused: Not on file     Physically abused: Not on file     Forced sexual activity: Not on file   Other Topics Concern     Parent/sibling w/ CABG, MI or angioplasty before 65F 55M? No   Social History Narrative     Not on file        Physical Exam:  Vital signs: [facility] 09/06/2006    General Appearance: No acute distress, appropriate demeanor, conversant  Eyes: moist conjunctivae; EOMI; pupils symmetric; visual acuity grossly intact; no proptosis  Head: normocephalic; overall symmetric appearance without deformity  Face: overall symmetric without deformity; HB I/VI  Ears: Normal appearance of external ear; external meatus normal in appearance; TMs intact without perforation bilaterally;   Nose: No external deformity; septum deviated to right; inferior turbinates without significant hypertrophy  Oral Cavity/oropharynx: Normal appearance of mucosa; tongue midline; no mass or lesions; oropharynx without obvious mucosal abnormality  Neck: no palpable lymphadenopathy; thyroid without palpable  nodules  Lungs: symmetric chest rise; no wheezing  CV: Good distal perfusion; normal heart rate  Extremities: No deformity  Neurologic Exam: Cranial nerves II-XII are grossly intact; no focal deficit      Procedure Note  Procedure performed: Rigid nasal endoscopy  Indication: To evaluate for sinonasal pathology not visualized on routine anterior rhinoscopy  Anesthesia: 4% topical lidocaine with 0.05% oxymetazoline  Description of procedure: A 30 degree, 3 mm rigid endoscope was inserted into bilateral nasal cavities and the nasal valve, nasal cavity, middle meatus, sphenoethmoid recess, and nasopharynx were thoroughly evaluated for evidence of obstruction, edema, purulence, polyps and/or mass/lesion.     Ulises-Kevin Endoscopic Scoring System  Endoscopic observation Right Left   Polyps in middle meatus (0 = absent, 1 = restricted to middle meatus, 2 = Beyond middle meatus) 0 0   Discharge (0 = absent, 1 = thin and clear, 2 = thick, purulent) 0 0   Edema (0 = absent, 1 = mild-moderate, 2 = moderate-severe) 0 0   Crusting (0 = absent, 1 = mild-moderate, 2 = moderate-severe) 0 0   Scarring (0= absent, 1 = mild-moderate, 2 = moderate-severe) 0 0   Total 1 0     Findings  RT: Mild crusting of anterior nasal septum with some mild squamous metaplasia; SER clear  LT: MM and SER clear    Nasopharynx clear    The patient tolerated the procedure well without complication.     Laboratory Review:  n/a    Imaging Review:  n/a    Pathology Review:  n/a    Assessment/Medical Decision Making/Plan:  Crusting of right nasal cavity  Chronic rhinitis  Mild epistaxis    I performed nasal endoscopy today.  There he has some mild right nasal crusting with some squamous metaplasia that I believe is due to frequent irritation and manipulation.  Advised judicious nose blowing and manipulation.  I have advised topical petroleum based ointment and prescribed mupirocin for symptom exacerbation.  She may follow-up with me as needed.    Torres INTERIANO  MD Maximo    [facility]  Rhinology  Endoscopic Skull Base Surgery  [facility]  Department of Otolaryngology - Head & Neck Surgery    The above documentation was completed with the aid of voice recognition dictation software. Unexpected dictation errors may occur.        Again, thank you for allowing me to participate in the care of your patient.      Sincerely,    Torres Marsh MD

## 2021-04-28 NOTE — NURSING NOTE
"Chief Complaint   Patient presents with     Consult     Sore in nose         Pulse 68, height 1.651 m (5' 5\"), weight 56.7 kg (125 lb), last menstrual period 09/06/2006, SpO2 97 %, not currently breastfeeding.    Erin Elliott, EMT    "

## 2021-04-28 NOTE — PATIENT INSTRUCTIONS
1. Try aquaphor/eucerin inside the nose to help with healing  2. If crusting recurs significantly, try mupirocin ointment twice daily for 2 weeks  3. Try to avoid blowing/picking excessively     Torres Marsh MD    [facility]  Rhinology  Endoscopic Skull Base Surgery  [facility]  Department of Otolaryngology - Head & Neck Surgery

## 2021-05-11 ENCOUNTER — OFFICE VISIT (OUTPATIENT)
Dept: CARDIOLOGY | Facility: CLINIC | Age: 61
End: 2021-05-11
Attending: INTERNAL MEDICINE
Payer: COMMERCIAL

## 2021-05-11 VITALS
BODY MASS INDEX: 19.85 KG/M2 | DIASTOLIC BLOOD PRESSURE: 67 MMHG | SYSTOLIC BLOOD PRESSURE: 109 MMHG | OXYGEN SATURATION: 97 % | HEART RATE: 68 BPM | WEIGHT: 119.3 LBS

## 2021-05-11 DIAGNOSIS — I10 ESSENTIAL HYPERTENSION: ICD-10-CM

## 2021-05-11 DIAGNOSIS — I42.2 APICAL VARIANT HYPERTROPHIC CARDIOMYOPATHY (H): Primary | ICD-10-CM

## 2021-05-11 PROCEDURE — 99417 PROLNG OP E/M EACH 15 MIN: CPT | Performed by: INTERNAL MEDICINE

## 2021-05-11 PROCEDURE — 99215 OFFICE O/P EST HI 40 MIN: CPT | Performed by: INTERNAL MEDICINE

## 2021-05-11 PROCEDURE — 93005 ELECTROCARDIOGRAM TRACING: CPT

## 2021-05-11 PROCEDURE — G0463 HOSPITAL OUTPT CLINIC VISIT: HCPCS | Mod: 25

## 2021-05-11 ASSESSMENT — PAIN SCALES - GENERAL: PAINLEVEL: NO PAIN (0)

## 2021-05-11 NOTE — PATIENT INSTRUCTIONS
You were seen today in the Adult Congenital and Cardiovascular Genetics Clinic at the [facility].    Cardiology Providers you saw during your visit:  Dr Uriel Mendes    Diagnosis:  History of hypertrophic cardiomyopathy    Results:  The results of your Zio monitor and labs were reviewed with you in clinic today    Recommendations:    1.  Continue to eat a heart healthy, low salt diet.  2.  Continue to get 20-30 minutes of aerobic activity, 4-5 days per week.  Examples of aerobic activity include walking, running, swimming, cycling, etc.  3.  Continue to observe good oral hygiene, with regular dental visits.  4.  No changes at this time.   5.  Tatianna Han will call to follow up with you regarding genetic testing for your family    SBE prophylaxis:   Yes____  No__x__    Lifelong Bacterial Endocarditis Prophylaxis:  YES____  NO____    If YES is checked, follow the recommendations outlined below:   1. Take antibiotic(s) prior to recommended dental procedures and procedures on the respiratory tract or with infected skin, muscle or bones. SBE prophylaxis is not needed for routine GI and  procedures (ie. Colonoscopy or vaginal delivery)   2. Observe good oral hygiene daily, as advised by your dentist. Get regular professional dental care.   3. Keep cuts clean.   4. Infections should be treated promptly.   5. Symptoms of Infective Endocarditis could include: fever lasting more than 4-5 days or a recurrent fever that initially resolves but returns within 1-2 days)     Exercise restrictions:   Yes__x__  No____         If yes, list restrictions:  Must be allowed to rest if fatigued or SOB      Work restrictions:  Yes____  No__x__         If yes, list restrictions:    FASTING CHOLESTEROL was checked in the last 5 years YES_x__  NO___ 2021  Continue to eat a heart healthy, low salt diet.         ____ Fasting lipid panel order today         ____ No changes in medications          ____ I recommend the  following changes in your cholesterol medications.:          ____ Please follow up for cholesterol screening at your primary care physician      Follow-up:  Follow up with Dr Mendes in March with an echo, 14 day Zio monitor, and fasting labs prior.     For after hours urgent needs, call [phone] and ask to speak to the Adult Congenital Physician on call.  Mention Job Code 0401.    For emergencies call [phone].    For any scheduling needs, please call [facility] Procedure , at [phone]  Thank you for your visit today!  If you have questions or concerns about today's visit, please call me.    Isma Elliott, RN, BSN  Cardiology Care Coordinator  [facility]  [phone]    [address]  Mail Code [id number]  [place] [address]

## 2021-05-11 NOTE — LETTER
5/11/2021      RE: Clara Boykin  [address]  [facility] [address]       Dear Colleague,    Thank you for the opportunity to participate in the care of your patient, Clara Boykin, at the [facility] [place] at [facility]. Please see a copy of my visit note below.    Chief complaint: HOCM evaluation    HPI:   Clara Boykin is a 59 year-old female with a past medical history notable for nonobstructive coronary artery disease, carotid artery disease, hypertension, and newly diagnosed hypertrophic cardiomyopathy who presents for evaluation of hypertrophic cardiomyopathy.  The patient states that she initially developed symptoms in January of this past year.  She was shoveling snow and developed severe dyspnea.  This feeling of dyspnea persisted for a few days.  Eventually, she ordered a troponin for herself and this later returned to be elevated.  After she had the elevated troponin, she presented to [facility].  Given the elevated troponin, shortness of breath, and abnormal EKG (which is new from prior), she underwent a coronary angiogram which showed nonobstructive coronary artery disease.  She was given aspirin, atorvastatin, and metoprolol on discharge.  She later underwent a cardiac MRI which showed signs consistent with apical hypertrophic cardiomyopathy.  She presents here today for further evaluation.    With the exception of the issue that led to her being admitted to the hospital, she has been generally asymptomatic.  She states that she does feel more short of breath than typical with significant exertion, however has not been exerting herself this past winter.  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  She notably has no palpitations, presyncope, and syncope.  She states that she had one syncopal event that was associated with a seizure in the 1970s, which she was told was due to dehydration.  She has since worn  an event monitor which showed no episodes of NSVT.  She has a family history of 2 siblings dying at a very young age, which was presumably due to Rh disease.  In addition, she did have 1 uncle who  while swimming, however he was in his late 70s to early 80s at the time.  Her father has been told that he is apical hypokinesis, though this was presumed to be due to a myocardial infarction.  She has no children and she has 1 living brother.    10/6/2020:  At the patient's last visit, her metoprolol was decreased to 12.5 mg twice per day (mostly treating hypertension).  We also ordered a cardiopulmonary stress test, which is yet to be completed.    The patient reports that since her last visit she has had occasional episodes of shortness of breath.  This is been worse recently with the higher heat and fires.  She has had intermittent presyncopal episodes, however no syncopal episodes.  She reports that her heart rate is been mainly in the 50s.  Occasionally, her systolic blood pressure will be in the 90s at which point she will hold 1 dose of metoprolol.    She also obtained the autopsy report from a paternal uncle who  while swimming.  He was found to have hypertrophic cardiomyopathy.  Her father has recently had a stroke and echocardiogram was performed at that time.    She denies orthopnea, PND, or lower extremity swelling.    21:  Since her last visit, metoprolol was discontinued and Clara also stopped her lisinopril. She has been running regularly (6-10 miles 3-4 miles/wk) without difficulty. Home BPs have been consistently 100s-110s/60s-70s.       PAST MEDICAL HISTORY:  Past Medical History:   Diagnosis Date     Apical variant hypertrophic cardiomyopathy (H) 3/10/2020     Arthritis ?    thumbs, ? mild other sites     Coronary artery disease involving native coronary artery of native heart without angina pectoris 2020    NSTEMI 2020     Diplopia      H/O CT scan      H/O magnetic resonance  imaging      History of blood transfusion     2x at birth; mom was RH neg     Hypertension < =2018    Morning-surge HTN: mild     Non-obstructive CAD without angina (coronary angiogram 1/2020) 2/4/2020    NSTEMI Jan 2020     Paralytic strabismus      Pneumonia      PONV (postoperative nausea and vomiting)        CURRENT MEDICATIONS:  Current Outpatient Medications   Medication Sig Dispense Refill     aspirin (ASA) 81 MG chewable tablet Take 1 tablet (81 mg) by mouth daily 90 tablet 1     atorvastatin (LIPITOR) 10 MG tablet Take 1 tablet (10 mg) by mouth every evening 90 tablet 1     CALCIUM PO Take 1,200 mg by mouth daily       Co-Enzyme Q-10 60 MG CAPS        diphenhydrAMINE (BENADRYL) 25 MG tablet Take 12.5-25 mg by mouth nightly as needed Reported on 3/31/2017       melatonin 3 MG tablet Take 3 mg by mouth nightly as needed for sleep       multivitamin, therapeutic (THERA-VIT) TABS tablet Take 1 tablet by mouth daily       mupirocin (BACTROBAN) 2 % external ointment Apply a small amount inside the nostril twice daily using a q-tip. 30 g 0     Omega-3 Fatty Acids (OMEGA 3 PO) Take 1 g by mouth daily Reported on 3/31/2017       traZODone (DESYREL) 50 MG tablet 1/2 pill nightly as needed. 45 tablet 1     triamcinolone (KENALOG) 0.1 % external ointment Apply topically 2 times daily 30 g 0     VITAMIN D, CHOLECALCIFEROL, PO Take 1,000 Units by mouth daily         PAST SURGICAL HISTORY:  Past Surgical History:   Procedure Laterality Date     ABDOMEN SURGERY  July 2016    Abd hernia repair (which has reoccurred form coughing 2018)     APPENDECTOMY       AS KNEE SCOPE, DIAGNOSTIC       BACK SURGERY  1999    Left C6-7 foraminotomy     BIOPSY  2014    lipoma excision (near R scapula)     BUNIONECTOMY Right 2016    times 2     BUNIONECTOMY Left 10/22/2018    Procedure: LEFT REVISION BUNION ;  Surgeon: Johanna Lund MD;  Location: [facility]     COLONOSCOPY  2019    1 sm precancer polyp     CV CORONARY ANGIOGRAM N/A  2020    Procedure: Coronary Angiogram;  Surgeon: Criss Green MD;  Location:  HEART CARDIAC CATH LAB     CV LEFT HEART CATH N/A 2020    Procedure: Left Heart Cath;  Surgeon: Criss Green MD;  Location:  HEART CARDIAC CATH LAB     CV LEFT VENTRICULOGRAM N/A 2020    Procedure: Left Ventriculogram;  Surgeon: Criss Green MD;  Location:  HEART CARDIAC CATH LAB     HERNIORRHAPHY VENTRAL N/A 2016    Procedure: HERNIORRHAPHY VENTRAL;  Surgeon: Ash Thompson MD;  Location: [facility] OR     left clavical       NECK SURGERY       ORTHOPEDIC SURGERY  [id number]    ORIF L clavicle/hardware out; bilat bunion/forefoot (4 ops)     RECESSION RESECTION (REPAIR STRABISMUS) Right 4/10/2017    Procedure: RECESSION RESECTION (REPAIR STRABISMUS);  Surgeon: Lyle Leggett MD;  Location: [facility] OR     SOFT TISSUE SURGERY      L wrist: scapholunate lig reconstruction post L wristfx     WRIST SURGERY         ALLERGIES:     Allergies   Allergen Reactions     Exparel [Bupivacaine] GI Disturbance     Patient had severe abdominal distention     Darvocet [Propoxyphene N-Apap] Other (See Comments)     confusion     Liposomes GI Disturbance     Oxycodone      Penicillins Itching     Percocet [Oxycodone-Acetaminophen] Other (See Comments)     confusion     Tape [Adhesive Tape] Rash     Once after surgical tape     Vistaril [Hydroxyzine] Rash     Intense flushing/redness of face        FAMILY HISTORY:  Family History   Problem Relation Age of Onset     Cancer Mother         skin cancer     Diabetes Mother         borderline/Type 2     Hypertension Mother         3 meds: 4.5cm asc aortic aneurysm     Hyperlipidemia Mother         chol & very high triglycerides     Cerebrovascular Disease Mother         many small, cog. impaired,  18     Osteoporosis Mother         fosamax x5 yrs:poststeroids     Obesity Mother         BMI 35+     Unknown/Adopted Mother          dermatomyositis since      Depression Mother      Mental Illness Mother         probably bordeline personality disorder; vascular dementia     Anesthesia Reaction Mother         ?     Cancer Father         skin cancer     Coronary Artery Disease Father         angioplasty ~     Hypertension Father         1 med, mild. Age 94     Depression Father         2x: age 87 [facility]     Hyperlipidemia Father         2020 after CVA, lipids not elevated     Cerebrovascular Disease Father         2 sm. foci occipito-parietal 20     Cancer Maternal Grandmother         skin cancer     Cerebrovascular Disease Maternal Grandmother         - multiple CVAs     Obesity Maternal Grandmother         overweight     Diabetes Maternal Grandfather         borderline/Type 2     Coronary Artery Disease Maternal Grandfather         2-3 MIs; worked after each     Cerebrovascular Disease Maternal Grandfather          from CVA postop hernia     Obesity Maternal Grandfather         was overwt to obese     Diabetes Cousin         prediabetes     Hypertension Brother         1 med for 20 yrs     Substance Abuse Brother         hx of EtOH     Cerebrovascular Disease Other         cog. impairment like my mom     Mental Illness Sister         ?bipolar; past chem dep     Substance Abuse Sister         hx of: prescript drug& EtOH     Unknown/Adopted Sister         sister is adopted     Breast Cancer Sister         stage 2-3,      Anesthesia Reaction Other         naus/vomit 1-8 hr postop unless tx'd     Cerebrovascular Disease Other         cog. 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    Exam:  /67 (BP Location: Right arm, Patient Position: Chair, Cuff Size: Adult Regular)   Pulse 68   Wt 54.1 kg (119 lb 4.8 oz)   LMP 09/06/2006   SpO2 97%   BMI 19.85 kg/m    GENERAL APPEARANCE: healthy, alert and no distress  EYES: no icterus, no xanthelasmas  ENT: normal palate, mucosa moist, no central cyanosis  NECK: JVP not elevated  RESPIRATORY: lungs clear to auscultation - no rales, rhonchi or wheezes, no use of accessory muscles, no retractions, respirations are unlabored, normal respiratory rate  CARDIOVASCULAR: regular rhythm, normal S1 with physiologic split S2, no S3 or S4 and no murmur, click or rub.  GI: soft, non tender, bowel sounds normal,no abdominal bruits  EXTREMITIES: no edema, no bruits  NEURO: alert and oriented to person/place/time, normal speech, gait and affect  VASC: Radial, dorsalis pedis and posterior tibialis pulses 2+ bilaterally.  SKIN: no ecchymoses, no rashes.  PSYCH: cooperative, affect appropriate.     Labs:  Reviewed.  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  A variant of unknown significance means that there is a genetic change in which we do not have enough information to determine if it is disease causing or not. Labs review published data, functional studies, presences in population databases, similarity to normal sequence, and computer prediction models.    This particular variant occurs in a portion of the gene that is highly conserved in available vertebrate species.  This variant occurs in an intron after exon 23.  A splice prediction tool predicts this alteration will abolish the native donor site; however, direct evidence is unavailable. This variant has not been previously reported as a disease causing mutation or as a normal variation.  Therefore, based on the  current information it is unclear if this VUS is associated with disease or not.       Testing/Procedures:  CMR 2/14/20:  Apical HCM with apical segments ~1.3 cm (vs. 0.9 cm basal anterosetpum and 0.7 cm basal inferolateral.) Hyperdynamic LV function and normal RV function, LVEF=75% RVEF=67%. No LVOT or intracavitary obstruction. Very small apical LGE per report-- per my review, there is no convincing fibrosis on late gadolinium enhancement imaging.      Coronary angiogram 1/24/2020: non-obstructive CAD (30-40% mLAD/D1, 40% RCA)    CPX 9/29/21:   Exercised 12:17 on Jose G Ramp protocol  /90-> 185/78  MVO2 30.5 mL/kg/min (9 METS, class I, 120% predicted)    I personally visualized and interpreted:  ECG   ZioPatch 2/6/21-2/22/21: Baseline sinus rhythm, average VR 73 bpm (range  bpm.) No sustained/nonsustained VT. Rare (<1%) PACs and PVCs; rare nonsustained SVT, likely AT (longest 21.2 sec.) No atrial fibrillation. 7 patient-triggered events, which appeared to correlate with SVT and with PACs.     Assessment and Plan:   1.  Apical hypertrophic cardiomyopathy:   Clara has clear apical HCM by cardiac MRI.   Her only risk factor ventricular arrhythmias is sudden cardiac death in her uncle, who had autopsy confirmed hypertrophic cardiomyopathy is somewhat concerning.  The patient has had no ventricular arrhythmias on ZIO Patch testing and she has no significant scar burden on MRI.  Given the presence of a genetic mutation of undetermined significance and a paternal uncle with known hypertrophic cardiomyopathy, we have discussed the need for her family members, including brothers, father, and cousins to be clinically screened (ECG and echocardiogram.)   We did discuss testing her father for the same VUS she had for HCM. She plans to discuss this with him.  Plan to follow up in 10 months with echocardiogram, ziopatch, and fasting lipids prior.     2.  Coronary artery disease, non-obstructive, without angina:  Nonobstructive on coronary angiogram.  Continue aspirin and atorvastatin (decreased to 10 mg per patient preference).     3.  Carotid artery disease: Noted on previous CTA.  Continue atorvastatin.    4.  Hypertension, essential, controlled: Continue to monitor.     The patient states understanding and is agreeable with plan.     Chart review time: 15 minutes  Visit time: 60 minutes  Total time: 75 minutes    Uriel Mendes MD

## 2021-05-11 NOTE — PROGRESS NOTES
Chief complaint: HOCM evaluation    HPI:   Clara Boykin is a 59 year-old female with a past medical history notable for nonobstructive coronary artery disease, carotid artery disease, hypertension, and newly diagnosed hypertrophic cardiomyopathy who presents for evaluation of hypertrophic cardiomyopathy.  The patient states that she initially developed symptoms in January of this past year.  She was shoveling snow and developed severe dyspnea.  This feeling of dyspnea persisted for a few days.  Eventually, she ordered a troponin for herself and this later returned to be elevated.  After she had the elevated troponin, she presented to [facility].  Given the elevated troponin, shortness of breath, and abnormal EKG (which is new from prior), she underwent a coronary angiogram which showed nonobstructive coronary artery disease.  She was given aspirin, atorvastatin, and metoprolol on discharge.  She later underwent a cardiac MRI which showed signs consistent with apical hypertrophic cardiomyopathy.  She presents here today for further evaluation.    With the exception of the issue that led to her being admitted to the hospital, she has been generally asymptomatic.  She states that she does feel more short of breath than typical with significant exertion, however has not been exerting herself this past winter.  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  She notably has no palpitations, presyncope, and syncope.  She states that she had one syncopal event that was associated with a seizure in the 1970s, which she was told was due to dehydration.  She has since worn an event monitor which showed no episodes of NSVT.  She has a family history of 2 siblings dying at a very young age, which was presumably due to Rh disease.  In addition, she did have 1 uncle who  while swimming, however he was in his late 70s to early 80s at the time.  Her father has been told that he is apical hypokinesis, though  this was presumed to be due to a myocardial infarction.  She has no children and she has 1 living brother.    10/6/2020:  At the patient's last visit, her metoprolol was decreased to 12.5 mg twice per day (mostly treating hypertension).  We also ordered a cardiopulmonary stress test, which is yet to be completed.    The patient reports that since her last visit she has had occasional episodes of shortness of breath.  This is been worse recently with the higher heat and fires.  She has had intermittent presyncopal episodes, however no syncopal episodes.  She reports that her heart rate is been mainly in the 50s.  Occasionally, her systolic blood pressure will be in the 90s at which point she will hold 1 dose of metoprolol.    She also obtained the autopsy report from a paternal uncle who  while swimming.  He was found to have hypertrophic cardiomyopathy.  Her father has recently had a stroke and echocardiogram was performed at that time.    She denies orthopnea, PND, or lower extremity swelling.    21:  Since her last visit, metoprolol was discontinued and Clara also stopped her lisinopril. She has been running regularly (6-10 miles 3-4 miles/wk) without difficulty. Home BPs have been consistently 100s-110s/60s-70s.       PAST MEDICAL HISTORY:  Past Medical History:   Diagnosis Date     Apical variant hypertrophic cardiomyopathy (H) 3/10/2020     Arthritis ?    thumbs, ? mild other sites     Coronary artery disease involving native coronary artery of native heart without angina pectoris 2020    NSTEMI 2020     Diplopia      H/O CT scan      H/O magnetic resonance imaging      History of blood transfusion     2x at birth; mom was RH neg     Hypertension < =2018    Morning-surge HTN: mild     Non-obstructive CAD without angina (coronary angiogram 2020) 2020    NSTEMI 2020     Paralytic strabismus      Pneumonia      PONV (postoperative nausea and vomiting)        CURRENT  MEDICATIONS:  Current Outpatient Medications   Medication Sig Dispense Refill     aspirin (ASA) 81 MG chewable tablet Take 1 tablet (81 mg) by mouth daily 90 tablet 1     atorvastatin (LIPITOR) 10 MG tablet Take 1 tablet (10 mg) by mouth every evening 90 tablet 1     CALCIUM PO Take 1,200 mg by mouth daily       Co-Enzyme Q-10 60 MG CAPS        diphenhydrAMINE (BENADRYL) 25 MG tablet Take 12.5-25 mg by mouth nightly as needed Reported on 3/31/2017       melatonin 3 MG tablet Take 3 mg by mouth nightly as needed for sleep       multivitamin, therapeutic (THERA-VIT) TABS tablet Take 1 tablet by mouth daily       mupirocin (BACTROBAN) 2 % external ointment Apply a small amount inside the nostril twice daily using a q-tip. 30 g 0     Omega-3 Fatty Acids (OMEGA 3 PO) Take 1 g by mouth daily Reported on 3/31/2017       traZODone (DESYREL) 50 MG tablet 1/2 pill nightly as needed. 45 tablet 1     triamcinolone (KENALOG) 0.1 % external ointment Apply topically 2 times daily 30 g 0     VITAMIN D, CHOLECALCIFEROL, PO Take 1,000 Units by mouth daily         PAST SURGICAL HISTORY:  Past Surgical History:   Procedure Laterality Date     ABDOMEN SURGERY  July 2016    Abd hernia repair (which has reoccurred form coughing 2018)     APPENDECTOMY       AS KNEE SCOPE, DIAGNOSTIC       BACK SURGERY  1999    Left C6-7 foraminotomy     BIOPSY  2014    lipoma excision (near R scapula)     BUNIONECTOMY Right 2016    times 2     BUNIONECTOMY Left 10/22/2018    Procedure: LEFT REVISION BUNION ;  Surgeon: Johanna Lund MD;  Location:  [facility]     COLONOSCOPY  2019    1 sm precancer polyp     CV CORONARY ANGIOGRAM N/A 1/24/2020    Procedure: Coronary Angiogram;  Surgeon: Criss Green MD;  Location:  HEART CARDIAC CATH LAB     CV LEFT HEART CATH N/A 1/24/2020    Procedure: Left Heart Cath;  Surgeon: Criss Green MD;  Location:  HEART CARDIAC CATH LAB     CV LEFT VENTRICULOGRAM N/A 1/24/2020    Procedure: Left  Ventriculogram;  Surgeon: Criss Green MD;  Location:  HEART CARDIAC CATH LAB     HERNIORRHAPHY VENTRAL N/A 2016    Procedure: HERNIORRHAPHY VENTRAL;  Surgeon: Ash Thompson MD;  Location:  OR     left clavical       NECK SURGERY       ORTHOPEDIC SURGERY  [id number]    ORIF L clavicle/hardware out; bilat bunion/forefoot (4 ops)     RECESSION RESECTION (REPAIR STRABISMUS) Right 4/10/2017    Procedure: RECESSION RESECTION (REPAIR STRABISMUS);  Surgeon: Lyle Leggett MD;  Location:  OR     SOFT TISSUE SURGERY      L wrist: scapholunate lig reconstruction post L wristfx     WRIST SURGERY         ALLERGIES:     Allergies   Allergen Reactions     Exparel [Bupivacaine] GI Disturbance     Patient had severe abdominal distention     Darvocet [Propoxyphene N-Apap] Other (See Comments)     confusion     Liposomes GI Disturbance     Oxycodone      Penicillins Itching     Percocet [Oxycodone-Acetaminophen] Other (See Comments)     confusion     Tape [Adhesive Tape] Rash     Once after surgical tape     Vistaril [Hydroxyzine] Rash     Intense flushing/redness of face        FAMILY HISTORY:  Family History   Problem Relation Age of Onset     Cancer Mother         skin cancer     Diabetes Mother         borderline/Type 2     Hypertension Mother         3 meds: 4.5cm asc aortic aneurysm     Hyperlipidemia Mother         chol & very high triglycerides     Cerebrovascular Disease Mother         many small, cog. impaired,  18     Osteoporosis Mother         fosamax x5 yrs:poststeroids     Obesity Mother         BMI 35+     Unknown/Adopted Mother         dermatomyositis since      Depression Mother      Mental Illness Mother         probably bordeline personality disorder; vascular dementia     Anesthesia Reaction Mother         ?     Cancer Father         skin cancer     Coronary Artery Disease Father         angioplasty ~     Hypertension Father         1 med, mild. Age  94     Depression Father         2x: age 87 [facility]     Hyperlipidemia Father         2020 after CVA, lipids not elevated     Cerebrovascular Disease Father         2 sm. foci occipito-parietal 20     Cancer Maternal Grandmother         skin cancer     Cerebrovascular Disease Maternal Grandmother         - multiple CVAs     Obesity Maternal Grandmother         overweight     Diabetes Maternal Grandfather         borderline/Type 2     Coronary Artery Disease Maternal Grandfather         2-3 MIs; worked after each     Cerebrovascular Disease Maternal Grandfather          from CVA postop hernia     Obesity Maternal Grandfather         was overwt to obese     Diabetes Cousin         prediabetes     Hypertension Brother         1 med for 20 yrs     Substance Abuse Brother         hx of EtOH     Cerebrovascular Disease Other         cog. impairment like my mom     Mental Illness Sister         ?bipolar; past chem dep     Substance Abuse Sister         hx of: prescript drug& EtOH     Unknown/Adopted Sister         sister is adopted     Breast Cancer Sister         stage 2-3,      Anesthesia Reaction Other         naus/vomit 1-8 hr postop unless tx'd     Cerebrovascular Disease Other         cog. 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    Exam:  /67 (BP Location: Right arm, Patient Position: Chair, Cuff Size: Adult Regular)   Pulse 68   Wt 54.1 kg (119 lb 4.8 oz)   LMP 2006   SpO2 97%   BMI 19.85 kg/m    GENERAL APPEARANCE: healthy, alert and no distress  EYES: no icterus, no xanthelasmas  ENT: normal palate,  mucosa moist, no central cyanosis  NECK: JVP not elevated  RESPIRATORY: lungs clear to auscultation - no rales, rhonchi or wheezes, no use of accessory muscles, no retractions, respirations are unlabored, normal respiratory rate  CARDIOVASCULAR: regular rhythm, normal S1 with physiologic split S2, no S3 or S4 and no murmur, click or rub.  GI: soft, non tender, bowel sounds normal,no abdominal bruits  EXTREMITIES: no edema, no bruits  NEURO: alert and oriented to person/place/time, normal speech, gait and affect  VASC: Radial, dorsalis pedis and posterior tibialis pulses 2+ bilaterally.  SKIN: no ecchymoses, no rashes.  PSYCH: cooperative, affect appropriate.     Labs:  Reviewed.  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  A variant of unknown significance means that there is a genetic change in which we do not have enough information to determine if it is disease causing or not. Labs review published data, functional studies, presences in population databases, similarity to normal sequence, and computer prediction models.    This particular variant occurs in a portion of the gene that is highly conserved in available vertebrate species.  This variant occurs in an intron after exon 23.  A splice prediction tool predicts this alteration will abolish the native donor site; however, direct evidence is unavailable. This variant has not been previously reported as a disease causing mutation or as a normal variation.  Therefore, based on the current information it is unclear if this VUS is associated with disease or not.       Testing/Procedures:  CMR 2/14/20:  Apical HCM with apical segments ~1.3 cm (vs. 0.9 cm basal anterosetpum and 0.7 cm basal inferolateral.) Hyperdynamic LV function and normal RV function, LVEF=75% RVEF=67%. No LVOT or intracavitary obstruction. Very  small apical LGE per report-- per my review, there is no convincing fibrosis on late gadolinium enhancement imaging.      Coronary angiogram 1/24/2020: non-obstructive CAD (30-40% mLAD/D1, 40% RCA)    CPX 9/29/21:   Exercised 12:17 on Jose G Ramp protocol  /90-> 185/78  MVO2 30.5 mL/kg/min (9 METS, class I, 120% predicted)    I personally visualized and interpreted:  ECG   ZioPatch 2/6/21-2/22/21: Baseline sinus rhythm, average VR 73 bpm (range  bpm.) No sustained/nonsustained VT. Rare (<1%) PACs and PVCs; rare nonsustained SVT, likely AT (longest 21.2 sec.) No atrial fibrillation. 7 patient-triggered events, which appeared to correlate with SVT and with PACs.     Assessment and Plan:   1.  Apical hypertrophic cardiomyopathy:   Clara has clear apical HCM by cardiac MRI.   Her only risk factor ventricular arrhythmias is sudden cardiac death in her uncle, who had autopsy confirmed hypertrophic cardiomyopathy is somewhat concerning.  The patient has had no ventricular arrhythmias on ZIO Patch testing and she has no significant scar burden on MRI.  Given the presence of a genetic mutation of undetermined significance and a paternal uncle with known hypertrophic cardiomyopathy, we have discussed the need for her family members, including brothers, father, and cousins to be clinically screened (ECG and echocardiogram.)   We did discuss testing her father for the same VUS she had for HCM. She plans to discuss this with him.  Plan to follow up in 10 months with echocardiogram, ziopatch, and fasting lipids prior.     2.  Coronary artery disease, non-obstructive, without angina: Nonobstructive on coronary angiogram.  Continue aspirin and atorvastatin (decreased to 10 mg per patient preference).     3.  Carotid artery disease: Noted on previous CTA.  Continue atorvastatin.    4.  Hypertension, essential, controlled: Continue to monitor.     The patient states understanding and is agreeable with plan.     Chart review  time: 15 minutes  Visit time: 60 minutes  Total time: 75 minutes    Uriel Mendes MD

## 2021-05-11 NOTE — NURSING NOTE
Cardiac Monitors: The monitor was placed on the patient with instructions regarding care of the skin electrodes and monitor, as well as documentation in the patient diary. Patient demonstrated understanding of this information and agreed to call with further questions or concerns.    Cardiac Testing: Patient given instructions regarding  echocardiogram . Discussed purpose, preparation, procedure and when to expect results reported back to the patient. Patient demonstrated understanding of this information and agreed to call with further questions or concerns.    Diet: Patient instructed regarding a heart healthy diet, including discussion of reduced fat and sodium intake. Patient demonstrated understanding of this information and agreed to call with further questions or concerns.    Labs: Patient was given results of the laboratory testing obtained today. Patient was instructed to return for the next laboratory testing in about one year. Patient demonstrated understanding of this information and agreed to call with further questions or concerns.     Med Reconcile: Reviewed and verified all current medications with the patient. The updated medication list was printed and given to the patient.    Return Appointment: Patient given instructions regarding scheduling next clinic visit. Patient demonstrated understanding of this information and agreed to call with further questions or concerns.    Patient stated Clara Boykin understood all health information given and agreed to call with further questions or concerns.    Tatianna Durham, MSN, RN, CNL  Cardiology Care Coordinator  [facility]  [phone]

## 2021-05-11 NOTE — NURSING NOTE
Chief Complaint   Patient presents with     Follow Up     61 year old female with history of CAD/NSTEMI 1/23/2020, on cMRI apical HCM seen, presenting for follow up.        Vitals were taken, medications reconciled, and EKG was performed.    Chino Major, ELOISE  2:03 PM

## 2021-05-12 LAB — INTERPRETATION ECG - MUSE: NORMAL

## 2021-06-02 ENCOUNTER — TELEPHONE (OUTPATIENT)
Dept: CARDIOLOGY | Facility: CLINIC | Age: 61
End: 2021-06-02

## 2021-06-02 NOTE — TELEPHONE ENCOUNTER
F/u re: vus testing in her dad    Spoke with Clara.  She is going to arrange a virtual visit with me by calling Kati to find a time that works in her schedule.  I will submit a family studies application and order a kit to her house so she can have it with her when her father has his appointment.    She will contact kati to set up visit.  andrea

## 2021-06-06 NOTE — PROGRESS NOTES
"AUDIOLOGY REPORT    SUBJECTIVE:  Clara Boykin is a 61 year old adult who was seen on 6/9/2021 in the Audiology Clinic at the [facility] for audiologic evaluation, referred by Joel Daniel Irwin Wegener MD. The patient reports a \"metallic cricket\" sound in both ears when in quiet environments, which began about 1 year ago.  She reports she began taking baby aspirin in January 2020 as recommended by Cardiology for a genetic heart issue.  She is unsure if she has any hearing loss.  The patient denies ear pain, drainage from the ears or history of ear surgery.  She has a family history of hearing loss (parents).  With respect to noise exposure, she has hunted a few times in the past (right handed shooter).      OBJECTIVE:    Otoscopic exam indicates ears are clear of cerumen bilaterally.     Pure Tone Thresholds assessed using conventional audiometry with good  reliability from 250-8000 Hz bilaterally using circumaural headphones and insert earphones.      RIGHT: Normal/borderline normal hearing through 4000 Hz, sloping to mild/moderate presumed sensorineural hearing loss from 3914-4693 Hz    LEFT:  Normal hearing through 3000 Hz, sloping to mild sensorineural hearing loss at 4000 Hz and above  Hearing is symmetric    Tympanogram:    RIGHT: normal eardrum mobility    LEFT:   normal eardrum mobility    Reflexes (reported by stimulus ear):  RIGHT: Ipsilateral is present at normal levels  RIGHT: Contralateral is present at normal levels  LEFT:   Ipsilateral is present at normal levels  LEFT:   Contralateral is present at normal levels      Speech Reception Threshold:    RIGHT: 10 dB HL    LEFT:   10 dB HL  Word Recognition Score:     RIGHT: 100% at 50 dB HL using NU-6 recorded word list.    LEFT:   100% at 50 dB HL using NU-6 recorded word list.      ASSESSMENT:   Sensorineural hearing loss in high frequencies bilaterally  Bilateral tinnitus    Today s results were discussed " with the patient in detail.     Discussed that tinnitus can be associated with hearing loss.  Tinnitus can also be affected by medications such as aspirin.      PLAN:    1. Recheck hearing in 1-2 years or sooner if changes are noted.  2. Hearing protection in noise.  3. Follow up with Dr. Wegener.  4. Follow up with Cardiology should she have any questions about her medications.  No medication changes should be made without consulting with her Cardiologist.      The patient expressed understanding and agreement with this plan.    Scott Koch, CCC-A, [facility]  Licensed Audiologist  [place] #[id number]    Enclosure: audiogram      Cc Joel Daniel Irwin Wegener MD

## 2021-06-09 ENCOUNTER — OFFICE VISIT (OUTPATIENT)
Dept: AUDIOLOGY | Facility: CLINIC | Age: 61
End: 2021-06-09
Payer: COMMERCIAL

## 2021-06-09 DIAGNOSIS — H93.13 TINNITUS OF BOTH EARS: ICD-10-CM

## 2021-06-09 DIAGNOSIS — H90.3 SENSORY HEARING LOSS, BILATERAL: Primary | ICD-10-CM

## 2021-06-09 DIAGNOSIS — H91.93 DECREASED HEARING OF BOTH EARS: ICD-10-CM

## 2021-06-09 PROCEDURE — 92557 COMPREHENSIVE HEARING TEST: CPT | Performed by: AUDIOLOGIST-HEARING AID FITTER

## 2021-06-09 PROCEDURE — 92550 TYMPANOMETRY & REFLEX THRESH: CPT | Performed by: AUDIOLOGIST-HEARING AID FITTER

## 2021-08-18 ENCOUNTER — MYC MEDICAL ADVICE (OUTPATIENT)
Dept: FAMILY MEDICINE | Facility: CLINIC | Age: 61
End: 2021-08-18

## 2021-08-30 ENCOUNTER — MYC MEDICAL ADVICE (OUTPATIENT)
Dept: FAMILY MEDICINE | Facility: CLINIC | Age: 61
End: 2021-08-30

## 2021-09-04 ENCOUNTER — HEALTH MAINTENANCE LETTER (OUTPATIENT)
Age: 61
End: 2021-09-04

## 2021-09-15 ENCOUNTER — MYC MEDICAL ADVICE (OUTPATIENT)
Dept: CARDIOLOGY | Facility: CLINIC | Age: 61
End: 2021-09-15

## 2021-09-15 DIAGNOSIS — I42.2 HYPERTROPHIC NONOBSTRUCTIVE CARDIOMYOPATHY (H): Primary | ICD-10-CM

## 2021-09-21 NOTE — TELEPHONE ENCOUNTER
Spoke with Clara about the family studies results.  Her father agreed to participate in [facility] family study program to help better define and understand variants of unknown significance (VUS).      Clara was diagnosed with apical HCM and found to carry a VUS in the MYH7 gene.  Since her father Rehan has a history of apical hypokinesis, left ventricular hypertrophy, stroke (5/12 and 5/20), and history of HCM found on his brother's autopsy after he drown he qualified for the complimentary study.    Due to his age and limited understanding of this testing, he asked that I share results with Clara.      Testing found that Rehan does NOT carry the MYH7 vus found in Clara.  Results for family study testing are reported on [facility] test report with relationship, gender and age listed but not individual's name.     Explained that these results indicate that Clara did not inherit the MYH7 variant from her father.  However, this result does NOT clarify if the variant is causing her symptoms or not. Reiterated to Clara that symptoms in herself, her father and uncle could still all be related but not associated with the MYH7 variant found.      Clara wondered what this means for her mom's family.  Since her mother is not living, we cannot test her to see if it was inherited from her mother.  However, since there is no known history of heart problems on her maternal side, risk of this being a disease causing variant from her mother is less likely.      All questions answered at this time.    Tatianna Short MS, [facility]  Licensed, Certified Genetic Counselor  [facility]  [facility]

## 2021-10-08 ENCOUNTER — MYC MEDICAL ADVICE (OUTPATIENT)
Dept: FAMILY MEDICINE | Facility: CLINIC | Age: 61
End: 2021-10-08

## 2021-10-12 DIAGNOSIS — I21.4 NSTEMI (NON-ST ELEVATED MYOCARDIAL INFARCTION) (H): ICD-10-CM

## 2021-10-12 RX ORDER — ATORVASTATIN CALCIUM 10 MG/1
10 TABLET, FILM COATED ORAL EVERY EVENING
Qty: 90 TABLET | Refills: 2 | Status: SHIPPED | OUTPATIENT
Start: 2021-10-12 | End: 2022-07-28

## 2021-10-12 NOTE — TELEPHONE ENCOUNTER
Last Clinic Visit: 5/11/2021  [facility]

## 2021-11-04 ENCOUNTER — MYC MEDICAL ADVICE (OUTPATIENT)
Dept: FAMILY MEDICINE | Facility: CLINIC | Age: 61
End: 2021-11-04

## 2021-11-04 DIAGNOSIS — I10 HYPERTENSION GOAL BP (BLOOD PRESSURE) < 130/80: Primary | ICD-10-CM

## 2021-11-04 DIAGNOSIS — I21.4 NSTEMI (NON-ST ELEVATED MYOCARDIAL INFARCTION) (H): ICD-10-CM

## 2021-11-04 NOTE — TELEPHONE ENCOUNTER
JW,  Please see below MyChart message and advise.  Vaccine updated on immunization list  Thanks,  Yady PARDO RN

## 2021-11-05 RX ORDER — LISINOPRIL 2.5 MG/1
2.5 TABLET ORAL DAILY
Qty: 90 TABLET | Refills: 1 | Status: SHIPPED | OUTPATIENT
Start: 2021-11-05 | End: 2022-01-28

## 2021-11-16 ENCOUNTER — CARE COORDINATION (OUTPATIENT)
Dept: CARDIOLOGY | Facility: CLINIC | Age: 61
End: 2021-11-16
Payer: COMMERCIAL

## 2021-11-16 DIAGNOSIS — I67.9 SMALL VESSEL DISEASE, CEREBROVASCULAR: ICD-10-CM

## 2021-11-16 DIAGNOSIS — I42.2 APICAL VARIANT HYPERTROPHIC CARDIOMYOPATHY (H): Primary | ICD-10-CM

## 2021-11-16 NOTE — PROGRESS NOTES
Date: 11/16/2021    Time of Call: 2:11 PM     Diagnosis:  HCM     [ TORB ] Ordering provider: Dr. Mendes  Order: 14 day zio, ECHO and fasting labs     Order received by: Isis Renteria RN     Follow-up/additional notes: testing for f/u with Dr. Mendes

## 2021-12-22 ENCOUNTER — MYC MEDICAL ADVICE (OUTPATIENT)
Dept: FAMILY MEDICINE | Facility: CLINIC | Age: 61
End: 2021-12-22
Payer: COMMERCIAL

## 2021-12-22 DIAGNOSIS — M25.512 ACUTE PAIN OF LEFT SHOULDER: Primary | ICD-10-CM

## 2021-12-25 ENCOUNTER — HEALTH MAINTENANCE LETTER (OUTPATIENT)
Age: 61
End: 2021-12-25

## 2021-12-31 ENCOUNTER — THERAPY VISIT (OUTPATIENT)
Dept: PHYSICAL THERAPY | Facility: CLINIC | Age: 61
End: 2021-12-31
Attending: FAMILY MEDICINE
Payer: COMMERCIAL

## 2021-12-31 DIAGNOSIS — M25.512 ACUTE PAIN OF LEFT SHOULDER: ICD-10-CM

## 2021-12-31 PROCEDURE — 97161 PT EVAL LOW COMPLEX 20 MIN: CPT | Mod: GP | Performed by: PHYSICAL THERAPIST

## 2021-12-31 PROCEDURE — 97112 NEUROMUSCULAR REEDUCATION: CPT | Mod: GP | Performed by: PHYSICAL THERAPIST

## 2021-12-31 PROCEDURE — 97110 THERAPEUTIC EXERCISES: CPT | Mod: GP | Performed by: PHYSICAL THERAPIST

## 2021-12-31 NOTE — PROGRESS NOTES
"Physical Therapy Initial Evaluation  Subjective:    Patient Health History  Clara Boykin being seen for Left shoulder pain and clunking.     Problem began: 8/1/2021.   Problem occurred: Lifted bag in yard ~ 8/1/21. Reoccurred & worse shoveling 12/12/21   Pain is reported as 2/10 on pain scale.  General health as reported by patient is excellent.  Pertinent medical history includes: history of fractures, heart problems and other. Other medical history details: Mild genetic heart condition diagnosed Jan 2020: Apical hypertrophic cardiomyopathy (HCM).     Medical allergies: other. Other medical allergies details: PCN, Percocet.   Surgeries include:  Orthopedic surgery and other. Other surgery history details: hernia 2016, R eye 2017, appe 1968.  Ortho: L lateral 1/3 clavicle fx (hook plate 2010; removed Feb 2011), L C6-7 foraminotomy 1999, L wrist ligament recon with FCR tendon 2007, 5 foot surgeries.    Current medications:  Other. Other medications details: baby ASA, 10 mg atorvastatin, & intermittent 1.25 mg lisinopril.    Current occupation is health services researcher.   Primary job tasks include:  Computer work.                  Therapist Generated HPI Evaluation  Problem details: Pt presents to PT with a chief complaint of L anterior shoulder pain with reported onset in 08/2021 with lifting a yard bag. Pt reports pain improved with rest and time, but then experienced another exacerbation on 12/12/21 after snow shoveling. Pt reports burning R anterior shoulder pain with occasional \"clunking\" with lifting and reaching especially in front of her. Pt has a previous history of a L clavicle fx with surgical fixation in 2010. .         Type of problem:  Left shoulder.    This is a new condition.  Condition occurred with:  Lifting and while carrying an object.  Where condition occurred: at home.  Patient reports pain:  Anterior.  Pain is described as burning and is constant.  Pain radiates to:  Upper arm. Pain is " worse in the P.M..  Since onset symptoms are gradually worsening.  Associated symptoms:  Loss of motion/stiffness, loss of strength and catching. Symptoms are exacerbated by using arm at shoulder level, using arm behind back, using arm overhead and lifting  and relieved by NSAID's and rest.      Restrictions due to condition include:  Working in normal job without restrictions.  Barriers include:  None as reported by patient.                        Objective:  Standing Alignment:      Shoulder/UE:  Protracted scapula L and humeral head anterior L                                  Cervical/Thoracic Evaluation    AROM:  AROM Cervical:    Flexion:         Extension:        Rotation:         Left: Mild loss     Right: WNL  Side Bend:      Left:     Right:                                Shoulder Evaluation:  ROM:  AROM:    Flexion:  Left:  WNL, pain ++    Right:  WNL    Abduction:  Left: WNL, pain +   Right:  WNL    Internal Rotation:  Left:  Mild loss, pain +    Right:  WNL  External Rotation:  Left:  WNL    Right:  WNL                  Pain: Pain ++ with L shoulder flexion AROM at 90 deg and end range    Strength:    Flexion: Left:4/5  Strong/painful    Pain: ++    Right: 5/5     Pain:     Abduction:  Left: 5-/5   Pain:+    Right: 5/5     Pain:    Internal Rotation:  Left:5/5     Pain:    Right: 5/5     Pain:  External Rotation:   Left:4+/5     Pain:   Right:4+/5     Pain:                Palpation:    Left shoulder tenderness present at:  Biceps                                       General     ROS    Assessment/Plan:    Patient is a 61 year old adult with left side shoulder complaints.    Patient has the following significant findings with corresponding treatment plan.                Diagnosis 1:  L shoulder pain  Pain -  manual therapy, splint/taping/bracing/orthotics, self management and education  Decreased ROM/flexibility - manual therapy and therapeutic exercise  Decreased strength - therapeutic exercise and  therapeutic activities  Impaired muscle performance - neuro re-education  Impaired posture - neuro re-education    Therapy Evaluation Codes:     Cumulative Therapy Evaluation is: Low complexity.    Previous and current functional limitations:  (See Goal Flow Sheet for this information)    Short term and Long term goals: (See Goal Flow Sheet for this information)     Communication ability:  Patient appears to be able to clearly communicate and understand verbal and written communication and follow directions correctly.  Treatment Explanation - The following has been discussed with the patient:   RX ordered/plan of care  Anticipated outcomes  Possible risks and side effects  This patient would benefit from PT intervention to resume normal activities.   Rehab potential is good.    Frequency:  1 X week, once daily  Duration:  for 4 weeks then 2 x month for 1 month  Discharge Plan:  Achieve all LTG.  Independent in home treatment program.  Reach maximal therapeutic benefit.    Please refer to the daily flowsheet for treatment today, total treatment time and time spent performing 1:1 timed codes.

## 2022-01-11 ENCOUNTER — TRANSFERRED RECORDS (OUTPATIENT)
Dept: HEALTH INFORMATION MANAGEMENT | Facility: CLINIC | Age: 62
End: 2022-01-11
Payer: COMMERCIAL

## 2022-01-19 ENCOUNTER — TRANSFERRED RECORDS (OUTPATIENT)
Dept: HEALTH INFORMATION MANAGEMENT | Facility: CLINIC | Age: 62
End: 2022-01-19
Payer: COMMERCIAL

## 2022-01-27 NOTE — PATIENT INSTRUCTIONS
Urine protein, a1c  Preventive Health Recommendations  Female Ages 50 - 64    Yearly exam: See your health care provider every year in order to  o Review health changes.   o Discuss preventive care.    o Review your medicines if your doctor has prescribed any.      Get a Pap test every three years (unless you have an abnormal result and your provider advises testing more often).    If you get Pap tests with HPV test, you only need to test every 5 years, unless you have an abnormal result.     You do not need a Pap test if your uterus was removed (hysterectomy) and you have not had cancer.    You should be tested each year for STDs (sexually transmitted diseases) if you're at risk.     Have a mammogram every 1 to 2 years.    Have a colonoscopy at age 50, or have a yearly FIT test (stool test). These exams screen for colon cancer.      Have a cholesterol test every 5 years, or more often if advised.    Have a diabetes test (fasting glucose) every three years. If you are at risk for diabetes, you should have this test more often.     If you are at risk for osteoporosis (brittle bone disease), think about having a bone density scan (DEXA).    Shots: Get a flu shot each year. Get a tetanus shot every 10 years.    Nutrition:     Eat at least 5 servings of fruits and vegetables each day.    Eat whole-grain bread, whole-wheat pasta and brown rice instead of white grains and rice.    Get adequate Calcium and Vitamin D.     Lifestyle    Exercise at least 150 minutes a week (30 minutes a day, 5 days a week). This will help you control your weight and prevent disease.    Limit alcohol to one drink per day.    No smoking.     Wear sunscreen to prevent skin cancer.     See your dentist every six months for an exam and cleaning.    See your eye doctor every 1 to 2 years.

## 2022-01-27 NOTE — PROGRESS NOTES
SUBJECTIVE:   CC: Clara Boykin is an 61 year old woman who presents for preventive health visit.       Patient has been advised of split billing requirements and indicates understanding: Yes  Healthy Habits:     Getting at least 3 servings of Calcium per day:  Yes    Bi-annual eye exam:  Yes    Dental care twice a year:  Yes    Sleep apnea or symptoms of sleep apnea:  None    Diet:  Low salt and Low fat/cholesterol    Frequency of exercise:  4-5 days/week    Duration of exercise:  15-30 minutes    Taking medications regularly:  Yes    Medication side effects:  None    PHQ-2 Total Score: 0    Additional concerns today:  No     Answers for HPI/ROS submitted by the patient on 1/28/2022  Blood in stool: No  heartburn: No  peripheral edema: No  mood changes: No          Overall well despite very high stress year and caring for 99 year old father and helping him move into apartment.      Continues to do rehab for shoulder going decently.     Has labs/appointment coming up with dr. walton (cardiology) for hcm.  Wondering if I could add a1c and if other labs due.     Today's PHQ-2 Score:   PHQ-2 ( 1999 [employer]) 1/28/2022   Q1: Little interest or pleasure in doing things 0   Q2: Feeling down, depressed or hopeless 0   PHQ-2 Score 0   PHQ-2 Total Score (12-17 Years)- Positive if 3 or more points; Administer PHQ-A if positive -   Q1: Little interest or pleasure in doing things Not at all   Q2: Feeling down, depressed or hopeless Not at all   PHQ-2 Score 0       Abuse: Current or Past (Physical, Sexual or Emotional) - No  Do you feel safe in your environment? Yes    Have you ever done Advance Care Planning? (For example, a Health Directive, POLST, or a discussion with a medical provider or your loved ones about your wishes): No, advance care planning information given to patient to review.  Patient declined advance care planning discussion at this time.    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 Not applicable    Alcohol Use 1/28/2022   Prescreen: >3 drinks/day or >7 drinks/week? Not Applicable   Prescreen: >3 drinks/day or >7 drinks/week? -   No flowsheet data found.    Reviewed orders with patient.  Reviewed health maintenance and updated orders accordingly - Yes      Breast Cancer Screening:  Any new diagnosis of family breast, ovarian, or bowel cancer? No    FHS-7:   Breast CA Risk Assessment (FHS-7) 1/28/2022   Did any of your first-degree relatives have breast or ovarian cancer? No   Did any of your relatives have bilateral breast cancer? No   Did any man in your family have breast cancer? No   Did any woman in your family have breast and ovarian cancer? Yes   Did any woman in your family have breast cancer before age 50 y? No   Do you have 2 or more relatives with breast and/or ovarian cancer? No   Do you have 2 or more relatives with breast and/or bowel cancer? No         Pertinent mammograms are reviewed under the imaging tab.    History of abnormal Pap smear: NO - age 30-65 PAP every 5 years with negative HPV co-testing recommended  PAP / HPV Latest Ref Rng & Units 10/28/2020 2/17/2017   PAP (Historical) - NIL NIL   HPV16 NEG:Negative Negative Negative   HPV18 NEG:Negative Negative Negative   HRHPV NEG:Negative Negative Negative     Reviewed and updated as needed this visit by clinical staff                Reviewed and updated as needed this visit by Provider               Past Medical History:   Diagnosis Date     Apical variant hypertrophic cardiomyopathy (H) 3/10/2020     Arthritis ?    thumbs, ? mild other sites     Coronary artery disease involving native coronary artery of native heart without angina pectoris  2/4/2020    NSTEMI Jan 2020     Diplopia      H/O CT scan      H/O magnetic resonance imaging      History of blood transfusion     2x at birth; mom was RH neg     Hypertension < =2018    Morning-surge HTN: mild     Non-obstructive CAD without angina (coronary angiogram 1/2020) 2/4/2020    NSTEMI Jan 2020     Paralytic strabismus      Pneumonia      PONV (postoperative nausea and vomiting)       Past Surgical History:   Procedure Laterality Date     ABDOMEN SURGERY  July 2016    Abd hernia repair (which has reoccurred form coughing 2018)     APPENDECTOMY       AS KNEE SCOPE, DIAGNOSTIC       BACK SURGERY  1999    Left C6-7 foraminotomy     BIOPSY  2014    lipoma excision (near R scapula)     BUNIONECTOMY Right 2016    times 2     BUNIONECTOMY Left 10/22/2018    Procedure: LEFT REVISION BUNION ;  Surgeon: Johanna Lund MD;  Location:  [facility]     COLONOSCOPY  2019    1 sm precancer polyp     CV CORONARY ANGIOGRAM N/A 1/24/2020    Procedure: Coronary Angiogram;  Surgeon: Criss Green MD;  Location:  [facility]     CV LEFT HEART CATH N/A 1/24/2020    Procedure: Left Heart Cath;  Surgeon: Criss Green MD;  Location:  [facility]     CV LEFT VENTRICULOGRAM N/A 1/24/2020    Procedure: Left Ventriculogram;  Surgeon: Criss Green MD;  Location:  [facility]     HERNIORRHAPHY VENTRAL N/A 7/7/2016    Procedure: HERNIORRHAPHY VENTRAL;  Surgeon: Ash Thompson MD;  Location:  OR     left clavical       NECK SURGERY       ORTHOPEDIC SURGERY  2524-2180    ORIF L clavicle/hardware out; bilat bunion/forefoot (4 ops)     RECESSION RESECTION (REPAIR STRABISMUS) Right 4/10/2017    Procedure: RECESSION RESECTION (REPAIR STRABISMUS);  Surgeon: Lyle Leggett MD;  Location:  OR     SOFT TISSUE SURGERY  2007    L wrist: scapholunate lig reconstruction post L wristfx     WRIST SURGERY         Review of Systems   Constitutional: Negative  for chills and fever.   HENT: Negative for congestion, ear pain and hearing loss.    Eyes: Negative for pain.   Respiratory: Negative for cough.    Cardiovascular: Negative for chest pain.   Gastrointestinal: Negative for abdominal pain, constipation, diarrhea and nausea.   Genitourinary: Negative for dysuria, frequency, genital sores and hematuria.   Musculoskeletal: Negative for arthralgias, joint swelling and myalgias.   Neurological: Negative for dizziness and headaches.   Psychiatric/Behavioral: The patient is not nervous/anxious.           OBJECTIVE:   LMP 09/06/2006   Physical Exam  GENERAL: healthy, alert and no distress  EYES: Eyes grossly normal to inspection, PERRL and conjunctivae and sclerae normal  HENT: ear canals and TM's normal, nose and mouth without ulcers or lesions  NECK: no adenopathy, no asymmetry, masses, or scars and thyroid normal to palpation  RESP: lungs clear to auscultation - no rales, rhonchi or wheezes  BREAST: discussed/deferred per current guidelines since doing mammogram  CV: regular rate and rhythm, normal S1 S2, no S3 or S4, no murmur, click or rub, no peripheral edema and peripheral pulses strong  ABDOMEN: soft, nontender, no hepatosplenomegaly, no masses and bowel sounds normal  MS: no gross musculoskeletal defects noted, no edema  SKIN: no suspicious lesions or rashes  NEURO: Normal strength and tone, mentation intact and speech normal  PSYCH: mentation appears normal, affect normal/bright        ASSESSMENT/PLAN:       ICD-10-CM    1. Routine general medical examination at a health care facility  Z00.00    2. NSTEMI (non-ST elevated myocardial infarction) (H)  I21.4 lisinopril (ZESTRIL) 2.5 MG tablet   3. Primary insomnia  F51.01 traZODone (DESYREL) 50 MG tablet   4. Hypertension goal BP (blood pressure) < 130/80  I10 Albumin Random Urine Quantitative with Creat Ratio   5. Elevated blood sugar  R73.9 Hemoglobin A1c   6. Screening for diabetes mellitus  Z13.1 Hemoglobin A1c  "  7. Other neutropenia (H)  D70.8    8. Apical variant hypertrophic cardiomyopathy (H)  I42.2      Reviewed upcoming lab orders lipids, cmp, cbc per dr. walton to address items as above.  Added urine albumin, a1c.     Plans to schedule mammogram since over one year.   Patient has been advised of split billing requirements and indicates understanding: Yes    COUNSELING:  Reviewed preventive health counseling, as reflected in patient instructions       Regular exercise       Healthy diet/nutrition    Estimated body mass index is 19.85 kg/m  as calculated from the following:    Height as of 4/28/21: 1.651 m (5' 5\").    Weight as of 5/11/21: 54.1 kg (119 lb 4.8 oz).        Clara Boykin reports that Clara Boykin has never smoked. Clara Boykin has never used smokeless tobacco.      Counseling Resources:  ATP IV Guidelines  Pooled Cohorts Equation Calculator  Breast Cancer Risk Calculator  BRCA-Related Cancer Risk Assessment: FHS-7 Tool  FRAX Risk Assessment  ICSI Preventive Guidelines  Dietary Guidelines for Americans, 2010  USDA's MyPlate  ASA Prophylaxis  Lung CA Screening    Joel Daniel Wegener, MD  [facility]  "

## 2022-01-28 ENCOUNTER — OFFICE VISIT (OUTPATIENT)
Dept: FAMILY MEDICINE | Facility: CLINIC | Age: 62
End: 2022-01-28
Payer: COMMERCIAL

## 2022-01-28 VITALS
HEART RATE: 72 BPM | SYSTOLIC BLOOD PRESSURE: 115 MMHG | OXYGEN SATURATION: 97 % | DIASTOLIC BLOOD PRESSURE: 68 MMHG | WEIGHT: 119.7 LBS | HEIGHT: 65 IN | TEMPERATURE: 97.5 F | BODY MASS INDEX: 19.94 KG/M2

## 2022-01-28 DIAGNOSIS — I21.4 NSTEMI (NON-ST ELEVATED MYOCARDIAL INFARCTION) (H): ICD-10-CM

## 2022-01-28 DIAGNOSIS — I42.2 APICAL VARIANT HYPERTROPHIC CARDIOMYOPATHY (H): ICD-10-CM

## 2022-01-28 DIAGNOSIS — F51.01 PRIMARY INSOMNIA: ICD-10-CM

## 2022-01-28 DIAGNOSIS — R73.9 ELEVATED BLOOD SUGAR: ICD-10-CM

## 2022-01-28 DIAGNOSIS — Z00.00 ROUTINE GENERAL MEDICAL EXAMINATION AT A HEALTH CARE FACILITY: Primary | ICD-10-CM

## 2022-01-28 DIAGNOSIS — I10 HYPERTENSION GOAL BP (BLOOD PRESSURE) < 130/80: ICD-10-CM

## 2022-01-28 DIAGNOSIS — Z13.1 SCREENING FOR DIABETES MELLITUS: ICD-10-CM

## 2022-01-28 DIAGNOSIS — D70.8 OTHER NEUTROPENIA (H): ICD-10-CM

## 2022-01-28 PROCEDURE — 99396 PREV VISIT EST AGE 40-64: CPT | Performed by: FAMILY MEDICINE

## 2022-01-28 RX ORDER — TRAZODONE HYDROCHLORIDE 50 MG/1
TABLET, FILM COATED ORAL
Qty: 45 TABLET | Refills: 3 | Status: SHIPPED | OUTPATIENT
Start: 2022-01-28 | End: 2023-02-06

## 2022-01-28 RX ORDER — LISINOPRIL 2.5 MG/1
2.5 TABLET ORAL DAILY
Qty: 90 TABLET | Refills: 3 | Status: SHIPPED | OUTPATIENT
Start: 2022-01-28 | End: 2022-03-22

## 2022-01-28 ASSESSMENT — ENCOUNTER SYMPTOMS
HEMATURIA: 0
CONSTIPATION: 0
NAUSEA: 0
ARTHRALGIAS: 0
NERVOUS/ANXIOUS: 0
HEADACHES: 0
FEVER: 0
MYALGIAS: 0
DIZZINESS: 0
DIARRHEA: 0
DYSURIA: 0
ABDOMINAL PAIN: 0
JOINT SWELLING: 0
EYE PAIN: 0
COUGH: 0
FREQUENCY: 0
CHILLS: 0
HEARTBURN: 0
HEMATOCHEZIA: 0

## 2022-01-28 ASSESSMENT — MIFFLIN-ST. JEOR: SCORE: 1096.64

## 2022-01-30 PROBLEM — D70.8 OTHER NEUTROPENIA (H): Status: ACTIVE | Noted: 2022-01-30

## 2022-02-01 ENCOUNTER — ALLIED HEALTH/NURSE VISIT (OUTPATIENT)
Dept: CARDIOLOGY | Facility: CLINIC | Age: 62
End: 2022-02-01
Attending: INTERNAL MEDICINE
Payer: COMMERCIAL

## 2022-02-01 DIAGNOSIS — I42.2 APICAL VARIANT HYPERTROPHIC CARDIOMYOPATHY (H): ICD-10-CM

## 2022-02-01 DIAGNOSIS — I67.9 SMALL VESSEL DISEASE, CEREBROVASCULAR: ICD-10-CM

## 2022-02-01 PROCEDURE — 93248 EXT ECG>7D<15D REV&INTERPJ: CPT | Performed by: INTERNAL MEDICINE

## 2022-02-24 ENCOUNTER — LAB (OUTPATIENT)
Dept: LAB | Facility: CLINIC | Age: 62
End: 2022-02-24
Payer: COMMERCIAL

## 2022-02-24 DIAGNOSIS — I10 HYPERTENSION GOAL BP (BLOOD PRESSURE) < 130/80: ICD-10-CM

## 2022-02-24 DIAGNOSIS — I42.2 APICAL VARIANT HYPERTROPHIC CARDIOMYOPATHY (H): ICD-10-CM

## 2022-02-24 DIAGNOSIS — I67.9 SMALL VESSEL DISEASE, CEREBROVASCULAR: ICD-10-CM

## 2022-02-24 DIAGNOSIS — Z13.1 SCREENING FOR DIABETES MELLITUS: ICD-10-CM

## 2022-02-24 DIAGNOSIS — R73.9 ELEVATED BLOOD SUGAR: ICD-10-CM

## 2022-02-24 LAB
ALBUMIN SERPL-MCNC: 3.9 G/DL (ref 3.4–5)
ALP SERPL-CCNC: 71 U/L (ref 40–150)
ALT SERPL W P-5'-P-CCNC: 39 U/L (ref 0–70)
ANION GAP SERPL CALCULATED.3IONS-SCNC: 7 MMOL/L (ref 3–14)
AST SERPL W P-5'-P-CCNC: 30 U/L (ref 0–45)
BASOPHILS # BLD AUTO: 0 10E3/UL (ref 0–0.2)
BASOPHILS NFR BLD AUTO: 1 %
BILIRUB SERPL-MCNC: 0.5 MG/DL (ref 0.2–1.3)
BUN SERPL-MCNC: 23 MG/DL (ref 7–30)
CALCIUM SERPL-MCNC: 9.2 MG/DL (ref 8.5–10.1)
CHLORIDE BLD-SCNC: 103 MMOL/L (ref 94–109)
CO2 SERPL-SCNC: 27 MMOL/L (ref 20–32)
CREAT SERPL-MCNC: 0.72 MG/DL (ref 0.52–1.25)
CREAT UR-MCNC: 66 MG/DL
EOSINOPHIL # BLD AUTO: 0.2 10E3/UL (ref 0–0.7)
EOSINOPHIL NFR BLD AUTO: 6 %
ERYTHROCYTE [DISTWIDTH] IN BLOOD BY AUTOMATED COUNT: 12.8 % (ref 10–15)
GFR SERPL CREATININE-BSD FRML MDRD: >90 ML/MIN/1.73M2
GLUCOSE BLD-MCNC: 92 MG/DL (ref 70–99)
HBA1C MFR BLD: 5.7 % (ref 0–5.6)
HCT VFR BLD AUTO: 44.5 % (ref 35–53)
HGB BLD-MCNC: 14.6 G/DL (ref 11.7–17.7)
IMM GRANULOCYTES # BLD: 0 10E3/UL
IMM GRANULOCYTES NFR BLD: 0 %
LYMPHOCYTES # BLD AUTO: 1.5 10E3/UL (ref 0.8–5.3)
LYMPHOCYTES NFR BLD AUTO: 45 %
MCH RBC QN AUTO: 30.7 PG (ref 26.5–33)
MCHC RBC AUTO-ENTMCNC: 32.8 G/DL (ref 31.5–36.5)
MCV RBC AUTO: 94 FL (ref 78–100)
MICROALBUMIN UR-MCNC: <5 MG/L
MICROALBUMIN/CREAT UR: NORMAL MG/G{CREAT}
MONOCYTES # BLD AUTO: 0.4 10E3/UL (ref 0–1.3)
MONOCYTES NFR BLD AUTO: 13 %
NEUTROPHILS # BLD AUTO: 1.2 10E3/UL (ref 1.6–8.3)
NEUTROPHILS NFR BLD AUTO: 35 %
PLATELET # BLD AUTO: 264 10E3/UL (ref 150–450)
POTASSIUM BLD-SCNC: 3.9 MMOL/L (ref 3.4–5.3)
PROT SERPL-MCNC: 7.5 G/DL (ref 6.8–8.8)
RBC # BLD AUTO: 4.76 10E6/UL (ref 3.8–5.9)
SODIUM SERPL-SCNC: 137 MMOL/L (ref 133–144)
WBC # BLD AUTO: 3.4 10E3/UL (ref 4–11)

## 2022-02-24 PROCEDURE — 82043 UR ALBUMIN QUANTITATIVE: CPT

## 2022-02-24 PROCEDURE — 80053 COMPREHEN METABOLIC PANEL: CPT

## 2022-02-24 PROCEDURE — 36415 COLL VENOUS BLD VENIPUNCTURE: CPT

## 2022-02-24 PROCEDURE — 85025 COMPLETE CBC W/AUTO DIFF WBC: CPT

## 2022-02-24 PROCEDURE — 80061 LIPID PANEL: CPT

## 2022-02-24 PROCEDURE — 83036 HEMOGLOBIN GLYCOSYLATED A1C: CPT

## 2022-02-26 ENCOUNTER — MYC MEDICAL ADVICE (OUTPATIENT)
Dept: CARDIOLOGY | Facility: CLINIC | Age: 62
End: 2022-02-26
Payer: COMMERCIAL

## 2022-02-26 DIAGNOSIS — E78.5 HYPERLIPIDEMIA LDL GOAL <70: Primary | ICD-10-CM

## 2022-02-26 DIAGNOSIS — I42.2 APICAL VARIANT HYPERTROPHIC CARDIOMYOPATHY (H): ICD-10-CM

## 2022-02-26 LAB
CHOLEST SERPL-MCNC: 174 MG/DL
FASTING STATUS PATIENT QL REPORTED: YES
HDLC SERPL-MCNC: 70 MG/DL
LDLC SERPL CALC-MCNC: 93 MG/DL
NONHDLC SERPL-MCNC: 104 MG/DL
TRIGL SERPL-MCNC: 55 MG/DL

## 2022-03-10 ENCOUNTER — LAB (OUTPATIENT)
Dept: LAB | Facility: CLINIC | Age: 62
End: 2022-03-10
Payer: COMMERCIAL

## 2022-03-10 DIAGNOSIS — E78.5 HYPERLIPIDEMIA LDL GOAL <70: ICD-10-CM

## 2022-03-10 DIAGNOSIS — I42.2 APICAL VARIANT HYPERTROPHIC CARDIOMYOPATHY (H): ICD-10-CM

## 2022-03-10 LAB — LDLC SERPL CALC-MCNC: 66 MG/DL

## 2022-03-10 PROCEDURE — 36415 COLL VENOUS BLD VENIPUNCTURE: CPT

## 2022-03-10 PROCEDURE — 83721 ASSAY OF BLOOD LIPOPROTEIN: CPT

## 2022-03-21 NOTE — PROGRESS NOTES
Chief complaint: HOCM evaluation    HPI:   Clara Boykin is a 61 year old female with a past medical history notable for nonobstructive coronary artery disease, carotid artery disease, hypertension, and newly diagnosed hypertrophic cardiomyopathy who presents for evaluation of hypertrophic cardiomyopathy.  The patient states that she initially developed symptoms in January of this past year.  She was shoveling snow and developed severe dyspnea.  This feeling of dyspnea persisted for a few days.  Eventually, she ordered a troponin for herself and this later returned to be elevated.  After she had the elevated troponin, she presented to [facility].  Given the elevated troponin, shortness of breath, and abnormal EKG (which is new from prior), she underwent a coronary angiogram which showed nonobstructive coronary artery disease.  She was given aspirin, atorvastatin, and metoprolol on discharge.  She later underwent a cardiac MRI which showed signs consistent with apical hypertrophic cardiomyopathy.  She presents here today for further evaluation.    With the exception of the issue that led to her being admitted to the hospital, she has been generally asymptomatic.  She states that she does feel more short of breath than typical with significant exertion, however has not been exerting herself this past winter.  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  She notably has no palpitations, presyncope, and syncope.  She states that she had one syncopal event that was associated with a seizure in the 1970s, which she was told was due to dehydration.  She has since worn an event monitor which showed no episodes of NSVT.  She has a family history of 2 siblings dying at a very young age, which was presumably due to Rh disease.  In addition, she did have 1 uncle who  while swimming, however he was in his late 70s to early 80s at the time.  Her father has been told that he is apical hypokinesis, though  this was presumed to be due to a myocardial infarction.  She has no children and she has 1 living brother.    10/6/2020:  At the patient's last visit, her metoprolol was decreased to 12.5 mg twice per day (mostly treating hypertension).  We also ordered a cardiopulmonary stress test, which is yet to be completed.    The patient reports that since her last visit she has had occasional episodes of shortness of breath.  This is been worse recently with the higher heat and fires.  She has had intermittent presyncopal episodes, however no syncopal episodes.  She reports that her heart rate is been mainly in the 50s.  Occasionally, her systolic blood pressure will be in the 90s at which point she will hold 1 dose of metoprolol.    She also obtained the autopsy report from a paternal uncle who  while swimming.  He was found to have hypertrophic cardiomyopathy.  Her father has recently had a stroke and echocardiogram was performed at that time.    She denies orthopnea, PND, or lower extremity swelling.    21:  Since her last visit, metoprolol was discontinued and Clara also stopped her lisinopril. She has been running regularly (6-10 miles 3-4 miles/wk) without difficulty. Home BPs have been consistently 100s-110s/60s-70s.     22:  Clara has had a difficult year, with significant work demands and stresses of COVID and also because her father has been ill (seizure and small stroke) but he is doing better now.  She just resumed running again. She is starting to feel better and is under less stress than before.     ECG 22: sinus with left atrial abnormality, VR 69,  QRS 78 QTc 415. No change from 21.     TTE 22: Apical HCM, better seen on prior CMR (and best seen on non-contrast images.) Normal LV/RV size/function. Mild TR. Trace to mild AI.     PAST MEDICAL HISTORY:  Past Medical History:   Diagnosis Date     Apical variant hypertrophic cardiomyopathy (H) 3/10/2020     Arthritis ?     thumbs, ? mild other sites     Coronary artery disease involving native coronary artery of native heart without angina pectoris 2/4/2020    NSTEMI Jan 2020     Diplopia      H/O CT scan      H/O magnetic resonance imaging      History of blood transfusion     2x at birth; mom was RH neg     Hypertension < =2018    Morning-surge HTN: mild     Non-obstructive CAD without angina (coronary angiogram 1/2020) 2/4/2020    NSTEMI Jan 2020     Paralytic strabismus      Pneumonia      PONV (postoperative nausea and vomiting)        CURRENT MEDICATIONS:  Current Outpatient Medications   Medication Sig Dispense Refill     aspirin (ASA) 81 MG chewable tablet Take 1 tablet (81 mg) by mouth daily 90 tablet 1     atorvastatin (LIPITOR) 10 MG tablet Take 1 tablet (10 mg) by mouth every evening 90 tablet 2     CALCIUM PO Take 1,200 mg by mouth daily       Co-Enzyme Q-10 60 MG CAPS        diphenhydrAMINE (BENADRYL) 25 MG tablet Take 12.5-25 mg by mouth nightly as needed Reported on 3/31/2017       lisinopril (ZESTRIL) 2.5 MG tablet Take 1 tablet (2.5 mg) by mouth daily 90 tablet 3     melatonin 3 MG tablet Take 3 mg by mouth nightly as needed for sleep       multivitamin, therapeutic (THERA-VIT) TABS tablet Take 1 tablet by mouth daily       Omega-3 Fatty Acids (OMEGA 3 PO) Take 1 g by mouth daily Reported on 3/31/2017       traZODone (DESYREL) 50 MG tablet TAKE 1/2 TABLET BY MOUTH EVERY NIGHT AS NEEDED 45 tablet 3     VITAMIN D, CHOLECALCIFEROL, PO Take 1,000 Units by mouth daily         PAST SURGICAL HISTORY:  Past Surgical History:   Procedure Laterality Date     ABDOMEN SURGERY  July 2016    Abd hernia repair (which has reoccurred form coughing 2018)     APPENDECTOMY       AS KNEE SCOPE, DIAGNOSTIC       BACK SURGERY  1999    Left C6-7 foraminotomy     BIOPSY  2014    lipoma excision (near R scapula)     BUNIONECTOMY Right 2016    times 2     BUNIONECTOMY Left 10/22/2018    Procedure: LEFT REVISION BUNION ;  Surgeon: Kevon  Johanna GOOD MD;  Location:  [facility]     COLONOSCOPY      1 sm precancer polyp     CV CORONARY ANGIOGRAM N/A 2020    Procedure: Coronary Angiogram;  Surgeon: Criss Green MD;  Location:  [facility]     CV LEFT HEART CATH N/A 2020    Procedure: Left Heart Cath;  Surgeon: Criss Green MD;  Location:  [facility]     CV LEFT VENTRICULOGRAM N/A 2020    Procedure: Left Ventriculogram;  Surgeon: Criss Green MD;  Location:  [facility]     HERNIORRHAPHY VENTRAL N/A 2016    Procedure: HERNIORRHAPHY VENTRAL;  Surgeon: Ash Thompson MD;  Location:  OR     left clavical       NECK SURGERY       ORTHOPEDIC SURGERY  [id number]    ORIF L clavicle/hardware out; bilat bunion/forefoot (4 ops)     RECESSION RESECTION (REPAIR STRABISMUS) Right 4/10/2017    Procedure: RECESSION RESECTION (REPAIR STRABISMUS);  Surgeon: Lyle Leggett MD;  Location:  OR     SOFT TISSUE SURGERY      L wrist: scapholunate lig reconstruction post L wristfx     WRIST SURGERY         ALLERGIES:     Allergies   Allergen Reactions     Exparel [Bupivacaine] GI Disturbance     Patient had severe abdominal distention     Darvocet [Propoxyphene N-Apap] Other (See Comments)     confusion     Liposomes GI Disturbance     Oxycodone      Penicillins Itching     Percocet [Oxycodone-Acetaminophen] Other (See Comments)     confusion     Tape [Adhesive Tape] Rash     Once after surgical tape     Vistaril [Hydroxyzine] Rash     Intense flushing/redness of face        FAMILY HISTORY:  Family History   Problem Relation Age of Onset     Cancer Mother         skin cancer     Diabetes Mother         borderline/Type 2     Hypertension Mother         3 meds: 4.5cm asc aortic aneurysm     Hyperlipidemia Mother         chol & very high triglycerides     Cerebrovascular Disease Mother         many small, cog. impaired,  18     Osteoporosis Mother          fosamax x5 yrs:poststeroids     Obesity Mother         BMI 35+     Unknown/Adopted Mother         dermatomyositis since      Depression Mother      Mental Illness Mother         probably bordeline personality disorder; vascular dementia     Anesthesia Reaction Mother         ?     Cancer Father         skin cancer     Coronary Artery Disease Father         angioplasty ~     Hypertension Father         1 med, mild. Age 94     Depression Father         2x: age 87 [facility]     Hyperlipidemia Father         2020 after CVA, lipids not elevated     Cerebrovascular Disease Father         2 sm. foci occipito-parietal 20     Cancer Maternal Grandmother         skin cancer     Cerebrovascular Disease Maternal Grandmother         - multiple CVAs     Obesity Maternal Grandmother         overweight     Diabetes Maternal Grandfather         borderline/Type 2     Coronary Artery Disease Maternal Grandfather         2-3 MIs; worked after each     Cerebrovascular Disease Maternal Grandfather          from CVA postop hernia     Obesity Maternal Grandfather         was overwt to obese     Diabetes Cousin         prediabetes     Hypertension Brother         1 med for 20 yrs     Substance Abuse Brother         hx of EtOH     Cerebrovascular Disease Other         cog. impairment like my mom     Mental Illness Sister         ?bipolar; past chem dep     Substance Abuse Sister         hx of: prescript drug& EtOH     Unknown/Adopted Sister         sister is adopted     Breast Cancer Sister         stage 2-3,      Anesthesia Reaction Other         naus/vomit 1-8 hr postop unless tx'd     Cerebrovascular Disease Other         cog. 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    Exam:  LMP 09/06/2006   GENERAL APPEARANCE: healthy, alert and no distress  EYES: no icterus, no xanthelasmas  ENT: normal palate, mucosa moist, no central cyanosis  NECK: JVP not elevated  RESPIRATORY: lungs clear to auscultation - no rales, rhonchi or wheezes, no use of accessory muscles, no retractions, respirations are unlabored, normal respiratory rate  CARDIOVASCULAR: regular rhythm, normal S1 with physiologic split S2, no S3 or S4 and no murmur, click or rub.  GI: soft, non tender, bowel sounds normal,no abdominal bruits  EXTREMITIES: no edema, no bruits  NEURO: alert and oriented to person/place/time, normal speech, gait and affect  VASC: Radial, dorsalis pedis and posterior tibialis pulses 2+ bilaterally.  SKIN: no ecchymoses, no rashes.  PSYCH: cooperative, affect appropriate.     Labs:  Reviewed.  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  A variant of unknown significance means that there is a genetic change in which we do not have enough information to determine if it is disease causing or not. Labs review published data, functional studies, presences in population databases, similarity to normal sequence, and computer prediction models.    This particular variant occurs in a portion of the gene that is highly conserved in available vertebrate species.  This variant occurs in an intron after exon 23.  A splice prediction tool predicts this alteration will abolish the native donor site; however, direct evidence is unavailable. This variant has not been previously  reported as a disease causing mutation or as a normal variation.  Therefore, based on the current information it is unclear if this VUS is associated with disease or not.       Testing/Procedures:  CMR 2/14/20:  Apical HCM with apical segments ~1.3 cm (vs. 0.9 cm basal anterosetpum and 0.7 cm basal inferolateral.) Hyperdynamic LV function and normal RV function, LVEF=75% RVEF=67%. No LVOT or intracavitary obstruction. Very small apical LGE per report-- per my review, there is no convincing fibrosis on late gadolinium enhancement imaging.      Coronary angiogram 1/24/2020: non-obstructive CAD (30-40% mLAD/D1, 40% RCA)    CPX 9/29/21:   Exercised 12:17 on Jose G Ramp protocol  /90-> 185/78  MVO2 30.5 mL/kg/min (9 METS, class I, 120% predicted)    ZioPatch 2/6/21-2/22/21: Baseline sinus rhythm, average VR 73 bpm (range  bpm.) No sustained/nonsustained VT. Rare (<1%) PACs and PVCs; rare nonsustained SVT, likely AT (longest 21.2 sec.) No atrial fibrillation. 7 patient-triggered events, which appeared to correlate with SVT and with PACs.     I personally visualized and interpreted:  ZioPatch 2/4/22-2/18/22:   Baseline sinus rhythm, average VR 74 bpm (range  bpm.) Rare (<1%) PACs and PVCs. 8 brief runs of nonsustained SVT (likely atrial tachycardia, longest 7 beats), 3 of which were associated with symptoms. 18 patient-triggered episodes were associated with SVT (3), PACs (12), and the remainder with sinus rhyhtm. Several of the episodes with PACs also had isolated PVCs. No nonsustained VT. No atrial fibrillation. No significant/sustained tachyarrhythmias, bradyarrhythmias, or pauses.   AT was also present on prior ZioPatch from 2/6/21-2/22/21 (and associated with symptoms at that time) and also with 1/2020-2/2020 Holter monitor. The longest SVT/AT run was significantly shorter than on the prior ZioPatch (7 beats vs. 21.2 seconds.)   Results released to the patient via MyChart and will be discussed at her  upcoming outpatient visit.      Assessment and Plan:   1.  Apical hypertrophic cardiomyopathy:   Clara has clear apical HCM by cardiac MRI.   Her only risk factor ventricular arrhythmias is sudden cardiac death in her uncle, who had autopsy confirmed hypertrophic cardiomyopathy is somewhat concerning.  The patient has had no ventricular arrhythmias on ZIO Patch testing and she has no significant scar burden on MRI.  Given the presence of a genetic mutation of undetermined significance and a paternal uncle with known hypertrophic cardiomyopathy, we have discussed the need for her family members, including brothers, father, and cousins to be clinically screened (ECG and echocardiogram.) Her father was tested for the same VUS that Clara has and was negative for it.    2.  Coronary artery disease, non-obstructive, without angina: Nonobstructive on coronary angiogram.  Continue aspirin and atorvastatin 10 mg (LDL<70.)    3.  Carotid artery disease: Noted on previous CTA.  Continue atorvastatin. Direct LDL-C<70 on atorvastatin 10 mg daily.     4.  Hypertension, essential, controlled: Continue to monitor.     5. Atrial tachycardia/PACs: rare and not bothersome to Clara, continue to monitor.     The patient states understanding and is agreeable with plan.     Total time: 48 minutes    Uriel Mendes MD

## 2022-03-21 NOTE — RESULT ENCOUNTER NOTE
CMP 2/24/22 shows normal renal function/electroytes and normal liver function  Lipids 2/24/22: chol 174 LDL(calculated) 93 HDL 70 TG 55  Direct LDL 3/22/22: 66    Regarding lipids, taken in combination, LDL (by best measurement) is <70 and HDL is >60. Lipids are optimal and no changes are needed.     Uriel Mendes MD  Cardiology

## 2022-03-21 NOTE — RESULT ENCOUNTER NOTE
ZioPatch 2/4/22-2/18/22: Baseline sinus rhythm, average VR 74 bpm (range  bpm.) Rare (<1%) PACs and PVCs. 8 brief runs of nonsustained SVT (likely atrial tachycardia, longest 7 beats), 3 of which were associated with symptoms. 18 patient-triggered episodes were associated with SVT (3), PACs (12), and the remainder with sinus rhyhtm. Several of the episodes with PACs also had isolated PVCs. No nonsustained VT.     No atrial fibrillation. No significant/sustained tachyarrhythmias, bradyarrhythmias, or pauses.     AT was also present on prior ZioPatch from 2/6/21-2/22/21 (and associated with symptoms at that time) and also with 1/2020-2/2020 Holter monitor. The longest SVT/AT run was significantly shorter than on the prior ZioPatch (7 beats vs. 21.2 seconds.)     Results released to the patient via Grabbedt and will be discussed at her upcoming outpatient visit.    Uriel Mendes MD  Cardiology

## 2022-03-22 ENCOUNTER — OFFICE VISIT (OUTPATIENT)
Dept: CARDIOLOGY | Facility: CLINIC | Age: 62
End: 2022-03-22
Attending: INTERNAL MEDICINE
Payer: COMMERCIAL

## 2022-03-22 VITALS
DIASTOLIC BLOOD PRESSURE: 76 MMHG | HEART RATE: 62 BPM | OXYGEN SATURATION: 99 % | SYSTOLIC BLOOD PRESSURE: 119 MMHG | HEIGHT: 65 IN | BODY MASS INDEX: 19.59 KG/M2 | WEIGHT: 117.6 LBS

## 2022-03-22 DIAGNOSIS — I42.2 APICAL VARIANT HYPERTROPHIC CARDIOMYOPATHY (H): ICD-10-CM

## 2022-03-22 DIAGNOSIS — I67.9 SMALL VESSEL DISEASE, CEREBROVASCULAR: ICD-10-CM

## 2022-03-22 DIAGNOSIS — I10 ESSENTIAL HYPERTENSION: ICD-10-CM

## 2022-03-22 LAB — LVEF ECHO: NORMAL

## 2022-03-22 PROCEDURE — G0463 HOSPITAL OUTPT CLINIC VISIT: HCPCS | Mod: 25

## 2022-03-22 PROCEDURE — 99215 OFFICE O/P EST HI 40 MIN: CPT | Performed by: INTERNAL MEDICINE

## 2022-03-22 PROCEDURE — 99207 PR NO BILLABLE SERVICE THIS VISIT: CPT | Performed by: INTERNAL MEDICINE

## 2022-03-22 PROCEDURE — 93005 ELECTROCARDIOGRAM TRACING: CPT

## 2022-03-22 PROCEDURE — 93306 TTE W/DOPPLER COMPLETE: CPT | Performed by: INTERNAL MEDICINE

## 2022-03-22 RX ADMIN — Medication 5 ML: at 12:32

## 2022-03-22 ASSESSMENT — PAIN SCALES - GENERAL: PAINLEVEL: NO PAIN (0)

## 2022-03-22 NOTE — LETTER
3/22/2022      RE: Clara Boykin  [address]  [facility] [address]       Dear Colleague,    Thank you for the opportunity to participate in the care of your patient, Clara Boykin, at the [facility] [place] [facility]. Please see a copy of my visit note below.    Chief complaint: HOCM evaluation    HPI:   Clara Boykin is a 61 year old female with a past medical history notable for nonobstructive coronary artery disease, carotid artery disease, hypertension, and newly diagnosed hypertrophic cardiomyopathy who presents for evaluation of hypertrophic cardiomyopathy.  The patient states that she initially developed symptoms in January of this past year.  She was shoveling snow and developed severe dyspnea.  This feeling of dyspnea persisted for a few days.  Eventually, she ordered a troponin for herself and this later returned to be elevated.  After she had the elevated troponin, she presented to [facility].  Given the elevated troponin, shortness of breath, and abnormal EKG (which is new from prior), she underwent a coronary angiogram which showed nonobstructive coronary artery disease.  She was given aspirin, atorvastatin, and metoprolol on discharge.  She later underwent a cardiac MRI which showed signs consistent with apical hypertrophic cardiomyopathy.  She presents here today for further evaluation.    With the exception of the issue that led to her being admitted to the hospital, she has been generally asymptomatic.  She states that she does feel more short of breath than typical with significant exertion, however has not been exerting herself this past winter.  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  She notably has no palpitations, presyncope, and syncope.  She states that she had one syncopal event that was associated with a seizure in the 1970s, which she was told was due to dehydration.  She has since worn  an event monitor which showed no episodes of NSVT.  She has a family history of 2 siblings dying at a very young age, which was presumably due to Rh disease.  In addition, she did have 1 uncle who  while swimming, however he was in his late 70s to early 80s at the time.  Her father has been told that he is apical hypokinesis, though this was presumed to be due to a myocardial infarction.  She has no children and she has 1 living brother.    10/6/2020:  At the patient's last visit, her metoprolol was decreased to 12.5 mg twice per day (mostly treating hypertension).  We also ordered a cardiopulmonary stress test, which is yet to be completed.    The patient reports that since her last visit she has had occasional episodes of shortness of breath.  This is been worse recently with the higher heat and fires.  She has had intermittent presyncopal episodes, however no syncopal episodes.  She reports that her heart rate is been mainly in the 50s.  Occasionally, her systolic blood pressure will be in the 90s at which point she will hold 1 dose of metoprolol.    She also obtained the autopsy report from a paternal uncle who  while swimming.  He was found to have hypertrophic cardiomyopathy.  Her father has recently had a stroke and echocardiogram was performed at that time.    She denies orthopnea, PND, or lower extremity swelling.    21:  Since her last visit, metoprolol was discontinued and Clara also stopped her lisinopril. She has been running regularly (6-10 miles 3-4 miles/wk) without difficulty. Home BPs have been consistently 100s-110s/60s-70s.     22:  Clara has had a difficult year, with significant work demands and stresses of COVID and also because her father has been ill (seizure and small stroke) but he is doing better now.  She just resumed running again. She is starting to feel better and is under less stress than before.     ECG 22: sinus with left atrial abnormality, VR 69,   QRS 78 QTc 415. No change from 5/11/21.     TTE 03/22/22: Apical HCM, better seen on prior CMR (and best seen on non-contrast images.) Normal LV/RV size/function. Mild TR. Trace to mild AI.     PAST MEDICAL HISTORY:  Past Medical History:   Diagnosis Date     Apical variant hypertrophic cardiomyopathy (H) 3/10/2020     Arthritis ?    thumbs, ? mild other sites     Coronary artery disease involving native coronary artery of native heart without angina pectoris 2/4/2020    NSTEMI Jan 2020     Diplopia      H/O CT scan      H/O magnetic resonance imaging      History of blood transfusion     2x at birth; mom was RH neg     Hypertension < =2018    Morning-surge HTN: mild     Non-obstructive CAD without angina (coronary angiogram 1/2020) 2/4/2020    NSTEMI Jan 2020     Paralytic strabismus      Pneumonia      PONV (postoperative nausea and vomiting)        CURRENT MEDICATIONS:  Current Outpatient Medications   Medication Sig Dispense Refill     aspirin (ASA) 81 MG chewable tablet Take 1 tablet (81 mg) by mouth daily 90 tablet 1     atorvastatin (LIPITOR) 10 MG tablet Take 1 tablet (10 mg) by mouth every evening 90 tablet 2     CALCIUM PO Take 1,200 mg by mouth daily       Co-Enzyme Q-10 60 MG CAPS        diphenhydrAMINE (BENADRYL) 25 MG tablet Take 12.5-25 mg by mouth nightly as needed Reported on 3/31/2017       lisinopril (ZESTRIL) 2.5 MG tablet Take 1 tablet (2.5 mg) by mouth daily 90 tablet 3     melatonin 3 MG tablet Take 3 mg by mouth nightly as needed for sleep       multivitamin, therapeutic (THERA-VIT) TABS tablet Take 1 tablet by mouth daily       Omega-3 Fatty Acids (OMEGA 3 PO) Take 1 g by mouth daily Reported on 3/31/2017       traZODone (DESYREL) 50 MG tablet TAKE 1/2 TABLET BY MOUTH EVERY NIGHT AS NEEDED 45 tablet 3     VITAMIN D, CHOLECALCIFEROL, PO Take 1,000 Units by mouth daily         PAST SURGICAL HISTORY:  Past Surgical History:   Procedure Laterality Date     ABDOMEN SURGERY  July 2016    Abd  hernia repair (which has reoccurred form coughing 2018)     APPENDECTOMY       AS KNEE SCOPE, DIAGNOSTIC       BACK SURGERY  1999    Left C6-7 foraminotomy     BIOPSY  2014    lipoma excision (near R scapula)     BUNIONECTOMY Right 2016    times 2     BUNIONECTOMY Left 10/22/2018    Procedure: LEFT REVISION BUNION ;  Surgeon: Johanna Lund MD;  Location:  [facility]     COLONOSCOPY  2019    1 sm precancer polyp     CV CORONARY ANGIOGRAM N/A 1/24/2020    Procedure: Coronary Angiogram;  Surgeon: Criss Green MD;  Location:  HEART CARDIAC CATH LAB     CV LEFT HEART CATH N/A 1/24/2020    Procedure: Left Heart Cath;  Surgeon: Criss Green MD;  Location:  HEART CARDIAC CATH LAB     CV LEFT VENTRICULOGRAM N/A 1/24/2020    Procedure: Left Ventriculogram;  Surgeon: Criss Green MD;  Location:  HEART CARDIAC CATH LAB     HERNIORRHAPHY VENTRAL N/A 7/7/2016    Procedure: HERNIORRHAPHY VENTRAL;  Surgeon: Ash Thompson MD;  Location:  OR     left clavical       NECK SURGERY       ORTHOPEDIC SURGERY  [id number]    ORIF L clavicle/hardware out; bilat bunion/forefoot (4 ops)     RECESSION RESECTION (REPAIR STRABISMUS) Right 4/10/2017    Procedure: RECESSION RESECTION (REPAIR STRABISMUS);  Surgeon: Lyle Leggett MD;  Location:  OR     SOFT TISSUE SURGERY  2007    L wrist: scapholunate lig reconstruction post L wristfx     WRIST SURGERY         ALLERGIES:     Allergies   Allergen Reactions     Exparel [Bupivacaine] GI Disturbance     Patient had severe abdominal distention     Darvocet [Propoxyphene N-Apap] Other (See Comments)     confusion     Liposomes GI Disturbance     Oxycodone      Penicillins Itching     Percocet [Oxycodone-Acetaminophen] Other (See Comments)     confusion     Tape [Adhesive Tape] Rash     Once after surgical tape     Vistaril [Hydroxyzine] Rash     Intense flushing/redness of face        FAMILY HISTORY:  Family History   Problem Relation Age of  Onset     Cancer Mother         skin cancer     Diabetes Mother         borderline/Type 2     Hypertension Mother         3 meds: 4.5cm asc aortic aneurysm     Hyperlipidemia Mother         chol & very high triglycerides     Cerebrovascular Disease Mother         many small, cog. impaired,  18     Osteoporosis Mother         fosamax x5 yrs:poststeroids     Obesity Mother         BMI 35+     Unknown/Adopted Mother         dermatomyositis since      Depression Mother      Mental Illness Mother         probably bordeline personality disorder; vascular dementia     Anesthesia Reaction Mother         ?     Cancer Father         skin cancer     Coronary Artery Disease Father         angioplasty ~     Hypertension Father         1 med, mild. Age 94     Depression Father         2x: age 87 [facility]     Hyperlipidemia Father         2020 after CVA, lipids not elevated     Cerebrovascular Disease Father         2 sm. foci occipito-parietal 20     Cancer Maternal Grandmother         skin cancer     Cerebrovascular Disease Maternal Grandmother         - multiple CVAs     Obesity Maternal Grandmother         overweight     Diabetes Maternal Grandfather         borderline/Type 2     Coronary Artery Disease Maternal Grandfather         2-3 MIs; worked after each     Cerebrovascular Disease Maternal Grandfather          from CVA postop hernia     Obesity Maternal Grandfather         was overwt to obese     Diabetes Cousin         prediabetes     Hypertension Brother         1 med for 20 yrs     Substance Abuse Brother         hx of EtOH     Cerebrovascular Disease Other         cog. impairment like my mom     Mental Illness Sister         ?bipolar; past chem dep     Substance Abuse Sister         hx of: prescript drug& EtOH     Unknown/Adopted Sister         sister is adopted     Breast Cancer Sister         stage 2-3,      Anesthesia Reaction Other         naus/vomit 1-8 hr postop unless tx'd      Cerebrovascular Disease Other         cog. 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    Exam:  [facility] 09/06/2006   GENERAL APPEARANCE: healthy, alert and no distress  EYES: no icterus, no xanthelasmas  ENT: normal palate, mucosa moist, no central cyanosis  NECK: JVP not elevated  RESPIRATORY: lungs clear to auscultation - no rales, rhonchi or wheezes, no use of accessory muscles, no retractions, respirations are unlabored, normal respiratory rate  CARDIOVASCULAR: regular rhythm, normal S1 with physiologic split S2, no S3 or S4 and no murmur, click or rub.  GI: soft, non tender, bowel sounds normal,no abdominal bruits  EXTREMITIES: no edema, no bruits  NEURO: alert and oriented to person/place/time, normal speech, gait and affect  VASC: Radial, dorsalis pedis and posterior tibialis pulses 2+ bilaterally.  SKIN: no ecchymoses, no rashes.  PSYCH: cooperative, affect appropriate.     Labs:  Reviewed.  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  A variant of unknown significance means that there is a genetic change in which we do not have enough information to determine if it is disease causing or not. Labs review published data, functional studies, presences in population databases, similarity to normal sequence, and computer prediction  models.    This particular variant occurs in a portion of the gene that is highly conserved in available vertebrate species.  This variant occurs in an intron after exon 23.  A splice prediction tool predicts this alteration will abolish the native donor site; however, direct evidence is unavailable. This variant has not been previously reported as a disease causing mutation or as a normal variation.  Therefore, based on the current information it is unclear if this VUS is associated with disease or not.       Testing/Procedures:  CMR 2/14/20:  Apical HCM with apical segments ~1.3 cm (vs. 0.9 cm basal anterosetpum and 0.7 cm basal inferolateral.) Hyperdynamic LV function and normal RV function, LVEF=75% RVEF=67%. No LVOT or intracavitary obstruction. Very small apical LGE per report-- per my review, there is no convincing fibrosis on late gadolinium enhancement imaging.      Coronary angiogram 1/24/2020: non-obstructive CAD (30-40% mLAD/D1, 40% RCA)    CPX 9/29/21:   Exercised 12:17 on Jose G Ramp protocol  /90-> 185/78  MVO2 30.5 mL/kg/min (9 METS, class I, 120% predicted)    ZioPatch 2/6/21-2/22/21: Baseline sinus rhythm, average VR 73 bpm (range  bpm.) No sustained/nonsustained VT. Rare (<1%) PACs and PVCs; rare nonsustained SVT, likely AT (longest 21.2 sec.) No atrial fibrillation. 7 patient-triggered events, which appeared to correlate with SVT and with PACs.     I personally visualized and interpreted:  ZioPatch 2/4/22-2/18/22:   Baseline sinus rhythm, average VR 74 bpm (range  bpm.) Rare (<1%) PACs and PVCs. 8 brief runs of nonsustained SVT (likely atrial tachycardia, longest 7 beats), 3 of which were associated with symptoms. 18 patient-triggered episodes were associated with SVT (3), PACs (12), and the remainder with sinus rhyhtm. Several of the episodes with PACs also had isolated PVCs. No nonsustained VT. No atrial fibrillation. No significant/sustained tachyarrhythmias, bradyarrhythmias,  or pauses.   AT was also present on prior ZioPatch from 2/6/21-2/22/21 (and associated with symptoms at that time) and also with 1/2020-2/2020 Holter monitor. The longest SVT/AT run was significantly shorter than on the prior ZioPatch (7 beats vs. 21.2 seconds.)   Results released to the patient via [facility] and will be discussed at her upcoming outpatient visit.      Assessment and Plan:   1.  Apical hypertrophic cardiomyopathy:   Clara has clear apical HCM by cardiac MRI.   Her only risk factor ventricular arrhythmias is sudden cardiac death in her uncle, who had autopsy confirmed hypertrophic cardiomyopathy is somewhat concerning.  The patient has had no ventricular arrhythmias on ZIO Patch testing and she has no significant scar burden on MRI.  Given the presence of a genetic mutation of undetermined significance and a paternal uncle with known hypertrophic cardiomyopathy, we have discussed the need for her family members, including brothers, father, and cousins to be clinically screened (ECG and echocardiogram.) Her father was tested for the same VUS that Clara has and was negative for it.    2.  Coronary artery disease, non-obstructive, without angina: Nonobstructive on coronary angiogram.  Continue aspirin and atorvastatin 10 mg (LDL<70.)    3.  Carotid artery disease: Noted on previous CTA.  Continue atorvastatin. Direct LDL-C<70 on atorvastatin 10 mg daily.     4.  Hypertension, essential, controlled: Continue to monitor.     5. Atrial tachycardia/PACs: rare and not bothersome to Clara, continue to monitor.     The patient states understanding and is agreeable with plan.     Total time: 48 minutes    Uriel Mendes MD

## 2022-03-22 NOTE — PATIENT INSTRUCTIONS
You were seen today in the Adult Congenital and Cardiovascular Genetics Clinic at the [facility].    Cardiology Providers you saw during your visit:  Uriel Mendes MD    Diagnosis:  HCM    Results:  Uriel Mendes MD reviewed the results of your zio, labs and echo testing today in clinic.    Recommendations:    1. Continue to eat a heart healthy, low salt diet.  2. Continue to get 20-30 minutes of aerobic activity, 4-5 days per week.  Examples of aerobic activity include walking, running, swimming, cycling, etc.  3. Continue to observe good oral hygiene, with regular dental visits.  4. No changes today      SBE prophylaxis:   Yes____  No__x__    Lifelong Bacterial Endocarditis Prophylaxis:  YES____  NO____    If YES is checked, follow the recommendations outlined below:  1. Take antibiotic(s) prior to recommended dental procedures and procedures on the respiratory tract or with infected skin, muscle or bones. SBE prophylaxis is not needed for routine GI and  procedures (ie. Colonoscopy or vaginal delivery)  2. Observe good oral hygiene daily, as advised by your dentist. Get regular professional dental care.  3. Keep cuts clean.  4. Infections should be treated promptly.  5. Symptoms of Infective Endocarditis could include: fever lasting more than 4-5 days or a recurrent fever that initially resolves but returns within 1-2 days)      Exercise restrictions:   Yes__X__  No____         If yes, list restrictions:  Must be allowed to rest if fatigued or SOB      Work restrictions:  Yes____  No_X___         If yes, list restrictions:    FASTING CHOLESTEROL was checked in the last 5 years YES_x__  NO___ (2022)  Continue to eat a heart healthy, low salt diet.         ____ Fasting lipid panel order today         ____ No changes in medications          ____ I recommend the following changes in your cholesterol medications.:          ____ Please follow up for cholesterol screening at your primary care  physician      Follow-up:  Follow up with Dr. Mendes in 1 year CPX, zio, echo and labs prior    If you have questions or concerns please contact us at:    VALENTÍN RuedaN, RN    Kati Kraus (Scheduling)  Nurse Care Coordinator     Clinic   Adult Congenital and CV Genetics   Adult Congenital and CV Genetic  [facility]   [facility]  (P) [phone]     (P) [phone]         (F) [phone]        For after hours urgent needs, call [phone] and ask to speak to the Adult Congenital Physician on call.  Mention Job Code 0401.    For emergencies call 911.    [facility]  Mail Code [id number]  [address] [place]  [address]

## 2022-03-22 NOTE — NURSING NOTE
Chief Complaint   Patient presents with     Follow Up     CV Genetics 03/22/2022: 61 year old female with history of CAD/NSTEMI 1/23/2020, on cMRI apical HCM seen, presenting for follow up.    Vitals were taken, medications reconciled, and EKG was performed.    Jerry Mcgowan, EMT  12:51 PM

## 2022-03-23 LAB
ATRIAL RATE - MUSE: 69 BPM
DIASTOLIC BLOOD PRESSURE - MUSE: NORMAL MMHG
INTERPRETATION ECG - MUSE: NORMAL
P AXIS - MUSE: 78 DEGREES
PR INTERVAL - MUSE: 170 MS
QRS DURATION - MUSE: 78 MS
QT - MUSE: 388 MS
QTC - MUSE: 415 MS
R AXIS - MUSE: 72 DEGREES
SYSTOLIC BLOOD PRESSURE - MUSE: NORMAL MMHG
T AXIS - MUSE: 51 DEGREES
VENTRICULAR RATE- MUSE: 69 BPM

## 2022-03-25 ENCOUNTER — MYC MEDICAL ADVICE (OUTPATIENT)
Dept: FAMILY MEDICINE | Facility: CLINIC | Age: 62
End: 2022-03-25
Payer: COMMERCIAL

## 2022-03-25 DIAGNOSIS — D70.9 NEUTROPENIA, UNSPECIFIED TYPE (H): Primary | ICD-10-CM

## 2022-03-28 NOTE — TELEPHONE ENCOUNTER
JW,    Please see [employer] message.  Patient last saw you 4/13/21 for nasal crusting  Last physical 10/28/2020    Do you want to do some type of visit?  She her for her physical?    Caty Fong RN

## 2022-04-27 ENCOUNTER — MYC MEDICAL ADVICE (OUTPATIENT)
Dept: FAMILY MEDICINE | Facility: CLINIC | Age: 62
End: 2022-04-27
Payer: COMMERCIAL

## 2022-05-05 ENCOUNTER — ANCILLARY PROCEDURE (OUTPATIENT)
Dept: MAMMOGRAPHY | Facility: CLINIC | Age: 62
End: 2022-05-05
Attending: FAMILY MEDICINE
Payer: COMMERCIAL

## 2022-05-05 DIAGNOSIS — Z12.31 VISIT FOR SCREENING MAMMOGRAM: ICD-10-CM

## 2022-05-05 PROCEDURE — 77067 SCR MAMMO BI INCL CAD: CPT | Mod: GC

## 2022-05-06 ENCOUNTER — LAB (OUTPATIENT)
Dept: LAB | Facility: CLINIC | Age: 62
End: 2022-05-06
Payer: COMMERCIAL

## 2022-05-06 DIAGNOSIS — D70.9 NEUTROPENIA, UNSPECIFIED TYPE (H): ICD-10-CM

## 2022-05-06 DIAGNOSIS — I10 ESSENTIAL HYPERTENSION: ICD-10-CM

## 2022-05-06 DIAGNOSIS — I42.2 APICAL VARIANT HYPERTROPHIC CARDIOMYOPATHY (H): ICD-10-CM

## 2022-05-06 LAB
ERYTHROCYTE [DISTWIDTH] IN BLOOD BY AUTOMATED COUNT: 13.1 % (ref 10–15)
HCT VFR BLD AUTO: 43.1 % (ref 35–47)
HGB BLD-MCNC: 14.3 G/DL (ref 11.7–15.7)
MCH RBC QN AUTO: 31.2 PG (ref 26.5–33)
MCHC RBC AUTO-ENTMCNC: 33.2 G/DL (ref 31.5–36.5)
MCV RBC AUTO: 94 FL (ref 78–100)
PLATELET # BLD AUTO: 273 10E3/UL (ref 150–450)
RBC # BLD AUTO: 4.59 10E6/UL (ref 3.8–5.2)
TROPONIN I SERPL HS-MCNC: 5 NG/L
WBC # BLD AUTO: 4.1 10E3/UL (ref 4–11)

## 2022-05-06 PROCEDURE — 84484 ASSAY OF TROPONIN QUANT: CPT

## 2022-05-06 PROCEDURE — 85027 COMPLETE CBC AUTOMATED: CPT

## 2022-05-06 PROCEDURE — 36415 COLL VENOUS BLD VENIPUNCTURE: CPT

## 2022-05-26 PROBLEM — M25.512 ACUTE PAIN OF LEFT SHOULDER: Status: RESOLVED | Noted: 2021-12-31 | Resolved: 2022-05-26

## 2022-06-30 ENCOUNTER — NURSE TRIAGE (OUTPATIENT)
Dept: FAMILY MEDICINE | Facility: CLINIC | Age: 62
End: 2022-06-30

## 2022-07-01 ENCOUNTER — OFFICE VISIT (OUTPATIENT)
Dept: FAMILY MEDICINE | Facility: CLINIC | Age: 62
End: 2022-07-01
Payer: COMMERCIAL

## 2022-07-01 ENCOUNTER — ANCILLARY PROCEDURE (OUTPATIENT)
Dept: CT IMAGING | Facility: CLINIC | Age: 62
End: 2022-07-01
Attending: FAMILY MEDICINE
Payer: COMMERCIAL

## 2022-07-01 ENCOUNTER — MYC MEDICAL ADVICE (OUTPATIENT)
Dept: FAMILY MEDICINE | Facility: CLINIC | Age: 62
End: 2022-07-01

## 2022-07-01 VITALS
SYSTOLIC BLOOD PRESSURE: 113 MMHG | DIASTOLIC BLOOD PRESSURE: 71 MMHG | OXYGEN SATURATION: 97 % | HEART RATE: 67 BPM | BODY MASS INDEX: 19.78 KG/M2 | TEMPERATURE: 97.3 F | RESPIRATION RATE: 16 BRPM | WEIGHT: 119.3 LBS

## 2022-07-01 DIAGNOSIS — R10.32 ABDOMINAL PAIN, LEFT LOWER QUADRANT: ICD-10-CM

## 2022-07-01 DIAGNOSIS — R10.32 ABDOMINAL PAIN, LEFT LOWER QUADRANT: Primary | ICD-10-CM

## 2022-07-01 LAB
ALBUMIN UR-MCNC: NEGATIVE MG/DL
APPEARANCE UR: CLEAR
BACTERIA #/AREA URNS HPF: ABNORMAL /HPF
BILIRUB UR QL STRIP: NEGATIVE
COLOR UR AUTO: YELLOW
CREAT BLD-MCNC: 0.6 MG/DL (ref 0.5–1)
ERYTHROCYTE [DISTWIDTH] IN BLOOD BY AUTOMATED COUNT: 13.3 % (ref 10–15)
GFR SERPL CREATININE-BSD FRML MDRD: >60 ML/MIN/1.73M2
GLUCOSE UR STRIP-MCNC: NEGATIVE MG/DL
HCT VFR BLD AUTO: 41.5 % (ref 35–47)
HGB BLD-MCNC: 13.6 G/DL (ref 11.7–15.7)
HGB UR QL STRIP: ABNORMAL
KETONES UR STRIP-MCNC: NEGATIVE MG/DL
LEUKOCYTE ESTERASE UR QL STRIP: NEGATIVE
MCH RBC QN AUTO: 31.2 PG (ref 26.5–33)
MCHC RBC AUTO-ENTMCNC: 32.8 G/DL (ref 31.5–36.5)
MCV RBC AUTO: 95 FL (ref 78–100)
NITRATE UR QL: NEGATIVE
PH UR STRIP: 6 [PH] (ref 5–7)
PLATELET # BLD AUTO: 245 10E3/UL (ref 150–450)
RBC # BLD AUTO: 4.36 10E6/UL (ref 3.8–5.2)
RBC #/AREA URNS AUTO: ABNORMAL /HPF
SP GR UR STRIP: 1.01 (ref 1–1.03)
UROBILINOGEN UR STRIP-ACNC: 0.2 E.U./DL
WBC # BLD AUTO: 5.1 10E3/UL (ref 4–11)
WBC #/AREA URNS AUTO: ABNORMAL /HPF

## 2022-07-01 PROCEDURE — 81001 URINALYSIS AUTO W/SCOPE: CPT | Performed by: FAMILY MEDICINE

## 2022-07-01 PROCEDURE — 74177 CT ABD & PELVIS W/CONTRAST: CPT | Mod: GC | Performed by: RADIOLOGY

## 2022-07-01 PROCEDURE — 85027 COMPLETE CBC AUTOMATED: CPT | Performed by: FAMILY MEDICINE

## 2022-07-01 PROCEDURE — 99214 OFFICE O/P EST MOD 30 MIN: CPT | Performed by: FAMILY MEDICINE

## 2022-07-01 PROCEDURE — 36415 COLL VENOUS BLD VENIPUNCTURE: CPT | Performed by: FAMILY MEDICINE

## 2022-07-01 PROCEDURE — 82565 ASSAY OF CREATININE: CPT | Performed by: PATHOLOGY

## 2022-07-01 RX ORDER — IOPAMIDOL 755 MG/ML
66 INJECTION, SOLUTION INTRAVASCULAR ONCE
Status: COMPLETED | OUTPATIENT
Start: 2022-07-01 | End: 2022-07-01

## 2022-07-01 RX ADMIN — IOPAMIDOL 66 ML: 755 INJECTION, SOLUTION INTRAVASCULAR at 14:36

## 2022-07-01 ASSESSMENT — PAIN SCALES - GENERAL: PAINLEVEL: MILD PAIN (3)

## 2022-07-01 NOTE — NURSING NOTE
"Chief Complaint   Patient presents with     Abdominal Pain     Initial Visit Vitals: /71   Pulse 67   Temp 97.3  F (36.3  C) (Temporal)   Resp 16   Wt 54.1 kg (119 lb 4.8 oz)   LMP 09/06/2006   SpO2 97%   BMI 19.78 kg/m   Estimated body mass index is 19.78 kg/m  as calculated from the following:    Height as of 3/22/22: 1.654 m (5' 5.12\").    Weight as of this encounter: 54.1 kg (119 lb 4.8 oz).  BP completed using cuff size: regular.       Health Maintenance that is potentially due pending provider review:  NONE    n/a    Lisa Mohamud RN    "

## 2022-07-01 NOTE — PROGRESS NOTES
Assessment & Plan     Abdominal pain, left lower quadrant  We discussed doing labs today , CBC and UA , which came back normal , trace blood in the urine only , sent her for an abdominal and pelvic CT scan to rule out diverticulitis   - REVIEW OF HEALTH MAINTENANCE PROTOCOL ORDERS  - CBC with platelets; Future  - UA with Microscopic reflex to Culture - lab collect; Future  - CBC with platelets  - UA with Microscopic reflex to Culture - lab collect  - Urine Microscopic  - CT Abdomen Pelvis w Contrast; Future  CT scan was normal , no signs of any diverticulitis , also she has had coloscopy in 2019 which was normal , tiny polyp which was benign and no signs of diverticulosis , recommendations to do a f/u colonoscopy in 2029   In the DD this could have been a muscle spasm as she has been exercising more lately , half marathon and also biking a lot , in the am on the day pain started . Would need to drink plenty of water and add magnesium , could be passed kidney stone but she has no urgency or frequency , now is feeling better   Would continue to monitor and if any worsening symptoms would need to be seen .,possible pelvic US to look into ovaries and uterus if pain re occurs.  Pt is aware  and comfortable with the current plan.        Lashay Uriarte MD  [facility]   Clara is a 62 year old, presenting for the following health issues:  Abdominal Pain      History of Present Illness       Reason for visit:  Abrupt onset lower abdominal pain 6/29 5pm  Symptom onset:  1-3 days ago  Symptom intensity:  Moderate  Symptom progression:  Improving  Had these symptoms before:  No  What makes it worse:  Movement. Immediately before bowel movement  What makes it better:  Lay down    She eats 4 or more servings of fruits and vegetables daily.She consumes 0 sweetened beverage(s) daily.She exercises with enough effort to increase her heart rate 20 to 29 minutes per day.  She exercises with enough  "effort to increase her heart rate 6 days per week.   She is taking medications regularly.     Abdominal pain in the LLQ , on Wednesday , Felt like something burst at 5 pm pain in the left lowe r quadrant , lasted about an hour, then,felt pain all over , felt better when walking bent over , thursday when she got up , felt like having a BM , felt sweaty , sat on the toilet , has some small amounts of BM , no dysuria , doesnot feel like a UTI, no urgency no frequency with voiding .   Ovarian cysts in the past ,but now , severe pain not like an ovarian cyst   When eating does not affect the pain still feleing pain better ,now more like a bruised feeling all over the  abdomen     Concern - Abdominal pain  Pt says the pain was so sudden and significant that it \"felt like something burst.\" Pain began Wednesday ~5 pm after coming home from work. Pt was squatting in bathroom to pick something up and felt some small \"gas-like\" pain, then pain grew to become severe/significant. Pt went and laid down. Thursday AM, while walking with abd flexed, felt like needed to have bowel movement. Felt body had hot flash, dizzy. Tried to have BM and had small BM. Pain slightly improved after lying down. PMH of ovarian cyst years ago, and feels this pain is similar but \"like x4 severity.\" Pt is s/p appendectomy in 1968. Pt has no trouble urinating. Today pain is slightly improved. Still feels better to be flexed forwards. Tried heat pack - helps if lying still.     Ran half marathon couple weeks ago in [place]. Biked on Wedns AM. Pt likes to stay active. Saying this pain \"is not like me.\" BMs not regular since, now are smaller amounts. Soft feces, but not diarrhea. Seemed to contain mucus on Wednesday evening. Does not feel her BMs hurt during defecation, but the motion of intestines prior to BM hurts.     Onset: Wednesday 6/29/22  Description: At first felt mild and \"gas-like\", then progressed to become more severe. Localized to lower " abdomen below umbilicus, first in LLQ then progressed to RLQ as well.   Intensity: severe at first, but has become mild-moderate today  Progression of Symptoms:  improving  Accompanying Signs & Symptoms: Smaller, softer BMs, but no diarrhea  Previous history of similar problem: none  Precipitating factors:        Worsened by: Standing straight; worse just before BM  Alleviating factors:        Improved by: improved by flexing abdomen forwards  Therapies tried and outcome: rest, heat packs (give mild relief when lying down)        Review of Systems   Constitutional, HEENT, cardiovascular, pulmonary, GI, , musculoskeletal, neuro, skin, endocrine and psych systems are negative, except as otherwise noted.      Objective    /71   Pulse 67   Temp 97.3  F (36.3  C) (Temporal)   Resp 16   Wt 54.1 kg (119 lb 4.8 oz)   LMP 09/06/2006   SpO2 97%   BMI 19.78 kg/m    Body mass index is 19.78 kg/m .  Physical Exam   GENERAL: healthy, alert and no distress  EYES: Eyes grossly normal to inspection, PERRL and conjunctivae and sclerae normal  NECK: no adenopathy, no asymmetry, masses, or scars and thyroid normal to palpation  RESP: lungs clear to auscultation - no rales, rhonchi or wheezes  CV: regular rate and rhythm, normal S1 S2, no S3 or S4, no murmur, click or rub, no peripheral edema and peripheral pulses strong  ABDOMEN: soft, some tenderness in the lower left abdomen and low center but no rebound no guarding and no referred pain , , no hepatosplenomegaly, no masses and bowel sounds normal  MS: no gross musculoskeletal defects noted, no edema  NEURO: Normal strength and tone, mentation intact and speech normal  PSYCH: mentation appears normal, affect normal/bright  LYMPH: no cervical, supraclavicular, axillary, or inguinal adenopathy    Results for orders placed or performed in visit on 07/01/22 (from the past 24 hour(s))   CBC with platelets   Result Value Ref Range    WBC Count 5.1 4.0 - 11.0 10e3/uL    RBC  Count 4.36 3.80 - 5.20 10e6/uL    Hemoglobin 13.6 11.7 - 15.7 g/dL    Hematocrit 41.5 35.0 - 47.0 %    MCV 95 78 - 100 fL    MCH 31.2 26.5 - 33.0 pg    MCHC 32.8 31.5 - 36.5 g/dL    RDW 13.3 10.0 - 15.0 %    Platelet Count 245 150 - 450 10e3/uL   UA with Microscopic reflex to Culture - lab collect    Specimen: Urine, Midstream   Result Value Ref Range    Color Urine Yellow Colorless, Straw, Light Yellow, Yellow    Appearance Urine Clear Clear    Glucose Urine Negative Negative mg/dL    Bilirubin Urine Negative Negative    Ketones Urine Negative Negative mg/dL    Specific Gravity Urine 1.015 1.003 - 1.035    Blood Urine Trace (A) Negative    pH Urine 6.0 5.0 - 7.0    Protein Albumin Urine Negative Negative mg/dL    Urobilinogen Urine 0.2 0.2, 1.0 E.U./dL    Nitrite Urine Negative Negative    Leukocyte Esterase Urine Negative Negative   Urine Microscopic   Result Value Ref Range    Bacteria Urine Few (A) None Seen /HPF    RBC Urine 0-2 0-2 /HPF /HPF    WBC Urine 0-5 0-5 /HPF /HPF    Narrative    Urine Culture not indicated                   .  ..

## 2022-07-04 ENCOUNTER — MYC MEDICAL ADVICE (OUTPATIENT)
Dept: FAMILY MEDICINE | Facility: CLINIC | Age: 62
End: 2022-07-04

## 2022-07-05 ENCOUNTER — TELEPHONE (OUTPATIENT)
Dept: FAMILY MEDICINE | Facility: CLINIC | Age: 62
End: 2022-07-05

## 2022-07-05 NOTE — TELEPHONE ENCOUNTER
"Called requesting a pap/pelvic exam with Ghassan on Thursday when he is back in town. Will take an appt with any other provider if can get in earlier but needs to be seen asap    Continued abdominal pain 2/10 throughout the day, 4/10 when having a bowl movement. CT was neg last week and she was recommended to increase fluids and rest based on provider advice (pulled muscle or kidney stone). Feels it in uterine area and \"it is not normal feeling\"    Hot packing abdomin, not taking medication \"has platelet disorder so bruises easily if takes NSAIDs or more than baby Asprin\"     Please call back today with advice.    Catalina Burger RN  [facility]   "

## 2022-07-07 ENCOUNTER — OFFICE VISIT (OUTPATIENT)
Dept: FAMILY MEDICINE | Facility: CLINIC | Age: 62
End: 2022-07-07
Payer: COMMERCIAL

## 2022-07-07 VITALS
SYSTOLIC BLOOD PRESSURE: 99 MMHG | RESPIRATION RATE: 16 BRPM | WEIGHT: 121 LBS | BODY MASS INDEX: 20.16 KG/M2 | HEIGHT: 65 IN | TEMPERATURE: 98.2 F | DIASTOLIC BLOOD PRESSURE: 63 MMHG | OXYGEN SATURATION: 97 % | HEART RATE: 69 BPM

## 2022-07-07 DIAGNOSIS — Z12.4 SCREENING FOR CERVICAL CANCER: ICD-10-CM

## 2022-07-07 DIAGNOSIS — R10.2 PELVIC PAIN IN FEMALE: Primary | ICD-10-CM

## 2022-07-07 DIAGNOSIS — R31.29 MICROSCOPIC HEMATURIA: ICD-10-CM

## 2022-07-07 LAB
ALBUMIN UR-MCNC: NEGATIVE MG/DL
ALBUMIN UR-MCNC: NEGATIVE MG/DL
APPEARANCE UR: CLEAR
APPEARANCE UR: CLEAR
BILIRUB UR QL STRIP: NEGATIVE
BILIRUB UR QL STRIP: NEGATIVE
COLOR UR AUTO: YELLOW
COLOR UR AUTO: YELLOW
GLUCOSE UR STRIP-MCNC: NEGATIVE MG/DL
GLUCOSE UR STRIP-MCNC: NEGATIVE MG/DL
HGB UR QL STRIP: NEGATIVE
HGB UR QL STRIP: NEGATIVE
KETONES UR STRIP-MCNC: NEGATIVE MG/DL
KETONES UR STRIP-MCNC: NEGATIVE MG/DL
LEUKOCYTE ESTERASE UR QL STRIP: NEGATIVE
LEUKOCYTE ESTERASE UR QL STRIP: NEGATIVE
NITRATE UR QL: NEGATIVE
NITRATE UR QL: NEGATIVE
PH UR STRIP: 5.5 [PH] (ref 5–7)
PH UR STRIP: 6.5 [PH] (ref 5–7)
SP GR UR STRIP: 1.01 (ref 1–1.03)
SP GR UR STRIP: 1.01 (ref 1–1.03)
UROBILINOGEN UR STRIP-ACNC: 0.2 E.U./DL
UROBILINOGEN UR STRIP-ACNC: 0.2 E.U./DL

## 2022-07-07 PROCEDURE — 99214 OFFICE O/P EST MOD 30 MIN: CPT | Performed by: FAMILY MEDICINE

## 2022-07-07 PROCEDURE — 87086 URINE CULTURE/COLONY COUNT: CPT | Performed by: FAMILY MEDICINE

## 2022-07-07 PROCEDURE — G0145 SCR C/V CYTO,THINLAYER,RESCR: HCPCS | Performed by: FAMILY MEDICINE

## 2022-07-07 PROCEDURE — 81003 URINALYSIS AUTO W/O SCOPE: CPT | Performed by: FAMILY MEDICINE

## 2022-07-07 PROCEDURE — 87624 HPV HI-RISK TYP POOLED RSLT: CPT | Performed by: FAMILY MEDICINE

## 2022-07-07 ASSESSMENT — PAIN SCALES - GENERAL: PAINLEVEL: NO PAIN (1)

## 2022-07-07 NOTE — PROGRESS NOTES
"  Assessment & Plan     Pelvic pain in female  Non-diagnostic recent ct reviewed. Reassuring exam. By history constipation variant most likely.     However given concern for pelvic malignancy/abnormality will do pelvic ultrasound.     Check ua/culture for uti since could also cause pelvic pain.   - US Pelvic Complete with Transvaginal; Future  - UA reflex to Microscopic; Future  - UA reflex to Microscopic    Microscopic hematuria    - UA reflex to Microscopic - lab collect; Future  - Urine Culture Aerobic Bacterial - lab collect; Future  - UA reflex to Microscopic - lab collect  - Urine Culture Aerobic Bacterial - lab collect  - UA reflex to Microscopic; Future  - UA reflex to Microscopic    Screening for cervical cancer    - Pap imaged thin layer screen with HPV - recommended age 30 - 65; Future  - Pap imaged thin layer screen with HPV - recommended age 30 - 65  - HPV Hold (Lab Only)  - HPV High Risk Types DNA Cervical                 No follow-ups on file.    Joel Daniel Wegener, MD  [facility]    Kortney Elizabeth is a 62 year old, presenting for the following health issues:  Abdominal Pain      HPI     Reviewed history from previous phone call:  Called requesting a pap/pelvic exam with Ghassan on Thursday when he is back in town. Will take an appt with any other provider if can get in earlier but needs to be seen asap     Continued abdominal pain 2/10 throughout the day, 4/10 when having a bowl movement. CT was neg last week and she was recommended to increase fluids and rest based on provider advice (pulled muscle or kidney stone). Feels it in uterine area and \"it is not normal feeling\"     Hot packing abdomin, not taking medication \"has platelet disorder so bruises easily if takes NSAIDs or more than baby Asprin\"      Please call back today with advice.     Catalina Burger RN  [facility]     No significant change to history.  Pain more left side/lower than mid. " "          Review of Systems         Objective    Resp 16   Ht 1.657 m (5' 5.25\")   Wt 54.9 kg (121 lb)   LMP 09/06/2006   BMI 19.98 kg/m    Body mass index is 19.98 kg/m .  Physical Exam   No m/g/r  No neck masses  lctab  Abdomen soft, not distended. Mild discomfort lower quadrants left>R  Pelvic exam: normal female .   No palpable or visible vaginal or cervical masses.   No palpable adnexal or uterine masses with bimanual exam.                 .  ..  "

## 2022-07-09 LAB — BACTERIA UR CULT: NO GROWTH

## 2022-07-13 LAB
BKR LAB AP GYN ADEQUACY: NORMAL
BKR LAB AP GYN INTERPRETATION: NORMAL
BKR LAB AP HPV REFLEX: NORMAL
BKR LAB AP PREVIOUS ABNORMAL: NORMAL
PATH REPORT.COMMENTS IMP SPEC: NORMAL
PATH REPORT.COMMENTS IMP SPEC: NORMAL
PATH REPORT.RELEVANT HX SPEC: NORMAL

## 2022-07-14 ENCOUNTER — ANCILLARY PROCEDURE (OUTPATIENT)
Dept: ULTRASOUND IMAGING | Facility: CLINIC | Age: 62
End: 2022-07-14
Attending: FAMILY MEDICINE
Payer: COMMERCIAL

## 2022-07-14 DIAGNOSIS — R10.2 PELVIC PAIN IN FEMALE: ICD-10-CM

## 2022-07-14 LAB
HUMAN PAPILLOMA VIRUS 16 DNA: NEGATIVE
HUMAN PAPILLOMA VIRUS 18 DNA: NEGATIVE
HUMAN PAPILLOMA VIRUS FINAL DIAGNOSIS: NORMAL
HUMAN PAPILLOMA VIRUS OTHER HR: NEGATIVE

## 2022-07-14 PROCEDURE — 76830 TRANSVAGINAL US NON-OB: CPT | Performed by: RADIOLOGY

## 2022-07-14 PROCEDURE — 76856 US EXAM PELVIC COMPLETE: CPT | Performed by: RADIOLOGY

## 2022-07-15 ENCOUNTER — MYC MEDICAL ADVICE (OUTPATIENT)
Dept: FAMILY MEDICINE | Facility: CLINIC | Age: 62
End: 2022-07-15

## 2022-07-28 DIAGNOSIS — I21.4 NSTEMI (NON-ST ELEVATED MYOCARDIAL INFARCTION) (H): ICD-10-CM

## 2022-07-28 RX ORDER — ATORVASTATIN CALCIUM 10 MG/1
10 TABLET, FILM COATED ORAL EVERY EVENING
Qty: 90 TABLET | Refills: 2 | Status: SHIPPED | OUTPATIENT
Start: 2022-07-28 | End: 2023-04-20

## 2022-08-24 ENCOUNTER — APPOINTMENT (OUTPATIENT)
Dept: URBAN - METROPOLITAN AREA CLINIC 256 | Age: 62
Setting detail: DERMATOLOGY
End: 2022-08-24

## 2022-08-24 DIAGNOSIS — Z41.9 ENCOUNTER FOR PROCEDURE FOR PURPOSES OTHER THAN REMEDYING HEALTH STATE, UNSPECIFIED: ICD-10-CM

## 2022-08-24 PROCEDURE — OTHER BOTOX: OTHER

## 2022-08-24 ASSESSMENT — LOCATION SIMPLE DESCRIPTION DERM
LOCATION SIMPLE: GLABELLA
LOCATION SIMPLE: RIGHT FOREHEAD
LOCATION SIMPLE: LEFT FOREHEAD

## 2022-08-24 ASSESSMENT — LOCATION DETAILED DESCRIPTION DERM
LOCATION DETAILED: RIGHT INFERIOR MEDIAL FOREHEAD
LOCATION DETAILED: LEFT INFERIOR MEDIAL FOREHEAD
LOCATION DETAILED: RIGHT INFERIOR FOREHEAD
LOCATION DETAILED: RIGHT INFERIOR LATERAL FOREHEAD
LOCATION DETAILED: LEFT INFERIOR FOREHEAD
LOCATION DETAILED: LEFT INFERIOR LATERAL FOREHEAD
LOCATION DETAILED: GLABELLA

## 2022-08-24 ASSESSMENT — LOCATION ZONE DERM: LOCATION ZONE: FACE

## 2022-08-24 NOTE — PROCEDURE: BOTOX
Price (Use Numbers Only, No Special Characters Or $): 179 Price (Use Numbers Only, No Special Characters Or $): 485

## 2022-09-27 ENCOUNTER — MYC MEDICAL ADVICE (OUTPATIENT)
Dept: FAMILY MEDICINE | Facility: CLINIC | Age: 62
End: 2022-09-27

## 2022-09-29 ENCOUNTER — MYC MEDICAL ADVICE (OUTPATIENT)
Dept: FAMILY MEDICINE | Facility: CLINIC | Age: 62
End: 2022-09-29

## 2022-12-28 ENCOUNTER — OFFICE VISIT (OUTPATIENT)
Dept: CARDIOLOGY | Facility: CLINIC | Age: 62
End: 2022-12-28
Payer: COMMERCIAL

## 2022-12-28 DIAGNOSIS — Z00.6 EXAMINATION OF PARTICIPANT OR CONTROL IN CLINICAL RESEARCH: Primary | ICD-10-CM

## 2022-12-28 LAB — APO A-I SERPL-MCNC: 16 MG/DL

## 2022-12-28 PROCEDURE — 99207 PR NO CHARGE-RESEARCH SERVICE: CPT

## 2022-12-28 PROCEDURE — 36415 COLL VENOUS BLD VENIPUNCTURE: CPT

## 2022-12-28 PROCEDURE — 83695 ASSAY OF LIPOPROTEIN(A): CPT

## 2022-12-28 NOTE — LETTER
12/28/2022    Joel Daniel Wegener, MD  [address] [address]    RE: Clara Boykin       Dear Colleague,     I had the pleasure of seeing Clara Ann Lucretia in the [facility].    [facility]: A multicenter, Cross sectional study to characterize the Distribution of Lipoprotein(a) Levels Among Patients With Documented History of Atherosclerotic Cardiovascular Disease     [facility] Inclusion/Exclusion Criteria    Inclusion Criteria  All must be Yes    101 Subject has provided informed consent prior to initiation of any study specific activities/procedures. Yes   102 Age 18 to 85 years. Yes   103 History of ASCVD as demonstrated by either:  a) MI (presumed type 1)    And/or    b) PCI (with high-risk features) with at least 1 of the following:    Age > 65 years    Diabetes mellitus    History of ischemic stroke    History of peripheral arterial disease    Residual stenosis > 50%    Multivessel PCI (ie, > 2 vessels, including branch arteries) Yes          Exclusion Criteria All must   be No   201 Subjects known to be currently receiving investigational drug in a clinical study that is anticipated to last > 1 year. No   202 Known Lp(a) value < 90 mg/dL (if measured in mass) or < 200 nmol/L (if measured in molar). No   203 Subject has a diagnosis of end-stage renal disease or requires dialysis. No   204 Poorly controlled (glycated hemoglobin [HbA1c] > 10%) diabetes mellitus (type 1 or type 2). No   205 Subject is receiving or has received lipoprotein apheresis to reduce Lp(a) within 3 months prior to enrollment. No   206 Known uncontrolled or recurrent ventricular tachycardia in the past 3 months prior to enrollment. No   207 Known malignancy (except non-melanoma skin cancers, cervical in situ carcinoma, breast ductal carcinoma in situ, or stage 1 prostate carcinoma) within the last 5 years prior to enrollment. No   208 Known history or evidence of clinically significant disease (eg,  respiratory, gastrointestinal, or psychiatric disease) or unstable disorder or biomarker that, in the opinion of the investigator(s), would result in life expectancy < 5 years. No   209 Known hemorrhagic stroke. No       Participant has met all inclusion criteria and no exclusion criteria and is ready to be enrolled in the [facility] disease prevalence study.     Dr. Flores: Agree this patient can move to Enrollment   [x] Yes   [] No    Mickie Guadarrama RN          [facility]- A multicenter, Cross sectional study to characterize the Distribution of Lipoprotein(a) Levels Among Patients With Documented History of Atherosclerotic Cardiovascular Disease       Clara Boykin was seen in clinic today for the screening visit.    Research nurse met with subject to discuss consent and participation in the above noted study.    The study discussion included the following:     Study purpose    Qualifications for participation    Length of study participation    Study procedures    Risks and side effects    Benefits (if any)    Voluntary nature of participation    Alternatives to participation    Confidentiality of records    Financial considerations     Subject asked questions and agreed that she received answers that satisfied her.    Consent form [Version 2 - [employer] ([employer] version 19 Jul 2022] signed on 28 Dec 2022 at 08.00. Patient verbalized understanding. A signed copy was offered to the subject & forwarded to medical records. No study procedures were done prior to obtaining informed consent.      MELODIE obtained.    Did Subject meet all inclusion criteria? Yes  Did Subject meet any Exclusion criteria? No    Lab draw performed without issue at 08.13am. Research staff will follow up with subject with results.      Mickie Guadarrama RN   Clinical Trials Office   [phone]      Current Outpatient Medications:      aspirin (ASA) 81 MG chewable tablet, Take 1 tablet (81 mg) by mouth daily, Disp: 90 tablet, Rfl: 1     atorvastatin (LIPITOR) 10  MG tablet, Take 1 tablet (10 mg) by mouth every evening, Disp: 90 tablet, Rfl: 2     CALCIUM PO, Take 1,200 mg by mouth daily, Disp: , Rfl:      Co-Enzyme Q-10 60 MG CAPS, , Disp: , Rfl:      diphenhydrAMINE (BENADRYL) 25 MG tablet, Take 12.5-25 mg by mouth nightly as needed Reported on 3/31/2017, Disp: , Rfl:      melatonin 3 MG tablet, Take 3 mg by mouth nightly as needed for sleep, Disp: , Rfl:      multivitamin, therapeutic (THERA-VIT) TABS tablet, Take 1 tablet by mouth daily, Disp: , Rfl:      Omega-3 Fatty Acids (OMEGA 3 PO), Take 1 g by mouth daily Reported on 3/31/2017, Disp: , Rfl:      traZODone (DESYREL) 50 MG tablet, TAKE 1/2 TABLET BY MOUTH EVERY NIGHT AS NEEDED, Disp: 45 tablet, Rfl: 3     VITAMIN D, CHOLECALCIFEROL, PO, Take 1,000 Units by mouth daily, Disp: , Rfl:   Past Medical History:   Diagnosis Date     Apical variant hypertrophic cardiomyopathy (H) 3/10/2020     Arthritis ?    thumbs, ? mild other sites     Coronary artery disease involving native coronary artery of native heart without angina pectoris 2/4/2020    NSTEMI Jan 2020     Diplopia      H/O CT scan      H/O magnetic resonance imaging      History of blood transfusion     2x at birth; mom was RH neg     Hypertension < =2018    Morning-surge HTN: mild     Non-obstructive CAD without angina (coronary angiogram 1/2020) 2/4/2020    NSTEMI Jan 2020     Paralytic strabismus      Pneumonia      PONV (postoperative nausea and vomiting)      No data found.  Vitals: LMP 09/06/2006   BMI= There is no height or weight on file to calculate BMI.  Other, Choose not to answer  female  62 year old        Thank you for allowing me to participate in the care of your patient.      Sincerely,     Mickie Guadarrama RN   [facility]  cc: No referring provider defined for this encounter.

## 2022-12-28 NOTE — PROGRESS NOTES
[facility]- A multicenter, Cross sectional study to characterize the Distribution of Lipoprotein(a) Levels Among Patients With Documented History of Atherosclerotic Cardiovascular Disease       Clara Boykin was seen in clinic today for the screening visit.    Research nurse met with subject to discuss consent and participation in the above noted study.    The study discussion included the following:     Study purpose    Qualifications for participation    Length of study participation    Study procedures    Risks and side effects    Benefits (if any)    Voluntary nature of participation    Alternatives to participation    Confidentiality of records    Financial considerations     Subject asked questions and agreed that she received answers that satisfied her.    Consent form [Version 2 - [employer] version 19 Jul 2022] signed on 28 Dec 2022 at 08.00. Patient verbalized understanding. A signed copy was offered to the subject & forwarded to medical records. No study procedures were done prior to obtaining informed consent.      MELODIE obtained.    Did Subject meet all inclusion criteria? Yes  Did Subject meet any Exclusion criteria? No    Lab draw performed without issue at 08.13am. Research staff will follow up with subject with results.      Mickie Guadarrama RN   Clinical Trials Office   [phone]      Current Outpatient Medications:      aspirin (ASA) 81 MG chewable tablet, Take 1 tablet (81 mg) by mouth daily, Disp: 90 tablet, Rfl: 1     atorvastatin (LIPITOR) 10 MG tablet, Take 1 tablet (10 mg) by mouth every evening, Disp: 90 tablet, Rfl: 2     CALCIUM PO, Take 1,200 mg by mouth daily, Disp: , Rfl:      Co-Enzyme Q-10 60 MG CAPS, , Disp: , Rfl:      diphenhydrAMINE (BENADRYL) 25 MG tablet, Take 12.5-25 mg by mouth nightly as needed Reported on 3/31/2017, Disp: , Rfl:      melatonin 3 MG tablet, Take 3 mg by mouth nightly as needed for sleep, Disp: , Rfl:      multivitamin, therapeutic (THERA-VIT) TABS tablet, Take  1 tablet by mouth daily, Disp: , Rfl:      Omega-3 Fatty Acids (OMEGA 3 PO), Take 1 g by mouth daily Reported on 3/31/2017, Disp: , Rfl:      traZODone (DESYREL) 50 MG tablet, TAKE 1/2 TABLET BY MOUTH EVERY NIGHT AS NEEDED, Disp: 45 tablet, Rfl: 3     VITAMIN D, CHOLECALCIFEROL, PO, Take 1,000 Units by mouth daily, Disp: , Rfl:   Past Medical History:   Diagnosis Date     Apical variant hypertrophic cardiomyopathy (H) 3/10/2020     Arthritis ?    thumbs, ? mild other sites     Coronary artery disease involving native coronary artery of native heart without angina pectoris 2/4/2020    NSTEMI Jan 2020     Diplopia      H/O CT scan      H/O magnetic resonance imaging      History of blood transfusion     2x at birth; mom was RH neg     Hypertension < =2018    Morning-surge HTN: mild     Non-obstructive CAD without angina (coronary angiogram 1/2020) 2/4/2020    NSTEMI Jan 2020     Paralytic strabismus      Pneumonia      PONV (postoperative nausea and vomiting)      No data found.  Vitals: LMP 09/06/2006   BMI= There is no height or weight on file to calculate BMI.  Other, Choose not to answer  female  62 year old

## 2022-12-28 NOTE — PROGRESS NOTES
[facility]: A multicenter, Cross sectional study to characterize the Distribution of Lipoprotein(a) Levels Among Patients With Documented History of Atherosclerotic Cardiovascular Disease     [facility]/Exclusion [facility]    Inclusion Criteria  All must be Yes    101 Subject has provided informed consent prior to initiation of any study specific activities/procedures. Yes   102 Age 18 to 85 years. Yes   103 History of ASCVD as demonstrated by either:  MI (presumed type 1)    And/or    b) PCI (with high-risk features) with at least 1 of the following:  Age > 65 years  Diabetes mellitus  History of ischemic stroke  History of peripheral arterial disease  Residual stenosis > 50%  Multivessel PCI (ie, > 2 vessels, including branch arteries) Yes          Exclusion Criteria All must   be No   201 Subjects known to be currently receiving investigational drug in a clinical study that is anticipated to last > 1 year. No   202 Known Lp(a) value < 90 mg/dL (if measured in mass) or < 200 nmol/L (if measured in molar). No   203 Subject has a diagnosis of end-stage renal disease or requires dialysis. No   204 Poorly controlled (glycated hemoglobin [HbA1c] > 10%) diabetes mellitus (type 1 or type 2). No   205 Subject is receiving or has received lipoprotein apheresis to reduce Lp(a) within 3 months prior to enrollment. No   206 Known uncontrolled or recurrent ventricular tachycardia in the past 3 months prior to enrollment. No   207 Known malignancy (except non-melanoma skin cancers, cervical in situ carcinoma, breast ductal carcinoma in situ, or stage 1 prostate carcinoma) within the last 5 years prior to enrollment. No   208 Known history or evidence of clinically significant disease (eg, respiratory, gastrointestinal, or psychiatric disease) or unstable disorder or biomarker that, in the opinion of the investigator(s), would result in life expectancy < 5 years. No   209 Known hemorrhagic stroke. No       Participant  has met all inclusion criteria and no exclusion criteria and is ready to be enrolled in the [facility] disease prevalence study.     Dr. Flores: Agree this patient can move to Enrollment   [x] Yes   [] No    Mickie Guadarrama RN

## 2022-12-29 ENCOUNTER — TELEPHONE (OUTPATIENT)
Dept: CARDIOLOGY | Facility: CLINIC | Age: 62
End: 2022-12-29

## 2022-12-29 ENCOUNTER — DOCUMENTATION ONLY (OUTPATIENT)
Dept: CARDIOLOGY | Facility: CLINIC | Age: 62
End: 2022-12-29
Payer: COMMERCIAL

## 2022-12-29 PROCEDURE — 99207 PR NO CHARGE-RESEARCH SERVICE: CPT

## 2022-12-29 NOTE — PROGRESS NOTES
[facility]: A multicenter, Cross sectional study to characterize the Distribution of Lipoprotein(a) Levels Among Patients With Documented History of Atherosclerotic Cardiovascular Disease     Day 7 follow up/End of study call    Clara Boykin was called today to review results of Lp(a) drawn on 28 Jan 2022.    She was informed of the significance of the results as related to upcoming study and their potential participation.    She affirms understanding.    Clara Boykin has no further questions at this time.    Mickie Guadarrama RN    Recent Results (from the past 240 hour(s))   Lipoprotein (a)    Collection Time: 12/28/22  8:13 AM   Result Value Ref Range    Lipoprotein (a) 16 <30 mg/dL

## 2023-01-12 ENCOUNTER — APPOINTMENT (OUTPATIENT)
Dept: URBAN - METROPOLITAN AREA CLINIC 256 | Age: 63
Setting detail: DERMATOLOGY
End: 2023-01-12

## 2023-01-12 VITALS — WEIGHT: 118 LBS | HEIGHT: 65 IN

## 2023-01-12 DIAGNOSIS — I78.8 OTHER DISEASES OF CAPILLARIES: ICD-10-CM

## 2023-01-12 DIAGNOSIS — D18.0 HEMANGIOMA: ICD-10-CM

## 2023-01-12 DIAGNOSIS — Z71.89 OTHER SPECIFIED COUNSELING: ICD-10-CM

## 2023-01-12 DIAGNOSIS — L57.8 OTHER SKIN CHANGES DUE TO CHRONIC EXPOSURE TO NONIONIZING RADIATION: ICD-10-CM

## 2023-01-12 DIAGNOSIS — D485 NEOPLASM OF UNCERTAIN BEHAVIOR OF SKIN: ICD-10-CM

## 2023-01-12 DIAGNOSIS — L82.1 OTHER SEBORRHEIC KERATOSIS: ICD-10-CM

## 2023-01-12 DIAGNOSIS — D22 MELANOCYTIC NEVI: ICD-10-CM

## 2023-01-12 PROBLEM — D22.5 MELANOCYTIC NEVI OF TRUNK: Status: ACTIVE | Noted: 2023-01-12

## 2023-01-12 PROBLEM — D48.5 NEOPLASM OF UNCERTAIN BEHAVIOR OF SKIN: Status: ACTIVE | Noted: 2023-01-12

## 2023-01-12 PROBLEM — D18.01 HEMANGIOMA OF SKIN AND SUBCUTANEOUS TISSUE: Status: ACTIVE | Noted: 2023-01-12

## 2023-01-12 PROCEDURE — OTHER COUNSELING: OTHER

## 2023-01-12 PROCEDURE — 99213 OFFICE O/P EST LOW 20 MIN: CPT

## 2023-01-12 PROCEDURE — OTHER MIPS QUALITY: OTHER

## 2023-01-12 ASSESSMENT — LOCATION DETAILED DESCRIPTION DERM
LOCATION DETAILED: RIGHT NASAL DORSUM
LOCATION DETAILED: PERIUMBILICAL SKIN
LOCATION DETAILED: RIGHT INFERIOR UPPER BACK
LOCATION DETAILED: RIGHT MID-UPPER BACK
LOCATION DETAILED: LEFT INFERIOR CENTRAL MALAR CHEEK
LOCATION DETAILED: RIGHT ANTERIOR DISTAL THIGH
LOCATION DETAILED: LEFT ANTERIOR DISTAL THIGH

## 2023-01-12 ASSESSMENT — LOCATION SIMPLE DESCRIPTION DERM
LOCATION SIMPLE: ABDOMEN
LOCATION SIMPLE: RIGHT UPPER BACK
LOCATION SIMPLE: NOSE
LOCATION SIMPLE: LEFT THIGH
LOCATION SIMPLE: RIGHT THIGH
LOCATION SIMPLE: LEFT CHEEK

## 2023-01-12 ASSESSMENT — LOCATION ZONE DERM
LOCATION ZONE: LEG
LOCATION ZONE: NOSE
LOCATION ZONE: FACE
LOCATION ZONE: TRUNK

## 2023-01-12 NOTE — PROCEDURE: COUNSELING
Detail Level: Detailed
Detail Level: Generalized
Patient Specific Counseling (Will Not Stick From Patient To Patient): Discussed wearing a long sleeve shirt and broad brimmed hat for physical protection when outside in the sun for long periods of time as this will provide more protection than just sunscreen alone.
Patient Specific Counseling (Will Not Stick From Patient To Patient): Discussed with patient it is unclear if the lesion is a cyst but it does not appear to be cancerous. Patient could consider surgery in the future but advised her that treatment is not indicated today. Recommended she monitor the lesion, will reevaluate it at next visit and revisit treatment at that time.
Detail Level: Zone

## 2023-01-23 ENCOUNTER — APPOINTMENT (OUTPATIENT)
Dept: URBAN - METROPOLITAN AREA CLINIC 281 | Age: 63
Setting detail: DERMATOLOGY
End: 2023-01-23

## 2023-03-20 NOTE — TELEPHONE ENCOUNTER
I called the patient back and she was given the information below. I attempted to transfer the patient but she hung up during the transfer.   Nuria Cruz, [facility]     28.1

## 2023-04-01 ENCOUNTER — HEALTH MAINTENANCE LETTER (OUTPATIENT)
Age: 63
End: 2023-04-01

## 2023-04-05 ENCOUNTER — ALLIED HEALTH/NURSE VISIT (OUTPATIENT)
Dept: CARDIOLOGY | Facility: CLINIC | Age: 63
End: 2023-04-05
Payer: COMMERCIAL

## 2023-04-05 DIAGNOSIS — I42.2 APICAL VARIANT HYPERTROPHIC CARDIOMYOPATHY (H): ICD-10-CM

## 2023-04-05 PROCEDURE — 93244 EXT ECG>48HR<7D REV&INTERPJ: CPT | Performed by: INTERNAL MEDICINE

## 2023-04-07 ENCOUNTER — LAB (OUTPATIENT)
Dept: LAB | Facility: CLINIC | Age: 63
End: 2023-04-07
Payer: COMMERCIAL

## 2023-04-07 DIAGNOSIS — I42.2 APICAL VARIANT HYPERTROPHIC CARDIOMYOPATHY (H): ICD-10-CM

## 2023-04-07 LAB
CHOLEST SERPL-MCNC: 146 MG/DL
HDLC SERPL-MCNC: 70 MG/DL
LDLC SERPL CALC-MCNC: 64 MG/DL
NONHDLC SERPL-MCNC: 76 MG/DL
TRIGL SERPL-MCNC: 59 MG/DL

## 2023-04-07 PROCEDURE — 80061 LIPID PANEL: CPT

## 2023-04-07 PROCEDURE — 36415 COLL VENOUS BLD VENIPUNCTURE: CPT

## 2023-04-17 ENCOUNTER — HOSPITAL ENCOUNTER (OUTPATIENT)
Dept: CARDIOLOGY | Facility: CLINIC | Age: 63
Discharge: HOME | End: 2023-04-17
Attending: INTERNAL MEDICINE | Admitting: INTERNAL MEDICINE
Payer: COMMERCIAL

## 2023-04-17 VITALS — WEIGHT: 118.39 LBS | BODY MASS INDEX: 19.55 KG/M2

## 2023-04-17 DIAGNOSIS — I42.2 APICAL VARIANT HYPERTROPHIC CARDIOMYOPATHY (H): ICD-10-CM

## 2023-04-17 PROCEDURE — 94621 CARDIOPULM EXERCISE TESTING: CPT | Mod: 26 | Performed by: INTERNAL MEDICINE

## 2023-04-17 PROCEDURE — 94621 CARDIOPULM EXERCISE TESTING: CPT

## 2023-04-18 DIAGNOSIS — I21.4 NSTEMI (NON-ST ELEVATED MYOCARDIAL INFARCTION) (H): ICD-10-CM

## 2023-04-18 LAB
CARDIOPULMONARY ANAEROBIC THRESHOLD PREDICTED PEAK: 142 %
CARDIOPULMONARY ANAEROBIC THRESHOLD VO2: 35.5 ML/KG/MIN
CARDIOPULMONARY BLOOD PRESSURE REST: NORMAL MMHG
CARDIOPULMONARY BREATHING RESERVE REST: 86.7
CARDIOPULMONARY BREATHING RESERVE V02MAX: 9
CARDIOPULMONARY CO2 OUTPUT REST: 282 ML/MIN
CARDIOPULMONARY CO2 OUTPUT VO2MAX: 2739 ML/MIN
CARDIOPULMONARY FEV 1.0 (L) ACTUAL: 2.38
CARDIOPULMONARY FEV 1.0 (L) PRECENT: 94 %
CARDIOPULMONARY FEV 1.0 (L) PREDICTED: 2.54
CARDIOPULMONARY FEV 1.0 FVC (%) ACTUAL: 73.9
CARDIOPULMONARY FEV 1.0 FVC (%) PERCENT: 96 %
CARDIOPULMONARY FEV 1.0 FVC (%) PREDICTED: 77.2
CARDIOPULMONARY FUNCTIONAL CAPACITY MAX ML/KG/MIN: 40.1 ML/KG/MIN
CARDIOPULMONARY FUNCTIONAL CAPACITY PERCENT: 160 %
CARDIOPULMONARY FUNCTIONAL CAPACITY PREDICTED: 25 ML/KG/MIN
CARDIOPULMONARY FVC (L) ACTUAL: 3.23
CARDIOPULMONARY FVC (L) PERCENT: 98 %
CARDIOPULMONARY FVC (L) PREDICTED: 3.31
CARDIOPULMONARY HEART RATE REST: 80 BPM
CARDIOPULMONARY MET'S REST: 1.8
CARDIOPULMONARY MINUTE VENTILATION REST: 11.1 L/MIN
CARDIOPULMONARY MINUTE VENTILATION VO2MAX: 86.2 L/MIN
CARDIOPULMONARY MYOCARDIAC O2 DEMAND MAX: NORMAL
CARDIOPULMONARY OXYGEN CONSUMPTION REST: 6.4 ML/KG/MIN
CARDIOPULMONARY OXYGEN CONSUMPTION VO2MAX: 40.1 ML/KG/MIN
CARDIOPULMONARY OXYGEN PULSE REST: 4 ML/BEAT
CARDIOPULMONARY OXYGEN PULSE VO2MAX: 13.3 ML/BEAT
CARDIOPULMONARY OXYGEN SATURATION- OXIMETRY REST: 100 %
CARDIOPULMONARY OXYGEN SATURATION- OXIMETRY VO2MAX: 99 %
CARDIOPULMONARY PET C02 REST: 33
CARDIOPULMONARY PET C02 VO2MAX: 36
CARDIOPULMONARY PET02 REST: 104
CARDIOPULMONARY PET02 V02 MAX: 111
CARDIOPULMONARY RER: 1.21
CARDIOPULMONARY RESPIRALORY EXCHANGE RATIO VO2MAX: 1.21
CARDIOPULMONARY RESPIRALORY EXCHANGE RATIO: 0.81
CARDIOPULMONARY RESPIRATORY RATE REST: 22 BR/MIN
CARDIOPULMONARY RESPIRATORY RATE VO2MAX: 55 BR/MIN
CARDIOPULMONARY STRESS BASE 1 BP MMHG: NORMAL MMHG
CARDIOPULMONARY STRESS BASE 1 BPA: 129 BPM
CARDIOPULMONARY STRESS BASE 1 SPO2: 99 % SPO2
CARDIOPULMONARY STRESS BASE 1 TIME SEC: 0 SEC
CARDIOPULMONARY STRESS BASE 1 TIME: 1 MINS
CARDIOPULMONARY STRESS BASE 2 BP MMHG: NORMAL MMHG
CARDIOPULMONARY STRESS BASE 2 BPA: 104 BPM
CARDIOPULMONARY STRESS BASE 2 SPO2: 100 % SPO2
CARDIOPULMONARY STRESS BASE 2 TIME SEC: 0 SEC
CARDIOPULMONARY STRESS BASE 2 TIME: 3 MINS
CARDIOPULMONARY STRESS BASE 3 BP MMHG: NORMAL MMHG
CARDIOPULMONARY STRESS BASE 3 BPA: 94 BPM
CARDIOPULMONARY STRESS BASE 3 TIME SEC: 0 SEC
CARDIOPULMONARY STRESS BASE 3 TIME: 5 MINS
CARDIOPULMONARY STRESS PHASE 1 BP MMHG: NORMAL MMHG
CARDIOPULMONARY STRESS PHASE 1 BPM: 94 BPM
CARDIOPULMONARY STRESS PHASE 1 SPO2: 99 % SPO2
CARDIOPULMONARY STRESS PHASE 1 TIME SEC: 0 SEC
CARDIOPULMONARY STRESS PHASE 1 TIME: 3 MINS
CARDIOPULMONARY STRESS PHASE 2 BP MMHG: NORMAL MMHG
CARDIOPULMONARY STRESS PHASE 2 BPM: 105 BPM
CARDIOPULMONARY STRESS PHASE 2 SPO2: 99 % SPO2
CARDIOPULMONARY STRESS PHASE 2 TIME SEC: 0 SEC
CARDIOPULMONARY STRESS PHASE 2 TIME: 6 MINS
CARDIOPULMONARY STRESS PHASE 3 BP MMHG: NORMAL MMHG
CARDIOPULMONARY STRESS PHASE 3 BPM: 122 BPM
CARDIOPULMONARY STRESS PHASE 3 SPO2: 100 % SPO2
CARDIOPULMONARY STRESS PHASE 3 TIME SEC: 0 SEC
CARDIOPULMONARY STRESS PHASE 3 TIME: 9 MINS
CARDIOPULMONARY STRESS PHASE 4 BP MMHG: NORMAL MMHG
CARDIOPULMONARY STRESS PHASE 4 BPM: 142 BPM
CARDIOPULMONARY STRESS PHASE 4 SPO2: 100 % SPO2
CARDIOPULMONARY STRESS PHASE 4 TIME SEC: 0 SEC
CARDIOPULMONARY STRESS PHASE 4 TIME: 12 MINS
CARDIOPULMONARY STRESS PHASE 5 BP MMHG: NORMAL MMHG
CARDIOPULMONARY STRESS PHASE 5 BPM: 160 BPM
CARDIOPULMONARY STRESS PHASE 5 SPO2: 99 % SPO2
CARDIOPULMONARY STRESS PHASE 5 TIME SEC: 0 SEC
CARDIOPULMONARY STRESS PHASE 5 TIME: 15 MINS
CARDIOPULMONARY STRESS PHASE 6 BPA: 162 BPM
CARDIOPULMONARY STRESS PHASE 6 TIME SEC: 33 SEC
CARDIOPULMONARY STRESS PHASE 6 TIME: 15 MINS
CARDIOPULMONARY SVC (L) ACTUAL: 3.25
CARDIOPULMONARY SVC (L) PERCENT: 98 %
CARDIOPULMONARY SVC (L) PREDICTED: 3.31
CARDIOPULMONARY TIDAL VOLUME REST: 505 ML
CARDIOPULMONARY TIDAL VOLUME VO2MAX: 1569 ML
CARDIOPULMONARY VE/VCO2 SLOPE: 33.63
CARDIOPULMONARY VENTILATORY EQUIVALENT 02 REST: 32
CARDIOPULMONARY VENTILATORY EQUIVALENT 02 V02: 36
CARDIOPULMONARY VENTILATORY EQUIVALENT C02 REST: 39
CARDIOPULMONARY VENTILATORY EQUIVALENT C02 SLOPE VO2MAX: 33.63
CARDIOPULMONARY VENTILATORY EQUIVALENT C02 VO2MAX: 31
CV STRESS MAX HR HE: 162
PREDICTED VO2MAX: 25
RATED PERCEIVED EXERTION: 19
STRESS ANGINA INDEX: 0
STRESS ECHO BASELINE BP: NORMAL MMHG
STRESS ECHO BASELINE HR: 67 BPM
STRESS ECHO CALCULATED PERCENT HR: 104 %
STRESS ECHO LAST STRESS BP: NORMAL MMHG
STRESS ECHO POST ESTIMATED WORKLOAD: 11.5 METS
STRESS ECHO POST EXERCISE DUR MIN: 15 MIN
STRESS ECHO POST EXERCISE DUR SEC: 33 SEC
STRESS ECHO TARGET HR: 156

## 2023-04-20 RX ORDER — ATORVASTATIN CALCIUM 10 MG/1
10 TABLET, FILM COATED ORAL EVERY EVENING
Qty: 90 TABLET | Refills: 0 | Status: SHIPPED | OUTPATIENT
Start: 2023-04-20 | End: 2023-07-17

## 2023-05-05 NOTE — PROGRESS NOTES
Chief complaint: Follow up of apical-variant HCM    HPI:   Clara Boykin is a 63 year old female with a past medical history notable for nonobstructive coronary artery disease, carotid artery disease, hypertension, and newly diagnosed hypertrophic cardiomyopathy who presents for evaluation of hypertrophic cardiomyopathy.  The patient states that she initially developed symptoms in January of this past year.  She was shoveling snow and developed severe dyspnea.  This feeling of dyspnea persisted for a few days.  Eventually, she ordered a troponin for herself and this later returned to be elevated.  After she had the elevated troponin, she presented to [facility].  Given the elevated troponin, shortness of breath, and abnormal EKG (which is new from prior), she underwent a coronary angiogram which showed nonobstructive coronary artery disease.  She was given aspirin, atorvastatin, and metoprolol on discharge.  She later underwent a cardiac MRI which showed signs consistent with apical hypertrophic cardiomyopathy.  She presents here today for further evaluation.    With the exception of the issue that led to her being admitted to the hospital, she has been generally asymptomatic.  She states that she does feel more short of breath than typical with significant exertion, however has not been exerting herself this past winter.  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  She notably has no palpitations, presyncope, and syncope.  She states that she had one syncopal event that was associated with a seizure in the 1970s, which she was told was due to dehydration.  She has since worn an event monitor which showed no episodes of NSVT.  She has a family history of 2 siblings dying at a very young age, which was presumably due to Rh disease.  In addition, she did have 1 uncle who  while swimming, however he was in his late 70s to early 80s at the time.  Her father has been told that he is apical  hypokinesis, though this was presumed to be due to a myocardial infarction.  She has no children and she has 1 living brother.    10/6/2020:  At the patient's last visit, her metoprolol was decreased to 12.5 mg twice per day (mostly treating hypertension).  We also ordered a cardiopulmonary stress test, which is yet to be completed.    The patient reports that since her last visit she has had occasional episodes of shortness of breath.  This is been worse recently with the higher heat and fires.  She has had intermittent presyncopal episodes, however no syncopal episodes.  She reports that her heart rate is been mainly in the 50s.  Occasionally, her systolic blood pressure will be in the 90s at which point she will hold 1 dose of metoprolol.    She also obtained the autopsy report from a paternal uncle who  while swimming.  He was found to have hypertrophic cardiomyopathy.  Her father has recently had a stroke and echocardiogram was performed at that time.    She denies orthopnea, PND, or lower extremity swelling.    21:  Since her last visit, metoprolol was discontinued and Clara also stopped her lisinopril. She has been running regularly (6-10 miles 3-4 miles/wk) without difficulty. Home BPs have been consistently 100s-110s/60s-70s.     22:  Clara has had a difficult year, with significant work demands and stresses of COVID and also because her father has been ill (seizure and small stroke) but he is doing better now.  She just resumed running again. She is starting to feel better and is under less stress than before.   ECG 22: sinus with left atrial abnormality, VR 69,  QRS 78 QTc 415. No change from 21.   TTE 22: Apical HCM, better seen on prior CMR (and best seen on non-contrast images.) Normal LV/RV size/function. Mild TR. Trace to mild AI.     2023:   She reports feeling well and remains active; she just ran a 4-mile trail race in [place] this weekend. A CPX was  done and showed a peak VO2 of 40.1 mL/kg/min. She reports feeling generally well. She plans to do a triathlon.      PAST MEDICAL HISTORY:  Past Medical History:   Diagnosis Date     Apical variant hypertrophic cardiomyopathy (H) 3/10/2020     Arthritis ?    thumbs, ? mild other sites     Coronary artery disease involving native coronary artery of native heart without angina pectoris 2/4/2020    NSTEMI Jan 2020     Diplopia      H/O CT scan      H/O magnetic resonance imaging      History of blood transfusion     2x at birth; mom was RH neg     Hypertension < =2018    Morning-surge HTN: mild     Non-obstructive CAD without angina (coronary angiogram 1/2020) 2/4/2020    NSTEMI Jan 2020     Paralytic strabismus      Pneumonia      PONV (postoperative nausea and vomiting)        CURRENT MEDICATIONS:  Current Outpatient Medications   Medication Sig Dispense Refill     aspirin (ASA) 81 MG chewable tablet Take 1 tablet (81 mg) by mouth daily 90 tablet 1     atorvastatin (LIPITOR) 10 MG tablet Take 1 tablet (10 mg) by mouth every evening 90 tablet 0     CALCIUM PO Take 1,200 mg by mouth daily       Co-Enzyme Q-10 60 MG CAPS        diphenhydrAMINE (BENADRYL) 25 MG tablet Take 12.5-25 mg by mouth nightly as needed Reported on 3/31/2017       melatonin 3 MG tablet Take 3 mg by mouth nightly as needed for sleep       multivitamin, therapeutic (THERA-VIT) TABS tablet Take 1 tablet by mouth daily       Omega-3 Fatty Acids (OMEGA 3 PO) Take 1 g by mouth daily Reported on 3/31/2017       traZODone (DESYREL) 50 MG tablet TAKE 1/2 TABLET BY MOUTH EVERY NIGHT AS NEEDED 15 tablet 1     VITAMIN D, CHOLECALCIFEROL, PO Take 1,000 Units by mouth daily         PAST SURGICAL HISTORY:  Past Surgical History:   Procedure Laterality Date     ABDOMEN SURGERY  July 2016    Abd hernia repair (which has reoccurred form coughing 2018)     APPENDECTOMY       AS KNEE SCOPE, DIAGNOSTIC       BACK SURGERY  1999    Left C6-7 foraminotomy     BIOPSY   2014    lipoma excision (near R scapula)     BUNIONECTOMY Right 2016    times 2     BUNIONECTOMY Left 10/22/2018    Procedure: LEFT REVISION BUNION ;  Surgeon: Johanna Lund MD;  Location:  [facility]     COLONOSCOPY  2019    1 sm precancer polyp     CV CORONARY ANGIOGRAM N/A 1/24/2020    Procedure: Coronary Angiogram;  Surgeon: Criss Green MD;  Location:  [facility]     CV LEFT HEART CATH N/A 1/24/2020    Procedure: Left Heart Cath;  Surgeon: Criss Green MD;  Location:  [facility]     CV LEFT VENTRICULOGRAM N/A 1/24/2020    Procedure: Left Ventriculogram;  Surgeon: Criss Green MD;  Location:  [facility]     HERNIORRHAPHY VENTRAL N/A 7/7/2016    Procedure: HERNIORRHAPHY VENTRAL;  Surgeon: Ash Thompson MD;  Location:  OR     left clavical       NECK SURGERY       ORTHOPEDIC SURGERY  [id number]    ORIF L clavicle/hardware out; bilat bunion/forefoot (4 ops)     RECESSION RESECTION (REPAIR STRABISMUS) Right 4/10/2017    Procedure: RECESSION RESECTION (REPAIR STRABISMUS);  Surgeon: Lyle Leggett MD;  Location:  OR     SOFT TISSUE SURGERY  2007    L wrist: scapholunate lig reconstruction post L wristfx     WRIST SURGERY         ALLERGIES:     Allergies   Allergen Reactions     Exparel [Bupivacaine] GI Disturbance     Patient had severe abdominal distention     Darvocet [Propoxyphene N-Apap] Other (See Comments)     confusion     Liposomes GI Disturbance     Oxycodone      Penicillins Itching     Percocet [Oxycodone-Acetaminophen] Other (See Comments)     confusion     Tape [Adhesive Tape] Rash     Once after surgical tape     Vistaril [Hydroxyzine] Rash     Intense flushing/redness of face        FAMILY HISTORY:  Family History   Problem Relation Age of Onset     Cancer Mother         skin cancer     Diabetes Mother         borderline/Type 2     Hypertension Mother         3 meds: 4.5cm asc aortic aneurysm      Hyperlipidemia Mother         chol & very high triglycerides     Cerebrovascular Disease Mother         many small, cog. impaired,  18     Osteoporosis Mother         fosamax x5 yrs:poststeroids     Obesity Mother         BMI 35+     Unknown/Adopted Mother         dermatomyositis since      Depression Mother      Mental Illness Mother         probably bordeline personality disorder; vascular dementia     Anesthesia Reaction Mother         ?     Cancer Father         skin cancer     Coronary Artery Disease Father         angioplasty ~     Hypertension Father         1 med, mild. Age 94     Depression Father         2x: age 87 [facility]     Hyperlipidemia Father         2020 after CVA, lipids not elevated     Cerebrovascular Disease Father         2 sm. foci occipito-parietal 20     Cancer Maternal Grandmother         skin cancer     Cerebrovascular Disease Maternal Grandmother         - multiple CVAs     Obesity Maternal Grandmother         overweight     Diabetes Maternal Grandfather         borderline/Type 2     Coronary Artery Disease Maternal Grandfather         2-3 MIs; worked after each     Cerebrovascular Disease Maternal Grandfather          from CVA postop hernia     Obesity Maternal Grandfather         was overwt to obese     Diabetes Cousin         prediabetes     Hypertension Brother         1 med for 20 yrs     Substance Abuse Brother         hx of EtOH     Cerebrovascular Disease Other         cog. impairment like my mom     Mental Illness Sister         ?bipolar; past chem dep     Substance Abuse Sister         hx of: prescript drug& EtOH     Unknown/Adopted Sister         sister is adopted     Breast Cancer Sister         stage 2-3,      Anesthesia Reaction Other         naus/vomit 1-8 hr postop unless tx'd     Cerebrovascular Disease Other         cog. 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    Exam:  /80 (BP Location: Right arm, Patient Position: Sitting, Cuff Size: Adult Small)   Pulse 61   Ht 1.665 m (5' 5.55\")   Wt 54.5 kg (120 lb 1.6 oz)   LMP 09/06/2006   SpO2 100%   BMI 19.65 kg/m    GENERAL APPEARANCE: healthy, alert and no distress  EYES: no icterus, no xanthelasmas  ENT: normal palate, mucosa moist, no central cyanosis  NECK: JVP not elevated  RESPIRATORY: lungs clear to auscultation - no rales, rhonchi or wheezes, no use of accessory muscles, no retractions, respirations are unlabored, normal respiratory rate  CARDIOVASCULAR: regular rhythm, normal S1 with physiologic split S2, no S3 or S4 and no murmur, click or rub.  GI: soft, non tender, bowel sounds normal,no abdominal bruits  EXTREMITIES: no edema, no bruits  NEURO: alert and oriented to person/place/time, normal speech, gait and affect  VASC: Radial, dorsalis pedis and posterior tibialis pulses 2+ bilaterally.  SKIN: no ecchymoses, no rashes.  PSYCH: cooperative, affect appropriate.     Labs:  Reviewed.  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  A variant of unknown significance means that there is a genetic change in which we do not have enough information to determine if it is disease causing or not. Labs review published data, functional studies, presences in " population databases, similarity to normal sequence, and computer prediction models.    This particular variant occurs in a portion of the gene that is highly conserved in available vertebrate species.  This variant occurs in an intron after exon 23.  A splice prediction tool predicts this alteration will abolish the native donor site; however, direct evidence is unavailable. This variant has not been previously reported as a disease causing mutation or as a normal variation.  Therefore, based on the current information it is unclear if this VUS is associated with disease or not.       Testing/Procedures:    CPX 9/29/20:   Exercised 12:17 on Jose G Ramp protocol  /90-> 185/78  MVO2 30.5 mL/kg/min (9 METS, class I, 120% predicted)    ZioPatch 2/6/21-2/22/21: Baseline sinus rhythm, average VR 73 bpm (range  bpm.) No sustained/nonsustained VT. Rare (<1%) PACs and PVCs; rare nonsustained SVT, likely AT (longest 21.2 sec.) No atrial fibrillation. 7 patient-triggered events, which appeared to correlate with SVT and with PACs.       ZioPatch 2/4/22-2/18/22:   Baseline sinus rhythm, average VR 74 bpm (range  bpm.) Rare (<1%) PACs and PVCs. 8 brief runs of nonsustained SVT (likely atrial tachycardia, longest 7 beats), 3 of which were associated with symptoms. 18 patient-triggered episodes were associated with SVT (3), PACs (12), and the remainder with sinus rhyhtm. Several of the episodes with PACs also had isolated PVCs. No nonsustained VT. No atrial fibrillation. No significant/sustained tachyarrhythmias, bradyarrhythmias, or pauses.   AT was also present on prior ZioPatch from 2/6/21-2/22/21 (and associated with symptoms at that time) and also with 1/2020-2/2020 Holter monitor. The longest SVT/AT run was significantly shorter than on the prior ZioPatch (7 beats vs. 21.2 seconds.)   Results released to the patient via [employer] and will be discussed at her upcoming outpatient visit.      Echo  3/22/2022  Global and regional left ventricular function is normal with an EF of 60-65%.  Global right ventricular function is normal.  Mild mitral and tricuspid insufficiency present.  Pulmonary artery systolic pressure is normal.  The inferior vena cava was normal in size with preserved respiratory  variability.  No pericardial effusion is present.    CMR 2/14/20:  Apical HCM with apical segments ~1.3 cm (vs. 0.9 cm basal anterosetpum and 0.7 cm basal inferolateral.) Hyperdynamic LV function and normal RV function, LVEF=75% RVEF=67%. No LVOT or intracavitary obstruction. Very small apical LGE per report-- per my review, there is no convincing fibrosis on late gadolinium enhancement imaging.      Coronary angiogram 1/24/2020: non-obstructive CAD (30-40% mLAD/D1, 40% RCA)    Dr. Mendes personally visualized and interpreted:  TTE 05/09/23: Known apical variant HCM with isolated apical hypertrophy, LVEF=60-65%. No LVOT or intracavitary gradient. No significant change from 3/22/22.     CPX 4/17/23:   Superior exercise capacity (160% predicted), improved significantly (31%) from last CPX 9/2020.  MVO2 40.1 mL/kg/min (160% predicted, class I, 11.5 METS) VE/VCO2 slope 33.63 RER 1.21  Exercised 15:53 on Jose G Ramp protocol, achieved HR of 162 (104% MPHR)  Normal HR and BP response to exercise.    ZioPatch 4/6/23-4/13/23:  Baseline sinus rhythm, average VR 74 bpm ( bpm.)   No sustained or nonsustained VT, no atrial fibrillation.   11 asymptomatic episodes of SVT, longest 19.3 sec.   Rare (<1%) PACs and PVCs.   5 patient-triggered events correlated with PACs (4/5 events.)      Assessment and Plan:   1.  Apical hypertrophic cardiomyopathy:   Clara has clear apical HCM by cardiac MRI.   Her only risk factor ventricular arrhythmias is sudden cardiac death in her uncle, who had autopsy confirmed hypertrophic cardiomyopathy is somewhat concerning.  The patient has had no ventricular arrhythmias on ZIO Patch testing and  she has no significant scar burden on MRI.  Given the presence of a genetic mutation of undetermined significance and a paternal uncle with known hypertrophic cardiomyopathy, we have discussed the need for her family members, including brothers, father, and cousins to be clinically screened (ECG and echocardiogram.) Her father was tested for the same VUS that lCara has and was negative for it.    2.  Coronary artery disease, non-obstructive, without angina: Nonobstructive on coronary angiogram.  Continue aspirin and atorvastatin 10 mg (LDL<70.)    3.  Carotid artery disease: Noted on previous CTA.  Continue atorvastatin. Direct LDL-C<70 on atorvastatin 10 mg daily.     4.  Hypertension, essential, controlled: Continue to monitor.     5. Atrial tachycardia/PACs: rare and not bothersome to Clara, continue to monitor.     The patient states understanding and is agreeable with plan.     Total time spent 05/09/23: 64 minutes    Uriel Mendes MD

## 2023-05-08 NOTE — OR NURSING
"Pt noted a 1/4\" spot on the palm of her hand. Does not appear to be punctured. Looks like a pressure taylor. Offered a Dr. to look at it. Did not want anyone. She was wanted to be discharged.  " no

## 2023-05-09 ENCOUNTER — ANCILLARY PROCEDURE (OUTPATIENT)
Dept: CARDIOLOGY | Facility: CLINIC | Age: 63
End: 2023-05-09
Attending: INTERNAL MEDICINE
Payer: COMMERCIAL

## 2023-05-09 VITALS
DIASTOLIC BLOOD PRESSURE: 80 MMHG | WEIGHT: 120.1 LBS | OXYGEN SATURATION: 100 % | HEART RATE: 61 BPM | SYSTOLIC BLOOD PRESSURE: 127 MMHG | HEIGHT: 66 IN | BODY MASS INDEX: 19.3 KG/M2

## 2023-05-09 DIAGNOSIS — I42.2 APICAL VARIANT HYPERTROPHIC CARDIOMYOPATHY (H): Primary | ICD-10-CM

## 2023-05-09 DIAGNOSIS — I42.2 APICAL VARIANT HYPERTROPHIC CARDIOMYOPATHY (H): ICD-10-CM

## 2023-05-09 DIAGNOSIS — I10 ESSENTIAL HYPERTENSION: ICD-10-CM

## 2023-05-09 LAB — LVEF ECHO: NORMAL

## 2023-05-09 PROCEDURE — G0463 HOSPITAL OUTPT CLINIC VISIT: HCPCS | Mod: 25 | Performed by: INTERNAL MEDICINE

## 2023-05-09 PROCEDURE — 93306 TTE W/DOPPLER COMPLETE: CPT | Performed by: INTERNAL MEDICINE

## 2023-05-09 PROCEDURE — 93005 ELECTROCARDIOGRAM TRACING: CPT

## 2023-05-09 PROCEDURE — 93010 ELECTROCARDIOGRAM REPORT: CPT | Performed by: INTERNAL MEDICINE

## 2023-05-09 PROCEDURE — 99215 OFFICE O/P EST HI 40 MIN: CPT | Mod: 25 | Performed by: INTERNAL MEDICINE

## 2023-05-09 RX ORDER — LISINOPRIL 2.5 MG/1
0.5 TABLET ORAL
COMMUNITY
End: 2023-10-10

## 2023-05-09 ASSESSMENT — PAIN SCALES - GENERAL: PAINLEVEL: NO PAIN (0)

## 2023-05-09 NOTE — NURSING NOTE
Cardiac Testing: Patient given instructions regarding  echocardiogram  and zio patch. Discussed purpose, preparation, procedure and when to expect results reported back to the patient. Patient demonstrated understanding of this information and agreed to call with further questions or concerns.    Med Reconcile: Reviewed and verified all current medications with the patient. The updated medication list was printed and given to the patient.    Return Appointment: Patient given instructions regarding scheduling next clinic visit. Patient demonstrated understanding of this information and agreed to call with further questions or concerns.    Patient stated she understood all health information given and agreed to call with further questions or concerns.

## 2023-05-09 NOTE — NURSING NOTE
Chief Complaint   Patient presents with     Follow Up     63 year old female with history of CAD/NSTEMI 1/23/2020, on cMRI apical HCM seen, presenting for follow up.          Vitals were taken, medications reconciled and EKG performed.     Cristi Galloway, EMT   12:48 PM

## 2023-05-09 NOTE — LETTER
5/9/2023      RE: Clara Boykin  [address]  [facility] [address]       Dear Colleague,    Thank you for the opportunity to participate in the care of your patient, Clara Boykin, at the [facility] [place] at [facility]. Please see a copy of my visit note below.    Chief complaint: Follow up of apical-variant HCM    HPI:   Clara Boykin is a 63 year old female with a past medical history notable for nonobstructive coronary artery disease, carotid artery disease, hypertension, and newly diagnosed hypertrophic cardiomyopathy who presents for evaluation of hypertrophic cardiomyopathy.  The patient states that she initially developed symptoms in January of this past year.  She was shoveling snow and developed severe dyspnea.  This feeling of dyspnea persisted for a few days.  Eventually, she ordered a troponin for herself and this later returned to be elevated.  After she had the elevated troponin, she presented to [facility].  Given the elevated troponin, shortness of breath, and abnormal EKG (which is new from prior), she underwent a coronary angiogram which showed nonobstructive coronary artery disease.  She was given aspirin, atorvastatin, and metoprolol on discharge.  She later underwent a cardiac MRI which showed signs consistent with apical hypertrophic cardiomyopathy.  She presents here today for further evaluation.    With the exception of the issue that led to her being admitted to the hospital, she has been generally asymptomatic.  She states that she does feel more short of breath than typical with significant exertion, however has not been exerting herself this past winter.  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  She notably has no palpitations, presyncope, and syncope.  She states that she had one syncopal event that was associated with a seizure in the 1970s, which she was told was due to dehydration.  She  has since worn an event monitor which showed no episodes of NSVT.  She has a family history of 2 siblings dying at a very young age, which was presumably due to Rh disease.  In addition, she did have 1 uncle who  while swimming, however he was in his late 70s to early 80s at the time.  Her father has been told that he is apical hypokinesis, though this was presumed to be due to a myocardial infarction.  She has no children and she has 1 living brother.    10/6/2020:  At the patient's last visit, her metoprolol was decreased to 12.5 mg twice per day (mostly treating hypertension).  We also ordered a cardiopulmonary stress test, which is yet to be completed.    The patient reports that since her last visit she has had occasional episodes of shortness of breath.  This is been worse recently with the higher heat and fires.  She has had intermittent presyncopal episodes, however no syncopal episodes.  She reports that her heart rate is been mainly in the 50s.  Occasionally, her systolic blood pressure will be in the 90s at which point she will hold 1 dose of metoprolol.    She also obtained the autopsy report from a paternal uncle who  while swimming.  He was found to have hypertrophic cardiomyopathy.  Her father has recently had a stroke and echocardiogram was performed at that time.    She denies orthopnea, PND, or lower extremity swelling.    21:  Since her last visit, metoprolol was discontinued and Clara also stopped her lisinopril. She has been running regularly (6-10 miles 3-4 miles/wk) without difficulty. Home BPs have been consistently 100s-110s/60s-70s.     22:  Clara has had a difficult year, with significant work demands and stresses of COVID and also because her father has been ill (seizure and small stroke) but he is doing better now.  She just resumed running again. She is starting to feel better and is under less stress than before.   ECG 22: sinus with left atrial abnormality, VR  69,  QRS 78 QTc 415. No change from 5/11/21.   TTE 03/22/22: Apical HCM, better seen on prior CMR (and best seen on non-contrast images.) Normal LV/RV size/function. Mild TR. Trace to mild AI.     5/9/2023:   She reports feeling well and remains active; she just ran a 4-mile trail race in [facility] this weekend. A CPX was done and showed a peak VO2 of 40.1 mL/kg/min. She reports feeling generally well. She plans to do a triathlon.      PAST MEDICAL HISTORY:  Past Medical History:   Diagnosis Date    Apical variant hypertrophic cardiomyopathy (H) 3/10/2020    Arthritis ?    thumbs, ? mild other sites    Coronary artery disease involving native coronary artery of native heart without angina pectoris 2/4/2020    NSTEMI Jan 2020    Diplopia     H/O CT scan     H/O magnetic resonance imaging     History of blood transfusion     2x at birth; mom was RH neg    Hypertension < =2018    Morning-surge HTN: mild    Non-obstructive CAD without angina (coronary angiogram 1/2020) 2/4/2020    NSTEMI Jan 2020    Paralytic strabismus     Pneumonia     PONV (postoperative nausea and vomiting)        CURRENT MEDICATIONS:  Current Outpatient Medications   Medication Sig Dispense Refill    aspirin (ASA) 81 MG chewable tablet Take 1 tablet (81 mg) by mouth daily 90 tablet 1    atorvastatin (LIPITOR) 10 MG tablet Take 1 tablet (10 mg) by mouth every evening 90 tablet 0    CALCIUM PO Take 1,200 mg by mouth daily      Co-Enzyme Q-10 60 MG CAPS       diphenhydrAMINE (BENADRYL) 25 MG tablet Take 12.5-25 mg by mouth nightly as needed Reported on 3/31/2017      melatonin 3 MG tablet Take 3 mg by mouth nightly as needed for sleep      multivitamin, therapeutic (THERA-VIT) TABS tablet Take 1 tablet by mouth daily      Omega-3 Fatty Acids (OMEGA 3 PO) Take 1 g by mouth daily Reported on 3/31/2017      traZODone (DESYREL) 50 MG tablet TAKE 1/2 TABLET BY MOUTH EVERY NIGHT AS NEEDED 15 tablet 1    VITAMIN D, CHOLECALCIFEROL, PO Take 1,000  Units by mouth daily         PAST SURGICAL HISTORY:  Past Surgical History:   Procedure Laterality Date    ABDOMEN SURGERY  July 2016    Abd hernia repair (which has reoccurred form coughing 2018)    APPENDECTOMY      AS KNEE SCOPE, DIAGNOSTIC      BACK SURGERY  1999    Left C6-7 foraminotomy    BIOPSY  2014    lipoma excision (near R scapula)    BUNIONECTOMY Right 2016    times 2    BUNIONECTOMY Left 10/22/2018    Procedure: LEFT REVISION BUNION ;  Surgeon: Johanna Lund MD;  Location:  [facility]    COLONOSCOPY  2019    1 sm precancer polyp    CV CORONARY ANGIOGRAM N/A 1/24/2020    Procedure: Coronary Angiogram;  Surgeon: Criss Green MD;  Location:  HEART CARDIAC CATH LAB    CV LEFT HEART CATH N/A 1/24/2020    Procedure: Left Heart Cath;  Surgeon: Criss Green MD;  Location:  HEART CARDIAC CATH LAB    CV LEFT VENTRICULOGRAM N/A 1/24/2020    Procedure: Left Ventriculogram;  Surgeon: Criss Green MD;  Location:  HEART CARDIAC CATH LAB    HERNIORRHAPHY VENTRAL N/A 7/7/2016    Procedure: HERNIORRHAPHY VENTRAL;  Surgeon: Ash Thompson MD;  Location:  OR    left clavical      NECK SURGERY      ORTHOPEDIC SURGERY  [id number]    ORIF L clavicle/hardware out; bilat bunion/forefoot (4 ops)    RECESSION RESECTION (REPAIR STRABISMUS) Right 4/10/2017    Procedure: RECESSION RESECTION (REPAIR STRABISMUS);  Surgeon: Lyle Leggett MD;  Location:  OR    SOFT TISSUE SURGERY  2007    L wrist: scapholunate lig reconstruction post L wristfx    WRIST SURGERY         ALLERGIES:     Allergies   Allergen Reactions    Exparel [Bupivacaine] GI Disturbance     Patient had severe abdominal distention    Darvocet [Propoxyphene N-Apap] Other (See Comments)     confusion    Liposomes GI Disturbance    Oxycodone     Penicillins Itching    Percocet [Oxycodone-Acetaminophen] Other (See Comments)     confusion    Tape [Adhesive Tape] Rash     Once after surgical tape    Vistaril  [Hydroxyzine] Rash     Intense flushing/redness of face        FAMILY HISTORY:  Family History   Problem Relation Age of Onset    Cancer Mother         skin cancer    Diabetes Mother         borderline/Type 2    Hypertension Mother         3 meds: 4.5cm asc aortic aneurysm    Hyperlipidemia Mother         chol & very high triglycerides    Cerebrovascular Disease Mother         many small, cog. impaired,  18    Osteoporosis Mother         fosamax x5 yrs:poststeroids    Obesity Mother         BMI 35+    Unknown/Adopted Mother         dermatomyositis since     Depression Mother     Mental Illness Mother         probably bordeline personality disorder; vascular dementia    Anesthesia Reaction Mother         ?    Cancer Father         skin cancer    Coronary Artery Disease Father         angioplasty ~    Hypertension Father         1 med, mild. Age 94    Depression Father         2x: age 87 [facility]    Hyperlipidemia Father         2020 after CVA, lipids not elevated    Cerebrovascular Disease Father         2 sm. foci occipito-parietal 20    Cancer Maternal Grandmother         skin cancer    Cerebrovascular Disease Maternal Grandmother         - multiple CVAs    Obesity Maternal Grandmother         overweight    Diabetes Maternal Grandfather         borderline/Type 2    Coronary Artery Disease Maternal Grandfather         2-3 MIs; worked after each    Cerebrovascular Disease Maternal Grandfather          from CVA postop hernia    Obesity Maternal Grandfather         was overwt to obese    Diabetes Cousin         prediabetes    Hypertension Brother         1 med for 20 yrs    Substance Abuse Brother         hx of EtOH    Cerebrovascular Disease Other         cog. impairment like my mom    Mental Illness Sister         ?bipolar; past chem dep    Substance Abuse Sister         hx of: prescript drug& EtOH    Unknown/Adopted Sister         sister is adopted    Breast Cancer Sister          "stage 2-3, 2021    Anesthesia Reaction Other         naus/vomit 1-8 hr postop unless tx'd    Cerebrovascular Disease Other         cog. 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    Exam:  /80 (BP Location: Right arm, Patient Position: Sitting, Cuff Size: Adult Small)   Pulse 61   Ht 1.665 m (5' 5.55\")   Wt 54.5 kg (120 lb 1.6 oz)   LMP 09/06/2006   SpO2 100%   BMI 19.65 kg/m    GENERAL APPEARANCE: healthy, alert and no distress  EYES: no icterus, no xanthelasmas  ENT: normal palate, mucosa moist, no central cyanosis  NECK: JVP not elevated  RESPIRATORY: lungs clear to auscultation - no rales, rhonchi or wheezes, no use of accessory muscles, no retractions, respirations are unlabored, normal respiratory rate  CARDIOVASCULAR: regular rhythm, normal S1 with physiologic split S2, no S3 or S4 and no murmur, click or rub.  GI: soft, non tender, bowel sounds normal,no abdominal bruits  EXTREMITIES: no edema, no bruits  NEURO: alert and oriented to person/place/time, normal speech, gait and affect  VASC: Radial, dorsalis pedis and posterior tibialis pulses 2+ bilaterally.  SKIN: no ecchymoses, no rashes.  PSYCH: cooperative, affect appropriate.     Labs:  Reviewed.  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  A variant of unknown significance means that there is a genetic change in which we do not have enough information to determine if it is disease causing or not. Labs review published data, functional studies, presences in population databases, similarity to normal sequence, and computer prediction models.    This particular variant occurs in a portion of the gene that is highly conserved in available vertebrate species.  This variant occurs in an intron after exon 23.  A splice prediction tool predicts this alteration will abolish the native donor site; however, direct evidence is unavailable. This variant has not been previously reported as a disease causing mutation or as a normal variation.  Therefore, based on the current information it is unclear if this VUS is associated with disease or not.       Testing/Procedures:    CPX 9/29/20:   Exercised 12:17 on Jose G Ramp protocol  /90-> 185/78  MVO2 30.5 mL/kg/min (9 METS, class I, 120% predicted)    ZioPatch 2/6/21-2/22/21: Baseline sinus rhythm, average VR 73 bpm (range  bpm.) No sustained/nonsustained VT. Rare (<1%) PACs and PVCs; rare nonsustained SVT, likely AT (longest 21.2 sec.) No atrial fibrillation. 7 patient-triggered events, which appeared to correlate with SVT and with PACs.       ZioPatch 2/4/22-2/18/22:   Baseline sinus rhythm, average VR 74 bpm (range  bpm.) Rare (<1%) PACs and PVCs. 8 brief runs of nonsustained SVT (likely atrial tachycardia, longest 7 beats), 3 of which were associated with symptoms. 18 patient-triggered episodes were associated with SVT (3), PACs (12), and the remainder with sinus rhyhtm. Several of the episodes with PACs also had isolated PVCs. No nonsustained VT. No atrial fibrillation. No significant/sustained tachyarrhythmias, bradyarrhythmias, or pauses.   AT was also present on prior ZioPatch from 2/6/21-2/22/21 (and associated with symptoms at that time) and also with 1/2020-2/2020 Holter  monitor. The longest SVT/AT run was significantly shorter than on the prior ZioPatch (7 beats vs. 21.2 seconds.)   Results released to the patient via [employer] [facility] and will be discussed at her upcoming outpatient visit.      Echo 3/22/2022  Global and regional left ventricular function is normal with an EF of 60-65%.  Global right ventricular function is normal.  Mild mitral and tricuspid insufficiency present.  Pulmonary artery systolic pressure is normal.  The inferior vena cava was normal in size with preserved respiratory  variability.  No pericardial effusion is present.    CMR 2/14/20:  Apical HCM with apical segments ~1.3 cm (vs. 0.9 cm basal anterosetpum and 0.7 cm basal inferolateral.) Hyperdynamic LV function and normal RV function, LVEF=75% RVEF=67%. No LVOT or intracavitary obstruction. Very small apical LGE per report-- per my review, there is no convincing fibrosis on late gadolinium enhancement imaging.      Coronary angiogram 1/24/2020: non-obstructive CAD (30-40% mLAD/D1, 40% RCA)    Dr. Mendes personally visualized and interpreted:  TTE 05/09/23: Known apical variant HCM with isolated apical hypertrophy, LVEF=60-65%. No LVOT or intracavitary gradient. No significant change from 3/22/22.     CPX 4/17/23:   Superior exercise capacity (160% predicted), improved significantly (31%) from last CPX 9/2020.  MVO2 40.1 mL/kg/min (160% predicted, class I, 11.5 METS) VE/VCO2 slope 33.63 RER 1.21  Exercised 15:53 on Jose G Ramp protocol, achieved HR of 162 (104% MPHR)  Normal HR and BP response to exercise.    ZioPatch 4/6/23-4/13/23:  Baseline sinus rhythm, average VR 74 bpm ( bpm.)   No sustained or nonsustained VT, no atrial fibrillation.   11 asymptomatic episodes of SVT, longest 19.3 sec.   Rare (<1%) PACs and PVCs.   5 patient-triggered events correlated with PACs (4/5 events.)      Assessment and Plan:   1.  Apical hypertrophic cardiomyopathy:   Clara has clear apical HCM by cardiac MRI.   Her only  risk factor ventricular arrhythmias is sudden cardiac death in her uncle, who had autopsy confirmed hypertrophic cardiomyopathy is somewhat concerning.  The patient has had no ventricular arrhythmias on ZIO Patch testing and she has no significant scar burden on MRI.  Given the presence of a genetic mutation of undetermined significance and a paternal uncle with known hypertrophic cardiomyopathy, we have discussed the need for her family members, including brothers, father, and cousins to be clinically screened (ECG and echocardiogram.) Her father was tested for the same VUS that Clara has and was negative for it.    2.  Coronary artery disease, non-obstructive, without angina: Nonobstructive on coronary angiogram.  Continue aspirin and atorvastatin 10 mg (LDL<70.)    3.  Carotid artery disease: Noted on previous CTA.  Continue atorvastatin. Direct LDL-C<70 on atorvastatin 10 mg daily.     4.  Hypertension, essential, controlled: Continue to monitor.     5. Atrial tachycardia/PACs: rare and not bothersome to Clara, continue to monitor.     The patient states understanding and is agreeable with plan.     Total time spent 05/09/23: 64 minutes    Uriel Mendes MD

## 2023-05-09 NOTE — PATIENT INSTRUCTIONS
You were seen today in the Adult Congenital and Cardiovascular Genetics Clinic at the [facility].    Cardiology Providers you saw during your visit:  Uriel Mendes MD    Diagnosis:  possible HCM    Results:  Uriel Mendes MD reviewed the results of your EKG, CPX, labs, echo and zio testing today in clinic.    Recommendations for you:    No changes today      General Cardiac Recommendations:  Continue to eat a heart healthy, low salt diet.  Continue to get 20-30 minutes of aerobic activity, 4-5 days per week.  Examples of aerobic activity include walking, running, swimming, cycling, etc.  Continue to observe good oral hygiene, with regular dental visits.      SBE prophylaxis:   Yes____  No__x__      Exercise restrictions:   Yes__X__  No____         If yes, list restrictions:  Must be allowed to rest if fatigued or SOB      FASTING CHOLESTEROL was checked in the last 5 years YES__x_  NO___ (2022)  If no, please follow up with your primary care physician. You should have a cholesterol screening every 5 years.      Follow-up:  Follow up with Dr Mendes in 1 year with fasting lipids, echo, 14 day zio     If you have questions or concerns please contact us at:    Celina Kathleen, RN, BSN   Kati Kraus (Scheduling)  Nurse Care Coordinator     Clinic   Adult Congenital and CV Genetics   Adult Congenital and CV Genetic  [facility]   [facility]  (P) [phone]     (P) [phone]            (F) [phone]        For after hours urgent needs, call [phone] and ask to speak to the Adult Congenital Physician on call.  Mention Job Code 0401.    For emergencies call 911.    [facility]  [facility]  Mail Code [id number]  [address] [place] [address], [place]  [address]

## 2023-05-10 LAB
ATRIAL RATE - MUSE: 65 BPM
DIASTOLIC BLOOD PRESSURE - MUSE: NORMAL MMHG
INTERPRETATION ECG - MUSE: NORMAL
P AXIS - MUSE: 73 DEGREES
PR INTERVAL - MUSE: 166 MS
QRS DURATION - MUSE: 78 MS
QT - MUSE: 398 MS
QTC - MUSE: 413 MS
R AXIS - MUSE: 66 DEGREES
SYSTOLIC BLOOD PRESSURE - MUSE: NORMAL MMHG
T AXIS - MUSE: 43 DEGREES
VENTRICULAR RATE- MUSE: 65 BPM

## 2023-05-13 ASSESSMENT — ENCOUNTER SYMPTOMS
JOINT SWELLING: 0
FEVER: 0
CONSTIPATION: 0
HEADACHES: 0
HEMATURIA: 0
MYALGIAS: 0
HEARTBURN: 0
FREQUENCY: 0
BREAST MASS: 0
SHORTNESS OF BREATH: 0
CHILLS: 0
WEAKNESS: 0
NAUSEA: 0
NERVOUS/ANXIOUS: 0
EYE PAIN: 0
PALPITATIONS: 0
DIZZINESS: 0
ARTHRALGIAS: 0
SORE THROAT: 0
ABDOMINAL PAIN: 0
COUGH: 0
PARESTHESIAS: 0
DIARRHEA: 0
DYSURIA: 0
HEMATOCHEZIA: 0

## 2023-05-13 ASSESSMENT — ACTIVITIES OF DAILY LIVING (ADL): CURRENT_FUNCTION: NO ASSISTANCE NEEDED

## 2023-05-17 ENCOUNTER — ANCILLARY PROCEDURE (OUTPATIENT)
Dept: MAMMOGRAPHY | Facility: CLINIC | Age: 63
End: 2023-05-17
Attending: FAMILY MEDICINE
Payer: COMMERCIAL

## 2023-05-17 DIAGNOSIS — Z12.31 VISIT FOR SCREENING MAMMOGRAM: ICD-10-CM

## 2023-05-17 PROCEDURE — 77067 SCR MAMMO BI INCL CAD: CPT | Mod: GC | Performed by: RADIOLOGY

## 2023-05-19 ENCOUNTER — OFFICE VISIT (OUTPATIENT)
Dept: FAMILY MEDICINE | Facility: CLINIC | Age: 63
End: 2023-05-19
Payer: COMMERCIAL

## 2023-05-19 VITALS
SYSTOLIC BLOOD PRESSURE: 112 MMHG | BODY MASS INDEX: 19.83 KG/M2 | OXYGEN SATURATION: 98 % | RESPIRATION RATE: 18 BRPM | HEIGHT: 65 IN | HEART RATE: 63 BPM | DIASTOLIC BLOOD PRESSURE: 69 MMHG | TEMPERATURE: 98.1 F | WEIGHT: 119 LBS

## 2023-05-19 DIAGNOSIS — Z00.00 ENCOUNTER FOR MEDICARE ANNUAL WELLNESS EXAM: Primary | ICD-10-CM

## 2023-05-19 DIAGNOSIS — R53.83 OTHER FATIGUE: ICD-10-CM

## 2023-05-19 DIAGNOSIS — Z13.820 SCREENING FOR OSTEOPOROSIS: ICD-10-CM

## 2023-05-19 DIAGNOSIS — Z78.0 POST-MENOPAUSAL: ICD-10-CM

## 2023-05-19 DIAGNOSIS — I42.2 APICAL VARIANT HYPERTROPHIC CARDIOMYOPATHY (H): ICD-10-CM

## 2023-05-19 DIAGNOSIS — F51.01 PRIMARY INSOMNIA: ICD-10-CM

## 2023-05-19 DIAGNOSIS — I10 ESSENTIAL HYPERTENSION: ICD-10-CM

## 2023-05-19 DIAGNOSIS — D70.8 OTHER NEUTROPENIA (H): ICD-10-CM

## 2023-05-19 DIAGNOSIS — Z23 NEED FOR SHINGLES VACCINE: ICD-10-CM

## 2023-05-19 LAB
CHOLEST SERPL-MCNC: 171 MG/DL
ERYTHROCYTE [DISTWIDTH] IN BLOOD BY AUTOMATED COUNT: 12.8 % (ref 10–15)
HCT VFR BLD AUTO: 45 % (ref 35–47)
HDLC SERPL-MCNC: 76 MG/DL
HGB BLD-MCNC: 14.9 G/DL (ref 11.7–15.7)
LDLC SERPL CALC-MCNC: 79 MG/DL
MCH RBC QN AUTO: 30.7 PG (ref 26.5–33)
MCHC RBC AUTO-ENTMCNC: 33.1 G/DL (ref 31.5–36.5)
MCV RBC AUTO: 93 FL (ref 78–100)
NONHDLC SERPL-MCNC: 95 MG/DL
PLATELET # BLD AUTO: 284 10E3/UL (ref 150–450)
RBC # BLD AUTO: 4.86 10E6/UL (ref 3.8–5.2)
TRIGL SERPL-MCNC: 78 MG/DL
TSH SERPL DL<=0.005 MIU/L-ACNC: 0.94 UIU/ML (ref 0.3–4.2)
WBC # BLD AUTO: 4.5 10E3/UL (ref 4–11)

## 2023-05-19 PROCEDURE — 84443 ASSAY THYROID STIM HORMONE: CPT | Performed by: FAMILY MEDICINE

## 2023-05-19 PROCEDURE — 36415 COLL VENOUS BLD VENIPUNCTURE: CPT | Performed by: FAMILY MEDICINE

## 2023-05-19 PROCEDURE — 99397 PER PM REEVAL EST PAT 65+ YR: CPT | Performed by: FAMILY MEDICINE

## 2023-05-19 PROCEDURE — 80061 LIPID PANEL: CPT | Performed by: FAMILY MEDICINE

## 2023-05-19 PROCEDURE — 85027 COMPLETE CBC AUTOMATED: CPT | Performed by: FAMILY MEDICINE

## 2023-05-19 RX ORDER — TRAZODONE HYDROCHLORIDE 50 MG/1
TABLET, FILM COATED ORAL
Qty: 30 TABLET | Refills: 11 | Status: SHIPPED | OUTPATIENT
Start: 2023-05-19 | End: 2024-09-12

## 2023-05-19 ASSESSMENT — ENCOUNTER SYMPTOMS
PALPITATIONS: 0
EYE PAIN: 0
ARTHRALGIAS: 0
NERVOUS/ANXIOUS: 0
HEMATURIA: 0
HEARTBURN: 0
WEAKNESS: 0
ABDOMINAL PAIN: 0
NAUSEA: 0
DYSURIA: 0
FEVER: 0
SORE THROAT: 0
PARESTHESIAS: 0
JOINT SWELLING: 0
HEMATOCHEZIA: 0
CONSTIPATION: 0
HEADACHES: 0
CHILLS: 0
BREAST MASS: 0
SHORTNESS OF BREATH: 0
DIZZINESS: 0
MYALGIAS: 0
FREQUENCY: 0
COUGH: 0
DIARRHEA: 0

## 2023-05-19 ASSESSMENT — ACTIVITIES OF DAILY LIVING (ADL): CURRENT_FUNCTION: NO ASSISTANCE NEEDED

## 2023-05-19 ASSESSMENT — PAIN SCALES - GENERAL: PAINLEVEL: NO PAIN (0)

## 2023-05-19 NOTE — PROGRESS NOTES
"SUBJECTIVE:   Clara is a 63 year old who presents for Preventive Visit.       View : No data to display.              Patient has been advised of split billing requirements and indicates understanding: Yes  Are you in the first 12 months of your Medicare coverage?  No - Patient not on Medicare yet    Healthy Habits:     In general, how would you rate your overall health?  Excellent    Frequency of exercise:  6-7 days/week    Duration of exercise:  45-60 minutes    Do you usually eat at least 4 servings of fruit and vegetables a day, include whole grains    & fiber and avoid regularly eating high fat or \"junk\" foods?  Yes    Taking medications regularly:  Yes    Ability to successfully perform activities of daily living:  No assistance needed    Home Safety:  No safety concerns identified    Hearing Impairment:  No hearing concerns    In the past 6 months, have you been bothered by leaking of urine? Yes    In general, how would you rate your overall mental or emotional health?  Good      PHQ-2 Total Score: 0    Additional concerns today:  No    Wellness Visit Notes:    -Last mammo done 5/2023, due 5/2024 (impression: Negative)  -Last DEXA done 7/2018, due 7/2021 (impression: Osteopenia (low bone mass)  Recommendations include ensuring adequate daily Calcium and Vitamin D intake) Order pended.   -Last colon cancer screening done 4/2019, due 4/2029 (impression: Polyp location: hepatic flexure. Size: 2mm. Polypectomy performed. Trivial size polyp - does not increase average risk of colon cancer. Repeat in colonoscopy in 10years.)   -Immunizations: Shingles - encouraged patient to receive at a pharmacy. Covid Bivalent Booster - pt will receive later in the fall.     -ACP: Not on File. Blank Copy Given, discussed.  -Education: Personalized Prevention Plan and Urine Incontinence  -Urine Leakage: Usually only later in the day. Would like to talk to provider about need for urology referral.     -Cyst on face, pt wants derm " consult  -Mammogram 2D vs 3D  -Colonoscopy timing  -Urology referral?       Have you ever done Advance Care Planning? (For example, a Health Directive, POLST, or a discussion with a medical provider or your loved ones about your wishes): No, advance care planning information given to patient to review.  Advanced care planning was discussed at today's visit.       Fall risk  Fallen 2 or more times in the past year?: No  Any fall with injury in the past year?: No    Cognitive Screening   1) Repeat 3 items (Leader, Season, Table)    2) Clock draw: NORMAL  3) 3 item recall: Recalls 2 objects   Results: NORMAL clock, 1-2 items recalled: COGNITIVE IMPAIRMENT LESS LIKELY    Mini-CogTM Copyright S Williams. Licensed by the author for use in [employer]; reprinted with permission (somonico@[facility]). All rights reserved.      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 No          View : No data to display.              Do you have a current opioid prescription? No  Do you use any other controlled substances or medications that are not prescribed by a provider? None              Current providers sharing in care for this patient include:   Patient Care Team:  Wegener, Joel Daniel Irwin, MD as PCP - General (Family Practice)  Izabella Arroyo MD as MD (Orthopedics)  Uriel Mendes MD as Assigned Heart and Vascular Provider  Torres Marsh MD as MD  (Otolaryngology)  Wegener, Joel Daniel Irwin, MD as Assigned PCP    The following health maintenance items are reviewed in Epic and correct as of today:  Health Maintenance   Topic Date Due     ZOSTER IMMUNIZATION (1 of 2) 10/30/2018     MEDICARE ANNUAL WELLNESS VISIT  04/30/2023     ANNUAL REVIEW OF HM ORDERS  07/01/2023     FALL RISK ASSESSMENT  05/19/2024     MAMMO SCREENING  05/17/2025     Pneumococcal Vaccine: 65+ Years (3 - PPSV23 if available, else PCV20) 05/19/2025     ADVANCE CARE PLANNING  01/30/2027     DTAP/TDAP/TD IMMUNIZATION (4 - Td or Tdap) 07/18/2027     LIPID  04/07/2028     COLORECTAL CANCER SCREENING  04/19/2029     DEXA  07/12/2033     HEPATITIS C SCREENING  Completed     HIV SCREENING  Completed     PHQ-2 (once per calendar year)  Completed     INFLUENZA VACCINE  Completed     COVID-19 Vaccine  Completed     IPV IMMUNIZATION  Aged Out     MENINGITIS IMMUNIZATION  Aged Out     PAP  Discontinued     Lab work is in process          1/28/2022     2:27 PM 5/13/2023     1:57 PM   Breast CA Risk Assessment (FHS-7)   Do you have a family history of breast, colon, or ovarian cancer? Yes No / Unknown           Pertinent mammograms are reviewed under the imaging tab.    Review of Systems   Constitutional: Negative for chills and fever.   HENT: Negative for ear pain, hearing loss and sore throat.    Eyes: Negative for pain and visual disturbance.   Respiratory: Negative for cough and shortness of breath.    Cardiovascular: Negative for chest pain, palpitations and peripheral edema.   Gastrointestinal: Negative for abdominal pain, constipation, diarrhea, heartburn, hematochezia and nausea.   Breasts:  Negative for tenderness, breast mass and discharge.   Genitourinary: Negative for dysuria, frequency, genital sores, hematuria, pelvic pain, urgency, vaginal bleeding and vaginal discharge.   Musculoskeletal: Negative for arthralgias, joint swelling and myalgias.   Skin: Negative for rash.   Neurological:  "Negative for dizziness, weakness, headaches and paresthesias.   Psychiatric/Behavioral: Negative for mood changes. The patient is not nervous/anxious.          OBJECTIVE:   LMP 09/06/2006  Estimated body mass index is 19.65 kg/m  as calculated from the following:    Height as of 5/9/23: 1.665 m (5' 5.55\").    Weight as of 5/9/23: 54.5 kg (120 lb 1.6 oz).  Physical Exam  GENERAL: healthy, alert and no distress  EYES: Eyes grossly normal to inspection, PERRL and conjunctivae and sclerae normal  HENT: ear canals and TM's normal, nose and mouth without ulcers or lesions  NECK: no adenopathy, no asymmetry, masses, or scars and thyroid normal to palpation  RESP: lungs clear to auscultation - no rales, rhonchi or wheezes  CV: regular rate and rhythm, normal S1 S2, no S3 or S4, no murmur, click or rub, no peripheral edema and peripheral pulses strong  ABDOMEN: soft, nontender, no hepatosplenomegaly, no masses and bowel sounds normal  MS: no gross musculoskeletal defects noted, no edema  SKIN: no suspicious lesions or rashes  NEURO: Normal strength and tone, mentation intact and speech normal  PSYCH: mentation appears normal, affect normal/bright        ASSESSMENT / PLAN:   (Z00.00) Encounter for Medicare annual wellness exam  (primary encounter diagnosis)      -Last mammo done 5/2023, due 5/2024 (impression: Negative) reassurance given re: 3D vs 2D.   -Last DEXA done 7/2018, due 7/2021 (impression: Osteopenia (low bone mass)  Recommendations include ensuring adequate daily Calcium and Vitamin D intake) Order pended.   -Last colon cancer screening done 4/2019 and did have polyp however small.  She is aware that current guidelines actually suggest follow up in 7 years so will plan on that and I changed health maintenance tracker.   -Immunizations: Shingles - encouraged patient to receive at a pharmacy. Covid Bivalent Booster - pt will receive later in the fall.     -ACP: Not on File. Blank Copy Given, discussed.  -Education: " Personalized Prevention Plan and Urine Incontinence  -Urine Leakage: Usually only later in the day. Discussed kegel exercises, referral not desired at this time.       Call radiology to schedule dexa scan:    RADIOLOGY:  [facility]:  [phone]   [facility]: [phone]    Check cbc, tsh today for low energy and also reports has had intermittent low wbc on labs done via outside lab.  I asked her to have her send those to me (lipids, cmp, cbc per her description and a1c).       Plan covid booster in fall and shingrix from pharmacy.     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     (F51.01) Primary insomnia  Comment: refill given, using intermittently.   Plan: traZODone (DESYREL) 50 MG tablet            (Z23) Need for shingles vaccine  Comment:   Plan:     (Z78.0) Post-menopausal  Comment:   Plan: DEXA HIP/PELVIS/SPINE - Future            (Z13.820) Screening for osteoporosis  Comment:   Plan: DEXA HIP/PELVIS/SPINE - Future            (R53.83) Other fatigue  Comment:   Plan: CBC with platelets, TSH with free T4 reflex            (I42.2) Apical variant hypertrophic cardiomyopathy (H)  Comment:   Plan: continues to follow with cardiology team. Blood pressue controlled.     (I10) Essential hypertension  Comment:   Plan:     (D70.8) Other neutropenia (H)  Comment:   Plan:     Patient has been advised of split billing requirements and indicates understanding: Yes      COUNSELING:  Reviewed preventive health counseling, as reflected in patient instructions       Regular exercise       Healthy diet/nutrition       Colon cancer screening        She reports that she has never smoked. She has never used smokeless tobacco.      Appropriate preventive services were discussed with this patient, including applicable screening as appropriate for cardiovascular disease, diabetes, osteopenia/osteoporosis, and glaucoma.  As appropriate for age/gender, discussed screening for  colorectal cancer, prostate cancer, breast cancer, and cervical cancer. Checklist reviewing preventive services available has been given to the patient.    Reviewed patients plan of care and provided an AVS. The Basic Care Plan (routine screening as documented in Health Maintenance) for Clara meets the Care Plan requirement. This Care Plan has been established and reviewed with the Patient.          Joel Daniel Wegener, MD  [facility]    Identified Health Risks:    I have reviewed Opioid Use Disorder and Substance Use Disorder risk factors and made any needed referrals.       Information on urinary incontinence and treatment options given to patient.

## 2023-05-19 NOTE — PATIENT INSTRUCTIONS
Call radiology to schedule dexa scan:    RADIOLOGY:  [facility]:  [phone]   [facility]: [phone]    Check cbc, tsh today for low energy.       Plan covid booster in fall and shingrix from pharmacy.     Consider seeing xochilt barber for cyst on face.     Patient Education   Personalized Prevention Plan  You are due for the preventive services outlined below.  Your care team is available to assist you in scheduling these services.  If you have already completed any of these items, please share that information with your care team to update in your medical record.  Health Maintenance Due   Topic Date Due    Zoster (Shingles) Vaccine (1 of 2) 10/30/2018    Osteoporosis Screening  07/12/2021       Urinary Incontinence, Female (Adult)   Urinary incontinence means loss of bladder control. This problem affects many women, especially as they get older. If you have incontinence, you may be embarrassed to ask for help. But know that this problem can be treated.   Types of Incontinence  There are different types of incontinence. Two of the main types are described here. You can have more than one type.   Stress incontinence. With this type, urine leaks when pressure (stress) is put on the bladder. This may happen when you cough, sneeze, or laugh. Stress incontinence most often occurs because the pelvic floor muscles that support the bladder and urethra are weak. This can happen after pregnancy and vaginal childbirth or a hysterectomy. It can also be due to excess body weight or hormone changes.  Urge incontinence (also called overactive bladder). With this type, a sudden urge to urinate is felt often. This may happen even though there may not be much urine in the bladder. The need to urinate often during the night is common. Urge incontinence most often occurs because of bladder spasms. This may be due to bladder irritation or infection. Damage to bladder nerves or pelvic muscles, constipation, and  certain medicines can also lead to urge incontinence.  Treatment depends on the cause. Further evaluation is needed to find the type you have. This will likely include an exam and certain tests. Based on the results, you and your healthcare provider can then plan treatment. Until a diagnosis is made, the home care tips below can help ease symptoms.   Home care  Do pelvic floor muscle exercises, if they are prescribed. The pelvic floor muscles help support the bladder and urethra. Many women find that their symptoms improve when doing special exercises that strengthen these muscles. To do the exercises, contract the muscles you would use to stop your stream of urine. But do this when you re not urinating. Hold for 10 seconds, then relax. Repeat 10 to 20 times in a row, at least 3 times a day. Your healthcare provider may give you other instructions for how to do the exercises and how often.  Keep a bladder diary. This helps track how often and how much you urinate over a set period of time. Bring this diary with you to your next visit with the provider. The information can help your provider learn more about your bladder problem.  Lose weight, if advised to by your provider. Extra weight puts pressure on the bladder. Your provider can help you create a weight-loss plan that s right for you. This may include exercising more and making certain diet changes.  Don't have foods and drinks that may irritate the bladder. These can include alcohol and caffeinated drinks.  Quit smoking. Smoking and other tobacco use can lead to a long-term (chronic) cough that strains the pelvic floor muscles. Smoking may also damage the bladder and urethra. Talk with your provider about treatments or methods you can use to quit smoking.  If drinking large amounts of fluid makes you have symptoms, you may be advised to limit your fluid intake. You may also be advised to drink most of your fluids during the day and to limit fluids at  night.  If you re worried about urine leakage or accidents, you may wear absorbent pads to catch urine. Change the pads often. This helps reduce discomfort. It may also reduce the risk of skin or bladder infections.    Follow-up care  Follow up with your healthcare provider, or as directed. It may take some to find the right treatment for your problem. But healthy lifestyle changes can be made right away. These include such things as exercising on a regular basis, eating a healthy diet, losing weight (if needed), and quitting smoking. Your treatment plan may include special therapies or medicines. Certain procedures or surgery may also be options. Talk about any questions you have with your provider.   When to seek medical advice  Call the healthcare provider right away if any of these occur:  Fever of 100.4 F (38 C) or higher, or as directed by your provider  Bladder pain or fullness  Belly swelling  Nausea or vomiting  Back pain  Weakness, dizziness, or fainting  Rafi last reviewed this educational content on 1/1/2020 2000-2022 [employer]. All rights reserved. This information is not intended as a substitute for professional medical care. Always follow your healthcare professional's instructions.

## 2023-07-06 ENCOUNTER — ANCILLARY PROCEDURE (OUTPATIENT)
Dept: BONE DENSITY | Facility: CLINIC | Age: 63
End: 2023-07-06
Attending: FAMILY MEDICINE
Payer: COMMERCIAL

## 2023-07-06 DIAGNOSIS — Z78.0 POST-MENOPAUSAL: ICD-10-CM

## 2023-07-06 DIAGNOSIS — Z13.820 SCREENING FOR OSTEOPOROSIS: ICD-10-CM

## 2023-07-06 PROCEDURE — 77080 DXA BONE DENSITY AXIAL: CPT | Performed by: INTERNAL MEDICINE

## 2023-07-08 DIAGNOSIS — M81.0 OSTEOPOROSIS WITHOUT CURRENT PATHOLOGICAL FRACTURE, UNSPECIFIED OSTEOPOROSIS TYPE: Primary | ICD-10-CM

## 2023-07-10 ENCOUNTER — MYC MEDICAL ADVICE (OUTPATIENT)
Dept: FAMILY MEDICINE | Facility: CLINIC | Age: 63
End: 2023-07-10
Payer: COMMERCIAL

## 2023-07-10 DIAGNOSIS — M81.0 OSTEOPOROSIS WITHOUT CURRENT PATHOLOGICAL FRACTURE, UNSPECIFIED OSTEOPOROSIS TYPE: Primary | ICD-10-CM

## 2023-07-15 DIAGNOSIS — I21.4 NSTEMI (NON-ST ELEVATED MYOCARDIAL INFARCTION) (H): ICD-10-CM

## 2023-07-17 RX ORDER — ATORVASTATIN CALCIUM 10 MG/1
10 TABLET, FILM COATED ORAL EVERY EVENING
Qty: 90 TABLET | Refills: 3 | Status: SHIPPED | OUTPATIENT
Start: 2023-07-17 | End: 2024-07-22

## 2023-08-25 ENCOUNTER — TRANSFERRED RECORDS (OUTPATIENT)
Dept: HEALTH INFORMATION MANAGEMENT | Facility: CLINIC | Age: 63
End: 2023-08-25
Payer: COMMERCIAL

## 2023-10-10 ENCOUNTER — MYC MEDICAL ADVICE (OUTPATIENT)
Dept: FAMILY MEDICINE | Facility: CLINIC | Age: 63
End: 2023-10-10
Payer: COMMERCIAL

## 2023-10-10 ENCOUNTER — MYC REFILL (OUTPATIENT)
Dept: FAMILY MEDICINE | Facility: CLINIC | Age: 63
End: 2023-10-10
Payer: COMMERCIAL

## 2023-10-10 DIAGNOSIS — I10 HYPERTENSION GOAL BP (BLOOD PRESSURE) < 130/80: Primary | ICD-10-CM

## 2023-10-12 RX ORDER — LISINOPRIL 2.5 MG/1
1.25 TABLET ORAL
Qty: 30 TABLET | Refills: 1 | Status: SHIPPED | OUTPATIENT
Start: 2023-10-12 | End: 2024-02-29

## 2023-11-02 ENCOUNTER — MYC MEDICAL ADVICE (OUTPATIENT)
Dept: CARDIOLOGY | Facility: CLINIC | Age: 63
End: 2023-11-02
Payer: COMMERCIAL

## 2023-12-21 NOTE — TELEPHONE ENCOUNTER
DIAGNOSIS: (R) knee injury / self referred / [facility] / no imaging or surgery    APPOINTMENT DATE: 1/02/2024   NOTES STATUS DETAILS   OFFICE NOTE from other specialist Internal 5/07/19: Tyrone Lucia, PT - [facility] - acute pain of R knee   MEDICATION LIST Internal

## 2023-12-27 DIAGNOSIS — M25.561 RIGHT KNEE PAIN, UNSPECIFIED CHRONICITY: Primary | ICD-10-CM

## 2024-01-02 ENCOUNTER — OFFICE VISIT (OUTPATIENT)
Dept: ORTHOPEDICS | Facility: CLINIC | Age: 64
End: 2024-01-02
Payer: COMMERCIAL

## 2024-01-02 ENCOUNTER — ANCILLARY PROCEDURE (OUTPATIENT)
Dept: GENERAL RADIOLOGY | Facility: CLINIC | Age: 64
End: 2024-01-02
Attending: FAMILY MEDICINE
Payer: COMMERCIAL

## 2024-01-02 ENCOUNTER — PRE VISIT (OUTPATIENT)
Dept: ORTHOPEDICS | Facility: CLINIC | Age: 64
End: 2024-01-02

## 2024-01-02 DIAGNOSIS — M25.561 ACUTE PAIN OF RIGHT KNEE: Primary | ICD-10-CM

## 2024-01-02 DIAGNOSIS — M25.561 RIGHT KNEE PAIN, UNSPECIFIED CHRONICITY: ICD-10-CM

## 2024-01-02 DIAGNOSIS — M17.11 PRIMARY OSTEOARTHRITIS OF RIGHT KNEE: ICD-10-CM

## 2024-01-02 PROCEDURE — 99203 OFFICE O/P NEW LOW 30 MIN: CPT | Performed by: FAMILY MEDICINE

## 2024-01-02 PROCEDURE — 73562 X-RAY EXAM OF KNEE 3: CPT | Mod: RT | Performed by: RADIOLOGY

## 2024-01-02 RX ORDER — ALENDRONATE SODIUM 70 MG/1
70 TABLET ORAL
COMMUNITY
Start: 2023-11-30

## 2024-01-02 NOTE — PROGRESS NOTES
CHIEF COMPLAINT:  Pain of the Right Knee     HISTORY OF PRESENT ILLNESS  Ms. Boykin is a pleasant 63 year old year old female who presents to clinic today with right knee.  Clara explains that she stepped down very hard on 12/8/23 while trying to break a large section of styrofoam on the ground and noted right knee pain. Shortly thereafter her rescue dog jumped up onto her and exacerbated her pain.    Prior to this, in October she was gardening and felt a twinge anteriorly about her right knee.      Clara had been training for a [employer] run in May, running twice/week and seeing a .    Onset: sudden  Location: right knee; anterior and posterior   Quality:  aching and dull  Duration: 3 weeks   Severity: 2/10 at worst  Timing:constant  Modifying factors:  resting and non-use makes it better, movement and use makes it worse  Associated signs & symptoms: Shifting in the knee, fullness    Previous similar pain: No  Treatments to date: Icing and Aleve, activity modification and avoiding running     Additional history: as documented    Review of Systems:  Have you recently had a a fever, chills, weight loss? No  Do you have any vision problems? No  Do you have any chest pain or edema? No  Do you have any shortness of breath or wheezing?  No  Do you have stomach problems? No  Do you have any numbness or focal weakness? No  Do you have diabetes? No  Do you have problems with bleeding or clotting? No  Do you have an rashes or other skin lesions? No    MEDICAL HISTORY  Patient Active Problem List   Diagnosis    iamJOINT PAIN-PELVIS    Pain in joint, upper arm    Skin exam, screening for cancer    CARDIOVASCULAR SCREENING; LDL GOAL LESS THAN 160    Insomnia    History of anesthesia reaction    Small vessel disease, cerebrovascular    Right foot pain    Hip pain, right    NSTEMI (non-ST elevated myocardial infarction) (H)    Non-obstructive CAD without angina (coronary angiogram 1/2020)    Hyperlipidemia  LDL goal <70    Apical variant hypertrophic cardiomyopathy (H)    Other neutropenia (H24)    Osteoporosis without current pathological fracture, unspecified osteoporosis type       Current Outpatient Medications   Medication Sig Dispense Refill    alendronate (FOSAMAX) 70 MG tablet Take 70 mg by mouth every 7 days      aspirin (ASA) 81 MG chewable tablet Take 1 tablet (81 mg) by mouth daily 90 tablet 1    atorvastatin (LIPITOR) 10 MG tablet Take 1 tablet (10 mg) by mouth every evening 90 tablet 3    CALCIUM PO Take 1,200 mg by mouth daily      Co-Enzyme Q-10 60 MG CAPS       diphenhydrAMINE (BENADRYL) 25 MG tablet Take 12.5-25 mg by mouth nightly as needed Reported on 3/31/2017      lisinopril (ZESTRIL) 2.5 MG tablet Take 0.5 tablets (1.25 mg) by mouth five times a week for 90 days 30 tablet 1    melatonin 3 MG tablet Take 3 mg by mouth nightly as needed for sleep      multivitamin, therapeutic (THERA-VIT) TABS tablet Take 1 tablet by mouth daily      Omega-3 Fatty Acids (OMEGA 3 PO) Take 1 g by mouth daily Reported on 3/31/2017      traZODone (DESYREL) 50 MG tablet TAKE 1/2 TABLET BY MOUTH EVERY NIGHT AS NEEDED 30 tablet 11    VITAMIN D, CHOLECALCIFEROL, PO Take 1,000 Units by mouth daily         Allergies   Allergen Reactions    Exparel [Bupivacaine] GI Disturbance     Patient had severe abdominal distention    Darvocet [Propoxyphene N-Apap] Other (See Comments)     confusion    Liposomes GI Disturbance    Oxycodone     Penicillins Itching    Percocet [Oxycodone-Acetaminophen] Other (See Comments)     confusion    Tape [Adhesive Tape] Rash     Once after surgical tape    Vistaril [Hydroxyzine] Rash     Intense flushing/redness of face        Family History   Problem Relation Age of Onset    Cancer Mother         skin cancer    Diabetes Mother         borderline/Type 2    Hypertension Mother         3 meds: 4.5cm asc aortic aneurysm    Hyperlipidemia Mother         chol & very high triglycerides    Cerebrovascular  Disease Mother         many small, cog. impaired,  18    Osteoporosis Mother         fosamax x5 yrs:poststeroids    Obesity Mother         BMI 35+    Unknown/Adopted Mother         dermatomyositis since     Depression Mother     Mental Illness Mother         probably bordeline personality disorder; vascular dementia    Anesthesia Reaction Mother         ?    Cancer Father         skin cancer    Coronary Artery Disease Father         angioplasty ~    Hypertension Father         1 med, mild. Age 94    Depression Father         2x: age 87 [facility]    Hyperlipidemia Father         2020 after CVA, lipids not elevated    Cerebrovascular Disease Father         2 sm. foci occipito-parietal 20    Cancer Maternal Grandmother         skin cancer    Cerebrovascular Disease Maternal Grandmother         - multiple CVAs    Obesity Maternal Grandmother         overweight    Diabetes Maternal Grandfather         borderline/Type 2    Coronary Artery Disease Maternal Grandfather         2-3 MIs; worked after each    Cerebrovascular Disease Maternal Grandfather          from CVA postop hernia    Obesity Maternal Grandfather         was overwt to obese    Diabetes Cousin         prediabetes    Hypertension Brother         1 med for 20 yrs    Substance Abuse Brother         hx of EtOH    Cerebrovascular Disease Other         cog. impairment like my mom    Mental Illness Sister         ?bipolar; past chem dep    Substance Abuse Sister         hx of: prescript drug& EtOH    Unknown/Adopted Sister         sister is adopted    Breast Cancer Sister         stage 2-3,     Anesthesia Reaction Other         naus/vomit 1-8 hr postop unless tx'd    Cerebrovascular Disease Other         cog. impairment like my mom       Additional medical/Social/Surgical histories reviewed in [facility] and updated as appropriate.       PHYSICAL EXAM  LMP 2006     General  - normal appearance, in no obvious  "distress  Musculoskeletal - Right knee  - stance: Minimally antalgic gait  - inspection: trace effusion  - palpation: Tender posterolateral joint line.  - ROM: 125 degrees flexion, 0 degrees extension, pain at terminal extension passively and comfortably ranges at 125-10 degrees avoiding full extension.  - strength: 5/5 in flexion, 5/5 in extension  - special tests:  (-) Lachman  (-) anterior drawer  (+) Donnie painful  (-) varus at 0 and 30 degrees flexion  (-) valgus at 0 and 30 degrees flexion  Neuro  - no sensory or motor deficit, grossly normal coordination, normal muscle tone      IMAGING : XR knee right 3 views and bilateral AP. Final results and radiologist's interpretation, available in the [facility] health record. Images were reviewed with the patient/family members in the office today. My personal interpretation of the performed imaging is mild medial compartment joint space narrowing bilateral knees.     ASSESSMENT & PLAN  Ms. Boykin is a 63 year old year old female who presents to clinic today with acute right knee pain.    Diagnosis: Acute pain of right knee, mild osteoarthritis of right knee.    Various explaining mechanical symptoms of instability and \"shifting\", pain despite activity modification, course of Aleve, icing and rest.  Given the above, MRI of her right knee is warranted to further investigate.  Concern for possible meniscus root tear.    Continue with ice, sparingly using Aleve, and holding off on running.  May supplement with octane  or stationary biking.     It was a pleasure seeing Clara today.    Nathan Burkett DO, [facility]  Primary Care Sports Medicine   "

## 2024-01-02 NOTE — LETTER
1/2/2024      RE: Clara Boykin  [address]  [facility] [address]     Dear Colleague,    Thank you for referring your patient, Clara Boykin, to the [facility] [place]. Please see a copy of my visit note below.    CHIEF COMPLAINT:  Pain of the Right Knee     HISTORY OF PRESENT ILLNESS  Ms. Boykin is a pleasant 63 year old year old female who presents to clinic today with right knee.  Clara explains that she stepped down very hard on 12/8/23 while trying to break a large section of styrofoam on the ground and noted right knee pain. Shortly thereafter her rescue dog jumped up onto her and exacerbated her pain.    Prior to this, in October she was gardening and felt a twinge anteriorly about her right knee.      Clara had been training for a [employer] run in May, running twice/week and seeing a .    Onset: sudden  Location: right knee; anterior and posterior   Quality:  aching and dull  Duration: 3 weeks   Severity: 2/10 at worst  Timing:constant  Modifying factors:  resting and non-use makes it better, movement and use makes it worse  Associated signs & symptoms: Shifting in the knee, fullness    Previous similar pain: No  Treatments to date: Icing and Aleve, activity modification and avoiding running     Additional history: as documented    Review of Systems:  Have you recently had a a fever, chills, weight loss? No  Do you have any vision problems? No  Do you have any chest pain or edema? No  Do you have any shortness of breath or wheezing?  No  Do you have stomach problems? No  Do you have any numbness or focal weakness? No  Do you have diabetes? No  Do you have problems with bleeding or clotting? No  Do you have an rashes or other skin lesions? No    MEDICAL HISTORY  Patient Active Problem List   Diagnosis    iamJOINT PAIN-PELVIS    Pain in joint, upper arm    Skin exam, screening for cancer    CARDIOVASCULAR SCREENING; LDL GOAL LESS THAN 160     Insomnia    History of anesthesia reaction    Small vessel disease, cerebrovascular    Right foot pain    Hip pain, right    NSTEMI (non-ST elevated myocardial infarction) (H)    Non-obstructive CAD without angina (coronary angiogram 1/2020)    Hyperlipidemia LDL goal <70    Apical variant hypertrophic cardiomyopathy (H)    Other neutropenia (H24)    Osteoporosis without current pathological fracture, unspecified osteoporosis type       Current Outpatient Medications   Medication Sig Dispense Refill    alendronate (FOSAMAX) 70 MG tablet Take 70 mg by mouth every 7 days      aspirin (ASA) 81 MG chewable tablet Take 1 tablet (81 mg) by mouth daily 90 tablet 1    atorvastatin (LIPITOR) 10 MG tablet Take 1 tablet (10 mg) by mouth every evening 90 tablet 3    CALCIUM PO Take 1,200 mg by mouth daily      Co-Enzyme Q-10 60 MG CAPS       diphenhydrAMINE (BENADRYL) 25 MG tablet Take 12.5-25 mg by mouth nightly as needed Reported on 3/31/2017      lisinopril (ZESTRIL) 2.5 MG tablet Take 0.5 tablets (1.25 mg) by mouth five times a week for 90 days 30 tablet 1    melatonin 3 MG tablet Take 3 mg by mouth nightly as needed for sleep      multivitamin, therapeutic (THERA-VIT) TABS tablet Take 1 tablet by mouth daily      Omega-3 Fatty Acids (OMEGA 3 PO) Take 1 g by mouth daily Reported on 3/31/2017      traZODone (DESYREL) 50 MG tablet TAKE 1/2 TABLET BY MOUTH EVERY NIGHT AS NEEDED 30 tablet 11    VITAMIN D, CHOLECALCIFEROL, PO Take 1,000 Units by mouth daily         Allergies   Allergen Reactions    Exparel [Bupivacaine] GI Disturbance     Patient had severe abdominal distention    Darvocet [Propoxyphene N-Apap] Other (See Comments)     confusion    Liposomes GI Disturbance    Oxycodone     Penicillins Itching    Percocet [Oxycodone-Acetaminophen] Other (See Comments)     confusion    Tape [Adhesive Tape] Rash     Once after surgical tape    Vistaril [Hydroxyzine] Rash     Intense flushing/redness of face        Family History    Problem Relation Age of Onset    Cancer Mother         skin cancer    Diabetes Mother         borderline/Type 2    Hypertension Mother         3 meds: 4.5cm asc aortic aneurysm    Hyperlipidemia Mother         chol & very high triglycerides    Cerebrovascular Disease Mother         many small, cog. impaired,  18    Osteoporosis Mother         fosamax x5 yrs:poststeroids    Obesity Mother         BMI 35+    Unknown/Adopted Mother         dermatomyositis since     Depression Mother     Mental Illness Mother         probably bordeline personality disorder; vascular dementia    Anesthesia Reaction Mother         ?    Cancer Father         skin cancer    Coronary Artery Disease Father         angioplasty ~    Hypertension Father         1 med, mild. Age 94    Depression Father         [facility]: age 87 [facility]    Hyperlipidemia Father         2020 after CVA, lipids not elevated    Cerebrovascular Disease Father         2 sm. foci occipito-parietal 20    Cancer Maternal Grandmother         skin cancer    Cerebrovascular Disease Maternal Grandmother         - multiple CVAs    Obesity Maternal Grandmother         overweight    Diabetes Maternal Grandfather         borderline/Type 2    Coronary Artery Disease Maternal Grandfather         2-3 MIs; worked after each    Cerebrovascular Disease Maternal Grandfather          from CVA postop hernia    Obesity Maternal Grandfather         was overwt to obese    Diabetes Cousin         prediabetes    Hypertension Brother         1 med for 20 yrs    Substance Abuse Brother         hx of EtOH    Cerebrovascular Disease Other         cog. impairment like my mom    Mental Illness Sister         ?bipolar; past chem dep    Substance Abuse Sister         hx of: prescript drug& EtOH    Unknown/Adopted Sister         sister is adopted    Breast Cancer Sister         stage 2-3,     Anesthesia Reaction Other         naus/vomit 1-8 hr postop unless tx'd     "Cerebrovascular Disease Other         cog. impairment like my mom       Additional medical/Social/Surgical histories reviewed in [facility] and updated as appropriate.       PHYSICAL EXAM  LMP 09/06/2006     General  - normal appearance, in no obvious distress  Musculoskeletal - Right knee  - stance: Minimally antalgic gait  - inspection: trace effusion  - palpation: Tender posterolateral joint line.  - ROM: 125 degrees flexion, 0 degrees extension, pain at terminal extension passively and comfortably ranges at 125-10 degrees avoiding full extension.  - strength: 5/5 in flexion, 5/5 in extension  - special tests:  (-) Lachman  (-) anterior drawer  (+) Donnie painful  (-) varus at 0 and 30 degrees flexion  (-) valgus at 0 and 30 degrees flexion  Neuro  - no sensory or motor deficit, grossly normal coordination, normal muscle tone      IMAGING : XR knee right 3 views and bilateral AP. Final results and radiologist's interpretation, available in the [facility] health record. Images were reviewed with the patient/family members in the office today. My personal interpretation of the performed imaging is mild medial compartment joint space narrowing bilateral knees.     ASSESSMENT & PLAN  Ms. Boykin is a 63 year old year old female who presents to clinic today with acute right knee pain.    Diagnosis: Acute pain of right knee, mild osteoarthritis of right knee.    Various explaining mechanical symptoms of instability and \"shifting\", pain despite activity modification, course of Aleve, icing and rest.  Given the above, MRI of her right knee is warranted to further investigate.  Concern for possible meniscus root tear.    Continue with ice, sparingly using Aleve, and holding off on running.  May supplement with octane  or stationary biking.     It was a pleasure seeing Clara today.    Nathan Burkett DO, [facility]  Primary Care Sports Medicine         "

## 2024-01-03 ENCOUNTER — ANCILLARY PROCEDURE (OUTPATIENT)
Dept: MRI IMAGING | Facility: CLINIC | Age: 64
End: 2024-01-03
Attending: FAMILY MEDICINE
Payer: COMMERCIAL

## 2024-01-03 ENCOUNTER — VIRTUAL VISIT (OUTPATIENT)
Dept: ORTHOPEDICS | Facility: CLINIC | Age: 64
End: 2024-01-03
Payer: COMMERCIAL

## 2024-01-03 DIAGNOSIS — S82.141D TIBIAL PLATEAU FRACTURE, RIGHT, CLOSED, WITH ROUTINE HEALING, SUBSEQUENT ENCOUNTER: Primary | ICD-10-CM

## 2024-01-03 DIAGNOSIS — M25.561 ACUTE PAIN OF RIGHT KNEE: ICD-10-CM

## 2024-01-03 PROCEDURE — 99443 PR PHYSICIAN TELEPHONE EVALUATION 21-30 MIN: CPT | Mod: 93 | Performed by: FAMILY MEDICINE

## 2024-01-03 PROCEDURE — 73721 MRI JNT OF LWR EXTRE W/O DYE: CPT | Mod: RT | Performed by: RADIOLOGY

## 2024-01-03 NOTE — PROGRESS NOTES
ESTABLISHED PATIENT FOLLOW-UP VIRTUAL:  Follow up MRI right knee     This encounter was conducted via telephone in my [place] office today.    What phone number would you like to be contacted at? Home  How would you like to obtain your AVS? MyChart    HISTORY OF PRESENT ILLNESS  Ms. Boykin is a pleasant 63 year old year old female who presents via telephone today for follow-up of right knee MRI.    MRI of the right knee completed this morning.    Date of injury: 12/8/2023  Date last seen: 1/2/2024  Following Therapeutic Plan: Yes  Pain: Clarifying pain was up to 8 or 9 out of 10 when it occurred, pain currently at a 2 out of 10.  Function: Unchanged  Interval History: MRI is completed.  She has crutches at home which she started utilizing when she saw the MRI results.    Additional medical/Social/Surgical histories reviewed in [facility] and updated as appropriate.    REVIEW OF SYSTEMS (1/3/2024)  CONSTITUTIONAL: Denies fever and weight loss  GASTROINTESTINAL: Denies abdominal pain, nausea, vomiting  MUSCULOSKELETAL: See HPI  SKIN: Denies any recent rash or lesion  NEUROLOGICAL: Denies numbness or focal weakness    IMAGING : MRI KNEE RIGHT WO CONTRAST    Impression:  1. Impaction fracture of the central anterior lateral tibial plateau  with approximately 1 mm of articular surface central depression  (Schatzker type IIIB).  2. Undersurface fraying/irregularity of the medial meniscus posterior  horn.     I have personally reviewed the examination and initial interpretation  and I agree with the findings.     JUTTA ELLERMANN, MD      ASSESSMENT & PLAN  Ms. Boykin is a 63 year old year old female who presents via telephone today for follow-up regarding MRI of right knee.    Diagnosis:  Schatzker type IIIB central anterior lateral tibial plateau fracture    History of osteoporosis on alendronate    Treatment options discussed in detail.  At this time I would like Clara to start utilizing a hinged knee brace.  She will remain  toe-touch weightbearing at this time.  I placed an order for an  brace through orthotics so that we can have this ready for transition to full weightbearing.  Activity modifications reviewed and may perform light biking with  brace as well as swimming without pushing off the wall.     Reviewed with Clara and she is already on Alendronate, calcium and vitamin D for her osteoporosis.  She is up-to-date with her DEXA scan.    Care discussed with my  Carlos will be present for brace fitting tomorrow.    Follow up in 6 weeks to review progress.    It was a pleasure seeing Clara.    Total Telephone Time: 25 min  Nathan Burkett DO, CAQSM  Primary Care Sports Medicine  Department of Orthopedic Surgery  [facility]

## 2024-01-03 NOTE — LETTER
1/3/2024         RE: Clara Boykin  [address]  [facility] [address]        Dear Colleague,    Thank you for referring your patient, Clara Boykin, to the [facility] [place]. Please see a copy of my visit note below.    ESTABLISHED PATIENT FOLLOW-UP VIRTUAL:  Follow up MRI right knee     This encounter was conducted via telephone in my [place] office today.    What phone number would you like to be contacted at? Home  How would you like to obtain your AVS? MyChart    HISTORY OF PRESENT ILLNESS  Ms. Boykin is a pleasant 63 year old year old female who presents via telephone today for follow-up of right knee MRI.    MRI of the right knee completed this morning.    Date of injury: 12/8/2023  Date last seen: 1/2/2024  Following Therapeutic Plan: Yes  Pain: Clarifying pain was up to 8 or 9 out of 10 when it occurred, pain currently at a 2 out of 10.  Function: Unchanged  Interval History: MRI is completed.  She has crutches at home which she started utilizing when she saw the MRI results.    Additional medical/Social/Surgical histories reviewed in [facility] and updated as appropriate.    REVIEW OF SYSTEMS (1/3/2024)  CONSTITUTIONAL: Denies fever and weight loss  GASTROINTESTINAL: Denies abdominal pain, nausea, vomiting  MUSCULOSKELETAL: See HPI  SKIN: Denies any recent rash or lesion  NEUROLOGICAL: Denies numbness or focal weakness    IMAGING : MRI KNEE RIGHT WO CONTRAST    Impression:  1. Impaction fracture of the central anterior lateral tibial plateau  with approximately 1 mm of articular surface central depression  (Schatzker type IIIB).  2. Undersurface fraying/irregularity of the medial meniscus posterior  horn.     I have personally reviewed the examination and initial interpretation  and I agree with the findings.     JUTTA ELLERMANN, MD      ASSESSMENT & PLAN  Ms. Boykin is a 63 year old year old female who presents via telephone today for follow-up regarding MRI of right  knee.    Diagnosis:  Schatzker type IIIB central anterior lateral tibial plateau fracture    History of osteoporosis on alendronate    Treatment options discussed in detail.  At this time I would like Clara to start utilizing a hinged knee brace.  She will remain toe-touch weightbearing at this time.  I placed an order for an  brace through orthotics so that we can have this ready for transition to full weightbearing.  Activity modifications reviewed and may perform light biking with  brace as well as swimming without pushing off the wall.     Reviewed with Clara and she is already on Alendronate, calcium and vitamin D for her osteoporosis.  She is up-to-date with her DEXA scan.    Care discussed with my  Carlos will be present for brace fitting tomorrow.    Follow up in 6 weeks to review progress.    It was a pleasure seeing Clara.    Total Telephone Time: 25 min  Nathan Burkett DO, CAQSM  Primary Care Sports Medicine  Department of Orthopedic Surgery  [facility]      Again, thank you for allowing me to participate in the care of your patient.        Sincerely,        Nathan Burkett DO

## 2024-01-04 ENCOUNTER — ALLIED HEALTH/NURSE VISIT (OUTPATIENT)
Dept: ORTHOPEDICS | Facility: CLINIC | Age: 64
End: 2024-01-04
Payer: COMMERCIAL

## 2024-01-04 DIAGNOSIS — S82.141D TIBIAL PLATEAU FRACTURE, RIGHT, CLOSED, WITH ROUTINE HEALING, SUBSEQUENT ENCOUNTER: Primary | ICD-10-CM

## 2024-01-04 PROCEDURE — 99207 PR NO CHARGE NURSE ONLY: CPT

## 2024-01-04 NOTE — PATIENT INSTRUCTIONS
Orthotics and Prosthetics    [place]  [facility]  [address]   [address]  [place] [address]  Phone [phone]  Fax: [phone]    [facility]  [address]  [place] [address]  Phone [phone]  Fax: [phone]    [facility]  [address]  [address]  [place] [address]  Phone [phone]  Fax: [phone]    [facility]  [address] [place] [address]  Phone [phone]  Fax: [phone]    [facility]  [address]  [address] (located in the Wound Clinic)  [place] [address]  Phone [phone]  Fax: [phone]    [facility]  (Please use Check In A)  [address] [place] [address]  Phone [phone]  Fax: [phone]    [facility]  [address]  [address]  [place] [address]  Phone [phone]  Fax: [phone]    [facility]   [address]  Phone [phone]  Fax: [phone]    [facility] [place]  [address]  [address]  [place] [address]  Phone [phone]  Fax: [phone]    [facility]  [address]  [address]  [place] [address]  Phone [phone]  Fax: [phone]    [facility]   [place]  [address] [place] [address]  Phone [phone]  Fax: [phone]

## 2024-01-04 NOTE — PROGRESS NOTES
Clara reports today for a fitting of a front closure hinged knee brace per Dr. Burkett for a diagnosis of a tibial plateau fx. She is obtaining this brace as a place zendejas before having a custom Lateral  brace made by our orthotics and prosthetics department.         DME FITTING    Relevant Diagnosis: Right tibial plateau fx  Front Closure knee brace, Small was fit on patient's Right knee.     Person(s) involved in teaching:   Patient    Brace was applied in standard Manner:  Yes  Brace fit well:  Yes  Patient reports brace to fit comfortably:  Yes    Education:   Patient shown self application and removal of brace: Yes  Patient shown how to adjust brace fit, if necessary: Yes  Patient educated on billing and return policy: Yes  Patient confirmed understanding when and how to contact clinic with concerns: Yes    Carlos Saucedo M.A., LAT, ATC  Certified Athletic Trainer

## 2024-01-09 ENCOUNTER — MYC MEDICAL ADVICE (OUTPATIENT)
Dept: FAMILY MEDICINE | Facility: CLINIC | Age: 64
End: 2024-01-09

## 2024-01-09 ENCOUNTER — TELEPHONE (OUTPATIENT)
Dept: FAMILY MEDICINE | Facility: CLINIC | Age: 64
End: 2024-01-09

## 2024-01-09 ENCOUNTER — MYC REFILL (OUTPATIENT)
Dept: FAMILY MEDICINE | Facility: CLINIC | Age: 64
End: 2024-01-09

## 2024-01-09 DIAGNOSIS — F51.01 PRIMARY INSOMNIA: ICD-10-CM

## 2024-01-09 RX ORDER — TRAZODONE HYDROCHLORIDE 50 MG/1
TABLET, FILM COATED ORAL
Qty: 30 TABLET | Refills: 11 | OUTPATIENT
Start: 2024-01-09

## 2024-01-09 NOTE — TELEPHONE ENCOUNTER
Prior Authorization Retail Medication Request    Medication/Dose:  Disp Refills Start End KENY  traZODone (DESYREL) 50 MG tablet 30 tablet 11 5/19/2023 - No  Sig: TAKE 1/2 TABLET BY MOUTH EVERY NIGHT AS NEEDED  Sent to pharmacy as: traZODone HCl 50 MG Oral Tablet (DESYREL)  Class: E-Prescribe      Diagnosis and ICD code (if different than what is on RX):  F51.01  New/renewal/insurance change PA/secondary ins. PA:  Previously Tried and Failed:  Unknown  Rationale:  Unknown    Insurance   Primary: [employer]  Insurance ID:  [id number]    Secondary (if applicable):N/A  Insurance ID:  N/A    Pharmacy Information (if different than what is on RX)  Name:       [id number] - [place], [place] - [address] AT [facility] [address]    Phone:  [phone]  Fax:[phone]

## 2024-01-14 NOTE — TELEPHONE ENCOUNTER
Retail Pharmacy Prior Authorization Team   Phone: [phone]    Prior Authorization Not Needed per Insurance    Medication: TRAZODONE HCL 50 MG PO TABS  Insurance Company: [employer] - Phone [phone] Fax [phone]  Expected CoPay: $    Pharmacy Filling the Rx: [id number] - [place], [place] - [address] AT [facility] [address]  Pharmacy Notified: YES - verified pharmacy received a paid claim  Patient Notified: YES (pharmacy already dispensed and patient picked up)

## 2024-01-17 DIAGNOSIS — I21.4 NSTEMI (NON-ST ELEVATED MYOCARDIAL INFARCTION) (H): Primary | ICD-10-CM

## 2024-01-22 ENCOUNTER — TRANSFERRED RECORDS (OUTPATIENT)
Dept: HEALTH INFORMATION MANAGEMENT | Facility: CLINIC | Age: 64
End: 2024-01-22
Payer: COMMERCIAL

## 2024-01-29 ENCOUNTER — TRANSFERRED RECORDS (OUTPATIENT)
Dept: HEALTH INFORMATION MANAGEMENT | Facility: CLINIC | Age: 64
End: 2024-01-29
Payer: COMMERCIAL

## 2024-02-12 ENCOUNTER — APPOINTMENT (OUTPATIENT)
Dept: URBAN - METROPOLITAN AREA CLINIC 256 | Age: 64
Setting detail: DERMATOLOGY
End: 2024-02-12

## 2024-02-12 DIAGNOSIS — D18.0 HEMANGIOMA: ICD-10-CM

## 2024-02-12 DIAGNOSIS — D22 MELANOCYTIC NEVI: ICD-10-CM

## 2024-02-12 DIAGNOSIS — Z71.89 OTHER SPECIFIED COUNSELING: ICD-10-CM

## 2024-02-12 DIAGNOSIS — L21.8 OTHER SEBORRHEIC DERMATITIS: ICD-10-CM

## 2024-02-12 DIAGNOSIS — L82.1 OTHER SEBORRHEIC KERATOSIS: ICD-10-CM

## 2024-02-12 DIAGNOSIS — L57.8 OTHER SKIN CHANGES DUE TO CHRONIC EXPOSURE TO NONIONIZING RADIATION: ICD-10-CM

## 2024-02-12 PROBLEM — D22.71 MELANOCYTIC NEVI OF RIGHT LOWER LIMB, INCLUDING HIP: Status: ACTIVE | Noted: 2024-02-12

## 2024-02-12 PROBLEM — D22.61 MELANOCYTIC NEVI OF RIGHT UPPER LIMB, INCLUDING SHOULDER: Status: ACTIVE | Noted: 2024-02-12

## 2024-02-12 PROBLEM — D22.5 MELANOCYTIC NEVI OF TRUNK: Status: ACTIVE | Noted: 2024-02-12

## 2024-02-12 PROBLEM — D18.01 HEMANGIOMA OF SKIN AND SUBCUTANEOUS TISSUE: Status: ACTIVE | Noted: 2024-02-12

## 2024-02-12 PROBLEM — D22.72 MELANOCYTIC NEVI OF LEFT LOWER LIMB, INCLUDING HIP: Status: ACTIVE | Noted: 2024-02-12

## 2024-02-12 PROBLEM — D22.62 MELANOCYTIC NEVI OF LEFT UPPER LIMB, INCLUDING SHOULDER: Status: ACTIVE | Noted: 2024-02-12

## 2024-02-12 PROCEDURE — OTHER PRESCRIPTION: OTHER

## 2024-02-12 PROCEDURE — OTHER COUNSELING: OTHER

## 2024-02-12 PROCEDURE — OTHER MIPS QUALITY: OTHER

## 2024-02-12 PROCEDURE — OTHER PRESCRIPTION MEDICATION MANAGEMENT: OTHER

## 2024-02-12 PROCEDURE — 99213 OFFICE O/P EST LOW 20 MIN: CPT

## 2024-02-12 RX ORDER — KETOCONAZOLE 20 MG/G
CREAM TOPICAL BID
Qty: 30 | Refills: 3 | Status: ERX | COMMUNITY
Start: 2024-02-12

## 2024-02-12 RX ORDER — HYDROCORTISONE 25 MG/G
CREAM TOPICAL BID
Qty: 30 | Refills: 2 | Status: ERX | COMMUNITY
Start: 2024-02-12

## 2024-02-12 ASSESSMENT — LOCATION DETAILED DESCRIPTION DERM
LOCATION DETAILED: LEFT MEDIAL UPPER BACK
LOCATION DETAILED: LEFT INFERIOR LATERAL MIDBACK
LOCATION DETAILED: RIGHT ANTERIOR DISTAL THIGH
LOCATION DETAILED: RIGHT VENTRAL DISTAL FOREARM
LOCATION DETAILED: LEFT VENTRAL DISTAL FOREARM
LOCATION DETAILED: SUPERIOR THORACIC SPINE
LOCATION DETAILED: RIGHT VENTRAL PROXIMAL FOREARM
LOCATION DETAILED: LEFT ANTECUBITAL SKIN
LOCATION DETAILED: LEFT ANTERIOR DISTAL THIGH
LOCATION DETAILED: LEFT INFERIOR CENTRAL MALAR CHEEK
LOCATION DETAILED: INFERIOR THORACIC SPINE
LOCATION DETAILED: LEFT VENTRAL PROXIMAL FOREARM

## 2024-02-12 ASSESSMENT — LOCATION SIMPLE DESCRIPTION DERM
LOCATION SIMPLE: LEFT UPPER ARM
LOCATION SIMPLE: LEFT UPPER BACK
LOCATION SIMPLE: LEFT FOREARM
LOCATION SIMPLE: UPPER BACK
LOCATION SIMPLE: RIGHT THIGH
LOCATION SIMPLE: LEFT LOWER BACK
LOCATION SIMPLE: RIGHT FOREARM
LOCATION SIMPLE: LEFT THIGH
LOCATION SIMPLE: LEFT CHEEK

## 2024-02-12 ASSESSMENT — LOCATION ZONE DERM
LOCATION ZONE: FACE
LOCATION ZONE: LEG
LOCATION ZONE: ARM
LOCATION ZONE: TRUNK

## 2024-02-12 NOTE — HPI: FULL BODY SKIN EXAMINATION
What Is The Reason For Today's Visit?: Full Body Skin Examination
What Is The Reason For Today's Visit? (Being Monitored For X): the development of new lesions
Additional History: Rash around mouth/red- retinol complete product, unsure if product caused it. Paused product. Wears sunscreen \\n

## 2024-02-12 NOTE — PROCEDURE: PRESCRIPTION MEDICATION MANAGEMENT
Detail Level: Zone
Initiate Treatment: Mix an even amount of hydrocortisone 2.5% topical cream with ketoconozole 2% cream. Apply a thin layer to face & other affected areas twice daily for seborrheic dermatitis.
Render In Strict Bullet Format?: No

## 2024-02-17 ENCOUNTER — ORDERS ONLY (AUTO-RELEASED) (OUTPATIENT)
Dept: CARDIOLOGY | Facility: CLINIC | Age: 64
End: 2024-02-17
Payer: COMMERCIAL

## 2024-02-17 DIAGNOSIS — I21.4 NSTEMI (NON-ST ELEVATED MYOCARDIAL INFARCTION) (H): ICD-10-CM

## 2024-02-21 ENCOUNTER — MYC MEDICAL ADVICE (OUTPATIENT)
Dept: CARDIOLOGY | Facility: CLINIC | Age: 64
End: 2024-02-21
Payer: COMMERCIAL

## 2024-02-26 ENCOUNTER — TRANSFERRED RECORDS (OUTPATIENT)
Dept: HEALTH INFORMATION MANAGEMENT | Facility: CLINIC | Age: 64
End: 2024-02-26
Payer: COMMERCIAL

## 2024-02-26 ENCOUNTER — MYC MEDICAL ADVICE (OUTPATIENT)
Dept: FAMILY MEDICINE | Facility: CLINIC | Age: 64
End: 2024-02-26
Payer: COMMERCIAL

## 2024-02-29 DIAGNOSIS — I10 HYPERTENSION GOAL BP (BLOOD PRESSURE) < 130/80: ICD-10-CM

## 2024-02-29 RX ORDER — LISINOPRIL 2.5 MG/1
TABLET ORAL
Qty: 30 TABLET | Refills: 11 | Status: SHIPPED | OUTPATIENT
Start: 2024-02-29

## 2024-03-05 ENCOUNTER — APPOINTMENT (OUTPATIENT)
Dept: URBAN - METROPOLITAN AREA CLINIC 256 | Age: 64
Setting detail: DERMATOLOGY
End: 2024-03-05

## 2024-03-05 DIAGNOSIS — Z41.9 ENCOUNTER FOR PROCEDURE FOR PURPOSES OTHER THAN REMEDYING HEALTH STATE, UNSPECIFIED: ICD-10-CM

## 2024-03-05 PROCEDURE — OTHER ICON IPL: OTHER

## 2024-03-05 PROCEDURE — OTHER COSMETIC CONSULTATION: SUN DAMAGE: OTHER

## 2024-03-05 ASSESSMENT — LOCATION DETAILED DESCRIPTION DERM
LOCATION DETAILED: LEFT CENTRAL MALAR CHEEK
LOCATION DETAILED: LEFT SUPERIOR LATERAL BUCCAL CHEEK
LOCATION DETAILED: LEFT INFERIOR PREAURICULAR CHEEK
LOCATION DETAILED: LEFT INFERIOR LATERAL BUCCAL CHEEK
LOCATION DETAILED: LEFT CENTRAL BUCCAL CHEEK

## 2024-03-05 ASSESSMENT — LOCATION ZONE DERM: LOCATION ZONE: FACE

## 2024-03-05 ASSESSMENT — LOCATION SIMPLE DESCRIPTION DERM: LOCATION SIMPLE: LEFT CHEEK

## 2024-03-05 NOTE — PROCEDURE: ICON IPL
Pre-Procedure Text: After consent was obtained, the treatment areas were cleansed and treated using the parameters mentioned above.
External Cooling Fan Speed: 5
Treatment Number (Optional): 0
Post-Care Instructions: I reviewed with the patient in detail post-care instructions. Patient should stay away from the sun and wear sun protection until treated areas are fully healed.
Pulse Duration (Optional- Include Units): 15 ms
Energy (Optional- Include Units): 30j
Consent: Written consent obtained, risks reviewed including but not limited to crusting, scabbing, blistering, scarring, darker or lighter pigmentary change, bruising, and/or incomplete response.
Handpiece (Optional): Yellow
Contact: Light
Anesthesia Type: 1% lidocaine with epinephrine
Contact: Firm
Detail Level: Zone
Passes: 1

## 2024-03-11 ENCOUNTER — TRANSFERRED RECORDS (OUTPATIENT)
Dept: HEALTH INFORMATION MANAGEMENT | Facility: CLINIC | Age: 64
End: 2024-03-11
Payer: COMMERCIAL

## 2024-03-25 ENCOUNTER — MYC MEDICAL ADVICE (OUTPATIENT)
Dept: CARDIOLOGY | Facility: CLINIC | Age: 64
End: 2024-03-25
Payer: COMMERCIAL

## 2024-03-25 DIAGNOSIS — I10 ESSENTIAL HYPERTENSION: Primary | ICD-10-CM

## 2024-03-25 DIAGNOSIS — I42.2 APICAL VARIANT HYPERTROPHIC CARDIOMYOPATHY (H): ICD-10-CM

## 2024-03-25 DIAGNOSIS — E78.5 HYPERLIPIDEMIA LDL GOAL <70: ICD-10-CM

## 2024-03-25 PROCEDURE — 93248 EXT ECG>7D<15D REV&INTERPJ: CPT | Performed by: INTERNAL MEDICINE

## 2024-04-05 NOTE — TELEPHONE ENCOUNTER
Dr Wegener - Please advise.  Brandy Marcos RN   impairments found/functional limitations in following categories

## 2024-04-08 ENCOUNTER — LAB (OUTPATIENT)
Dept: LAB | Facility: CLINIC | Age: 64
End: 2024-04-08
Payer: COMMERCIAL

## 2024-04-08 ENCOUNTER — MYC MEDICAL ADVICE (OUTPATIENT)
Dept: CARDIOLOGY | Facility: CLINIC | Age: 64
End: 2024-04-08

## 2024-04-08 ENCOUNTER — OFFICE VISIT (OUTPATIENT)
Dept: FAMILY MEDICINE | Facility: CLINIC | Age: 64
End: 2024-04-08
Payer: COMMERCIAL

## 2024-04-08 VITALS
TEMPERATURE: 97.2 F | HEIGHT: 65 IN | DIASTOLIC BLOOD PRESSURE: 62 MMHG | WEIGHT: 120.8 LBS | HEART RATE: 69 BPM | RESPIRATION RATE: 16 BRPM | OXYGEN SATURATION: 96 % | BODY MASS INDEX: 20.12 KG/M2 | SYSTOLIC BLOOD PRESSURE: 104 MMHG

## 2024-04-08 DIAGNOSIS — S56.512A PARTIAL TEAR OF COMMON EXTENSOR TENDON OF LEFT ELBOW: ICD-10-CM

## 2024-04-08 DIAGNOSIS — Z01.818 PREOP GENERAL PHYSICAL EXAM: Primary | ICD-10-CM

## 2024-04-08 DIAGNOSIS — I42.2 APICAL VARIANT HYPERTROPHIC CARDIOMYOPATHY (H): ICD-10-CM

## 2024-04-08 DIAGNOSIS — E78.5 HYPERLIPIDEMIA LDL GOAL <70: ICD-10-CM

## 2024-04-08 DIAGNOSIS — I10 ESSENTIAL HYPERTENSION: ICD-10-CM

## 2024-04-08 DIAGNOSIS — I42.2 APICAL VARIANT HYPERTROPHIC CARDIOMYOPATHY (H): Primary | ICD-10-CM

## 2024-04-08 PROBLEM — D70.8 OTHER NEUTROPENIA (H): Status: RESOLVED | Noted: 2022-01-30 | Resolved: 2024-04-08

## 2024-04-08 LAB
CHOLEST SERPL-MCNC: 171 MG/DL
ERYTHROCYTE [DISTWIDTH] IN BLOOD BY AUTOMATED COUNT: 13 % (ref 10–15)
FASTING STATUS PATIENT QL REPORTED: YES
HCT VFR BLD AUTO: 42.7 % (ref 35–47)
HDLC SERPL-MCNC: 65 MG/DL
HGB BLD-MCNC: 13.8 G/DL (ref 11.7–15.7)
LDLC SERPL CALC-MCNC: 94 MG/DL
MCH RBC QN AUTO: 30.9 PG (ref 26.5–33)
MCHC RBC AUTO-ENTMCNC: 32.3 G/DL (ref 31.5–36.5)
MCV RBC AUTO: 96 FL (ref 78–100)
NONHDLC SERPL-MCNC: 106 MG/DL
PLATELET # BLD AUTO: 285 10E3/UL (ref 150–450)
RBC # BLD AUTO: 4.47 10E6/UL (ref 3.8–5.2)
TRIGL SERPL-MCNC: 60 MG/DL
WBC # BLD AUTO: 4.5 10E3/UL (ref 4–11)

## 2024-04-08 PROCEDURE — 36415 COLL VENOUS BLD VENIPUNCTURE: CPT

## 2024-04-08 PROCEDURE — 80061 LIPID PANEL: CPT

## 2024-04-08 PROCEDURE — 85027 COMPLETE CBC AUTOMATED: CPT | Performed by: FAMILY MEDICINE

## 2024-04-08 PROCEDURE — 99214 OFFICE O/P EST MOD 30 MIN: CPT | Mod: 25 | Performed by: FAMILY MEDICINE

## 2024-04-08 PROCEDURE — 80053 COMPREHEN METABOLIC PANEL: CPT | Performed by: FAMILY MEDICINE

## 2024-04-08 PROCEDURE — 93000 ELECTROCARDIOGRAM COMPLETE: CPT | Performed by: FAMILY MEDICINE

## 2024-04-08 ASSESSMENT — PAIN SCALES - GENERAL: PAINLEVEL: NO PAIN (0)

## 2024-04-08 NOTE — PROGRESS NOTES
Preoperative Evaluation  [facility]  [address], [address]  [facility] [address]  Phone: [phone]  Primary Provider: Wegener, Joel Daniel Irwin  Pre-op Performing Provider: CELESTINO OZUNA  Apr 8, 2024       Clara is a 63 year old, presenting for the following:  Pre-Op Exam        4/8/2024     2:13 PM   Additional Questions   Roomed by Ashlie DONG   Accompanied by n/a     Surgical Information   Surgery/Procedure: Left open Elbow Lateral Extensor Supinator Mass Repair  Surgery Location: [facility]  Surgeon: Dr. Abdifatah Rodriguez  Surgery Date: 4/18/2024   Time of Surgery: TBD  Where patient plans to recover: At home alone  Fax number for surgical facility: [phone]    Assessment & Plan     The proposed surgical procedure is considered INTERMEDIATE risk.    Preop general physical exam  Partial tear of common extensor tendon of left elbow  Patient had imaging studies documenting a partial tear of the common extensor tendon. Patient's symptoms failed to improve with conservative management. Desires to proceed with surgery to alleviate pain.     Essential hypertension  Well controlled.   - COMPREHENSIVE METABOLIC PANEL; Future  - REVIEW OF HEALTH MAINTENANCE PROTOCOL ORDERS  - CBC with platelets; Future    Apical variant hypertrophic cardiomyopathy (H)  Patient denies any symptoms at this time. She has regular follow-up with cardiology.   - EKG 12-lead complete w/read - Clinics    Antiplatelet or Anticoagulation Medication Instructions   - Bleeding risk is low for this procedure (e.g. dental, skin, cataract).  Stopped taking aspirin on 04/04/2024    Additional Medication Instructions   - ACE/ARB: HOLD on day of surgery (minimum 11 hours for general anesthesia).    Recommendation  APPROVAL GIVEN to proceed with proposed procedure, without further diagnostic evaluation.        Subjective       HPI related to upcoming procedure:     Clara Boykin is a 63 year  old female with a past medical history notable for nonobstructive coronary artery disease, carotid artery disease, hypertension, and apical variant hypertrophic cardiomyopathy who presents to the clinic for a preoperative history and physical exam prior to left open Elbow Lateral Extensor Supinator Mass Repair. In November 2023, the patient was walking her dog and holding the leash with her left hand. The dog accelerated suddenly, pulling her arm and causing her to start experiencing pain. Pain persisted to December 2023. Imaging studies revealed a 1cm x 0.6 cm tear in the common extensor tendon. She describes sharp pain in her left elbow, which has failed to improve over the last four months. She was seen and evaluated by Ortho, and she desires to proceed with surgery to alleviate the pain        4/7/2024    11:50 AM   Preop Questions   1. Have you ever had a heart attack or stroke? UNKNOWN    2. Have you ever had surgery on your heart or blood vessels, such as a stent placement, a coronary artery bypass, or surgery on an artery in your head, neck, heart, or legs? No   3. Do you have chest pain with activity? No   4. Do you have a history of  heart failure? No   5. Do you currently have a cold, bronchitis or symptoms of other infection? No   6. Do you have a cough, shortness of breath, or wheezing? No   7. Do you or anyone in your family have previous history of blood clots? YES - denies any personal history   8. Do you or does anyone in your family have a serious bleeding problem such as prolonged bleeding following surgeries or cuts? No   9. Have you ever had problems with anemia or been told to take iron pills? No   10. Have you had any abnormal blood loss such as black, tarry or bloody stools, or abnormal vaginal bleeding? No   11. Have you ever had a blood transfusion? YES    11a. Have you ever had a transfusion reaction? UNKNOWN -    12. Are you willing to have a blood transfusion if it is medically needed  before, during, or after your surgery? Yes   13. Have you or any of your relatives ever had problems with anesthesia? YES - Pt had nausea and vomiting with older anesthetics.    14. Do you have sleep apnea, excessive snoring or daytime drowsiness? No   15. Do you have any artifical heart valves or other implanted medical devices like a pacemaker, defibrillator, or continuous glucose monitor? No   16. Do you have artificial joints? No   17. Are you allergic to latex? No       Health Care Directive  Patient does not have a Health Care Directive or Living Will: Per chart review, patient is full code.     Preoperative Review of    reviewed - no record of controlled substances prescribed.      Patient Active Problem List    Diagnosis Date Noted    Osteoporosis without current pathological fracture, unspecified osteoporosis type 07/08/2023     Priority: Medium    Apical variant hypertrophic cardiomyopathy (H) 02/14/2020     Priority: Medium     CMR 2/14/20      Non-obstructive CAD without angina (coronary angiogram 1/2020) 02/04/2020     Priority: Medium     NSTEMI Jan 2020      Hyperlipidemia LDL goal <70 02/04/2020     Priority: Medium    NSTEMI (non-ST elevated myocardial infarction) (H) 01/23/2020     Priority: Medium    Hip pain, right 09/25/2018     Priority: Medium    Right foot pain 11/30/2016     Priority: Medium    Small vessel disease, cerebrovascular 09/30/2016     Priority: Medium    History of anesthesia reaction 06/28/2016     Priority: Medium    CARDIOVASCULAR SCREENING; LDL GOAL LESS THAN 160 09/04/2015     Priority: Medium    Insomnia 09/04/2015     Priority: Medium    Skin exam, screening for cancer 08/22/2013     Priority: Medium    Pain in joint, upper arm 08/05/2013     Priority: Medium    iamJOINT PAIN-PELVIS 09/22/2005     Priority: Medium      Past Medical History:   Diagnosis Date    Apical variant hypertrophic cardiomyopathy (H) 3/10/2020    Arthritis ?    thumbs, ? mild other sites     Coronary artery disease involving native coronary artery of native heart without angina pectoris 2/4/2020    NSTEMI Jan 2020    Diplopia     H/O CT scan     H/O magnetic resonance imaging     History of blood transfusion     2x at birth; mom was RH neg    Hypertension < =2018    Morning-surge HTN: mild    Non-obstructive CAD without angina (coronary angiogram 1/2020) 2/4/2020    NSTEMI Jan 2020    Paralytic strabismus     Pneumonia     PONV (postoperative nausea and vomiting)      Past Surgical History:   Procedure Laterality Date    ABDOMEN SURGERY  July 2016    Abd hernia repair (which has reoccurred form coughing 2018)    APPENDECTOMY      AS KNEE SCOPE, DIAGNOSTIC      BACK SURGERY  1999    Left C6-7 foraminotomy    BIOPSY  2014    lipoma excision (near R scapula)    BUNIONECTOMY Right 2016    times 2    BUNIONECTOMY Left 10/22/2018    Procedure: LEFT REVISION BUNION ;  Surgeon: Johanna Lund MD;  Location:  [facility]    COLONOSCOPY  2019    1 sm precancer polyp    CV CORONARY ANGIOGRAM N/A 1/24/2020    Procedure: Coronary Angiogram;  Surgeon: Criss Green MD;  Location:  HEART CARDIAC CATH LAB    CV LEFT HEART CATH N/A 1/24/2020    Procedure: Left Heart Cath;  Surgeon: Criss Green MD;  Location:  HEART CARDIAC CATH LAB    CV LEFT VENTRICULOGRAM N/A 1/24/2020    Procedure: Left Ventriculogram;  Surgeon: Criss Green MD;  Location:  HEART CARDIAC CATH LAB    HERNIORRHAPHY VENTRAL N/A 7/7/2016    Procedure: HERNIORRHAPHY VENTRAL;  Surgeon: Ash Thompson MD;  Location:  OR    left clavical      NECK SURGERY      ORTHOPEDIC SURGERY  [id number]    ORIF L clavicle/hardware out; bilat bunion/forefoot (4 ops)    RECESSION RESECTION (REPAIR STRABISMUS) Right 4/10/2017    Procedure: RECESSION RESECTION (REPAIR STRABISMUS);  Surgeon: Lyle Leggett MD;  Location:  OR    SOFT TISSUE SURGERY  2007    L wrist: scapholunate lig reconstruction post L wristfx     WRIST SURGERY       Current Outpatient Medications   Medication Sig Dispense Refill    alendronate (FOSAMAX) 70 MG tablet Take 70 mg by mouth every 7 days      aspirin (ASA) 81 MG chewable tablet Take 1 tablet (81 mg) by mouth daily 90 tablet 1    atorvastatin (LIPITOR) 10 MG tablet Take 1 tablet (10 mg) by mouth every evening 90 tablet 3    CALCIUM PO Take 1,200 mg by mouth daily      Co-Enzyme Q-10 60 MG CAPS       diphenhydrAMINE (BENADRYL) 25 MG tablet Take 12.5-25 mg by mouth nightly as needed Reported on 3/31/2017      lisinopril (ZESTRIL) 2.5 MG tablet TAKE 1/2 TABLET(1.25 MG) BY MOUTH 5 TIMES A WEEK 30 tablet 11    melatonin 3 MG tablet Take 3 mg by mouth nightly as needed for sleep      multivitamin, therapeutic (THERA-VIT) TABS tablet Take 1 tablet by mouth daily      Omega-3 Fatty Acids (OMEGA 3 PO) Take 1 g by mouth daily Reported on 3/31/2017      traZODone (DESYREL) 50 MG tablet TAKE 1/2 TABLET BY MOUTH EVERY NIGHT AS NEEDED 30 tablet 11    VITAMIN D, CHOLECALCIFEROL, PO Take 1,000 Units by mouth daily         Allergies   Allergen Reactions    Exparel [Bupivacaine] GI Disturbance     Patient had severe abdominal distention    Darvocet [Propoxyphene N-Apap] Other (See Comments)     confusion    Liposomes GI Disturbance    Oxycodone     Penicillins Itching    Percocet [Oxycodone-Acetaminophen] Other (See Comments)     confusion    Tape [Adhesive Tape] Rash     Once after surgical tape    Vistaril [Hydroxyzine] Rash     Intense flushing/redness of face         Social History     Tobacco Use    Smoking status: Never    Smokeless tobacco: Never    Tobacco comments:     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    Objective    /62 (BP Location: Right arm, Patient Position: Sitting, Cuff Size: Adult Regular)   Pulse 69   Temp 97.2  F (36.2  C) (Temporal)   Resp 16   Ht 1.645 m (5' 4.76\")   Wt 54.8 kg (120 lb 12.8 oz)   LMP 09/06/2006   SpO2 96%   BMI 20.25 kg/m     Estimated body mass index is 20.25 kg/m  as calculated from the following:    Height as of this encounter: 1.645 m (5' 4.76\").    Weight as of this encounter: 54.8 kg (120 lb 12.8 oz).  Physical Exam  GENERAL: alert and no distress  NECK: no adenopathy, no asymmetry, masses, or scars  RESP: lungs clear to auscultation - no rales, rhonchi or wheezes  CV: regular rate and rhythm, normal S1 S2, no S3 or S4, no murmur, click or rub, no peripheral edema  ABDOMEN: soft, nontender, no hepatosplenomegaly, no masses and bowel sounds normal  MS: ROM is intact in bilateral upper extremities. Point tenderness to palpation on the left lateral elbow.     Recent Labs   Lab Test 05/19/23  1206 07/01/22  1434 07/01/22  1348   HGB 14.9  --  13.6     --  245   CR  --  0.6  --         Diagnostics  Recent Results (from the past 240 hour(s))   Lipid panel reflex to direct LDL Fasting    Collection Time: 04/08/24  8:17 AM   Result Value Ref Range    Cholesterol 171 <200 mg/dL    Triglycerides 60 <150 mg/dL    Direct Measure HDL 65 >=50 mg/dL    LDL Cholesterol Calculated 94 <=100 mg/dL    Non HDL Cholesterol 106 <130 mg/dL    Patient Fasting > 8hrs? Yes    CBC with platelets    Collection Time: 04/08/24  3:56 PM   Result Value Ref Range    WBC Count 4.5 4.0 - 11.0 10e3/uL    RBC Count 4.47 3.80 - 5.20 10e6/uL    Hemoglobin 13.8 11.7 - 15.7 g/dL    Hematocrit 42.7 35.0 - 47.0 %    MCV 96 78 - 100 fL    MCH 30.9 26.5 - 33.0 pg    MCHC 32.3 31.5 - 36.5 g/dL    RDW 13.0 10.0 - 15.0 %    Platelet Count 285 150 - 450 10e3/uL   COMPREHENSIVE METABOLIC PANEL    Collection Time: 04/08/24  3:56 PM   Result Value " Ref Range    Sodium 140 135 - 145 mmol/L    Potassium 4.6 3.4 - 5.3 mmol/L    Carbon Dioxide (CO2) 27 22 - 29 mmol/L    Anion Gap 11 7 - 15 mmol/L    Urea Nitrogen 23.7 (H) 8.0 - 23.0 mg/dL    Creatinine 0.68 0.51 - 0.95 mg/dL    GFR Estimate >90 >60 mL/min/1.73m2    Calcium 9.4 8.8 - 10.2 mg/dL    Chloride 102 98 - 107 mmol/L    Glucose 89 70 - 99 mg/dL    Alkaline Phosphatase 52 40 - 150 U/L    AST 34 0 - 45 U/L    ALT 28 0 - 50 U/L    Protein Total 7.1 6.4 - 8.3 g/dL    Albumin 4.6 3.5 - 5.2 g/dL    Bilirubin Total 0.3 <=1.2 mg/dL      EKG: sinus bradycardia, normal axis, normal intervals, no acute ST/T changes c/w ischemia, no LVH by voltage criteria, unchanged from previous tracings    Revised Cardiac Risk Index (RCRI)  The patient has the following serious cardiovascular risks for perioperative complications:   - Coronary artery disease and Apical variant hypertrophic cardiomyopathy (H) = 1 point     RCRI Interpretation: 1 point: Class II (low risk - 0.9% complication rate)         Signed Electronically by: Diana Huertas MD  Copy of this evaluation report is provided to requesting physician.

## 2024-04-08 NOTE — PATIENT INSTRUCTIONS
Preparing for Your Surgery  Getting started  A nurse will call you to review your health history and instructions. They will give you an arrival time based on your scheduled surgery time. Please be ready to share:  Your doctor's clinic name and phone number  Your medical, surgical, and anesthesia history  A list of allergies and sensitivities  A list of medicines, including herbal treatments and over-the-counter drugs  Whether the patient has a legal guardian (ask how to send us the papers in advance)  Please tell us if you're pregnant--or if there's any chance you might be pregnant. Some surgeries may injure a fetus (unborn baby), so they require a pregnancy test. Surgeries that are safe for a fetus don't always need a test, and you can choose whether to have one.   If you have a child who's having surgery, please ask for a copy of Preparing for Your Child's Surgery.    Preparing for surgery  Within 10 to 30 days of surgery: Have a pre-op exam (sometimes called an H&P, or History and Physical). This can be done at a clinic or pre-operative center.  If you're having a , you may not need this exam. Talk to your care team.  At your pre-op exam, talk to your care team about all medicines you take. If you need to stop any medicines before surgery, ask when to start taking them again.  We do this for your safety. Many medicines can make you bleed too much during surgery. Some change how well surgery (anesthesia) drugs work.  Call your insurance company to let them know you're having surgery. (If you don't have insurance, call [phone].)  Call your clinic if there's any change in your health. This includes signs of a cold or flu (sore throat, runny nose, cough, rash, fever). It also includes a scrape or scratch near the surgery site.  If you have questions on the day of surgery, call your hospital or surgery center.  Eating and drinking guidelines  For your safety: Unless your surgeon tells you otherwise,  follow the guidelines below.  Eat and drink as usual until 8 hours before you arrive for surgery. After that, no food or milk.  Drink clear liquids until 2 hours before you arrive. These are liquids you can see through, like water, Gatorade, and Propel Water. They also include plain black coffee and tea (no cream or milk), candy, and breath mints. You can spit out gum when you arrive.  If you drink alcohol: Stop drinking it the night before surgery.  If your care team tells you to take medicine on the morning of surgery, it's okay to take it with a sip of water.  Preventing infection  Shower or bathe the night before and morning of your surgery. Follow the instructions your clinic gave you. (If no instructions, use regular soap.)  Don't shave or clip hair near your surgery site. We'll remove the hair if needed.  Don't smoke or vape the morning of surgery. You may chew nicotine gum up to 2 hours before surgery. A nicotine patch is okay.  Note: Some surgeries require you to completely quit smoking and nicotine. Check with your surgeon.  Your care team will make every effort to keep you safe from infection. We will:  Clean our hands often with soap and water (or an alcohol-based hand rub).  Clean the skin at your surgery site with a special soap that kills germs.  Give you a special gown to keep you warm. (Cold raises the risk of infection.)  Wear special hair covers, masks, gowns and gloves during surgery.  Give antibiotic medicine, if prescribed. Not all surgeries need antibiotics.  What to bring on the day of surgery  Photo ID and insurance card  Copy of your health care directive, if you have one  Glasses and hearing aids (bring cases)  You can't wear contacts during surgery  Inhaler and eye drops, if you use them (tell us about these when you arrive)  CPAP machine or breathing device, if you use them  A few personal items, if spending the night  If you have . . .  A pacemaker, ICD (cardiac defibrillator) or other  implant: Bring the ID card.  An implanted stimulator: Bring the remote control.  A legal guardian: Bring a copy of the certified (court-stamped) guardianship papers.  Please remove any jewelry, including body piercings. Leave jewelry and other valuables at home.  If you're going home the day of surgery  You must have a responsible adult drive you home. They should stay with you overnight as well.  If you don't have someone to stay with you, and you aren't safe to go home alone, we may keep you overnight. Insurance often won't pay for this.  After surgery  If it's hard to control your pain or you need more pain medicine, please call your surgeon's office.  Questions?   If you have any questions for your care team, list them here: _________________________________________________________________________________________________________________________________________________________________________ ____________________________________ ____________________________________ ____________________________________  For informational purposes only. Not to replace the advice of your health care provider. Copyright   2003, 2019 [facility]. All rights reserved. Clinically reviewed by Caridad Ashraf MD. [employer] [id number] - REV 12/22.

## 2024-04-09 LAB
ALBUMIN SERPL BCG-MCNC: 4.6 G/DL (ref 3.5–5.2)
ALP SERPL-CCNC: 52 U/L (ref 40–150)
ALT SERPL W P-5'-P-CCNC: 28 U/L (ref 0–50)
ANION GAP SERPL CALCULATED.3IONS-SCNC: 11 MMOL/L (ref 7–15)
AST SERPL W P-5'-P-CCNC: 34 U/L (ref 0–45)
BILIRUB SERPL-MCNC: 0.3 MG/DL
BUN SERPL-MCNC: 23.7 MG/DL (ref 8–23)
CALCIUM SERPL-MCNC: 9.4 MG/DL (ref 8.8–10.2)
CHLORIDE SERPL-SCNC: 102 MMOL/L (ref 98–107)
CREAT SERPL-MCNC: 0.68 MG/DL (ref 0.51–0.95)
DEPRECATED HCO3 PLAS-SCNC: 27 MMOL/L (ref 22–29)
EGFRCR SERPLBLD CKD-EPI 2021: >90 ML/MIN/1.73M2
GLUCOSE SERPL-MCNC: 89 MG/DL (ref 70–99)
POTASSIUM SERPL-SCNC: 4.6 MMOL/L (ref 3.4–5.3)
PROT SERPL-MCNC: 7.1 G/DL (ref 6.4–8.3)
SODIUM SERPL-SCNC: 140 MMOL/L (ref 135–145)

## 2024-04-11 ENCOUNTER — MYC MEDICAL ADVICE (OUTPATIENT)
Dept: FAMILY MEDICINE | Facility: CLINIC | Age: 64
End: 2024-04-11
Payer: COMMERCIAL

## 2024-04-11 NOTE — TELEPHONE ENCOUNTER
Dr Huertas,  Please sign the Preop Physical when You can. Then we can Fax it.    Thank You,  [facility] Unit Coordinator

## 2024-04-12 NOTE — TELEPHONE ENCOUNTER
Faxed electronically. Another copy will be faxed by our TC team.   Copy of note was placed at [facility].   MD Kiya

## 2024-04-17 ENCOUNTER — PATIENT OUTREACH (OUTPATIENT)
Dept: CARE COORDINATION | Facility: CLINIC | Age: 64
End: 2024-04-17
Payer: COMMERCIAL

## 2024-04-29 ENCOUNTER — TRANSFERRED RECORDS (OUTPATIENT)
Dept: HEALTH INFORMATION MANAGEMENT | Facility: CLINIC | Age: 64
End: 2024-04-29
Payer: COMMERCIAL

## 2024-05-14 ENCOUNTER — MYC MEDICAL ADVICE (OUTPATIENT)
Dept: CARDIOLOGY | Facility: CLINIC | Age: 64
End: 2024-05-14

## 2024-05-14 ENCOUNTER — OFFICE VISIT (OUTPATIENT)
Dept: CARDIOLOGY | Facility: CLINIC | Age: 64
End: 2024-05-14
Attending: INTERNAL MEDICINE
Payer: COMMERCIAL

## 2024-05-14 ENCOUNTER — LAB (OUTPATIENT)
Dept: LAB | Facility: CLINIC | Age: 64
End: 2024-05-14
Payer: COMMERCIAL

## 2024-05-14 VITALS
HEART RATE: 63 BPM | WEIGHT: 119.8 LBS | SYSTOLIC BLOOD PRESSURE: 113 MMHG | BODY MASS INDEX: 20.08 KG/M2 | OXYGEN SATURATION: 98 % | DIASTOLIC BLOOD PRESSURE: 70 MMHG

## 2024-05-14 DIAGNOSIS — I10 HYPERTENSION GOAL BP (BLOOD PRESSURE) < 130/80: Primary | ICD-10-CM

## 2024-05-14 DIAGNOSIS — I10 ESSENTIAL HYPERTENSION: ICD-10-CM

## 2024-05-14 DIAGNOSIS — I42.2 HYPERTROPHIC CARDIOMYOPATHY (H): Primary | ICD-10-CM

## 2024-05-14 DIAGNOSIS — I42.2 APICAL VARIANT HYPERTROPHIC CARDIOMYOPATHY (H): ICD-10-CM

## 2024-05-14 LAB
CHOLEST SERPL-MCNC: 154 MG/DL
FASTING STATUS PATIENT QL REPORTED: YES
HDLC SERPL-MCNC: 70 MG/DL
LDLC SERPL CALC-MCNC: 74 MG/DL
LVEF ECHO: NORMAL
NONHDLC SERPL-MCNC: 84 MG/DL
TRIGL SERPL-MCNC: 50 MG/DL

## 2024-05-14 PROCEDURE — 93306 TTE W/DOPPLER COMPLETE: CPT | Performed by: INTERNAL MEDICINE

## 2024-05-14 PROCEDURE — 99215 OFFICE O/P EST HI 40 MIN: CPT | Mod: 25 | Performed by: INTERNAL MEDICINE

## 2024-05-14 PROCEDURE — 99213 OFFICE O/P EST LOW 20 MIN: CPT | Performed by: INTERNAL MEDICINE

## 2024-05-14 PROCEDURE — 36415 COLL VENOUS BLD VENIPUNCTURE: CPT | Performed by: PATHOLOGY

## 2024-05-14 PROCEDURE — 80061 LIPID PANEL: CPT | Performed by: PATHOLOGY

## 2024-05-14 ASSESSMENT — PAIN SCALES - GENERAL: PAINLEVEL: NO PAIN (0)

## 2024-05-14 NOTE — PROGRESS NOTES
Chief complaint: Follow up of apical-variant HCM    HPI:   Clara Boykin is a 64 year old female with a past medical history notable for nonobstructive coronary artery disease, carotid artery disease, hypertension, and newly diagnosed hypertrophic cardiomyopathy who presents for evaluation of hypertrophic cardiomyopathy.  The patient states that she initially developed symptoms in January of this past year.  She was shoveling snow and developed severe dyspnea.  This feeling of dyspnea persisted for a few days.  Eventually, she ordered a troponin for herself and this later returned to be elevated.  After she had the elevated troponin, she presented to [facility].  Given the elevated troponin, shortness of breath, and abnormal EKG (which is new from prior), she underwent a coronary angiogram which showed nonobstructive coronary artery disease.  She was given aspirin, atorvastatin, and metoprolol on discharge.  She later underwent a cardiac MRI which showed signs consistent with apical hypertrophic cardiomyopathy.  She presents here today for further evaluation.    With the exception of the issue that led to her being admitted to the hospital, she has been generally asymptomatic.  She states that she does feel more short of breath than typical with significant exertion, however has not been exerting herself this past winter.  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  She notably has no palpitations, presyncope, and syncope.  She states that she had one syncopal event that was associated with a seizure in the 1970s, which she was told was due to dehydration.  She has since worn an event monitor which showed no episodes of NSVT.  She has a family history of 2 siblings dying at a very young age, which was presumably due to Rh disease.  In addition, she did have 1 uncle who  while swimming, however he was in his late 70s to early 80s at the time.  Her father has been told that he is apical  hypokinesis, though this was presumed to be due to a myocardial infarction.  She has no children and she has 1 living brother.    10/6/2020:  At the patient's last visit, her metoprolol was decreased to 12.5 mg twice per day (mostly treating hypertension).  We also ordered a cardiopulmonary stress test, which is yet to be completed.    The patient reports that since her last visit she has had occasional episodes of shortness of breath.  This is been worse recently with the higher heat and fires.  She has had intermittent presyncopal episodes, however no syncopal episodes.  She reports that her heart rate is been mainly in the 50s.  Occasionally, her systolic blood pressure will be in the 90s at which point she will hold 1 dose of metoprolol.    She also obtained the autopsy report from a paternal uncle who  while swimming.  He was found to have hypertrophic cardiomyopathy.  Her father has recently had a stroke and echocardiogram was performed at that time.    She denies orthopnea, PND, or lower extremity swelling.    21:  Since her last visit, metoprolol was discontinued and Clara also stopped her lisinopril. She has been running regularly (6-10 miles 3-4 miles/wk) without difficulty. Home BPs have been consistently 100s-110s/60s-70s.     22:  Clara has had a difficult year, with significant work demands and stresses of COVID and also because her father has been ill (seizure and small stroke) but he is doing better now.  She just resumed running again. She is starting to feel better and is under less stress than before.   ECG 22: sinus with left atrial abnormality, VR 69,  QRS 78 QTc 415. No change from 21.   TTE 22: Apical HCM, better seen on prior CMR (and best seen on non-contrast images.) Normal LV/RV size/function. Mild TR. Trace to mild AI.     2023:   She reports feeling well and remains active; she just ran a 4-mile trail race in [facility] this weekend. A CPX was  done and showed a peak VO2 of 40.1 mL/kg/min. She reports feeling generally well. She plans to do a triathlon.    5/14/2024:   There are no interim hospitalizations.   She experienced an anterior tibial fracture in December of last year and was on crutches for five weeks. She also tore one of the tendons in her left elbow trying to restrain her dog. She is still in an elbow brace but has recently started running again without any issue.    No change in physical activity tolerance. She continues to run. Prior to her fracture in December she ran a marathon and a trail race. She recalls two episodes of transient palpitations, one of which occurred during the zoom meeting, the other of which occurred while she was standing in her kitchen. Both went away on their own.    There is no interim history of chest pain, tightness, changing exercise tolerance, paroxysmal nocturnal dyspnea, orthopnea, dependent edema, pre-syncope, syncope dizziness.     PAST MEDICAL HISTORY:  Past Medical History:   Diagnosis Date    Apical variant hypertrophic cardiomyopathy (H) 3/10/2020    Arthritis ?    thumbs, ? mild other sites    Coronary artery disease involving native coronary artery of native heart without angina pectoris 2/4/2020    NSTEMI Jan 2020    Diplopia     H/O CT scan     H/O magnetic resonance imaging     History of blood transfusion     2x at birth; mom was RH neg    Hypertension < =2018    Morning-surge HTN: mild    Non-obstructive CAD without angina (coronary angiogram 1/2020) 2/4/2020    NSTEMI Jan 2020    Paralytic strabismus     Pneumonia     PONV (postoperative nausea and vomiting)        CURRENT MEDICATIONS:  Current Outpatient Medications   Medication Sig Dispense Refill    alendronate (FOSAMAX) 70 MG tablet Take 70 mg by mouth every 7 days      aspirin (ASA) 81 MG chewable tablet Take 1 tablet (81 mg) by mouth daily 90 tablet 1    atorvastatin (LIPITOR) 10 MG tablet Take 1 tablet (10 mg) by mouth every evening 90  tablet 3    CALCIUM PO Take 1,200 mg by mouth daily      Co-Enzyme Q-10 60 MG CAPS       diphenhydrAMINE (BENADRYL) 25 MG tablet Take 12.5-25 mg by mouth nightly as needed Reported on 3/31/2017      lisinopril (ZESTRIL) 2.5 MG tablet TAKE 1/2 TABLET(1.25 MG) BY MOUTH 5 TIMES A WEEK 30 tablet 11    melatonin 3 MG tablet Take 3 mg by mouth nightly as needed for sleep      multivitamin, therapeutic (THERA-VIT) TABS tablet Take 1 tablet by mouth daily      Omega-3 Fatty Acids (OMEGA 3 PO) Take 1 g by mouth daily Reported on 3/31/2017      traZODone (DESYREL) 50 MG tablet TAKE 1/2 TABLET BY MOUTH EVERY NIGHT AS NEEDED 30 tablet 11    VITAMIN D, CHOLECALCIFEROL, PO Take 1,000 Units by mouth daily         PAST SURGICAL HISTORY:  Past Surgical History:   Procedure Laterality Date    ABDOMEN SURGERY  July 2016    Abd hernia repair (which has reoccurred form coughing 2018)    APPENDECTOMY      AS KNEE SCOPE, DIAGNOSTIC      BACK SURGERY  1999    Left C6-7 foraminotomy    BIOPSY  2014    lipoma excision (near R scapula)    BUNIONECTOMY Right 2016    times 2    BUNIONECTOMY Left 10/22/2018    Procedure: LEFT REVISION BUNION ;  Surgeon: Johanna Lund MD;  Location: [facility]    COLONOSCOPY  2019 1 sm precancer polyp    CV CORONARY ANGIOGRAM N/A 1/24/2020    Procedure: Coronary Angiogram;  Surgeon: Criss Green MD;  Location:  [facility]    CV LEFT HEART CATH N/A 1/24/2020    Procedure: Left Heart Cath;  Surgeon: Criss Green MD;  Location:  [facility]    CV LEFT VENTRICULOGRAM N/A 1/24/2020    Procedure: Left Ventriculogram;  Surgeon: Criss Green MD;  Location:  [facility]    HERNIORRHAPHY VENTRAL N/A 7/7/2016    Procedure: HERNIORRHAPHY VENTRAL;  Surgeon: Ash Thompson MD;  Location: [facility]    left clavical      NECK SURGERY      ORTHOPEDIC SURGERY  [id number]    ORIF L clavicle/hardware out; bilat bunion/forefoot (4 ops)    RECESSION  RESECTION (REPAIR STRABISMUS) Right 4/10/2017    Procedure: RECESSION RESECTION (REPAIR STRABISMUS);  Surgeon: Lyle Leggett MD;  Location: [facility] OR    SOFT TISSUE SURGERY      L wrist: scapholunate lig reconstruction post L wristfx    WRIST SURGERY         ALLERGIES:     Allergies   Allergen Reactions    Exparel [Bupivacaine] GI Disturbance     Patient had severe abdominal distention    Darvocet [Propoxyphene N-Apap] Other (See Comments)     confusion    Liposomes GI Disturbance    Oxycodone     Penicillins Itching    Percocet [Oxycodone-Acetaminophen] Other (See Comments)     confusion    Tape [Adhesive Tape] Rash     Once after surgical tape    Vistaril [Hydroxyzine] Rash     Intense flushing/redness of face        FAMILY HISTORY:  Family History   Problem Relation Age of Onset    Cancer Mother         skin cancer    Diabetes Mother         borderline/Type 2    Hypertension Mother         3 meds: 4.5cm asc aortic aneurysm    Hyperlipidemia Mother         chol & very high triglycerides    Cerebrovascular Disease Mother         many small, cog. impaired,  18    Osteoporosis Mother         fosamax x5 yrs:poststeroids    Obesity Mother         BMI 35+    Unknown/Adopted Mother         dermatomyositis since     Depression Mother     Mental Illness Mother         probably bordeline personality disorder; vascular dementia    Anesthesia Reaction Mother         ?    Cancer Father         skin cancer    Coronary Artery Disease Father         angioplasty ~    Hypertension Father         1 med, mild. Age 94    Depression Father         2x: age 87 [facility]    Hyperlipidemia Father         2020 after CVA, lipids not elevated    Cerebrovascular Disease Father         2 sm. foci occipito-parietal 20    Cancer Maternal Grandmother         skin cancer    Cerebrovascular Disease Maternal Grandmother         - multiple CVAs    Obesity Maternal Grandmother         overweight    Diabetes  Maternal Grandfather         borderline/Type 2    Coronary Artery Disease Maternal Grandfather         2-3 MIs; worked after each    Cerebrovascular Disease Maternal Grandfather          from CVA postop hernia    Obesity Maternal Grandfather         was overwt to obese    Diabetes Cousin         prediabetes    Hypertension Brother         1 med for 20 yrs    Substance Abuse Brother         hx of EtOH    Cerebrovascular Disease Other         cog. impairment like my mom    Mental Illness Sister         ?bipolar; past chem dep    Substance Abuse Sister         hx of: prescript drug& EtOH    Unknown/Adopted Sister         sister is adopted    Breast Cancer Sister         stage 2-3,     Anesthesia Reaction Other         naus/vomit 1-8 hr postop unless tx'd    Cerebrovascular Disease Other         cog. 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    Exam:  /70 (BP Location: Right arm, Patient Position: Chair, Cuff Size: Adult Regular)   Pulse 63   Wt 54.3 kg (119 lb 12.8 oz)   LMP 2006   SpO2 98%   BMI 20.08 kg/m    GENERAL APPEARANCE: healthy, alert and no distress  General: Well-nourished, well-developed, NAD   HEENT: normocephalic  Oral: moist mucous membranes  Chest: Normal work of breathing. clear to ausculation.   Cardio: Rhythm is regular.  S1 normal, S2. Murmurs are not present, JVP not elevated  Abdomen: without tenderness, rebound, guarding, masses, ascites  Extremities: Edema not present  Neuro: CN II-XII grossly intact. Alert and oriented x 4.   Skin: warm, dry, pink without open  lesions  Psych: normal mood, affect       Labs:  Reviewed.  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  A variant of unknown significance means that there is a genetic change in which we do not have enough information to determine if it is disease causing or not. Labs review published data, functional studies, presences in population databases, similarity to normal sequence, and computer prediction models.    This particular variant occurs in a portion of the gene that is highly conserved in available vertebrate species.  This variant occurs in an intron after exon 23.  A splice prediction tool predicts this alteration will abolish the native donor site; however, direct evidence is unavailable. This variant has not been previously reported as a disease causing mutation or as a normal variation.  Therefore, based on the current information it is unclear if this VUS is associated with disease or not.       Testing/Procedures:    CPX 9/29/20:   Exercised 12:17 on Jose G Ramp protocol  /90-> 185/78  MVO2 30.5 mL/kg/min (9 METS, class I, 120% predicted)    ZioPatch 2/6/21-2/22/21: Baseline sinus rhythm, average VR 73 bpm (range  bpm.) No sustained/nonsustained VT. Rare (<1%) PACs and PVCs; rare nonsustained SVT, likely AT (longest 21.2 sec.) No atrial fibrillation. 7 patient-triggered events, which appeared to correlate with SVT and with PACs.       ZioPatch 2/4/22-2/18/22:   Baseline sinus rhythm, average VR 74 bpm (range  bpm.) Rare (<1%) PACs and PVCs. 8 brief runs of nonsustained SVT (likely atrial tachycardia, longest 7 beats), 3 of which were  associated with symptoms. 18 patient-triggered episodes were associated with SVT (3), PACs (12), and the remainder with sinus rhyhtm. Several of the episodes with PACs also had isolated PVCs. No nonsustained VT. No atrial fibrillation. No significant/sustained tachyarrhythmias, bradyarrhythmias, or pauses.   AT was also present on prior ZioPatch from 2/6/21-2/22/21 (and associated with symptoms at that time) and also with 1/2020-2/2020 Holter monitor. The longest SVT/AT run was significantly shorter than on the prior ZioPatch (7 beats vs. 21.2 seconds.)   Results released to the patient via [facility] and will be discussed at her upcoming outpatient visit.    Echo 3/22/2022  Global and regional left ventricular function is normal with an EF of 60-65%.  Global right ventricular function is normal.  Mild mitral and tricuspid insufficiency present.  Pulmonary artery systolic pressure is normal.  The inferior vena cava was normal in size with preserved respiratory  variability.  No pericardial effusion is present.    CMR 2/14/20:  Apical HCM with apical segments ~1.3 cm (vs. 0.9 cm basal anterosetpum and 0.7 cm basal inferolateral.) Hyperdynamic LV function and normal RV function, LVEF=75% RVEF=67%. No LVOT or intracavitary obstruction. Very small apical LGE per report-- per my review, there is no convincing fibrosis on late gadolinium enhancement imaging.      Coronary angiogram 1/24/2020: non-obstructive CAD (30-40% mLAD/D1, 40% RCA)    Dr. Mendes personally visualized and interpreted:  TTE 05/09/23: Known apical variant HCM with isolated apical hypertrophy, LVEF=60-65%. No LVOT or intracavitary gradient. No significant change from 3/22/22.     CPX 4/17/23:   Superior exercise capacity (160% predicted), improved significantly (31%) from last CPX 9/2020.  MVO2 40.1 mL/kg/min (160% predicted, class I, 11.5 METS) VE/VCO2 slope 33.63 RER 1.21  Exercised 15:53 on Jose G Ramp protocol, achieved HR of 162 (104% MPHR)  Normal  HR and BP response to exercise.    ZioPatch 4/6/23-4/13/23:  Baseline sinus rhythm, average VR 74 bpm ( bpm.)   No sustained or nonsustained VT, no atrial fibrillation.   11 asymptomatic episodes of SVT, longest 19.3 sec.   Rare (<1%) PACs and PVCs.   5 patient-triggered events correlated with PACs (4/5 events.)    Zio patch 03/5/24-3/19/24  Rhythm was sinus with an average rate of 72 bpm, range 49 to 132 bpm.  PACs/PVCs less than 1%.  Brief runs of PACs up to 4 beats were asymptomatic.  No episodes of nonsustained VT.  Patient symptoms correlated with sinus rhythm and sinus tachycardia.    TTE 5/14/24   nterpretation Summary  Global and regional left ventricular function is normal with an EF of 60-65%.  Global right ventricular function is normal.  Mild tricuspid insufficiency is present.  Pulmonary artery systolic pressure is normal.  Estimated mean right atrial pressure is normal.  No pericardial effusion is present.     Assessment and Plan:   1.  Apical hypertrophic cardiomyopathy:   Clara has clear apical HCM by cardiac MRI.   Her only risk factor ventricular arrhythmias is sudden cardiac death in her uncle, who had autopsy confirmed hypertrophic cardiomyopathy is somewhat concerning.  The patient has had no ventricular arrhythmias on ZIO Patch testing and  her EF on echo is normal   Given the presence of a genetic mutation of undetermined significance and a paternal uncle with known hypertrophic cardiomyopathy, we have discussed the need for her family members, including brothers, father, and cousins to be clinically screened (ECG and echocardiogram.) Her father was tested for the same VUS that Clara has and was negative for it.  -follow up in 1 year with 7 day zio and CMR.     2.  Coronary artery disease, non-obstructive, without angina: Nonobstructive on coronary angiogram.  Continue aspirin and atorvastatin 10 mg (LDL<70.)    3.  Carotid artery disease: Noted on previous CTA.  Continue atorvastatin.  Direct LDL-C 74 on atorvastatin 10 mg daily.     4.  Hypertension, essential, controlled: Continue to monitor.     5. Atrial tachycardia/PACs: rare and not bothersome to Clara, continue to monitor.     The patient states understanding and is agreeable with plan.         Arturo Drake   Cardiology Fellow  This note was created using Dragon dictation software, so please excuse any mistakes and incorrect syntax and semantics.      Uriel Mendes MD

## 2024-05-14 NOTE — NURSING NOTE
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       Vitals were taken and medications reconciled.    Chino Major, EMT  9:15 AM

## 2024-05-14 NOTE — LETTER
5/14/2024      RE: Clara Boykin  [address]  [facility] [address]       Dear Colleague,    Thank you for the opportunity to participate in the care of your patient, Clara Boykin, at the [facility] [place] at [facility]. Please see a copy of my visit note below.    Chief complaint: Follow up of apical-variant HCM    HPI:   Clara Boykin is a 64 year old female with a past medical history notable for nonobstructive coronary artery disease, carotid artery disease, hypertension, and newly diagnosed hypertrophic cardiomyopathy who presents for evaluation of hypertrophic cardiomyopathy.  The patient states that she initially developed symptoms in January of this past year.  She was shoveling snow and developed severe dyspnea.  This feeling of dyspnea persisted for a few days.  Eventually, she ordered a troponin for herself and this later returned to be elevated.  After she had the elevated troponin, she presented to [facility].  Given the elevated troponin, shortness of breath, and abnormal EKG (which is new from prior), she underwent a coronary angiogram which showed nonobstructive coronary artery disease.  She was given aspirin, atorvastatin, and metoprolol on discharge.  She later underwent a cardiac MRI which showed signs consistent with apical hypertrophic cardiomyopathy.  She presents here today for further evaluation.    With the exception of the issue that led to her being admitted to the hospital, she has been generally asymptomatic.  She states that she does feel more short of breath than typical with significant exertion, however has not been exerting herself this past winter.  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  She notably has no palpitations, presyncope, and syncope.  She states that she had one syncopal event that was associated with a seizure in the 1970s, which she was told was due to dehydration.  She  has since worn an event monitor which showed no episodes of NSVT.  She has a family history of 2 siblings dying at a very young age, which was presumably due to Rh disease.  In addition, she did have 1 uncle who  while swimming, however he was in his late 70s to early 80s at the time.  Her father has been told that he is apical hypokinesis, though this was presumed to be due to a myocardial infarction.  She has no children and she has 1 living brother.    10/6/2020:  At the patient's last visit, her metoprolol was decreased to 12.5 mg twice per day (mostly treating hypertension).  We also ordered a cardiopulmonary stress test, which is yet to be completed.    The patient reports that since her last visit she has had occasional episodes of shortness of breath.  This is been worse recently with the higher heat and fires.  She has had intermittent presyncopal episodes, however no syncopal episodes.  She reports that her heart rate is been mainly in the 50s.  Occasionally, her systolic blood pressure will be in the 90s at which point she will hold 1 dose of metoprolol.    She also obtained the autopsy report from a paternal uncle who  while swimming.  He was found to have hypertrophic cardiomyopathy.  Her father has recently had a stroke and echocardiogram was performed at that time.    She denies orthopnea, PND, or lower extremity swelling.    21:  Since her last visit, metoprolol was discontinued and Clara also stopped her lisinopril. She has been running regularly (6-10 miles 3-4 miles/wk) without difficulty. Home BPs have been consistently 100s-110s/60s-70s.     22:  Clara has had a difficult year, with significant work demands and stresses of COVID and also because her father has been ill (seizure and small stroke) but he is doing better now.  She just resumed running again. She is starting to feel better and is under less stress than before.   ECG 22: sinus with left atrial abnormality, VR  69,  QRS 78 QTc 415. No change from 5/11/21.   TTE 03/22/22: Apical HCM, better seen on prior CMR (and best seen on non-contrast images.) Normal LV/RV size/function. Mild TR. Trace to mild AI.     5/9/2023:   She reports feeling well and remains active; she just ran a 4-mile trail race in [facility] this weekend. A CPX was done and showed a peak VO2 of 40.1 mL/kg/min. She reports feeling generally well. She plans to do a triathlon.    5/14/2024:   There are no interim hospitalizations.   She experienced an anterior tibial fracture in December of last year and was on crutches for five weeks. She also tore one of the tendons in her left elbow trying to restrain her dog. She is still in an elbow brace but has recently started running again without any issue.    No change in physical activity tolerance. She continues to run. Prior to her fracture in December she ran a marathon and a trail race. She recalls two episodes of transient palpitations, one of which occurred during the zoom meeting, the other of which occurred while she was standing in her kitchen. Both went away on their own.    There is no interim history of chest pain, tightness, changing exercise tolerance, paroxysmal nocturnal dyspnea, orthopnea, dependent edema, pre-syncope, syncope dizziness.     PAST MEDICAL HISTORY:  Past Medical History:   Diagnosis Date     Apical variant hypertrophic cardiomyopathy (H) 3/10/2020     Arthritis ?    thumbs, ? mild other sites     Coronary artery disease involving native coronary artery of native heart without angina pectoris 2/4/2020    NSTEMI Jan 2020     Diplopia      H/O CT scan      H/O magnetic resonance imaging      History of blood transfusion     2x at birth; mom was RH neg     Hypertension < =2018    Morning-surge HTN: mild     Non-obstructive CAD without angina (coronary angiogram 1/2020) 2/4/2020    NSTEMI Jan 2020     Paralytic strabismus      Pneumonia      PONV (postoperative nausea and  vomiting)        CURRENT MEDICATIONS:  Current Outpatient Medications   Medication Sig Dispense Refill     alendronate (FOSAMAX) 70 MG tablet Take 70 mg by mouth every 7 days       aspirin (ASA) 81 MG chewable tablet Take 1 tablet (81 mg) by mouth daily 90 tablet 1     atorvastatin (LIPITOR) 10 MG tablet Take 1 tablet (10 mg) by mouth every evening 90 tablet 3     CALCIUM PO Take 1,200 mg by mouth daily       Co-Enzyme Q-10 60 MG CAPS        diphenhydrAMINE (BENADRYL) 25 MG tablet Take 12.5-25 mg by mouth nightly as needed Reported on 3/31/2017       lisinopril (ZESTRIL) 2.5 MG tablet TAKE 1/2 TABLET(1.25 MG) BY MOUTH 5 TIMES A WEEK 30 tablet 11     melatonin 3 MG tablet Take 3 mg by mouth nightly as needed for sleep       multivitamin, therapeutic (THERA-VIT) TABS tablet Take 1 tablet by mouth daily       Omega-3 Fatty Acids (OMEGA 3 PO) Take 1 g by mouth daily Reported on 3/31/2017       traZODone (DESYREL) 50 MG tablet TAKE 1/2 TABLET BY MOUTH EVERY NIGHT AS NEEDED 30 tablet 11     VITAMIN D, CHOLECALCIFEROL, PO Take 1,000 Units by mouth daily         PAST SURGICAL HISTORY:  Past Surgical History:   Procedure Laterality Date     ABDOMEN SURGERY  July 2016    Abd hernia repair (which has reoccurred form coughing 2018)     APPENDECTOMY       AS KNEE SCOPE, DIAGNOSTIC       BACK SURGERY  1999    Left C6-7 foraminotomy     BIOPSY  2014    lipoma excision (near R scapula)     BUNIONECTOMY Right 2016    times 2     BUNIONECTOMY Left 10/22/2018    Procedure: LEFT REVISION BUNION ;  Surgeon: Johanna Lund MD;  Location:  [facility]     COLONOSCOPY  2019    1 sm precancer polyp     CV CORONARY ANGIOGRAM N/A 1/24/2020    Procedure: Coronary Angiogram;  Surgeon: Criss Green MD;  Location:  HEART CARDIAC CATH LAB     CV LEFT HEART CATH N/A 1/24/2020    Procedure: Left Heart Cath;  Surgeon: Criss Green MD;  Location:  HEART CARDIAC CATH LAB     CV LEFT VENTRICULOGRAM N/A 1/24/2020    Procedure:  Left Ventriculogram;  Surgeon: Criss Green MD;  Location:  HEART CARDIAC CATH LAB     HERNIORRHAPHY VENTRAL N/A 2016    Procedure: HERNIORRHAPHY VENTRAL;  Surgeon: Ash Thompson MD;  Location: [facility] OR     left clavical       NECK SURGERY       ORTHOPEDIC SURGERY  [id number]    ORIF L clavicle/hardware out; bilat bunion/forefoot (4 ops)     RECESSION RESECTION (REPAIR STRABISMUS) Right 4/10/2017    Procedure: RECESSION RESECTION (REPAIR STRABISMUS);  Surgeon: Lyle Leggett MD;  Location:  OR     SOFT TISSUE SURGERY      L wrist: scapholunate lig reconstruction post L wristfx     WRIST SURGERY         ALLERGIES:     Allergies   Allergen Reactions     Exparel [Bupivacaine] GI Disturbance     Patient had severe abdominal distention     Darvocet [Propoxyphene N-Apap] Other (See Comments)     confusion     Liposomes GI Disturbance     Oxycodone      Penicillins Itching     Percocet [Oxycodone-Acetaminophen] Other (See Comments)     confusion     Tape [Adhesive Tape] Rash     Once after surgical tape     Vistaril [Hydroxyzine] Rash     Intense flushing/redness of face        FAMILY HISTORY:  Family History   Problem Relation Age of Onset     Cancer Mother         skin cancer     Diabetes Mother         borderline/Type 2     Hypertension Mother         3 meds: 4.5cm asc aortic aneurysm     Hyperlipidemia Mother         chol & very high triglycerides     Cerebrovascular Disease Mother         many small, cog. impaired,  18     Osteoporosis Mother         fosamax x5 yrs:poststeroids     Obesity Mother         BMI 35+     Unknown/Adopted Mother         dermatomyositis since      Depression Mother      Mental Illness Mother         probably bordeline personality disorder; vascular dementia     Anesthesia Reaction Mother         ?     Cancer Father         skin cancer     Coronary Artery Disease Father         angioplasty ~     Hypertension Father         1 med, mild.  Age 94     Depression Father         2x: age 87 [facility]     Hyperlipidemia Father         2020 after CVA, lipids not elevated     Cerebrovascular Disease Father         2 sm. foci occipito-parietal 20     Cancer Maternal Grandmother         skin cancer     Cerebrovascular Disease Maternal Grandmother         - multiple CVAs     Obesity Maternal Grandmother         overweight     Diabetes Maternal Grandfather         borderline/Type 2     Coronary Artery Disease Maternal Grandfather         2-3 MIs; worked after each     Cerebrovascular Disease Maternal Grandfather          from CVA postop hernia     Obesity Maternal Grandfather         was overwt to obese     Diabetes Cousin         prediabetes     Hypertension Brother         1 med for 20 yrs     Substance Abuse Brother         hx of EtOH     Cerebrovascular Disease Other         cog. impairment like my mom     Mental Illness Sister         ?bipolar; past chem dep     Substance Abuse Sister         hx of: prescript drug& EtOH     Unknown/Adopted Sister         sister is adopted     Breast Cancer Sister         stage 2-3,      Anesthesia Reaction Other         naus/vomit 1-8 hr postop unless tx'd     Cerebrovascular Disease Other         cog. 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    Exam:  /70 (BP Location: Right arm, Patient Position: Chair, Cuff Size: Adult Regular)   Pulse 63   Wt 54.3 kg (119 lb 12.8 oz)   LMP 2006   SpO2 98%   BMI 20.08 kg/m    GENERAL APPEARANCE: healthy, alert and no distress  General:  Well-nourished, well-developed, NAD   HEENT: normocephalic  Oral: moist mucous membranes  Chest: Normal work of breathing. clear to ausculation.   Cardio: Rhythm is regular.  S1 normal, S2. Murmurs are not present, JVP not elevated  Abdomen: without tenderness, rebound, guarding, masses, ascites  Extremities: Edema not present  Neuro: CN II-XII grossly intact. Alert and oriented x 4.   Skin: warm, dry, pink without open lesions  Psych: normal mood, affect       Labs:  Reviewed.  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  A variant of unknown significance means that there is a genetic change in which we do not have enough information to determine if it is disease causing or not. Labs review published data, functional studies, presences in population databases, similarity to normal sequence, and computer prediction models.    This particular variant occurs in a portion of the gene that is highly conserved in available vertebrate species.  This variant occurs in an intron after exon 23.  A splice prediction tool predicts this alteration will abolish the native donor site; however, direct evidence is unavailable. This variant has not been previously reported as a disease causing mutation or as a normal variation.  Therefore, based on the current information it is unclear if this VUS is associated with disease or not.       Testing/Procedures:    CPX 9/29/20:   Exercised 12:17 on Jose G Ramp protocol  /90-> 185/78  MVO2 30.5 mL/kg/min (9 METS, class I, 120% predicted)    ZioPatch 2/6/21-2/22/21: Baseline sinus rhythm, average VR 73 bpm (range  bpm.) No  sustained/nonsustained VT. Rare (<1%) PACs and PVCs; rare nonsustained SVT, likely AT (longest 21.2 sec.) No atrial fibrillation. 7 patient-triggered events, which appeared to correlate with SVT and with PACs.       ZioPatch 2/4/22-2/18/22:   Baseline sinus rhythm, average VR 74 bpm (range  bpm.) Rare (<1%) PACs and PVCs. 8 brief runs of nonsustained SVT (likely atrial tachycardia, longest 7 beats), 3 of which were associated with symptoms. 18 patient-triggered episodes were associated with SVT (3), PACs (12), and the remainder with sinus rhyhtm. Several of the episodes with PACs also had isolated PVCs. No nonsustained VT. No atrial fibrillation. No significant/sustained tachyarrhythmias, bradyarrhythmias, or pauses.   AT was also present on prior ZioPatch from 2/6/21-2/22/21 (and associated with symptoms at that time) and also with 1/2020-2/2020 Holter monitor. The longest SVT/AT run was significantly shorter than on the prior ZioPatch (7 beats vs. 21.2 seconds.)   Results released to the patient via [employer] and will be discussed at her upcoming outpatient visit.    Echo 3/22/2022  Global and regional left ventricular function is normal with an EF of 60-65%.  Global right ventricular function is normal.  Mild mitral and tricuspid insufficiency present.  Pulmonary artery systolic pressure is normal.  The inferior vena cava was normal in size with preserved respiratory  variability.  No pericardial effusion is present.    CMR 2/14/20:  Apical HCM with apical segments ~1.3 cm (vs. 0.9 cm basal anterosetpum and 0.7 cm basal inferolateral.) Hyperdynamic LV function and normal RV function, LVEF=75% RVEF=67%. No LVOT or intracavitary obstruction. Very small apical LGE per report-- per my review, there is no convincing fibrosis on late gadolinium enhancement imaging.      Coronary angiogram 1/24/2020: non-obstructive CAD (30-40% mLAD/D1, 40% RCA)    Dr. Mendes personally visualized and interpreted:  TTE 05/09/23:  Known apical variant HCM with isolated apical hypertrophy, LVEF=60-65%. No LVOT or intracavitary gradient. No significant change from 3/22/22.     CPX 4/17/23:   Superior exercise capacity (160% predicted), improved significantly (31%) from last CPX 9/2020.  MVO2 40.1 mL/kg/min (160% predicted, class I, 11.5 METS) VE/VCO2 slope 33.63 RER 1.21  Exercised 15:53 on Jose G Ramp protocol, achieved HR of 162 (104% MPHR)  Normal HR and BP response to exercise.    ZioPatch 4/6/23-4/13/23:  Baseline sinus rhythm, average VR 74 bpm ( bpm.)   No sustained or nonsustained VT, no atrial fibrillation.   11 asymptomatic episodes of SVT, longest 19.3 sec.   Rare (<1%) PACs and PVCs.   5 patient-triggered events correlated with PACs (4/5 events.)    Zio patch 03/5/24-3/19/24  Rhythm was sinus with an average rate of 72 bpm, range 49 to 132 bpm.  PACs/PVCs less than 1%.  Brief runs of PACs up to 4 beats were asymptomatic.  No episodes of nonsustained VT.  Patient symptoms correlated with sinus rhythm and sinus tachycardia.    TTE 5/14/24   nterpretation Summary  Global and regional left ventricular function is normal with an EF of 60-65%.  Global right ventricular function is normal.  Mild tricuspid insufficiency is present.  Pulmonary artery systolic pressure is normal.  Estimated mean right atrial pressure is normal.  No pericardial effusion is present.     Assessment and Plan:   1.  Apical hypertrophic cardiomyopathy:   Clara has clear apical HCM by cardiac MRI.   Her only risk factor ventricular arrhythmias is sudden cardiac death in her uncle, who had autopsy confirmed hypertrophic cardiomyopathy is somewhat concerning.  The patient has had no ventricular arrhythmias on ZIO Patch testing and  her EF on echo is normal   Given the presence of a genetic mutation of undetermined significance and a paternal uncle with known hypertrophic cardiomyopathy, we have discussed the need for her family members, including brothers, father,  and cousins to be clinically screened (ECG and echocardiogram.) Her father was tested for the same VUS that Clara has and was negative for it.  -follow up in 1 year with 7 day zio and CMR.     2.  Coronary artery disease, non-obstructive, without angina: Nonobstructive on coronary angiogram.  Continue aspirin and atorvastatin 10 mg (LDL<70.)    3.  Carotid artery disease: Noted on previous CTA.  Continue atorvastatin. Direct LDL-C 74 on atorvastatin 10 mg daily.     4.  Hypertension, essential, controlled: Continue to monitor.     5. Atrial tachycardia/PACs: rare and not bothersome to Clara, continue to monitor.     The patient states understanding and is agreeable with plan.         Arturo Drake   Cardiology Fellow  This note was created using Dragon dictation software, so please excuse any mistakes and incorrect syntax and semantics.      Uriel Mendes MD    Attestation signed by Uriel Mendes MD at 5/14/2024 12:16 PM:  Physician Attestation  I, Uriel Mendes MD, saw this patient and agree with the findings and plan of care as documented in the note.      Items personally reviewed/procedural attestation: vitals, labs, imaging and agree with the interpretation documented in the note, and EKG and agree with the interpretation documented in the note.    Uriel Mendes MD

## 2024-05-14 NOTE — NURSING NOTE
Cardiac Testing: Patient given instructions regarding cardiac MRI. Discussed purpose, preparation, procedure and when to expect results reported back to the patient. Patient demonstrated understanding of this information and agreed to call with further questions or concerns.    Return Appointment: Patient given instructions regarding scheduling next clinic visit. Patient demonstrated understanding of this information and agreed to call with further questions or concerns. Follow up with Dr. Mendes in 1 year with CMR, 7 day Ziopatch monitor, and fasting labs prior.     Patient stated she understood all health information given and agreed to call with further questions or concerns.     Mohinder Devries RN

## 2024-05-14 NOTE — PATIENT INSTRUCTIONS
You were seen today in the Adult Congenital and Cardiovascular Genetics Clinic at the [facility].    Cardiology Providers you saw during your visit:  Uriel Mendes MD    Diagnosis:  HCM    Results:  Uriel Mendes MD reviewed the results of your echocardiogram, EKG, Ziopatch, and lab testing today in clinic.    Recommendations for you:    No changes today.    Cardiac MRI  Magnetic resonance imaging (MRI) is a test that lets your doctor see detailed pictures of the inside of your body. MRI combines the use of strong magnets and radio waves to form an MRI image. A Cardiac MRI will give a detailed image of your heart.  Follow these instructions:  1. Please no eating or drinking 3 hours prior to the procedure.   2. No caffeine, alcohol or tobacco 12 hours prior to the procedure.    During the procedure:  Please arrive to the Gold Waiting Room in the [facility].   You will be required to lay flat and follow breath-hold instructions.   You will need to remove all metal and answer a safety questionnaire. Please wear clothes without metal (snaps and zippers). Please remove any body piercings and hair extensions before you arrive. You will also remove watches, jewelry, hairpins, wallets, dentures, partial dental plates and hearing aids. You may wear contact lenses, and you may be able to wear your rings. We have a safe place to keep your personal items, but it is safer to leave them at home.  You will be given IV contrast for this exam.  To prepare:  The day before the exam drink extra fluid - at least six 8oz glasses (unless you are on a fluid restriction per your doctor)      General Cardiac Recommendations:  Continue to eat a heart healthy, low salt diet.  Continue to get 20-30 minutes of aerobic activity, 4-5 days per week.  Examples of aerobic activity include walking, running, swimming, cycling, etc.  Continue to observe good oral hygiene, with regular dental visits.      SBE prophylaxis:    Yes____  No_x___    Exercise restrictions:   Yes__X__  No____         If yes, list restrictions:  Must be allowed to rest if fatigued or SOB      FASTING CHOLESTEROL was checked in the last 5 years YES__x__  NO____ (2022)  If no, please follow up with your primary care physician. You should have a cholesterol screening every 5 years.      Follow-up:  Follow up with Dr. Mendes in 1 year with CMR, 7 day Ziopatch monitor, and fasting labs prior.     If you have questions or concerns please contact us at:    Mohinder Devries RN, BSN     Kenna Olson and Arabella Alfaro (Scheduling)  Nurse Care Coordinator     Clinic   CV Genetics      Adult Congenital and CV Genetic  [facility]  (P) [phone]     (P) [phone]  (F) [phone]     (F) [phone]      For after hours urgent needs, call [phone] and ask to speak to the Adult Congenital Physician on call.  Mention Job Code 0401.    For emergencies call 911.    [facility]  [facility]  Mail Code [id number]  [address] [place] [id number]

## 2024-05-15 ENCOUNTER — PATIENT OUTREACH (OUTPATIENT)
Dept: CARE COORDINATION | Facility: CLINIC | Age: 64
End: 2024-05-15
Payer: COMMERCIAL

## 2024-05-21 ENCOUNTER — MYC MEDICAL ADVICE (OUTPATIENT)
Dept: FAMILY MEDICINE | Facility: CLINIC | Age: 64
End: 2024-05-21
Payer: COMMERCIAL

## 2024-05-21 ENCOUNTER — APPOINTMENT (OUTPATIENT)
Dept: URBAN - METROPOLITAN AREA CLINIC 256 | Age: 64
Setting detail: DERMATOLOGY
End: 2024-05-21

## 2024-05-21 DIAGNOSIS — Z41.9 ENCOUNTER FOR PROCEDURE FOR PURPOSES OTHER THAN REMEDYING HEALTH STATE, UNSPECIFIED: ICD-10-CM

## 2024-05-21 PROCEDURE — OTHER ICON IPL: OTHER

## 2024-05-21 ASSESSMENT — LOCATION DETAILED DESCRIPTION DERM
LOCATION DETAILED: LEFT SUPERIOR LATERAL BUCCAL CHEEK
LOCATION DETAILED: LEFT INFERIOR CENTRAL MALAR CHEEK
LOCATION DETAILED: LEFT LATERAL MALAR CHEEK
LOCATION DETAILED: LEFT INFERIOR LATERAL MALAR CHEEK

## 2024-05-21 ASSESSMENT — LOCATION SIMPLE DESCRIPTION DERM: LOCATION SIMPLE: LEFT CHEEK

## 2024-05-21 ASSESSMENT — LOCATION ZONE DERM: LOCATION ZONE: FACE

## 2024-05-21 NOTE — PROCEDURE: ICON IPL
Pre-Procedure Text: After consent was obtained, the treatment areas were cleansed and treated using the parameters mentioned above.
Pulse Duration (Optional- Include Units): 15 ms
Consent: Written consent obtained, risks reviewed including but not limited to crusting, scabbing, blistering, scarring, darker or lighter pigmentary change, bruising, and/or incomplete response.
Anesthesia Type: 1% lidocaine with epinephrine
Contact: Firm
Treatment Number (Optional): 0
Contact: Light
Post-Care Instructions: I reviewed with the patient in detail post-care instructions. Patient should stay away from the sun and wear sun protection until treated areas are fully healed.
Handpiece (Optional): Green
External Cooling Fan Speed: 5
Detail Level: Simple
Energy (Optional- Include Units): 34f
Passes: 1

## 2024-05-29 ENCOUNTER — TRANSFERRED RECORDS (OUTPATIENT)
Dept: HEALTH INFORMATION MANAGEMENT | Facility: CLINIC | Age: 64
End: 2024-05-29
Payer: COMMERCIAL

## 2024-05-30 SDOH — HEALTH STABILITY: PHYSICAL HEALTH: ON AVERAGE, HOW MANY DAYS PER WEEK DO YOU ENGAGE IN MODERATE TO STRENUOUS EXERCISE (LIKE A BRISK WALK)?: 6 DAYS

## 2024-05-30 SDOH — HEALTH STABILITY: PHYSICAL HEALTH: ON AVERAGE, HOW MANY MINUTES DO YOU ENGAGE IN EXERCISE AT THIS LEVEL?: 30 MIN

## 2024-05-30 ASSESSMENT — SOCIAL DETERMINANTS OF HEALTH (SDOH): HOW OFTEN DO YOU GET TOGETHER WITH FRIENDS OR RELATIVES?: TWICE A WEEK

## 2024-06-03 ENCOUNTER — OFFICE VISIT (OUTPATIENT)
Dept: FAMILY MEDICINE | Facility: CLINIC | Age: 64
End: 2024-06-03
Attending: FAMILY MEDICINE
Payer: COMMERCIAL

## 2024-06-03 VITALS
DIASTOLIC BLOOD PRESSURE: 70 MMHG | TEMPERATURE: 97.5 F | HEART RATE: 62 BPM | WEIGHT: 121.2 LBS | OXYGEN SATURATION: 98 % | RESPIRATION RATE: 16 BRPM | SYSTOLIC BLOOD PRESSURE: 108 MMHG | HEIGHT: 65 IN | BODY MASS INDEX: 20.19 KG/M2

## 2024-06-03 DIAGNOSIS — Z00.00 ROUTINE GENERAL MEDICAL EXAMINATION AT A HEALTH CARE FACILITY: Primary | ICD-10-CM

## 2024-06-03 DIAGNOSIS — Z12.31 VISIT FOR SCREENING MAMMOGRAM: ICD-10-CM

## 2024-06-03 PROCEDURE — 99397 PER PM REEVAL EST PAT 65+ YR: CPT | Performed by: FAMILY MEDICINE

## 2024-06-03 ASSESSMENT — PAIN SCALES - GENERAL: PAINLEVEL: NO PAIN (0)

## 2024-06-03 NOTE — PROGRESS NOTES
Preventive Care Visit  [facility]  Joel Daniel Wegener, MD, Family Medicine  Ryan 3, 2024      Assessment & Plan     Routine general medical examination at a health care facility  Overall well despite stressors of aging father in 90s and sister with breast ca.  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  Had tibial plateau fracture and left arm tendon surgery last year.     Plans rsv from pharmacy, covid booster in fall.     Continues to see endocrine, cardiology specialties for osteoporosis/chf.      Had dermatology skin exam last year she reports.     Visit for screening mammogram  Due now, gave scheduling information.   - MA SCREENING DIGITAL BILAT - Future  (s+30); Future    Patient has been advised of split billing requirements and indicates understanding: Yes        Counseling  Appropriate preventive services were discussed with this patient, including applicable screening as appropriate for fall prevention, nutrition, physical activity, Tobacco-use cessation, weight loss and cognition.  Checklist reviewing preventive services available has been given to the patient.  Reviewed patient's diet, addressing concerns and/or questions.   Discussed possible causes of fatigue. Information on urinary incontinence and treatment options given to patient.           Subjective   Clara is a 64 year old, presenting for the following:  Physical (Pt is not fasting/)        6/3/2024     8:01 AM   Additional Questions   Roomed by thnoy moya   Accompanied by self        Health Care Directive  Patient does not have a Health Care Directive or Living Will: Discussed advance care planning with patient; information given to patient to review.    HPI              5/30/2024   General Health   How would you rate your overall physical health? Excellent   Feel stress (tense, anxious, or unable to sleep) Not at all         5/30/2024   Nutrition   Diet: Low fat/cholesterol         5/30/2024   Exercise   Days per week of  moderate/strenous exercise 6 days   Average minutes spent exercising at this level 30 min         5/30/2024   Social Factors   Frequency of gathering with friends or relatives Twice a week   Worry food won't last until get money to buy more No   Food not last or not have enough money for food? No   Do you have housing?  Yes   Are you worried about losing your housing? No   Lack of transportation? No   Unable to get utilities (heat,electricity)? No         5/30/2024   Fall Risk   Fallen 2 or more times in the past year? No    No   Trouble with walking or balance? No    No          5/30/2024   Activities of Daily Living- Home Safety   Needs help with the following daily activites None of the above   Safety concerns in the home None of the above         5/30/2024   Dental   Dentist two times every year? Yes         5/30/2024   Hearing Screening   Hearing concerns? None of the above         5/30/2024   Driving Risk Screening   Patient/family members have concerns about driving No         5/30/2024   General Alertness/Fatigue Screening   Have you been more tired than usual lately? (!) YES         5/30/2024   Urinary Incontinence Screening   Bothered by leaking urine in past 6 months Yes         5/30/2024   TB Screening   Were you born outside of the [place]? No           Today's PHQ-2 Score:       1/2/2024     7:20 AM   PHQ-2 ( 1999 Pfizer)   Q1: Little interest or pleasure in doing things 0   Q2: Feeling down, depressed or hopeless 0   PHQ-2 Score 0         5/30/2024   Substance Use   Alcohol more than 3/day or more than 7/wk Not Applicable   Do you have a current opioid prescription? No   How severe/bad is pain from 1 to 10? 0/10 (No Pain)   Do you use any other substances recreationally? 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   Vaping Use    Vaping status: Never Used   Substance Use Topics    Alcohol  use: Yes     Comment: < =4 ox wine/month or 1 small beer    Drug use: Never           5/17/2023   LAST FHS-7 RESULTS   1st degree relative breast or ovarian cancer No   Any relative bilateral breast cancer No   Any male have breast cancer No   Any ONE woman have BOTH breast AND ovarian cancer No   Any woman with breast cancer before 50yrs No   2 or more relatives with breast AND/OR ovarian cancer No   2 or more relatives with breast AND/OR bowel cancer No        Mammogram Screening - Mammogram every 1-2 years updated in Health Maintenance based on mutual decision making      History of abnormal Pap smear: No - age 65 or older with adequate negative prior screening test results (3 consecutive negative cytology results, 2 consecutive negative cotesting results, or 2 consecutive negative HrHPV test results within 10 years, with the most recent test occurring within the recommended screening interval for the test used)        Latest Ref Rng & Units 7/7/2022     5:18 PM 10/28/2020     8:27 AM 10/28/2020     7:20 AM   PAP / HPV   PAP  Negative for Intraepithelial Lesion or Malignancy (NILM)      PAP (Historical)   NIL     HPV 16 DNA Negative Negative   Negative    HPV 18 DNA Negative Negative   Negative    Other HR HPV Negative Negative   Negative      ASCVD Risk   The ASCVD Risk score (Juany DUMONT, et al., 2019) failed to calculate for the following reasons:    The patient has a prior MI or stroke diagnosis            Reviewed and updated as needed this visit by Provider                    Past Medical History:   Diagnosis Date    Apical variant hypertrophic cardiomyopathy (H) 3/10/2020    Arthritis ?    thumbs, ? mild other sites    Coronary artery disease involving native coronary artery of native heart without angina pectoris 2/4/2020    NSTEMI Jan 2020    Diplopia     H/O CT scan     H/O magnetic resonance imaging     History of blood transfusion     2x at birth; mom was RH neg    Hypertension < =2018     Morning-surge HTN: mild    Non-obstructive CAD without angina (coronary angiogram 1/2020) 2/4/2020    NSTEMI Jan 2020    Paralytic strabismus     Pneumonia     PONV (postoperative nausea and vomiting)      Past Surgical History:   Procedure Laterality Date    ABDOMEN SURGERY  July 2016    Abd hernia repair (which has reoccurred form coughing 2018)    APPENDECTOMY      AS KNEE SCOPE, DIAGNOSTIC      BACK SURGERY  1999    Left C6-7 foraminotomy    BIOPSY  2014    lipoma excision (near R scapula)    BUNIONECTOMY Right 2016    times 2    BUNIONECTOMY Left 10/22/2018    Procedure: LEFT REVISION BUNION ;  Surgeon: Johanna Lund MD;  Location:  [facility]    COLONOSCOPY  2019    1 sm precancer polyp    CV CORONARY ANGIOGRAM N/A 1/24/2020    Procedure: Coronary Angiogram;  Surgeon: Criss Green MD;  Location:  HEART CARDIAC CATH LAB    CV LEFT HEART CATH N/A 1/24/2020    Procedure: Left Heart Cath;  Surgeon: Criss Green MD;  Location:  HEART CARDIAC CATH LAB    CV LEFT VENTRICULOGRAM N/A 1/24/2020    Procedure: Left Ventriculogram;  Surgeon: Criss Green MD;  Location:  HEART CARDIAC CATH LAB    HERNIORRHAPHY VENTRAL N/A 7/7/2016    Procedure: HERNIORRHAPHY VENTRAL;  Surgeon: Ash Thompson MD;  Location:  OR    left clavical      NECK SURGERY      ORTHOPEDIC SURGERY  [id number]    ORIF L clavicle/hardware out; bilat bunion/forefoot (4 ops)    RECESSION RESECTION (REPAIR STRABISMUS) Right 4/10/2017    Procedure: RECESSION RESECTION (REPAIR STRABISMUS);  Surgeon: Lyle Leggett MD;  Location:  OR    SOFT TISSUE SURGERY  2007    L wrist: scapholunate lig reconstruction post L wristfx    WRIST SURGERY       Current providers sharing in care for this patient include:  Patient Care Team:  Wegener, Joel Daniel Irwin, MD as PCP - General (Family Practice)  Izabella Arroyo MD as MD (Orthopedics)  Uriel Mendes MD as Assigned Heart and Vascular  "Provider  Torres Marsh MD as MD (Otolaryngology)  Wegener, Joel Daniel Irwin, MD as Assigned PCP  Abelardo Roberto MD as MD (Endocrinology, Diabetes, and Metabolism)  Nathan Burkett DO as Assigned Musculoskeletal Provider    The following health maintenance items are reviewed in Epic and correct as of today:  Health Maintenance   Topic Date Due    RSV VACCINE (Pregnancy & 60+) (1 - 1-dose 60+ series) Never done    MICROALBUMIN  02/24/2023    COVID-19 Vaccine (7 - 2023-24 season) 11/20/2023    MAMMO SCREENING  05/17/2024    CMP  04/08/2025    ANNUAL REVIEW OF HM ORDERS  04/08/2025    CBC  04/08/2025    LIPID  05/14/2025    Pneumococcal Vaccine: 65+ Years (3 of 3 - PPSV23 or PCV20) 05/19/2025    MEDICARE ANNUAL WELLNESS VISIT  06/03/2025    FALL RISK ASSESSMENT  06/03/2025    DEXA  07/06/2026    GLUCOSE  04/08/2027    DTAP/TDAP/TD IMMUNIZATION (4 - Td or Tdap) 07/18/2027    ADVANCE CARE PLANNING  05/19/2028    COLORECTAL CANCER SCREENING  04/19/2029    HEPATITIS C SCREENING  Completed    PHQ-2 (once per calendar year)  Completed    INFLUENZA VACCINE  Completed    ZOSTER IMMUNIZATION  Completed    IPV IMMUNIZATION  Aged Out    HPV IMMUNIZATION  Aged Out    MENINGITIS IMMUNIZATION  Aged Out    RSV MONOCLONAL ANTIBODY  Aged Out    PAP  Discontinued         Review of Systems  Constitutional, neuro, ENT, endocrine, pulmonary, cardiac, gastrointestinal, genitourinary, musculoskeletal, integument and psychiatric systems are negative, except as otherwise noted.     Objective    Exam  /70 (BP Location: Left arm, Patient Position: Sitting, Cuff Size: Adult Regular)   Pulse 62   Temp 97.5  F (36.4  C) (Temporal)   Resp 16   Ht 1.651 m (5' 5\")   Wt 55 kg (121 lb 3.2 oz)   LMP 09/06/2006   SpO2 98%   BMI 20.17 kg/m     Estimated body mass index is 20.17 kg/m  as calculated from the following:    Height as of this encounter: 1.651 m (5' 5\").    Weight as of this encounter: 55 kg (121 lb 3.2 oz).    Physical " Exam  GENERAL: alert and no distress  EYES: Eyes grossly normal to inspection, PERRL and conjunctivae and sclerae normal  HENT: ear canals and TM's normal, nose and mouth without ulcers or lesions  NECK: no adenopathy, no asymmetry, masses, or scars  RESP: lungs clear to auscultation - no rales, rhonchi or wheezes  CV: regular rate and rhythm, normal S1 S2, no S3 or S4, no murmur, click or rub, no peripheral edema  ABDOMEN: soft, nontender, no hepatosplenomegaly, no masses and bowel sounds normal  MS: no gross musculoskeletal defects noted, no edema  SKIN: no suspicious lesions or rashes  NEURO: Normal strength and tone, mentation intact and speech normal  PSYCH: mentation appears normal, affect normal/bright         6/3/2024   Mini Cog   Clock Draw Score 2 Normal   3 Item Recall 3 objects recalled   Mini Cog Total Score 5             Signed Electronically by: Joel Daniel Wegener, MD

## 2024-06-03 NOTE — PATIENT INSTRUCTIONS
"Preventive Care Advice   This is general advice we often give to help people stay healthy. Your care team may have specific advice just for you. Please talk to your care team about your own preventive care needs.  Lifestyle  Exercise at least 150 minutes each week (30 minutes a day, 5 days a week).  Do muscle strengthening activities 2 days a week. These help control your weight and prevent disease.  No smoking.  Wear sunscreen to prevent skin cancer.  Have your home tested for radon every 2 to 5 years. Radon is a colorless, odorless gas that can harm your lungs. To learn more, go to www.health.[facility].mn. and search for \"Radon in Homes.\"  Keep guns unloaded and locked up in a safe place like a safe or gun vault, or, use a gun lock and hide the keys. Always lock away bullets separately. To learn more, visit [employer].mn.gov and search for \"safe gun storage.\"  Nutrition  Eat 5 or more servings of fruits and vegetables each day.  Try wheat bread, brown rice and whole grain pasta (instead of white bread, rice, and pasta).  Get enough calcium and vitamin D. Check the label on foods and aim for 100% of the RDA (recommended daily allowance).  Regular exams  Have a dental exam and cleaning every 6 months.  See your health care team every year to talk about:  Any changes in your health.  Any medicines your care team has prescribed.  Preventive care, family planning, and ways to prevent chronic diseases.  Shots (vaccines)   HPV shots (up to age 26), if you've never had them before.  Hepatitis B shots (up to age 59), if you've never had them before.  COVID-19 shot: Get this shot when it's due.  Flu shot: Get a flu shot every year.  Tetanus shot: Get a tetanus shot every 10 years.  Pneumococcal, hepatitis A, and RSV shots: Ask your care team if you need these based on your risk.  Shingles shot (for age 50 and up).  General health tests  Diabetes screening:  Starting at age 35, Get screened for diabetes at least every 3 years.  If " you are younger than age 35, ask your care team if you should be screened for diabetes.  Cholesterol test: At age 39, start having a cholesterol test every 5 years, or more often if advised.  Bone density scan (DEXA): At age 50, ask your care team if you should have this scan for osteoporosis (brittle bones).  Hepatitis C: Get tested at least once in your life.  Abdominal aortic aneurysm screening: Talk to your doctor about having this screening if you:  Have ever smoked; and  Are biologically male; and  Are between the ages of 65 and 75.  STIs (sexually transmitted infections)  Before age 24: Ask your care team if you should be screened for STIs.  After age 24: Get screened for STIs if you're at risk. You are at risk for STIs (including HIV) if:  You are sexually active with more than one person.  You don't use condoms every time.  You or a partner was diagnosed with a sexually transmitted infection.  If you are at risk for HIV, ask about PrEP medicine to prevent HIV.  Get tested for HIV at least once in your life, whether you are at risk for HIV or not.  Cancer screening tests  Cervical cancer screening: If you have a cervix, begin getting regular cervical cancer screening tests at age 21. Most people who have regular screenings with normal results can stop after age 65. Talk about this with your provider.  Breast cancer scan (mammogram): If you've ever had breasts, begin having regular mammograms starting at age 40. This is a scan to check for breast cancer.  Colon cancer screening: It is important to start screening for colon cancer at age 45.  Have a colonoscopy test every 10 years (or more often if you're at risk) Or, ask your provider about stool tests like a FIT test every year or Cologuard test every 3 years.  To learn more about your testing options, visit: www.[employer]/671141.pdf.  For help making a decision, visit: gabriel/gt02887.  Prostate cancer screening test: If you have a prostate and are age 55  to 69, ask your provider if you would benefit from a yearly prostate cancer screening test.  Lung cancer screening: If you are a current or former smoker age 50 to 80, ask your care team if ongoing lung cancer screenings are right for you.  For informational purposes only. Not to replace the advice of your health care provider. Copyright   2023 [employer]. All rights reserved. Clinically reviewed by the [facility]. [employer] [id number] - REV 04/24.    Learning About Sleeping Well  What does sleeping well mean?     Sleeping well means getting enough sleep to feel good and stay healthy. How much sleep is enough varies among people.  The number of hours you sleep and how you feel when you wake up are both important. If you do not feel refreshed, you probably need more sleep. Another sign of not getting enough sleep is feeling tired during the day.  Experts recommend that adults get at least 7 or more hours of sleep per day. Children and older adults need more sleep.  Why is getting enough sleep important?  Getting enough quality sleep is a basic part of good health. When your sleep suffers, your physical health, mood, and your thoughts can suffer too. You may find yourself feeling more grumpy or stressed. Not getting enough sleep also can lead to serious problems, including injury, accidents, anxiety, and depression.  What might cause poor sleeping?  Many things can cause sleep problems, including:  Changes to your sleep schedule.  Stress. Stress can be caused by fear about a single event, such as giving a speech. Or you may have ongoing stress, such as worry about work or school.  Depression, anxiety, and other mental or emotional conditions.  Changes in your sleep habits or surroundings. This includes changes that happen where you sleep, such as noise, light, or sleeping in a different bed. It also includes changes in your sleep pattern, such as having jet lag or working a late  "shift.  Health problems, such as pain, breathing problems, and restless legs syndrome.  Lack of regular exercise.  Using alcohol, nicotine, or caffeine before bed.  How can you help yourself?  Here are some tips that may help you sleep more soundly and wake up feeling more refreshed.  Your sleeping area   Use your bedroom only for sleeping and sex. A bit of light reading may help you fall asleep. But if it doesn't, do your reading elsewhere in the house. Try not to use your TV, computer, smartphone, or tablet while you are in bed.  Be sure your bed is big enough to stretch out comfortably, especially if you have a sleep partner.  Keep your bedroom quiet, dark, and cool. Use curtains, blinds, or a sleep mask to block out light. To block out noise, use earplugs, soothing music, or a \"white noise\" machine.  Your evening and bedtime routine   Create a relaxing bedtime routine. You might want to take a warm shower or bath, or listen to soothing music.  Go to bed at the same time every night. And get up at the same time every morning, even if you feel tired.  What to avoid   Limit caffeine (coffee, tea, caffeinated sodas) during the day, and don't have any for at least 6 hours before bedtime.  Avoid drinking alcohol before bedtime. Alcohol can cause you to wake up more often during the night.  Try not to smoke or use tobacco, especially in the evening. Nicotine can keep you awake.  Limit naps during the day, especially close to bedtime.  Avoid lying in bed awake for too long. If you can't fall asleep or if you wake up in the middle of the night and can't get back to sleep within about 20 minutes, get out of bed and go to another room until you feel sleepy.  Avoid taking medicine right before bed that may keep you awake or make you feel hyper or energized. Your doctor can tell you if your medicine may do this and if you can take it earlier in the day.  If you can't sleep   Imagine yourself in a peaceful, pleasant scene. " "Focus on the details and feelings of being in a place that is relaxing.  Get up and do a quiet or boring activity until you feel sleepy.  Avoid drinking any liquids before going to bed to help prevent waking up often to use the bathroom.  Where can you learn more?  Go to https://www.[employer].net/patiented  Enter J942 in the search box to learn more about \"Learning About Sleeping Well.\"  Current as of: July 10, 2023               Content Version: 14.0    6344-6698 [employer].   Care instructions adapted under license by your healthcare professional. If you have questions about a medical condition or this instruction, always ask your healthcare professional. [employer] disclaims any warranty or liability for your use of this information.      Bladder Training: Care Instructions  Your Care Instructions     Bladder training is used to treat urge incontinence and stress incontinence. Urge incontinence means that the need to urinate comes on so fast that you can't get to a toilet in time. Stress incontinence means that you leak urine because of pressure on your bladder. For example, it may happen when you laugh, cough, or lift something heavy.  Bladder training can increase how long you can wait before you have to urinate. It can also help your bladder hold more urine. And it can give you better control over the urge to urinate.  It is important to remember that bladder training takes a few weeks to a few months to make a difference. You may not see results right away, but don't give up.  Follow-up care is a key part of your treatment and safety. Be sure to make and go to all appointments, and call your doctor if you are having problems. It's also a good idea to know your test results and keep a list of the medicines you take.  How can you care for yourself at home?  Work with your doctor to come up with a bladder training program that is right for you. You may use one or more of the " "following methods.  Delayed urination  In the beginning, try to keep from urinating for 5 minutes after you first feel the need to go.  While you wait, take deep, slow breaths to relax. Kegel exercises can also help you delay the need to go to the bathroom.  After some practice, when you can easily wait 5 minutes to urinate, try to wait 10 minutes before you urinate.  Slowly increase the waiting period until you are able to control when you have to urinate.  Scheduled urination  Empty your bladder when you first wake up in the morning.  Schedule times throughout the day when you will urinate.  Start by going to the bathroom every hour, even if you don't need to go.  Slowly increase the time between trips to the bathroom.  When you have found a schedule that works well for you, keep doing it.  If you wake up during the night and have to urinate, do it. Apply your schedule to waking hours only.  Kegel exercises  These tighten and strengthen pelvic muscles, which can help you control the flow of urine. (If doing these exercises causes pain, stop doing them and talk with your doctor.) To do Kegel exercises:  Squeeze your muscles as if you were trying not to pass gas. Or squeeze your muscles as if you were stopping the flow of urine. Your belly, legs, and buttocks shouldn't move.  Hold the squeeze for 3 seconds, then relax for 5 to 10 seconds.  Start with 3 seconds, then add 1 second each week until you are able to squeeze for 10 seconds.  Repeat the exercise 10 times a session. Do 3 to 8 sessions a day.  When should you call for help?  Watch closely for changes in your health, and be sure to contact your doctor if:    Your incontinence is getting worse.     You do not get better as expected.   Where can you learn more?  Go to https://www.healthwise.net/patiented  Enter V684 in the search box to learn more about \"Bladder Training: Care Instructions.\"  Current as of: November 15, 2023               Content Version: " 14.0    7952-7383 [employer].   Care instructions adapted under license by your healthcare professional. If you have questions about a medical condition or this instruction, always ask your healthcare professional. [employer] disclaims any warranty or liability for your use of this information.

## 2024-06-21 ENCOUNTER — ANCILLARY PROCEDURE (OUTPATIENT)
Dept: MAMMOGRAPHY | Facility: CLINIC | Age: 64
End: 2024-06-21
Attending: FAMILY MEDICINE
Payer: COMMERCIAL

## 2024-06-21 DIAGNOSIS — Z12.31 VISIT FOR SCREENING MAMMOGRAM: ICD-10-CM

## 2024-06-21 PROCEDURE — 77067 SCR MAMMO BI INCL CAD: CPT | Mod: GC | Performed by: STUDENT IN AN ORGANIZED HEALTH CARE EDUCATION/TRAINING PROGRAM

## 2024-06-21 PROCEDURE — 77063 BREAST TOMOSYNTHESIS BI: CPT | Mod: GC | Performed by: STUDENT IN AN ORGANIZED HEALTH CARE EDUCATION/TRAINING PROGRAM

## 2024-06-25 ENCOUNTER — MYC MEDICAL ADVICE (OUTPATIENT)
Dept: FAMILY MEDICINE | Facility: CLINIC | Age: 64
End: 2024-06-25
Payer: COMMERCIAL

## 2024-06-25 NOTE — TELEPHONE ENCOUNTER
JW,  Please see below SMS [employer] Holdingst message and advise.  Patient has scheduled ultrasound and mammogram follow up imaging   Thanks,  Nubia GOMEZ RN

## 2024-07-10 ENCOUNTER — ANCILLARY PROCEDURE (OUTPATIENT)
Dept: MAMMOGRAPHY | Facility: CLINIC | Age: 64
End: 2024-07-10
Attending: FAMILY MEDICINE
Payer: COMMERCIAL

## 2024-07-10 DIAGNOSIS — R92.8 ABNORMAL MAMMOGRAM OF LEFT BREAST: ICD-10-CM

## 2024-07-10 PROCEDURE — 76642 ULTRASOUND BREAST LIMITED: CPT | Mod: LT | Performed by: RADIOLOGY

## 2024-07-10 PROCEDURE — G0279 TOMOSYNTHESIS, MAMMO: HCPCS | Mod: GC | Performed by: RADIOLOGY

## 2024-07-10 PROCEDURE — 77065 DX MAMMO INCL CAD UNI: CPT | Mod: LT | Performed by: RADIOLOGY

## 2024-07-20 DIAGNOSIS — I21.4 NSTEMI (NON-ST ELEVATED MYOCARDIAL INFARCTION) (H): ICD-10-CM

## 2024-07-22 ENCOUNTER — TRANSFERRED RECORDS (OUTPATIENT)
Dept: HEALTH INFORMATION MANAGEMENT | Facility: CLINIC | Age: 64
End: 2024-07-22
Payer: COMMERCIAL

## 2024-07-24 RX ORDER — ATORVASTATIN CALCIUM 10 MG/1
10 TABLET, FILM COATED ORAL EVERY EVENING
Qty: 90 TABLET | Refills: 2 | Status: SHIPPED | OUTPATIENT
Start: 2024-07-24

## 2024-07-24 NOTE — TELEPHONE ENCOUNTER
Antihyperlipidemic   atorvastatin (LIPITOR) 10 MG tablet   90 tablet 3 7/17/2023     Last Office Visit : 5-  Future Office visit:  none

## 2024-09-12 DIAGNOSIS — F51.01 PRIMARY INSOMNIA: ICD-10-CM

## 2024-09-12 RX ORDER — TRAZODONE HYDROCHLORIDE 50 MG/1
TABLET, FILM COATED ORAL
Qty: 30 TABLET | Refills: 2 | Status: SHIPPED | OUTPATIENT
Start: 2024-09-12

## 2024-09-13 ENCOUNTER — ANCILLARY PROCEDURE (OUTPATIENT)
Dept: BONE DENSITY | Facility: CLINIC | Age: 64
End: 2024-09-13
Attending: SPECIALIST
Payer: COMMERCIAL

## 2024-09-13 DIAGNOSIS — M81.0 OSTEOPOROSIS: ICD-10-CM

## 2024-09-13 PROCEDURE — 77080 DXA BONE DENSITY AXIAL: CPT

## 2024-09-23 ENCOUNTER — TRANSFERRED RECORDS (OUTPATIENT)
Dept: HEALTH INFORMATION MANAGEMENT | Facility: CLINIC | Age: 64
End: 2024-09-23

## 2024-12-03 ENCOUNTER — OFFICE VISIT (OUTPATIENT)
Dept: FAMILY MEDICINE | Facility: CLINIC | Age: 64
End: 2024-12-03
Payer: COMMERCIAL

## 2024-12-03 VITALS
RESPIRATION RATE: 16 BRPM | HEIGHT: 65 IN | DIASTOLIC BLOOD PRESSURE: 69 MMHG | OXYGEN SATURATION: 97 % | HEART RATE: 69 BPM | TEMPERATURE: 97.2 F | SYSTOLIC BLOOD PRESSURE: 109 MMHG | WEIGHT: 115.1 LBS | BODY MASS INDEX: 19.18 KG/M2

## 2024-12-03 DIAGNOSIS — Z01.818 PREOP GENERAL PHYSICAL EXAM: Primary | ICD-10-CM

## 2024-12-03 DIAGNOSIS — I42.2 APICAL VARIANT HYPERTROPHIC CARDIOMYOPATHY (H): ICD-10-CM

## 2024-12-03 DIAGNOSIS — I10 HYPERTENSION GOAL BP (BLOOD PRESSURE) < 130/80: ICD-10-CM

## 2024-12-03 DIAGNOSIS — M79.674 PAIN OF TOE OF RIGHT FOOT: ICD-10-CM

## 2024-12-03 DIAGNOSIS — I25.10 CORONARY ARTERY DISEASE INVOLVING NATIVE CORONARY ARTERY OF NATIVE HEART WITHOUT ANGINA PECTORIS: ICD-10-CM

## 2024-12-03 DIAGNOSIS — I25.10 CORONARY ARTERY DISEASE INVOLVING NATIVE CORONARY ARTERY OF NATIVE HEART WITHOUT ANGINA PECTORIS: Primary | ICD-10-CM

## 2024-12-03 DIAGNOSIS — Z79.899 MEDICATION MANAGEMENT: ICD-10-CM

## 2024-12-03 PROCEDURE — 80048 BASIC METABOLIC PNL TOTAL CA: CPT | Performed by: FAMILY MEDICINE

## 2024-12-03 PROCEDURE — G2211 COMPLEX E/M VISIT ADD ON: HCPCS | Performed by: FAMILY MEDICINE

## 2024-12-03 PROCEDURE — 93000 ELECTROCARDIOGRAM COMPLETE: CPT | Performed by: FAMILY MEDICINE

## 2024-12-03 PROCEDURE — 99215 OFFICE O/P EST HI 40 MIN: CPT | Performed by: FAMILY MEDICINE

## 2024-12-03 PROCEDURE — 36415 COLL VENOUS BLD VENIPUNCTURE: CPT | Performed by: FAMILY MEDICINE

## 2024-12-03 ASSESSMENT — PAIN SCALES - GENERAL: PAINLEVEL_OUTOF10: NO PAIN (0)

## 2024-12-03 NOTE — PROGRESS NOTES
Preoperative Evaluation  [facility]  [address], [address]  [facility] [address]  Phone: [phone]  Primary Provider: Joel Daniel Wegener, MD  Pre-op Performing Provider: Joel Daniel Wegener, MD  Dec 3, 2024   {Provider  Link to PREOP SmartSet  REQUIRED to apply standard patient instructions and medication directions to the AVS :475447}  {ROOMER review and update patient entered surgical information if needed :264024}        12/3/2024   Surgical Information   What procedure is being done? osteotomies right 2nd toe    Facility or Hospital where procedure/surgery will be performed: [facility]    Who is doing the procedure / surgery? Dr Stevenson Lund    Date of surgery / procedure: 12-    Time of surgery / procedure: TBD    Where do you plan to recover after surgery? at home with Home Care        Patient-reported     Fax number for surgical facility: [phone]     {Provider Charting Preference for Preop :669484}    Kortney Elizabeth is a 64 year old, presenting for the following:  Pre-Op Exam          12/3/2024    10:39 AM   Additional Questions   Roomed by Ashlie DONG   Accompanied by n/a     HPI related to upcoming procedure: ***        12/3/2024   Pre-Op Questionnaire   Have you ever had a heart attack or stroke? No    Have you ever had surgery on your heart or blood vessels, such as a stent placement, a coronary artery bypass, or surgery on an artery in your head, neck, heart, or legs? No    Do you have chest pain with activity? No    Do you have a history of heart failure? No    Do you currently have a cold, bronchitis or symptoms of other infection? No    Do you have a cough, shortness of breath, or wheezing? No    Do you or anyone in your family have previous history of blood clots? (!) YES ***    Do you or does anyone in your family have a serious bleeding problem such as prolonged bleeding following surgeries or cuts? No    Have you ever had problems with  anemia or been told to take iron pills? No    Have you had any abnormal blood loss such as black, tarry or bloody stools, or abnormal vaginal bleeding? No    Have you ever had a blood transfusion? (!) YES    Have you ever had a transfusion reaction? No    Are you willing to have a blood transfusion if it is medically needed before, during, or after your surgery? Yes    Have you or any of your relatives ever had problems with anesthesia? (!) YES ***    Do you have sleep apnea, excessive snoring or daytime drowsiness? No    Do you have any artifical heart valves or other implanted medical devices like a pacemaker, defibrillator, or continuous glucose monitor? No    Do you have artificial joints? No    Are you allergic to latex? No        Patient-reported     Health Care Directive  Patient does not have a Health Care Directive: {ADVANCE_DIRECTIVE_STATUS:212626}    Preoperative Review of   {Mnpmpreport:465503}  {Review MNPMP for all patients per [facility] MNPMP Profile:951432}    {Chronic problem details (Optional) :568319}    Patient Active Problem List    Diagnosis Date Noted    Osteoporosis without current pathological fracture, unspecified osteoporosis type 07/08/2023     Priority: Medium    Apical variant hypertrophic cardiomyopathy (H) 02/14/2020     Priority: Medium     CMR 2/14/20      Non-obstructive CAD without angina (coronary angiogram 1/2020) 02/04/2020     Priority: Medium     NSTEMI Jan 2020      Hyperlipidemia LDL goal <70 02/04/2020     Priority: Medium    NSTEMI (non-ST elevated myocardial infarction) (H) 01/23/2020     Priority: Medium    Hip pain, right 09/25/2018     Priority: Medium    Right foot pain 11/30/2016     Priority: Medium    Small vessel disease, cerebrovascular 09/30/2016     Priority: Medium    History of anesthesia reaction 06/28/2016     Priority: Medium    CARDIOVASCULAR SCREENING; LDL GOAL LESS THAN 160 09/04/2015     Priority: Medium    Insomnia 09/04/2015     Priority: Medium     Skin exam, screening for cancer 08/22/2013     Priority: Medium    Pain in joint, upper arm 08/05/2013     Priority: Medium    iamJOINT PAIN-PELVIS 09/22/2005     Priority: Medium      Past Medical History:   Diagnosis Date    Apical variant hypertrophic cardiomyopathy (H) 3/10/2020    Arthritis ?    thumbs, ? mild other sites    Coronary artery disease involving native coronary artery of native heart without angina pectoris 2/4/2020    NSTEMI Jan 2020    Diplopia     H/O CT scan     H/O magnetic resonance imaging     History of blood transfusion     2x at birth; mom was RH neg    Hypertension < =2018    Morning-surge HTN: mild    Non-obstructive CAD without angina (coronary angiogram 1/2020) 2/4/2020    NSTEMI Jan 2020    Paralytic strabismus     Pneumonia     PONV (postoperative nausea and vomiting)      Past Surgical History:   Procedure Laterality Date    ABDOMEN SURGERY  July 2016    Abd hernia repair (which has reoccurred form coughing 2018)    APPENDECTOMY      AS KNEE SCOPE, DIAGNOSTIC      BACK SURGERY  1999    Left C6-7 foraminotomy    BIOPSY  2014    lipoma excision (near R scapula)    BUNIONECTOMY Right 2016    times 2    BUNIONECTOMY Left 10/22/2018    Procedure: LEFT REVISION BUNION ;  Surgeon: Johanna Lund MD;  Location:  [facility]    COLONOSCOPY  2019    1 sm precancer polyp    CV CORONARY ANGIOGRAM N/A 1/24/2020    Procedure: Coronary Angiogram;  Surgeon: Criss Green MD;  Location:  [facility]ARDI[facility] CATH [facility]    CV LEFT HEART CATH N/A 1/24/2020    Procedure: Left Heart Cath;  Surgeon: Criss Green MD;  Location:  HE[facility]ARD[facility] CATH LAB    CV LEFT VENTRICULOGRAM N/A 1/24/2020    Procedure: Left Ventriculogram;  Surgeon: Criss Green MD;  Location:  HEART CARDIAC CATH LAB    HERNIORRHAPHY VENTRAL N/A 7/7/2016    Procedure: HERNIORRHAPHY VENTRAL;  Surgeon: Ash Thompson MD;  Location:  OR    left clavical      NECK SURGERY      ORTHOPEDIC SURGERY   [id number]    ORIF L clavicle/hardware out; bilat bunion/forefoot (4 ops)    RECESSION RESECTION (REPAIR STRABISMUS) Right 4/10/2017    Procedure: RECESSION RESECTION (REPAIR STRABISMUS);  Surgeon: Lyle Leggett MD;  Location: [facility] OR    SOFT TISSUE SURGERY  2007    L wrist: scapholunate lig reconstruction post L wristfx    WRIST SURGERY       Current Outpatient Medications   Medication Sig Dispense Refill    alendronate (FOSAMAX) 70 MG tablet Take 70 mg by mouth every 7 days      aspirin (ASA) 81 MG chewable tablet Take 1 tablet (81 mg) by mouth daily 90 tablet 1    atorvastatin (LIPITOR) 10 MG tablet Take 1 tablet (10 mg) by mouth every evening 90 tablet 2    CALCIUM PO Take 1,200 mg by mouth daily      Co-Enzyme Q-10 60 MG CAPS       diphenhydrAMINE (BENADRYL) 25 MG tablet Take 12.5-25 mg by mouth nightly as needed Reported on 3/31/2017      lisinopril (ZESTRIL) 2.5 MG tablet TAKE 1/2 TABLET(1.25 MG) BY MOUTH 5 TIMES A WEEK 30 tablet 11    melatonin 3 MG tablet Take 3 mg by mouth nightly as needed for sleep      multivitamin, therapeutic (THERA-VIT) TABS tablet Take 1 tablet by mouth daily      Omega-3 Fatty Acids (OMEGA 3 PO) Take 1 g by mouth daily Reported on 3/31/2017      traZODone (DESYREL) 50 MG tablet TAKE 1/2 TABLET BY MOUTH EVERY NIGHT AS NEEDED 30 tablet 2    VITAMIN D, CHOLECALCIFEROL, PO Take 1,000 Units by mouth daily         Allergies   Allergen Reactions    Exparel [Bupivacaine] GI Disturbance     Patient had severe abdominal distention    Darvocet [Propoxyphene N-Apap] Other (See Comments)     confusion    Liposomes GI Disturbance    Oxycodone     Penicillins Itching    Percocet [Oxycodone-Acetaminophen] Other (See Comments)     confusion    Tape [Adhesive Tape] Rash     Once after surgical tape    Vistaril [Hydroxyzine] Rash     Intense flushing/redness of face         Social History     Tobacco Use    Smoking status: Never    Smokeless tobacco: Never    Tobacco comments:     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    Weight as of 6/3/24: 55 kg (121 lb 3.2 oz).  Physical Exam  {Exam List :228726}    Recent Labs   Lab Test 24  1556   HGB 13.8         POTASSIUM 4.6   CR 0.68        Diagnostics  {LABS:676774}   {EK}    Revised Cardiac Risk Index (RCRI)  The patient has the following serious cardiovascular risks for perioperative complications:  {PREOP REVISED CARDIAC RISK INDEX (RCRI) :036547}     RCRI Interpretation: {REVISED CARDIAC RISK INTERPRETATION :939600}         Signed Electronically by: Joel Daniel Wegener, MD  A copy of this evaluation report is provided to the requesting physician.    {Provider Resources  Preop [facility] Preop Guidelines  Revised Cardiac Risk Index :339103}   {Email feedback regarding this note to [email or web address]   :202095}  "     Family History   Problem Relation Age of Onset    Cancer Mother         skin cancer    Diabetes Mother         borderline/Type 2    Hypertension Mother         3 meds: 4.5cm asc aortic aneurysm    Hyperlipidemia Mother         chol & very high triglycerides    Cerebrovascular Disease Mother         many small, cog. impaired,  18    Osteoporosis Mother         fosamax x5 yrs:poststeroids    Obesity Mother         BMI 35+    Unknown/Adopted Mother         dermatomyositis since     Depression Mother     Mental Illness Mother         probably bordeline personality disorder; vascular dementia    Anesthesia Reaction Mother         ?    Cancer Father         skin cancer    Coronary Artery Disease Father         angioplasty ~    Hypertension Father         1 med, mild. Age 94    Depression Father         2x: age 87 [facility]    Hyperlipidemia Father         2020 after CVA, lipids not elevated    Cerebrovascular Disease Father         2 sm. foci occipito-parietal 20    Cancer Maternal Grandmother         skin cancer    Cerebrovascular Disease Maternal Grandmother         - multiple CVAs    Obesity Maternal Grandmother         overweight    Diabetes Maternal Grandfather         borderline/Type 2    Coronary Artery Disease Maternal Grandfather         2-3 MIs; worked after each    Cerebrovascular Disease Maternal Grandfather          from CVA postop hernia    Obesity Maternal Grandfather         was overwt to obese    Diabetes Cousin         prediabetes    Hypertension Brother         1 med for 20 yrs    Substance Abuse Brother         hx of EtOH    Cerebrovascular Disease Other         cog. impairment like my mom    Mental Illness Sister         ?bipolar; past chem dep    Substance Abuse Sister         hx of: prescript drug& EtOH    Unknown/Adopted Sister         sister is adopted    Breast Cancer Sister         stage 2-3,     Anesthesia Reaction Other         naus/vomit 1-8 hr postop  "unless tx'd    Cerebrovascular Disease Other         cog. impairment like my mom     History   Drug Use Unknown             Review of Systems  Constitutional, neuro, ENT, endocrine, pulmonary, cardiac, gastrointestinal, genitourinary, musculoskeletal, integument and psychiatric systems are negative, except as otherwise noted.    Objective    /69 (BP Location: Left arm, Patient Position: Sitting, Cuff Size: Adult Small)   Pulse 69   Temp 97.2  F (36.2  C) (Temporal)   Resp 16   Ht 1.651 m (5' 5\")   Wt 52.2 kg (115 lb 1.6 oz)   LMP 09/06/2006   SpO2 97%   BMI 19.15 kg/m     Estimated body mass index is 19.15 kg/m  as calculated from the following:    Height as of this encounter: 1.651 m (5' 5\").    Weight as of this encounter: 52.2 kg (115 lb 1.6 oz).  Physical Exam  GENERAL: alert and no distress  EYES: Eyes grossly normal to inspection, PERRL and conjunctivae and sclerae normal  HENT: ear canals and TM's normal, nose and mouth without ulcers or lesions  NECK: no adenopathy, no asymmetry, masses, or scars  RESP: lungs clear to auscultation - no rales, rhonchi or wheezes  CV: regular rate and rhythm, normal S1 S2, no S3 or S4, no murmur, click or rub, no peripheral edema  ABDOMEN: soft, nontender, no hepatosplenomegaly, no masses and bowel sounds normal  MS: no gross musculoskeletal defects noted, no edema  SKIN: no suspicious lesions or rashes  NEURO: Normal strength and tone, mentation intact and speech normal  PSYCH: mentation appears normal, affect normal/bright    Recent Labs   Lab Test 04/08/24  1556   HGB 13.8         POTASSIUM 4.6   CR 0.68        Diagnostics  Recent Results (from the past 720 hours)   Basic metabolic panel  (Ca, Cl, CO2, Creat, Gluc, K, Na, BUN)    Collection Time: 12/03/24 11:48 AM   Result Value Ref Range    Sodium 137 135 - 145 mmol/L    Potassium 4.4 3.4 - 5.3 mmol/L    Chloride 101 98 - 107 mmol/L    Carbon Dioxide (CO2) 28 22 - 29 mmol/L    Anion Gap 8 7 - 15 " "mmol/L    Urea Nitrogen 26.2 (H) 8.0 - 23.0 mg/dL    Creatinine 0.73 0.51 - 0.95 mg/dL    GFR Estimate 90 >60 mL/min/1.73m2    Calcium 9.7 8.8 - 10.4 mg/dL    Glucose 98 70 - 99 mg/dL        Lab Results   Component Value Date    WBC 4.5 04/08/2024    WBC 4.3 01/25/2020     Lab Results   Component Value Date    RBC 4.47 04/08/2024    RBC 4.52 01/25/2020     Lab Results   Component Value Date    HGB 13.8 04/08/2024    HGB 14.0 01/25/2020     Lab Results   Component Value Date    HCT 42.7 04/08/2024    HCT 41.6 01/25/2020     No components found for: \"MCT\"  Lab Results   Component Value Date    MCV 96 04/08/2024    MCV 92 01/25/2020     Lab Results   Component Value Date    MCH 30.9 04/08/2024    MCH 31.0 01/25/2020     Lab Results   Component Value Date    MCHC 32.3 04/08/2024    MCHC 33.7 01/25/2020     Lab Results   Component Value Date    RDW 13.0 04/08/2024    RDW 13.4 01/25/2020     Lab Results   Component Value Date     04/08/2024     01/25/2020         EKG:  no ischemic changes.     Revised Cardiac Risk Index (RCRI)  The patient has the following serious cardiovascular risks for perioperative complications:   - Coronary Artery Disease (MI, positive stress test, angina, Qs on EKG) = 1 point     RCRI Interpretation: 1 point: Class II (low risk - 0.9% complication rate)         Signed Electronically by: Joel Daniel Wegener, MD  A copy of this evaluation report is provided to the requesting physician.         "

## 2024-12-04 LAB
ANION GAP SERPL CALCULATED.3IONS-SCNC: 8 MMOL/L (ref 7–15)
BUN SERPL-MCNC: 26.2 MG/DL (ref 8–23)
CALCIUM SERPL-MCNC: 9.7 MG/DL (ref 8.8–10.4)
CHLORIDE SERPL-SCNC: 101 MMOL/L (ref 98–107)
CREAT SERPL-MCNC: 0.73 MG/DL (ref 0.51–0.95)
EGFRCR SERPLBLD CKD-EPI 2021: 90 ML/MIN/1.73M2
GLUCOSE SERPL-MCNC: 98 MG/DL (ref 70–99)
HCO3 SERPL-SCNC: 28 MMOL/L (ref 22–29)
POTASSIUM SERPL-SCNC: 4.4 MMOL/L (ref 3.4–5.3)
SODIUM SERPL-SCNC: 137 MMOL/L (ref 135–145)

## 2024-12-18 ENCOUNTER — MYC MEDICAL ADVICE (OUTPATIENT)
Dept: FAMILY MEDICINE | Facility: CLINIC | Age: 64
End: 2024-12-18
Payer: COMMERCIAL

## 2024-12-18 NOTE — PATIENT INSTRUCTIONS
Cleared for procedure.  Do not take any aspirin or over the counter pain medications except for tylenol for 10 days prior to the procedure since they are mild blood thinners. Skip any other pills/medications on the morning of the procedure.     How to Take Your Medication Before Surgery  Preoperative Medication Instructions          Patient Education   Preparing for Your Surgery  For Adults  Getting started  In most cases, a nurse will call to review your health history and instructions. They will give you an arrival time based on your scheduled surgery time. Please be ready to share:  Your doctor's clinic name and phone number  Your medical, surgical, and anesthesia history  A list of allergies and sensitivities  A list of medicines, including herbal treatments and over-the-counter drugs  Whether the patient has a legal guardian (ask how to send us the papers in advance)  Note: You may not receive a call if you were seen at our PAC (Preoperative Assessment Center).  Please tell us if you're pregnant--or if there's any chance you might be pregnant. Some surgeries may injure a fetus (unborn baby), so they require a pregnancy test. Surgeries that are safe for a fetus don't always need a test, and you can choose whether to have one.   Preparing for surgery  Within 10 to 30 days of surgery: Have a pre-op exam (sometimes called an H&P, or History and Physical). This can be done at a clinic or pre-operative center.  If you're having a , you may not need this exam. Talk to your care team.  At your pre-op exam, talk to your care team about all medicines you take. (This includes CBD oil and any drugs, such as THC, marijuana, and other forms of cannabis.) If you need to stop any medicine before surgery, ask when to start taking it again.  This is for your safety. Many medicines and drugs can make you bleed too much during surgery. Some change how well surgery (anesthesia) drugs work.  Call your insurance company to  let them know you're having surgery. (If you don't have insurance, call [phone].)  Call your clinic if there's any change in your health. This includes a scrape or scratch near the surgery site, or any signs of a cold (sore throat, runny nose, cough, rash, fever).  Eating and drinking guidelines  For your safety: Unless your surgeon tells you otherwise, follow the guidelines below.  Eat and drink as normal until 8 hours before you arrive for surgery. After that, no food or milk. You can spit out gum when you arrive.  Drink clear liquids until 2 hours before you arrive. These are liquids you can see through, like water, Gatorade, and Propel Water. They also include plain black coffee and tea (no cream or milk).  No alcohol for 24 hours before you arrive. The night before surgery, stop any drinks that contain THC.  If your care team tells you to take medicine on the morning of surgery, it's okay to take it with a sip of water. No other medicines or drugs are allowed (including CBD oil)--follow your care team's instructions.  If you have questions the day of surgery, call your hospital or surgery center.   Preventing infection  Shower or bathe the night before and the morning of surgery. Follow the instructions your clinic gave you. (If no instructions, use regular soap.)  Don't shave or clip hair near your surgery site. We'll remove the hair if needed.  Don't smoke or vape the morning of surgery. No chewing tobacco for 6 hours before you arrive. A nicotine patch is okay. You may spit out nicotine gum when you arrive.  For some surgeries, the surgeon will tell you to fully quit smoking and nicotine.  We will make every effort to keep you safe from infection. We will:  Clean our hands often with soap and water (or an alcohol-based hand rub).  Clean the skin at your surgery site with a special soap that kills germs.  Give you a special gown to keep you warm. (Cold raises the risk of infection.)  Wear hair covers,  masks, gowns, and gloves during surgery.  Give antibiotic medicine, if prescribed. Not all surgeries need this medicine.  What to bring on the day of surgery  Photo ID and insurance card  Copy of your health care directive, if you have one  Glasses and hearing aids (bring cases)  You can't wear contacts during surgery  Inhaler and eye drops, if you use them (tell us about these when you arrive)  CPAP machine or breathing device, if you use them  A few personal items, if spending the night  If you have . . .  A pacemaker, ICD (cardiac defibrillator), or other implant: Bring the ID card.  An implanted stimulator: Bring the remote control.  A legal guardian: Bring a copy of the certified (court-stamped) guardianship papers.  Please remove any jewelry, including body piercings. Leave jewelry and other valuables at home.  If you're going home the day of surgery  You must have a responsible adult drive you home. They should stay with you overnight as well.  If you don't have someone to stay with you, and you aren't safe to go home alone, we may keep you overnight. Insurance often won't pay for this.  After surgery  If it's hard to control your pain or you need more pain medicine, please call your surgeon's office.  Questions?   If you have any questions for your care team, list them here:   ____________________________________________________________________________________________________________________________________________________________________________________________________________________________________________________________  For informational purposes only. Not to replace the advice of your health care provider. Copyright   2003, 2019 [employer] [facility] [facility]. All rights reserved. Clinically reviewed by James Mc MD. [employer] [id number] - REV 08/24.

## 2024-12-31 ENCOUNTER — OFFICE VISIT (OUTPATIENT)
Dept: FAMILY MEDICINE | Facility: CLINIC | Age: 64
End: 2024-12-31
Payer: COMMERCIAL

## 2024-12-31 VITALS
HEIGHT: 65 IN | RESPIRATION RATE: 16 BRPM | TEMPERATURE: 97 F | WEIGHT: 117 LBS | OXYGEN SATURATION: 97 % | HEART RATE: 72 BPM | BODY MASS INDEX: 19.49 KG/M2 | DIASTOLIC BLOOD PRESSURE: 65 MMHG | SYSTOLIC BLOOD PRESSURE: 104 MMHG

## 2024-12-31 DIAGNOSIS — I25.10 CORONARY ARTERY DISEASE INVOLVING NATIVE CORONARY ARTERY OF NATIVE HEART WITHOUT ANGINA PECTORIS: ICD-10-CM

## 2024-12-31 DIAGNOSIS — I10 HYPERTENSION GOAL BP (BLOOD PRESSURE) < 130/80: ICD-10-CM

## 2024-12-31 DIAGNOSIS — I42.2 APICAL VARIANT HYPERTROPHIC CARDIOMYOPATHY (H): ICD-10-CM

## 2024-12-31 DIAGNOSIS — Z51.81 ENCOUNTER FOR MONITORING OF CHRONIC ASPIRIN THERAPY: Primary | ICD-10-CM

## 2024-12-31 DIAGNOSIS — Z79.82 ENCOUNTER FOR MONITORING OF CHRONIC ASPIRIN THERAPY: Primary | ICD-10-CM

## 2024-12-31 DIAGNOSIS — M79.674 PAIN OF TOE OF RIGHT FOOT: ICD-10-CM

## 2024-12-31 LAB
ERYTHROCYTE [DISTWIDTH] IN BLOOD BY AUTOMATED COUNT: 13 % (ref 10–15)
HCT VFR BLD AUTO: 40 % (ref 35–47)
HGB BLD-MCNC: 13.5 G/DL (ref 11.7–15.7)
MCH RBC QN AUTO: 30.5 PG (ref 26.5–33)
MCHC RBC AUTO-ENTMCNC: 33.8 G/DL (ref 31.5–36.5)
MCV RBC AUTO: 90 FL (ref 78–100)
PLATELET # BLD AUTO: 299 10E3/UL (ref 150–450)
RBC # BLD AUTO: 4.43 10E6/UL (ref 3.8–5.2)
WBC # BLD AUTO: 6.1 10E3/UL (ref 4–11)

## 2024-12-31 PROCEDURE — 99214 OFFICE O/P EST MOD 30 MIN: CPT | Performed by: FAMILY MEDICINE

## 2024-12-31 PROCEDURE — 36415 COLL VENOUS BLD VENIPUNCTURE: CPT | Performed by: FAMILY MEDICINE

## 2024-12-31 PROCEDURE — G2211 COMPLEX E/M VISIT ADD ON: HCPCS | Performed by: FAMILY MEDICINE

## 2024-12-31 PROCEDURE — 85027 COMPLETE CBC AUTOMATED: CPT | Performed by: FAMILY MEDICINE

## 2024-12-31 ASSESSMENT — PAIN SCALES - GENERAL: PAINLEVEL_OUTOF10: NO PAIN (0)

## 2024-12-31 NOTE — PROGRESS NOTES
Preoperative Evaluation  [facility]  [address], [address]  [facility] [address]  Phone: [phone]  Primary Provider: Joel Daniel Wegener, MD  Pre-op Performing Provider: Joel Daniel Wegener, MD  Dec 31, 2024             12/27/2024   Surgical Information   What procedure is being done? corrective osteotomies 2nd toe right foot   Facility or Hospital where procedure/surgery will be performed: [facility]   Who is doing the procedure / surgery? Dr. Lund   Date of surgery / procedure: 1/15/2025   Time of surgery / procedure: TBD   Where do you plan to recover after surgery? at home alone     Fax number for surgical facility:[phone]     Assessment & Plan  The proposed surgical procedure is considered LOW risk.     Preop general physical exam  Cleared for procedure.  Do not take any aspirin or over the counter pain medications except for tylenol for 10 days prior to the procedure since they are mild blood thinners. Skip any other pills/medications on the morning of the procedure.      Pain of toe of right foot  Reason for procedure.      Non-obstructive CAD without angina (coronary angiogram 1/2020) : see 05/14/24 visit with her cardiologist Dr. Mendes.  Medially managed.  Has not had procedural interventions/stents.  Continues to exercise vigorously without symptoms. Normal echo may 2024 and normal exercise stress test apri. 2023.       Normal EKG at recent pre-op no need to repeat. .      Cleared for procedure from cardiac standpoint .         Apical variant hypertrophic cardiomyopathy (H)  Without significant arrythmia being monitored at least yearly with ziopatch extended cardiac monitoring.   Current plan to be seen next may with preceding MRI.           Medication management     Cbc today.         Hypertension goal BP (blood pressure) < 130/80  At goal.  No changes.     35 minutes spent on the date of the encounter doing chart review, history and  exam, negotiating and explaining the plan with the patient, documentation and further activities as noted above.      The longitudinal plan of care for the diagnosis(es)/condition(s) as documented were addressed during this visit. Due to the added complexity in care, I will continue to support Clara in the subsequent management and with ongoing continuity of care.      Subjective   Clara is a 64 year old, presenting for the following:  Pre-Op Exam (1/15/25 toe surgery)          12/31/2024    11:59 AM   Additional Questions   Roomed by waldo MURRELL related to upcoming procedure: delayed procedure due to caring for 103 year old father!  Recently passed.  Otherwise feeling well and robust.         12/27/2024   Pre-Op Questionnaire   Have you ever had a heart attack or stroke? No   Have you ever had surgery on your heart or blood vessels, such as a stent placement, a coronary artery bypass, or surgery on an artery in your head, neck, heart, or legs? No   Do you have chest pain with activity? No   Do you have a history of heart failure? No   Do you currently have a cold, bronchitis or symptoms of other infection? No   Do you have a cough, shortness of breath, or wheezing? No   Do you or anyone in your family have previous history of blood clots? No   Do you or does anyone in your family have a serious bleeding problem such as prolonged bleeding following surgeries or cuts? No   Have you ever had problems with anemia or been told to take iron pills? No   Have you had any abnormal blood loss such as black, tarry or bloody stools, or abnormal vaginal bleeding? No   Have you ever had a blood transfusion? (!) YES   Have you ever had a transfusion reaction?  NO   Are you willing to have a blood transfusion if it is medically needed before, during, or after your surgery? Yes   Have you or any of your relatives ever had problems with anesthesia? NO   Do you have sleep apnea, excessive snoring or daytime drowsiness? No   Do  you have any artifical heart valves or other implanted medical devices like a pacemaker, defibrillator, or continuous glucose monitor? No   Do you have artificial joints? No   Are you allergic to latex? No     Health Care Directive  Patient does not have a Health Care Directive: Discussed advance care planning with patient; information given to patient to review.    Preoperative Review of    reviewed - hydrocodone #7 April 2024          Patient Active Problem List    Diagnosis Date Noted    Osteoporosis without current pathological fracture, unspecified osteoporosis type 07/08/2023     Priority: Medium    Apical variant hypertrophic cardiomyopathy (H) 02/14/2020     Priority: Medium     CMR 2/14/20      Non-obstructive CAD without angina (coronary angiogram 1/2020) 02/04/2020     Priority: Medium     NSTEMI Jan 2020      Hyperlipidemia LDL goal <70 02/04/2020     Priority: Medium    NSTEMI (non-ST elevated myocardial infarction) (H) 01/23/2020     Priority: Medium    Hip pain, right 09/25/2018     Priority: Medium    Right foot pain 11/30/2016     Priority: Medium    Small vessel disease, cerebrovascular 09/30/2016     Priority: Medium    History of anesthesia reaction 06/28/2016     Priority: Medium    CARDIOVASCULAR SCREENING; LDL GOAL LESS THAN 160 09/04/2015     Priority: Medium    Insomnia 09/04/2015     Priority: Medium    Skin exam, screening for cancer 08/22/2013     Priority: Medium    Pain in joint, upper arm 08/05/2013     Priority: Medium    iamJOINT PAIN-PELVIS 09/22/2005     Priority: Medium      Past Medical History:   Diagnosis Date    Apical variant hypertrophic cardiomyopathy (H) 3/10/2020    Arthritis ?    thumbs, ? mild other sites    Coronary artery disease involving native coronary artery of native heart without angina pectoris 2/4/2020    NSTEMI Jan 2020    Diplopia     H/O CT scan     H/O magnetic resonance imaging     History of blood transfusion     2x at birth; mom was RH neg     Hypertension < =2018    Morning-surge HTN: mild    Non-obstructive CAD without angina (coronary angiogram 1/2020) 2/4/2020    NSTEMI Jan 2020    Paralytic strabismus     Pneumonia     PONV (postoperative nausea and vomiting)      Past Surgical History:   Procedure Laterality Date    ABDOMEN SURGERY  July 2016    Abd hernia repair (which has reoccurred form coughing 2018)    APPENDECTOMY      AS KNEE SCOPE, DIAGNOSTIC      BACK SURGERY  1999    Left C6-7 foraminotomy    BIOPSY  2014    lipoma excision (near R scapula)    BUNIONECTOMY Right 2016    times 2    BUNIONECTOMY Left 10/22/2018    Procedure: LEFT REVISION BUNION ;  Surgeon: Johanna Lund MD;  Location:  [facility]    COLONOSCOPY  2019 1 sm precancer polyp    CV CORONARY ANGIOGRAM N/A 1/24/2020    Procedure: Coronary Angiogram;  Surgeon: Criss Green MD;  Location:  HE[facility]ARDI[facility] CATH LAB    CV LEFT HEART CATH N/A 1/24/2020    Procedure: Left Heart Cath;  Surgeon: Criss Green MD;  Location:  HE[facility]ARDI[facility] CATH LAB    CV LEFT VENTRICULOGRAM N/A 1/24/2020    Procedure: Left Ventriculogram;  Surgeon: Criss Green MD;  Location:  HEART CARDIAC CATH LAB    HERNIORRHAPHY VENTRAL N/A 7/7/2016    Procedure: HERNIORRHAPHY VENTRAL;  Surgeon: Ash Thompson MD;  Location:  OR    left clavical      NECK SURGERY      ORTHOPEDIC SURGERY  [id number]    ORIF L clavicle/hardware out; bilat bunion/forefoot (4 ops)    RECESSION RESECTION (REPAIR STRABISMUS) Right 4/10/2017    Procedure: RECESSION RESECTION (REPAIR STRABISMUS);  Surgeon: Lyle Leggett MD;  Location:  OR    SOFT TISSUE SURGERY  2007    L wrist: scapholunate lig reconstruction post L wristfx    WRIST SURGERY       Current Outpatient Medications   Medication Sig Dispense Refill    alendronate (FOSAMAX) 70 MG tablet Take 70 mg by mouth every 7 days      aspirin (ASA) 81 MG chewable tablet Take 1 tablet (81 mg) by mouth daily 90 tablet 1     atorvastatin (LIPITOR) 10 MG tablet Take 1 tablet (10 mg) by mouth every evening (Patient taking differently: Take 10 mg by mouth every evening. Hold) 90 tablet 2    CALCIUM PO Take 1,200 mg by mouth daily      Co-Enzyme Q-10 60 MG CAPS       diphenhydrAMINE (BENADRYL) 25 MG tablet Take 12.5-25 mg by mouth nightly as needed Reported on 3/31/2017      lisinopril (ZESTRIL) 2.5 MG tablet TAKE 1/2 TABLET(1.25 MG) BY MOUTH 5 TIMES A WEEK 30 tablet 11    melatonin 3 MG tablet Take 3 mg by mouth nightly as needed for sleep (Patient not taking: Reported on 12/3/2024)      multivitamin, therapeutic (THERA-VIT) TABS tablet Take 1 tablet by mouth daily. 3 times x week      Omega-3 Fatty Acids (OMEGA 3 PO) Take 1 g by mouth daily. Reported on 3/31/2017 Hold      traZODone (DESYREL) 50 MG tablet TAKE 1/2 TABLET BY MOUTH EVERY NIGHT AS NEEDED 30 tablet 2    VITAMIN D, CHOLECALCIFEROL, PO Take 1,000 Units by mouth daily         Allergies   Allergen Reactions    Exparel [Bupivacaine] GI Disturbance     Patient had severe abdominal distention    Darvocet [Propoxyphene N-Apap] Other (See Comments)     confusion    Liposomes GI Disturbance    Oxycodone     Penicillins Itching    Percocet [Oxycodone-Acetaminophen] Other (See Comments)     confusion    Tape [Adhesive Tape] Rash     Once after surgical tape    Vistaril [Hydroxyzine] Rash     Intense flushing/redness of face         Social History     Tobacco Use    Smoking status: Never    Smokeless tobacco: Never    Tobacco comments:     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  impaired,  18    Osteoporosis Mother         fosamax x5 yrs:poststeroids    Obesity Mother         BMI 35+    Unknown/Adopted Mother         dermatomyositis since     Depression Mother     Mental Illness Mother         probably bordeline personality disorder; vascular dementia    Anesthesia Reaction Mother         ?    Cancer Father         skin cancer    Coronary Artery Disease Father         angioplasty ~    Hypertension Father         1 med, mild. Age 94    Depression Father         2x: age 87 [facility]    Hyperlipidemia Father         2020 after CVA, lipids not elevated    Cerebrovascular Disease Father         2 sm. foci occipito-parietal 20    Cancer Maternal Grandmother         skin cancer    Cerebrovascular Disease Maternal Grandmother         - multiple CVAs    Obesity Maternal Grandmother         overweight    Diabetes Maternal Grandfather         borderline/Type 2    Coronary Artery Disease Maternal Grandfather         2-3 MIs; worked after each    Cerebrovascular Disease Maternal Grandfather          from CVA postop hernia    Obesity Maternal Grandfather         was overwt to obese    Diabetes Cousin         prediabetes    Hypertension Brother         1 med for 20 yrs    Substance Abuse Brother         hx of EtOH    Cerebrovascular Disease Other         cog. impairment like my mom    Mental Illness Sister         ?bipolar; past chem dep    Substance Abuse Sister         hx of: prescript drug& EtOH    Unknown/Adopted Sister         sister is adopted    Breast Cancer Sister         stage 2-3,     Anesthesia Reaction Other         naus/vomit 1-8 hr postop unless tx'd    Cerebrovascular Disease Other         cog. impairment like my mom     History   Drug Use Unknown             Review of Systems  Constitutional, neuro, ENT, endocrine, pulmonary, cardiac, gastrointestinal, genitourinary, musculoskeletal, integument and psychiatric systems are negative, except as otherwise  "noted.    Objective    LMP 09/06/2006    Estimated body mass index is 19.15 kg/m  as calculated from the following:    Height as of 12/3/24: 1.651 m (5' 5\").    Weight as of 12/3/24: 52.2 kg (115 lb 1.6 oz).  Physical Exam  GENERAL: alert and no distress  NECK: no adenopathy, no asymmetry, masses, or scars  RESP: lungs clear to auscultation - no rales, rhonchi or wheezes  CV: regular rate and rhythm, normal S1 S2, no S3 or S4, no murmur, click or rub, no peripheral edema  ABDOMEN: soft, nontender, no hepatosplenomegaly, no masses and bowel sounds normal  MS: no gross musculoskeletal defects noted, no edema  SKIN: no suspicious lesions or rashes  NEURO: Normal strength and tone, mentation intact and speech normal  PSYCH: mentation appears normal, affect normal/bright    Recent Labs   Lab Test 12/03/24  1148 04/08/24  1556   HGB  --  13.8   PLT  --  285    140   POTASSIUM 4.4 4.6   CR 0.73 0.68        Diagnostics  Recent Results (from the past 24 hours)   CBC with platelets    Collection Time: 12/31/24 12:22 PM   Result Value Ref Range    WBC Count 6.1 4.0 - 11.0 10e3/uL    RBC Count 4.43 3.80 - 5.20 10e6/uL    Hemoglobin 13.5 11.7 - 15.7 g/dL    Hematocrit 40.0 35.0 - 47.0 %    MCV 90 78 - 100 fL    MCH 30.5 26.5 - 33.0 pg    MCHC 33.8 31.5 - 36.5 g/dL    RDW 13.0 10.0 - 15.0 %    Platelet Count 299 150 - 450 10e3/uL      EKG:  normal last visit.     Revised Cardiac Risk Index (RCRI)  The patient has the following serious cardiovascular risks for perioperative complications:   - No serious cardiac risks = 0 points     RCRI Interpretation: 0 points: Class I (very low risk - 0.4% complication rate) for ischemic event see details above re: cardiomyopathy         Signed Electronically by: Joel Daniel Wegener, MD  A copy of this evaluation report is provided to the requesting physician.        "

## 2024-12-31 NOTE — PATIENT INSTRUCTIONS
Cleared for procedure. Do not take any aspirin or over the counter pain medications except for tylenol for 10 days prior to the procedure since they are mild blood thinners. Skip any other pills/medications on the morning of the procedure.

## 2025-01-06 ENCOUNTER — TRANSFERRED RECORDS (OUTPATIENT)
Dept: HEALTH INFORMATION MANAGEMENT | Facility: CLINIC | Age: 65
End: 2025-01-06
Payer: COMMERCIAL

## 2025-01-06 ENCOUNTER — TRANSCRIBE ORDERS (OUTPATIENT)
Dept: OTHER | Age: 65
End: 2025-01-06

## 2025-01-06 DIAGNOSIS — H50.21 VERTICAL STRABISMUS OF RIGHT EYE: Primary | ICD-10-CM

## 2025-01-30 ENCOUNTER — TRANSFERRED RECORDS (OUTPATIENT)
Dept: HEALTH INFORMATION MANAGEMENT | Facility: CLINIC | Age: 65
End: 2025-01-30
Payer: COMMERCIAL

## 2025-02-07 ENCOUNTER — OFFICE VISIT (OUTPATIENT)
Dept: OPHTHALMOLOGY | Facility: CLINIC | Age: 65
End: 2025-02-07
Attending: OPHTHALMOLOGY
Payer: COMMERCIAL

## 2025-02-07 DIAGNOSIS — H35.373 EPIRETINAL MEMBRANE (ERM) OF BOTH EYES: ICD-10-CM

## 2025-02-07 DIAGNOSIS — H49.12 LEFT TROCHLEAR NERVE PALSY: Primary | ICD-10-CM

## 2025-02-07 DIAGNOSIS — H50.21 VERTICAL STRABISMUS OF RIGHT EYE: ICD-10-CM

## 2025-02-07 DIAGNOSIS — H50.22 HYPERTROPIA OF LEFT EYE: ICD-10-CM

## 2025-02-07 DIAGNOSIS — H53.2 DOUBLE VISION: ICD-10-CM

## 2025-02-07 DIAGNOSIS — H53.10 SUBJECTIVE VISUAL DISTURBANCE: ICD-10-CM

## 2025-02-07 PROCEDURE — 92060 SENSORIMOTOR EXAMINATION: CPT

## 2025-02-07 PROCEDURE — 92134 CPTRZ OPH DX IMG PST SGM RTA: CPT | Mod: 26 | Performed by: OPHTHALMOLOGY

## 2025-02-07 PROCEDURE — 92060 SENSORIMOTOR EXAMINATION: CPT | Performed by: OPHTHALMOLOGY

## 2025-02-07 PROCEDURE — 92060 SENSORIMOTOR EXAMINATION: CPT | Mod: 26 | Performed by: OPHTHALMOLOGY

## 2025-02-07 PROCEDURE — 99204 OFFICE O/P NEW MOD 45 MIN: CPT | Performed by: OPHTHALMOLOGY

## 2025-02-07 PROCEDURE — 99214 OFFICE O/P EST MOD 30 MIN: CPT | Performed by: OPHTHALMOLOGY

## 2025-02-07 PROCEDURE — 92134 CPTRZ OPH DX IMG PST SGM RTA: CPT | Performed by: OPHTHALMOLOGY

## 2025-02-07 ASSESSMENT — REFRACTION_WEARINGRX
OD_VBASE: BASE UP
OS_SPHERE: +2.25
OS_AXIS: 010
OD_CYLINDER: +0.50
OD_AXIS: 165
OD_VPRISM: 0.5
OS_VBASE: BASE DOWN
OS_SPHERE: +3.50
OS_AXIS: 010
OD_AXIS: 165
OD_ADD: +2.50
OD_SPHERE: +0.50
OS_CYLINDER: +0.50
OD_VBASE: BASE UP
OS_VBASE: BASE DOWN
OD_CYLINDER: +0.50
OS_VPRISM: 0.5
OS_VPRISM: 0.5
OD_VBASE: BASE UP
OS_ADD: +1.25
OD_SPHERE: +3.00
OS_CYLINDER: +0.50
OS_ADD: +2.50
OS_SPHERE: +1.00
OD_AXIS: 165
OS_VBASE: BASE DOWN
OD_CYLINDER: +0.50
OS_VPRISM: 0.5
OD_VPRISM: 0.5
OS_CYLINDER: +0.50
OD_SPHERE: +1.75
OD_ADD: +1.25
OD_VPRISM: 0.5
OS_AXIS: 010

## 2025-02-07 ASSESSMENT — VISUAL ACUITY
OD_SC: 20/25
OS_SC: 20/25
OS_SC+: -2
METHOD: SNELLEN - LINEAR
OD_SC+: -2

## 2025-02-07 ASSESSMENT — REFRACTION_FINALRX
OD_VPRISM: 1
OS_VPRISM: 1
OD_VPRISM: 1
OS_VPRISM: 1
OS_VPRISM: 1
OD_VPRISM: 1

## 2025-02-07 ASSESSMENT — TONOMETRY
IOP_METHOD: ICARE
OD_IOP_MMHG: 16
OS_IOP_MMHG: 15

## 2025-02-07 ASSESSMENT — SLIT LAMP EXAM - LIDS
COMMENTS: NORMAL
COMMENTS: NORMAL

## 2025-02-07 ASSESSMENT — CONF VISUAL FIELD
OS_SUPERIOR_TEMPORAL_RESTRICTION: 0
OS_INFERIOR_NASAL_RESTRICTION: 0
OD_INFERIOR_TEMPORAL_RESTRICTION: 0
OD_NORMAL: 1
OD_SUPERIOR_NASAL_RESTRICTION: 0
OS_SUPERIOR_NASAL_RESTRICTION: 0
OD_SUPERIOR_TEMPORAL_RESTRICTION: 0
OS_NORMAL: 1
OS_INFERIOR_TEMPORAL_RESTRICTION: 0
OD_INFERIOR_NASAL_RESTRICTION: 0
METHOD: COUNTING FINGERS

## 2025-02-07 ASSESSMENT — CUP TO DISC RATIO
OD_RATIO: 0.2
OS_RATIO: 0.2

## 2025-02-07 ASSESSMENT — EXTERNAL EXAM - RIGHT EYE: OD_EXAM: NORMAL

## 2025-02-07 ASSESSMENT — EXTERNAL EXAM - LEFT EYE: OS_EXAM: NORMAL

## 2025-02-07 NOTE — LETTER
2025    RE: Clara Boykin  : 1960  MRN: [id number]    Dear Dr. Taylor    Thank you for referring your patient, Clara Boykin, to my neuro-ophthalmology clinic recently.  After a thorough neuro-ophthalmic history and examination, I came to the following conclusions:     1. Right congenital cranial nerve 4 palsy diagnosed after negative MRI and stability of measurements on serial sensorimotor exam. status post right inferior oblique myectomy 2017 with very good outcome and single binocular vision in all gaze positions except far up gaze where patient had a small angle right hypotropia / left hypertropia.     Around 2 years ago patient began noticing blurred vision. Recent assessment by Dr. Taylor indicated left hypertropia in primary gaze. Patient was tentatively fitted with 1 prism diopter left hypertropia correction ground in prism glasses but referred for evaluation and possible additional strabismus surgery.    Today patient has a small angle 2 prism diopter left hyperphoria in primary gaze that increases to 5 prism diopters in up gaze and has orthophoria in down gaze.      The change in strabismus over time could represent one of the following:  A. Slow drifting status post strabismus surgery related to orbital connective tissue aging  B. A slowly progressive new left cranial nerve 4 palsy   C. Macular epiretinal membrane associated diplopia (dragged fovea diplopia syndrome)    Plan:  - Check MRI brain and orbits  - Check OCT macula today before leaving  - return for 6 month follow-up with me to assess stability of strabismus and happiness with ground in prism glasses   - new ground in prism glasses prescription (total of 2 prism diopter left hypertropia correction provided today). I'd prefer to treat her with ground in prism glasses rather than strabismus surgery if we can make her happy with ground in prism glasses.    Addendum:  OCT macula shows mild epiretinal membranes in both  macula without significant disruption of the normal foveal pit contour. In my experience these membranes do not distort the normal photoreceptor location sufficient to result in new diplopia / hypertropia.     Clara Boykin is a pleasant 64 year old White female who presents to my neuro-ophthalmology clinic today having been referred by Dr. Taylor for vertical strabismus.    HPI:  Patient has a hx of right CN 4 palsy, s/p POORNIMA myectomy (04/10/17) with Dr. Leggett.   Dr. Taylor recommended patient to return to neuro-ophth for an opinion on another surgery (shona) or to update prism glasses.  She has 3 separate pairs of glasses (distance, computer, reading) with 0.5 base up right eye and 0.5 base down left eye - from Dr. Taylor 25 but have not dispensed them yet. She has been adopting a slight chin up. Dr. Taylor noted that her primary gaze alignment had worsened and that she has torsional diplopia that interferes with fusion.     Patient has a history of fourth nerve palsy and had a right inferior oblique myectomy on 4/10/2017. She was initially having trouble seeing up, but it improved afterwards. Around 2 years ago, she started noticing a change for distance vision. She reports not seeing double, it just looks blurry. Reads primarily with her left eye closed which makes the blurriness better. Also thinks that she needs a new glasses prescription. She was previously told that she needed cataract surgery as well.     Recently has had a lot of stress due to her father who recently  at the age of 102 and work busyness. She thinks that her blurriness gets worse with tiredness. Reports minimal glare. She does note increased light sensitivity. No headaches or migraines.     Ocular history: Right inferior oblique myectomy, ocular hypertension  Drops: Systane Ultra 5 times a day    She is 3-4 / 10 unhappy with her current diplopia.     Independent historians:  Patient Clara    Review of outside clinical  notes:    1/6/2025 -- Visit with Dr. Taylor    Past medical history:    Patient Active Problem List   Diagnosis    iamJOINT PAIN-PELVIS    Pain in joint, upper arm    Skin exam, screening for cancer    CARDIOVASCULAR SCREENING; LDL GOAL LESS THAN 160    Insomnia    History of anesthesia reaction    Small vessel disease, cerebrovascular    Right foot pain    Hip pain, right    NSTEMI (non-ST elevated myocardial infarction) (H)    Non-obstructive CAD without angina (coronary angiogram 1/2020)    Hyperlipidemia LDL goal <70    Apical variant hypertrophic cardiomyopathy (H)    Osteoporosis without current pathological fracture, unspecified osteoporosis type    Hypertension goal BP (blood pressure) < 130/80       Patients medication history: has a current medication list which includes the following prescription(s): alendronate, aspirin, atorvastatin, calcium, co-enzyme q-10, diphenhydramine, lisinopril, melatonin, multivitamin, therapeutic, omega-3 fatty acids, trazodone, and cholecalciferol.. List is confirmed today.    Family history / social history:  Patient's family history includes Anesthesia Reaction in her mother and another family member; Breast Cancer in her sister; Cancer in her father, maternal grandmother, and mother; Cerebrovascular Disease in her father, maternal grandfather, maternal grandmother, mother, and other family members; Coronary Artery Disease in her father and maternal grandfather; Depression in her father and mother; Diabetes in her cousin, maternal grandfather, and mother; Hyperlipidemia in her father and mother; Hypertension in her brother, father, and mother; Mental Illness in her mother and sister; Obesity in her maternal grandfather, maternal grandmother, and mother; Osteoporosis in her mother; Substance Abuse in her brother and sister; Unknown/Adopted in her mother and sister.     Patient  reports that she has never smoked. She has never used smokeless tobacco. She reports current  alcohol use. She reports that she does not use drugs.     Brother had glaucoma.     Job: clinical research at [employer]    Exam:  Visual acuity 20/25-2 right eye 20/25-2 left eye.  Color vision is full in both eyes.  Pupils: no APD, round and reactive.  Intraocular pressure 16 right eye and 15 left eye.  Please see epic chart for complete exam     Sensorimotor exam showed full motility in both eyes. On alternate cover testing there is a 2 prism diopter left hyperphoria in primary gaze and right gaze. In left gaze there is a 1 prism diopter left hyperphoria. In down gaze orthophoria. In up gaze a left hypertropia of 5 prism diopters that is symptomatic. Double gamble miguel a shows 7 degrees extorsion in the left eye and no torsion right eye.    Tests ordered and interpreted today:  OCT macula- mild epiretinal membrane in both eyes without significant foveal pit morphology disruption nor photoreceptor dragging      Again, thank you for trusting me with the care of your patient.  For further exam details, please feel free to contact our office for additional records.  If you wish to contact me regarding this patient please email me at [email or web address] or give my clinic a call to arrange a phone conversation.    Sincerely,    Lyle Leggett MD  , Neuro-Ophthalmology and Adult Strabismus Surgery  The Julio WHITLOCK and Eryn Eugene Chair in Neuro-Ophthalmology  Department of Ophthalmology and Visual Neurosciences  [facility]

## 2025-02-07 NOTE — NURSING NOTE
Chief Complaint(s) and History of Present Illness(es)       Diplopia Evaluation    In right eye.  Disease is recurrent.  Characterized as vertical.  Occurring intermittently.             Comments    Clara Boykin is a 64 year old female sent for consultation by Dr. Taylor for diplopia evaluation.    Patient has a hx of right CN 4 palsy, s/p POORNIMA myectomy (04/10/17) with Dr. Leggett.   Dr. Taylor recommended patient to return to neuro-ophth for an opinion on another surgery (ck) or to update prism glasses.  She has 3 separate pairs of glasses (distance, computer, reading) with 0.5 base up right eye and 0.5 base down left eye - from Dr. Taylor 01/06/25 but have not dispensed them yet. She has been adopting a slight chin up. She reports vision is more blurred than it is double vision, thinks it may be due her being uncorrected from glasses.   JAY Winkler 2/7/2025 8:15 AM

## 2025-02-07 NOTE — PROGRESS NOTES
1. Right congenital cranial nerve 4 palsy diagnosed after negative MRI and stability of measurements on serial sensorimotor exam. status post right inferior oblique myectomy 04/2017 with very good outcome and single binocular vision in all gaze positions except far up gaze where patient had a small angle right hypotropia / left hypertropia.     Around 2 years ago patient began noticing blurred vision. Recent assessment by Dr. Taylor indicated left hypertropia in primary gaze. Patient was tentatively fitted with 1 prism diopter left hypertropia correction ground in prism glasses but referred for evaluation and possible additional strabismus surgery.    Today patient has a small angle 2 prism diopter left hyperphoria in primary gaze that increases to 5 prism diopters in up gaze and has orthophoria in down gaze.      The change in strabismus over time could represent one of the following:  A. Slow drifting status post strabismus surgery related to orbital connective tissue aging  B. A slowly progressive new left cranial nerve 4 palsy   C. Macular epiretinal membrane associated diplopia (dragged fovea diplopia syndrome)    Plan:  - Check MRI brain and orbits  - Check OCT macula today before leaving  - return for 6 month follow-up with me to assess stability of strabismus and happiness with ground in prism glasses   - new ground in prism glasses prescription (total of 2 prism diopter left hypertropia correction provided today). I'd prefer to treat her with ground in prism glasses rather than strabismus surgery if we can make her happy with ground in prism glasses.    Addendum:  OCT macula shows mild epiretinal membranes in both macula without significant disruption of the normal foveal pit contour. In my experience these membranes do not distort the normal photoreceptor location sufficient to result in new diplopia / hypertropia.     Clara Boykin is a pleasant 64 year old White female who presents to my  neuro-ophthalmology clinic today having been referred by Dr. Taylor for vertical strabismus.    HPI:  Patient has a hx of right CN 4 palsy, s/p POORNIMA myectomy (04/10/17) with Dr. Leggett.   Dr. Taylor recommended patient to return to neuro-ophth for an opinion on another surgery (shona) or to update prism glasses.  She has 3 separate pairs of glasses (distance, computer, reading) with 0.5 base up right eye and 0.5 base down left eye - from Dr. Taylor 25 but have not dispensed them yet. She has been adopting a slight chin up. Dr. Taylor noted that her primary gaze alignment had worsened and that she has torsional diplopia that interferes with fusion.     Patient has a history of fourth nerve palsy and had a right inferior oblique myectomy on 4/10/2017. She was initially having trouble seeing up, but it improved afterwards. Around 2 years ago, she started noticing a change for distance vision. She reports not seeing double, it just looks blurry. Reads primarily with her left eye closed which makes the blurriness better. Also thinks that she needs a new glasses prescription. She was previously told that she needed cataract surgery as well.     Recently has had a lot of stress due to her father who recently  at the age of 102 and work busyness. She thinks that her blurriness gets worse with tiredness. Reports minimal glare. She does note increased light sensitivity. No headaches or migraines.     Ocular history: Right inferior oblique myectomy, ocular hypertension  Drops: Systane Ultra 5 times a day    She is 3-4 / 10 unhappy with her current diplopia.     Independent historians:  Kiana Elizabeth    Review of outside clinical notes:    2025 -- Visit with Dr. Taylor    Past medical history:    Patient Active Problem List   Diagnosis    iamJOINT PAIN-PELVIS    Pain in joint, upper arm    Skin exam, screening for cancer    CARDIOVASCULAR SCREENING; LDL GOAL LESS THAN 160    Insomnia    History of anesthesia  reaction    Small vessel disease, cerebrovascular    Right foot pain    Hip pain, right    NSTEMI (non-ST elevated myocardial infarction) (H)    Non-obstructive CAD without angina (coronary angiogram 1/2020)    Hyperlipidemia LDL goal <70    Apical variant hypertrophic cardiomyopathy (H)    Osteoporosis without current pathological fracture, unspecified osteoporosis type    Hypertension goal BP (blood pressure) < 130/80       Patients medication history: has a current medication list which includes the following prescription(s): alendronate, aspirin, atorvastatin, calcium, co-enzyme q-10, diphenhydramine, lisinopril, melatonin, multivitamin, therapeutic, omega-3 fatty acids, trazodone, and cholecalciferol.. List is confirmed today.    Family history / social history:  Patient's family history includes Anesthesia Reaction in her mother and another family member; Breast Cancer in her sister; Cancer in her father, maternal grandmother, and mother; Cerebrovascular Disease in her father, maternal grandfather, maternal grandmother, mother, and other family members; Coronary Artery Disease in her father and maternal grandfather; Depression in her father and mother; Diabetes in her cousin, maternal grandfather, and mother; Hyperlipidemia in her father and mother; Hypertension in her brother, father, and mother; Mental Illness in her mother and sister; Obesity in her maternal grandfather, maternal grandmother, and mother; Osteoporosis in her mother; Substance Abuse in her brother and sister; Unknown/Adopted in her mother and sister.     Patient  reports that she has never smoked. She has never used smokeless tobacco. She reports current alcohol use. She reports that she does not use drugs.     Brother had glaucoma.     Job: clinical research at [employer]    Exam:  Visual acuity 20/25-2 right eye 20/25-2 left eye.  Color vision is full in both eyes.  Pupils: no APD, round and reactive.  Intraocular pressure 16 right eye and 15 left  eye.  Please see epic chart for complete exam     Sensorimotor exam showed full motility in both eyes. On alternate cover testing there is a 2 prism diopter left hyperphoria in primary gaze and right gaze. In left gaze there is a 1 prism diopter left hyperphoria. In down gaze orthophoria. In up gaze a left hypertropia of 5 prism diopters that is symptomatic. Double gamble miguel a shows 7 degrees extorsion in the left eye and no torsion right eye.    Tests ordered and interpreted today:  OCT macula- mild epiretinal membrane in both eyes without significant foveal pit morphology disruption nor photoreceptor dragging         Karen Lo, MS3  [employer] [employer]    Precharting only :  Sebastien Cotto MD   Fellow, Neuro-Ophthalmology    45 minutes were spent on the date of the encounter by me doing chart review, history and exam, documentation, and further activities as noted above    Complete documentation of historical and exam elements from today's encounter can be found in the full encounter summary report (not reduplicated in this progress note).  I personally re-obtained the chief complaint(s) and history of present illness.  I confirmed and edited as necessary the review of systems, past medical/surgical history, family history, social history, and examination findings as documented by others; and I examined the patient myself.  I personally reviewed the relevant tests, images, and reports as documented above.  I formulated and edited as necessary the assessment and plan and discussed the findings and management plan with the patient and family     A medical student was involved in the care of the patient. I was present with the medical student who participated in the service and in the documentation of the note. I have  verified the history and personally performed the physical exam and medical decision making. I extensively reviewed and edited when necessary the assessment and plan. I agree  with the assessment and plan of care as documented in the note    Lyle Leggett MD

## 2025-02-12 DIAGNOSIS — I42.2 APICAL VARIANT HYPERTROPHIC CARDIOMYOPATHY (H): ICD-10-CM

## 2025-02-12 DIAGNOSIS — I10 ESSENTIAL HYPERTENSION: ICD-10-CM

## 2025-02-12 DIAGNOSIS — I21.4 NSTEMI (NON-ST ELEVATED MYOCARDIAL INFARCTION) (H): Primary | ICD-10-CM

## 2025-02-19 ENCOUNTER — MYC MEDICAL ADVICE (OUTPATIENT)
Dept: OPHTHALMOLOGY | Facility: CLINIC | Age: 65
End: 2025-02-19
Payer: COMMERCIAL

## 2025-02-27 ENCOUNTER — TRANSFERRED RECORDS (OUTPATIENT)
Dept: HEALTH INFORMATION MANAGEMENT | Facility: CLINIC | Age: 65
End: 2025-02-27
Payer: COMMERCIAL

## 2025-04-14 DIAGNOSIS — I10 HYPERTENSION GOAL BP (BLOOD PRESSURE) < 130/80: ICD-10-CM

## 2025-04-14 DIAGNOSIS — I21.4 NSTEMI (NON-ST ELEVATED MYOCARDIAL INFARCTION) (H): ICD-10-CM

## 2025-04-14 RX ORDER — LISINOPRIL 2.5 MG/1
2.5 TABLET ORAL DAILY PRN
Qty: 30 TABLET | Refills: 11 | Status: SHIPPED | OUTPATIENT
Start: 2025-04-14

## 2025-04-14 RX ORDER — ATORVASTATIN CALCIUM 10 MG/1
10 TABLET, FILM COATED ORAL EVERY EVENING
Qty: 90 TABLET | Refills: 0 | Status: SHIPPED | OUTPATIENT
Start: 2025-04-14

## 2025-04-14 NOTE — TELEPHONE ENCOUNTER
Last Written Prescription:   Disp Refills Start End KENY   atorvastatin (LIPITOR) 10 MG tablet 90 tablet 2 7/24/2024 -- No   Sig - Route: Take 1 tablet (10 mg) by mouth every evening - Oral     ----------------------  Last Visit Date: 5/14/2024  [facility]      Future Visit Date:    7/15/2025 8:00 AM (30 min)  [place]   Arrive by:  7:45 AM   RETURN [facility]   Rfl Status: Pending Review   CV ([facility])   Uriel Mendes MD     ----------------------      Refill decision: Medication refilled per  Medication Refill in Ambulatory Care  policy.      LDL Cholesterol Calculated   Date Value Ref Range Status   05/14/2024 74 <=100 mg/dL Final   03/11/2021 77 <100 mg/dL Final     Comment:     Desirable:       <100 mg/dl     LDL Cholesterol Direct   Date Value Ref Range Status   03/10/2022 66 <100 mg/dL Final     Comment:     Age 0-19 years:  Desirable: 0-110 mg/dL   Borderline high: 110-129 mg/dL   High: >= 130 mg/dL    Age 20 years and older:  Desirable: <100mg/dL  Above desirable: 100-129 mg/dL   Borderline high: 130-159 mg/dL   High: 160-189 mg/dL   Very high: >= 190 mg/dL       Request from pharmacy:  Requested Prescriptions   Pending Prescriptions Disp Refills    atorvastatin (LIPITOR) 10 MG tablet 90 tablet 2     Sig: Take 1 tablet (10 mg) by mouth every evening.       Antihyperlipidemic agents Passed - 4/14/2025  3:39 PM        Passed - LDL on file in the past 12 months        Passed - Medication is active on med list and the sig matches. RN to manually verify dose and sig if red X/fail.     If the protocol passes (green check), you do not need to verify med dose and sig.    A prescription matches if they are the same clinical intention.    For Example: once daily and every morning are the same.    The protocol can not identify upper and lower case letters as matching and will fail.     For Example: Take 1 tablet (50 mg) by mouth daily     TAKE 1 TABLET (50 MG) BY MOUTH DAILY    For  all fails (red x), verify dose and sig.    If the refill does match what is on file, the RN can still proceed to approve the refill request.       If they do not match, route to the appropriate provider.             Passed - Recent (12 mo) or future (90 days) visit within the authorizing provider's specialty     The patient must have completed an in-person or virtual visit within the past 12 months or has a future visit scheduled within the next 90 days with the authorizing provider s specialty.  Urgent care and e-visits do not qualify as an office visit for this protocol.          Passed - Patient is age 18 years or older        Passed - No active pregnancy on record        Passed - No positive pregnancy test in past 12 mos

## 2025-05-20 ENCOUNTER — TELEPHONE (OUTPATIENT)
Dept: CARDIOLOGY | Facility: CLINIC | Age: 65
End: 2025-05-20
Payer: COMMERCIAL

## 2025-05-20 NOTE — TELEPHONE ENCOUNTER
Attempted to reach patient to reschedule 7/15/25 appt with Dr. Mendes due to provider canceling clinic.    No answer, LVM.

## 2025-05-21 NOTE — TELEPHONE ENCOUNTER
Spoke with patient to reschedule CMR, labs, and appt with Dr. Mendes.    CMR rescheduled to 9/9/25  Labs and provider visit rescheduled to 9/16/25    Confirmed dates/times/locations with pt

## 2025-06-06 ENCOUNTER — RESULTS FOLLOW-UP (OUTPATIENT)
Dept: FAMILY MEDICINE | Facility: CLINIC | Age: 65
End: 2025-06-06

## 2025-06-18 ENCOUNTER — TELEPHONE (OUTPATIENT)
Dept: CARDIOLOGY | Facility: CLINIC | Age: 65
End: 2025-06-18
Payer: COMMERCIAL

## 2025-06-18 NOTE — TELEPHONE ENCOUNTER
Attempted to reach patient to notify that Dr. Mendes canceled clinic on 9/16/25 and her 9:00am appt needs to be rescheduled.    No answer, detailed message left.

## 2025-06-27 ENCOUNTER — ANCILLARY PROCEDURE (OUTPATIENT)
Dept: MAMMOGRAPHY | Facility: CLINIC | Age: 65
End: 2025-06-27
Attending: FAMILY MEDICINE
Payer: COMMERCIAL

## 2025-06-27 DIAGNOSIS — Z12.31 ENCOUNTER FOR SCREENING MAMMOGRAM FOR BREAST CANCER: ICD-10-CM

## 2025-06-27 PROCEDURE — 77067 SCR MAMMO BI INCL CAD: CPT | Mod: GC | Performed by: RADIOLOGY

## 2025-06-27 PROCEDURE — 77063 BREAST TOMOSYNTHESIS BI: CPT | Mod: GC | Performed by: RADIOLOGY

## 2025-07-16 ENCOUNTER — MYC MEDICAL ADVICE (OUTPATIENT)
Dept: CARDIOLOGY | Facility: CLINIC | Age: 65
End: 2025-07-16
Payer: COMMERCIAL

## 2025-07-17 ENCOUNTER — HOSPITAL ENCOUNTER (OUTPATIENT)
Dept: MRI IMAGING | Facility: CLINIC | Age: 65
Discharge: HOME | End: 2025-07-17
Attending: INTERNAL MEDICINE
Payer: COMMERCIAL

## 2025-07-17 DIAGNOSIS — I42.2 APICAL VARIANT HYPERTROPHIC CARDIOMYOPATHY (H): ICD-10-CM

## 2025-07-17 DIAGNOSIS — I42.2 HYPERTROPHIC CARDIOMYOPATHY (H): ICD-10-CM

## 2025-07-17 PROCEDURE — 75561 CARDIAC MRI FOR MORPH W/DYE: CPT

## 2025-07-17 PROCEDURE — A9585 GADOBUTROL INJECTION: HCPCS | Performed by: INTERNAL MEDICINE

## 2025-07-17 PROCEDURE — 255N000002 HC RX 255 OP 636: Performed by: INTERNAL MEDICINE

## 2025-07-17 RX ORDER — GADOBUTROL 604.72 MG/ML
7.5 INJECTION INTRAVENOUS ONCE
Status: COMPLETED | OUTPATIENT
Start: 2025-07-17 | End: 2025-07-17

## 2025-07-17 RX ADMIN — GADOBUTROL 7.5 ML: 604.72 INJECTION INTRAVENOUS at 18:11

## 2025-07-21 ENCOUNTER — LAB (OUTPATIENT)
Dept: LAB | Facility: CLINIC | Age: 65
End: 2025-07-21
Payer: COMMERCIAL

## 2025-07-21 DIAGNOSIS — I42.2 APICAL VARIANT HYPERTROPHIC CARDIOMYOPATHY (H): ICD-10-CM

## 2025-07-21 DIAGNOSIS — I42.2 HYPERTROPHIC CARDIOMYOPATHY (H): ICD-10-CM

## 2025-07-21 LAB
ANION GAP SERPL CALCULATED.3IONS-SCNC: 10 MMOL/L (ref 7–15)
BUN SERPL-MCNC: 24.4 MG/DL (ref 8–23)
CALCIUM SERPL-MCNC: 9.5 MG/DL (ref 8.8–10.4)
CHLORIDE SERPL-SCNC: 102 MMOL/L (ref 98–107)
CHOLEST SERPL-MCNC: 171 MG/DL
CREAT SERPL-MCNC: 0.71 MG/DL (ref 0.51–0.95)
EGFRCR SERPLBLD CKD-EPI 2021: >90 ML/MIN/1.73M2
FASTING STATUS PATIENT QL REPORTED: YES
FASTING STATUS PATIENT QL REPORTED: YES
GLUCOSE SERPL-MCNC: 94 MG/DL (ref 70–99)
HCO3 SERPL-SCNC: 27 MMOL/L (ref 22–29)
HDLC SERPL-MCNC: 69 MG/DL
LDLC SERPL CALC-MCNC: 92 MG/DL
NONHDLC SERPL-MCNC: 102 MG/DL
POTASSIUM SERPL-SCNC: 4.3 MMOL/L (ref 3.4–5.3)
SODIUM SERPL-SCNC: 139 MMOL/L (ref 135–145)
TRIGL SERPL-MCNC: 51 MG/DL

## 2025-07-21 PROCEDURE — 80061 LIPID PANEL: CPT

## 2025-07-21 PROCEDURE — 80048 BASIC METABOLIC PNL TOTAL CA: CPT

## 2025-07-21 PROCEDURE — 36415 COLL VENOUS BLD VENIPUNCTURE: CPT

## 2025-07-22 ENCOUNTER — OFFICE VISIT (OUTPATIENT)
Dept: CARDIOLOGY | Facility: CLINIC | Age: 65
End: 2025-07-22
Attending: INTERNAL MEDICINE
Payer: COMMERCIAL

## 2025-07-22 ENCOUNTER — TRANSFERRED RECORDS (OUTPATIENT)
Dept: HEALTH INFORMATION MANAGEMENT | Facility: CLINIC | Age: 65
End: 2025-07-22

## 2025-07-22 VITALS
BODY MASS INDEX: 19.82 KG/M2 | DIASTOLIC BLOOD PRESSURE: 77 MMHG | HEART RATE: 69 BPM | WEIGHT: 119.1 LBS | SYSTOLIC BLOOD PRESSURE: 117 MMHG | OXYGEN SATURATION: 99 %

## 2025-07-22 DIAGNOSIS — I42.2 APICAL VARIANT HYPERTROPHIC CARDIOMYOPATHY (H): ICD-10-CM

## 2025-07-22 DIAGNOSIS — I25.10 CORONARY ARTERY DISEASE INVOLVING NATIVE CORONARY ARTERY OF NATIVE HEART WITHOUT ANGINA PECTORIS: ICD-10-CM

## 2025-07-22 DIAGNOSIS — E78.5 HYPERLIPIDEMIA LDL GOAL <70: ICD-10-CM

## 2025-07-22 DIAGNOSIS — I47.10 SVT (SUPRAVENTRICULAR TACHYCARDIA): Primary | ICD-10-CM

## 2025-07-22 LAB
ATRIAL RATE - MUSE: 69 BPM
DIASTOLIC BLOOD PRESSURE - MUSE: NORMAL MMHG
INTERPRETATION ECG - MUSE: NORMAL
P AXIS - MUSE: 74 DEGREES
PR INTERVAL - MUSE: 170 MS
QRS DURATION - MUSE: 74 MS
QT - MUSE: 384 MS
QTC - MUSE: 411 MS
R AXIS - MUSE: 64 DEGREES
SYSTOLIC BLOOD PRESSURE - MUSE: NORMAL MMHG
T AXIS - MUSE: 48 DEGREES
VENTRICULAR RATE- MUSE: 69 BPM

## 2025-07-22 PROCEDURE — 99215 OFFICE O/P EST HI 40 MIN: CPT | Mod: GC | Performed by: INTERNAL MEDICINE

## 2025-07-22 PROCEDURE — 3074F SYST BP LT 130 MM HG: CPT | Performed by: INTERNAL MEDICINE

## 2025-07-22 PROCEDURE — G2211 COMPLEX E/M VISIT ADD ON: HCPCS | Performed by: INTERNAL MEDICINE

## 2025-07-22 PROCEDURE — 3048F LDL-C <100 MG/DL: CPT | Performed by: INTERNAL MEDICINE

## 2025-07-22 PROCEDURE — 1126F AMNT PAIN NOTED NONE PRSNT: CPT | Performed by: INTERNAL MEDICINE

## 2025-07-22 PROCEDURE — 3078F DIAST BP <80 MM HG: CPT | Performed by: INTERNAL MEDICINE

## 2025-07-22 PROCEDURE — 93005 ELECTROCARDIOGRAM TRACING: CPT

## 2025-07-22 PROCEDURE — 99213 OFFICE O/P EST LOW 20 MIN: CPT | Performed by: INTERNAL MEDICINE

## 2025-07-22 RX ORDER — IBUPROFEN 800 MG/1
800 TABLET, FILM COATED ORAL 3 TIMES DAILY PRN
COMMUNITY
Start: 2025-01-15

## 2025-07-22 ASSESSMENT — PAIN SCALES - GENERAL: PAINLEVEL_OUTOF10: NO PAIN (0)

## 2025-07-22 NOTE — LETTER
7/22/2025      RE: Clara Boykin  [address]  [facility] [address]       Dear Colleague,    Thank you for the opportunity to participate in the care of your patient, Clara Boykin, at the [facility] [place] at [facility]. Please see a copy of my visit note below.    Chief complaint: Follow up of apical-variant HCM    HPI:   Clara Boykin is a 65 year old female with a past medical history notable for nonobstructive coronary artery disease, carotid artery disease, hypertension, and  hypertrophic cardiomyopathy who presents for follow up for hypertrophic cardiomyopathy.    The patient was admitted in 2020 with SOB and elevated troponin and was found to have NSTEMI.  Echo notable for apical akinesis.  Coronary angiogram was notable for nonobstructive coronary disease.  Subsequently she had cardiac MRI was notable for apical thickening with features concerning for HCM.  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 She has been doing well for a number of years with good exercise capacity.  Most recent echocardiograms have been notable for normal biventricular function with resolution of apical akinesis with apical thickening no longer notable.  Repeat CMR confirmed normal biventricular function and normal apical wall thickness.  Overall this is consistent with patient's index presentation being stress cardiomyopathy with apical akinesis complicated by apical thickening caused by wall edema which has since resolved.     Overall today she continues to be in good health.  Denies significant exertional chest pain, shortness of breath , orthopnea, paroxysmal nocturnal dyspnea, palpitations, syncope or presyncope. Functional status is excellent activity limited by back pain.     PAST MEDICAL HISTORY:  Past Medical History:   Diagnosis Date     Apical variant hypertrophic cardiomyopathy (H) 03/10/2020     Arthritis ?    thumbs, ? mild other  sites     Coronary artery disease involving native coronary artery of native heart without angina pectoris 02/04/2020    NSTEMI Jan 2020     Degenerative joint disease ?    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    Can palpate pulse near umbilicus. Suspect umbilical hernia     Tinnitus past yr?     CURRENT MEDICATIONS:  Current Outpatient Medications   Medication Sig Dispense Refill     alendronate (FOSAMAX) 70 MG tablet Take 70 mg by mouth every 7 days       aspirin (ASA) 81 MG chewable tablet Take 1 tablet (81 mg) by mouth daily 90 tablet 1     atorvastatin (LIPITOR) 10 MG tablet Take 1 tablet (10 mg) by mouth every evening. 90 tablet 0     CALCIUM PO Take 1,200 mg by mouth daily       Co-Enzyme Q-10 60 MG CAPS        diphenhydrAMINE (BENADRYL) 25 MG tablet Take 12.5-25 mg by mouth nightly as needed Reported on 3/31/2017       lisinopril (ZESTRIL) 2.5 MG tablet Take 0.5 tablets (1.25 mg) by mouth daily as needed (hypertension).       melatonin 3 MG tablet Take 3 mg by mouth nightly as needed for sleep (Patient taking differently: Take 4 mg by mouth nightly as needed for sleep.)       multivitamin, therapeutic (THERA-VIT) TABS tablet Take 1 tablet by mouth daily. 3 times x week       Omega-3 Fatty Acids (OMEGA 3 PO) Take 1 g by mouth daily. Reported on 3/31/2017  Hold       traZODone (DESYREL) 50 MG tablet TAKE 1/2 TABLET BY MOUTH EVERY NIGHT AS NEEDED (Patient taking differently: TAKE 1/4 TABLET BY MOUTH EVERY NIGHT AS NEEDED) 30 tablet 2     VITAMIN D, CHOLECALCIFEROL, PO Take 1,000 Units by mouth daily         PAST SURGICAL HISTORY:  Past Surgical History:   Procedure Laterality Date     ABDOMEN SURGERY  07/2016    Abd hernia repair (which has reoccurred form coughing 2018)     APPENDECTOMY       AS KNEE SCOPE, DIAGNOSTIC       BACK SURGERY  1999    Left C6-7 foraminotomy     BIOPSY  2014    lipoma excision (near R scapula)     BUNIONECTOMY Right 2016    times 2     BUNIONECTOMY Left 10/22/2018    Procedure: LEFT REVISION BUNION ;  Surgeon: Johanna Lund MD;  Location:  [facility]     COLONOSCOPY  2019 1 sm precancer polyp     CORONARY ANGIOGRAPHY ADULT ORDER  1/24/20    [facility]     CV CORONARY ANGIOGRAM N/A 01/24/2020    Procedure: Coronary Angiogram;  Surgeon: Criss Green MD;  Location:  HEART CARDIAC CATH LAB     CV LEFT HEART CATH N/A 01/24/2020    Procedure: Left Heart Cath;  Surgeon: Criss Green MD;  Location:  HEART CARDIAC CATH LAB     CV LEFT VENTRICULOGRAM N/A 01/24/2020    Procedure: Left Ventriculogram;  Surgeon: Criss Green MD;  Location:  HEART CARDIAC CATH LAB     EP ABLATION / EP STUDIES  1/25-2/18/20    portable 24 hr     HEAD & NECK SURGERY  Jan 1999    L C6-7 foraminotomy     HERNIORRHAPHY VENTRAL N/A 07/07/2016    Procedure: HERNIORRHAPHY VENTRAL;  Surgeon: Ash Thompson MD;  [facility]: [facility]     KNEE SURGERY  1984    L knee arthroscopy     left clavical       NECK SURGERY       ORTHOPEDIC SURGERY  [id number]    ORIF L clavicle/hardware out; bilat bunion/forefoot (4 ops)     [place] HAND/FINGER SURGERY UNLISTED  2007    L scapholunate lig reconstruction w FCR tendon: Dr. Graham     [place] SHOULDER SURG PROC UNLISTED  2010, 2011    ORIF lat 1/3 L clavicle fx; hardware out 2011     [place] STOMACH SURGERY PROCEDURE  UNLISTED      Abdominal hernia     RECESSION RESECTION (REPAIR STRABISMUS) Right 04/10/2017    Procedure: RECESSION RESECTION (REPAIR STRABISMUS);  Surgeon: Lyle Leggett MD;  [facility]: [facility]     SOFT TISSUE SURGERY      L wrist: scapholunate lig reconstruction post L wristfx     WRIST SURGERY         ALLERGIES:     Allergies   Allergen Reactions     Exparel [Bupivacaine] GI Disturbance     Patient had severe abdominal distention     Darvocet [Propoxyphene N-Apap] Other (See Comments)     confusion     Liposomes GI Disturbance     Oxycodone      Penicillins Itching     Percocet [Oxycodone-Acetaminophen] Other (See Comments)     confusion     Tape [Adhesive Tape] Rash     Once after surgical tape     Vistaril [Hydroxyzine] Rash     Intense flushing/redness of face        FAMILY HISTORY:  Family History   Problem Relation Age of Onset     Cancer Mother         skin cancer     Diabetes Mother         borderline/Type 2     Hypertension Mother         3 meds: 4.5cm asc aortic aneurysm     Hyperlipidemia Mother         chol & very high triglycerides     Cerebrovascular Disease Mother         many small, cog. impaired,  18     Osteoporosis Mother         fosamax 5 yrs:poststeroids     Obesity Mother         BMI 35+     Unknown/Adopted Mother         dermatomyositis since      Depression Mother      Mental Illness Mother         probably bordeline personality disorder; vascular dementia     Anxiety Disorder Mother      Aneurysm Mother         4.5cm ascending aortic     Colon Polyps Mother      Cancer Father         skin cancer     Coronary Artery Disease Father         angioplasty ; STEMI 2ndary to  CVA 20     Hypertension Father         Age 97     Depression Father         2x: age 87 [facility]     Hyperlipidemia Father         2020 after CVA, lipids not elevated     Cerebrovascular Disease Father         2 in ; dx'd w afib     Thyroid Disease Father         started med  age 99     Eye Surgery Father         shattered lens/filtered fluid after cataract w lens; chalazion; droopy lid     Strabismus Father         maybe Dad?     Heart Disease Father         angioplasty : alive     Cancer Maternal Grandmother          of breast CA     Cerebrovascular Disease Maternal Grandmother         - multiple CVAs     Obesity Maternal Grandmother         overweight     Diabetes Maternal Grandfather         borderline/Type 2     Coronary Artery Disease Maternal Grandfather         2-3 MIs; worked after each     Cerebrovascular Disease Maternal Grandfather         -CVA postop hernia     Obesity Maternal Grandfather         overwt to obese     Heart Disease Maternal Grandfather         MI x 2. Lived thru both     Diabetes Cousin         prediabetes     Hypertension Brother         Lisinopril 40 mg/d x 20 yrsor 20 yrs     Substance Abuse Brother         hx of EtOH     Anxiety Disorder Brother         chronic     Cerebrovascular Disease Other         cog. impairment like my mom     Mental Illness Sister         ?bipolar; past chem dep     Substance Abuse Sister         hx of: prescript drug& EtOH     Unknown/Adopted Sister         sister is adopted     Breast Cancer Sister         stage 2-3,      Anesthesia Reaction Other         N/V 1-8 hr postop if at all     Osteoporosis Other         Alendronate 2023     Genetic Disorder Other         Apical HCM     Cerebrovascular Disease Other         cog. impairment like my mom     Cancer Paternal Grandfather          lung CA age 53     Brain Hemorrhage Paternal Grandmother         age 53- from it     Hypertension Brother         1 med for 20 yrs     Anxiety Disorder Brother      Substance Abuse Brother         hx of EtOH     Mental Illness Sister         adopted sister     Substance Abuse Sister         hx of: prescript drug& EtOH     Unknown/Adopted Sister         sister is adopted     Osteoporosis Other         Alendronate Sept  2023     Genetic Disorder Other         Apical HCM     Cerebrovascular Disease Other         many small - currently nonverbal     Mental Illness Sister         ?bipolar; past chem dep     Substance Abuse Sister         hx of: prescript drug& EtOH     Unknown/Adopted Sister         sister is adopted     Cancer Cousin         thymic carcinoid: alive     Cancer Cousin         Hodgkins: alive, well     Diabetes Cousin         prediabetes     Cerebrovascular Disease Maternal Uncle         same cog. impairment as mom     Hypertension Brother         Lisinopril 40 mg/d x 20 yrsor 20 yrs     Anxiety Disorder Brother         chronic     Substance Abuse Brother         hx of EtOH     Cerebrovascular Disease Other         cog. 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    Exam:  LMP 09/06/2006   GENERAL APPEARANCE: healthy, alert and no distress  General: Well-nourished, well-developed, NAD   HEENT: normocephalic  Oral: moist mucous membranes  Chest: Normal work of breathing. clear to ausculation.   Cardio: Rhythm is regular.  S1 normal, S2. Murmurs are not present, JVP not elevated  Abdomen: without tenderness, rebound, guarding, masses, ascites  Extremities: Edema not present  Neuro: CN II-XII grossly intact. Alert and oriented x 4.   Skin: warm, dry, pink without open lesions  Psych: normal mood, affect     Labs:  Reviewed.  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  A variant of unknown significance means that there is a genetic change in which we do not have enough information to determine if it is disease causing or not. Labs review published data, functional studies, presences in population databases, similarity to normal sequence, and computer prediction models.    This particular variant occurs in a portion of the gene that is highly conserved in available vertebrate species.  This variant occurs in an intron after exon 23.  A splice prediction tool predicts this alteration will abolish the native donor site; however, direct evidence is unavailable. This variant has not been previously reported as a disease causing mutation or as a normal variation.  Therefore, based on the current information it is unclear if this VUS is associated with disease or not.     Testing/Procedures:    CPX 9/29/20:   Exercised 12:17 on Jose G Ramp protocol  /90-> 185/78  MVO2 30.5 mL/kg/min (9 METS, class I, 120% predicted)    ZioPatch 2/6/21-2/22/21: Baseline sinus rhythm, average VR 73 bpm (range  bpm.) No sustained/nonsustained VT. Rare (<1%) PACs and PVCs; rare nonsustained SVT, likely AT (longest 21.2 sec.) No atrial fibrillation. 7 patient-triggered events, which appeared to correlate with SVT and with PACs.     ZioPatch 2/4/22-2/18/22:   Baseline sinus rhythm, average VR 74 bpm (range  bpm.) Rare (<1%) PACs and PVCs. 8 brief runs of nonsustained SVT (likely atrial tachycardia, longest 7 beats), 3 of which were associated with symptoms. 18 patient-triggered episodes were associated with SVT (3), PACs (12), and the remainder with sinus rhyhtm. Several of the episodes with  PACs also had isolated PVCs. No nonsustained VT. No atrial fibrillation. No significant/sustained tachyarrhythmias, bradyarrhythmias, or pauses.   AT was also present on prior ZioPatch from 2/6/21-2/22/21 (and associated with symptoms at that time) and also with 1/2020-2/2020 Holter monitor. The longest SVT/AT run was significantly shorter than on the prior ZioPatch (7 beats vs. 21.2 seconds.)   Results released to the patient via [employer] and will be discussed at her upcoming outpatient visit.    Echo 3/22/2022  Global and regional left ventricular function is normal with an EF of 60-65%.  Global right ventricular function is normal.  Mild mitral and tricuspid insufficiency present.  Pulmonary artery systolic pressure is normal.  The inferior vena cava was normal in size with preserved respiratory  variability.  No pericardial effusion is present.    CMR 2/14/20:  Apical HCM with apical segments ~1.3 cm (vs. 0.9 cm basal anterosetpum and 0.7 cm basal inferolateral.) Hyperdynamic LV function and normal RV function, LVEF=75% RVEF=67%. No LVOT or intracavitary obstruction. Very small apical LGE per report-- per my review, there is no convincing fibrosis on late gadolinium enhancement imaging.    Coronary angiogram 1/24/2020: non-obstructive CAD (30-40% mLAD/D1, 40% RCA)    Dr. Mendes personally visualized and interpreted:  TTE 05/09/23: Known apical variant HCM with isolated apical hypertrophy, LVEF=60-65%. No LVOT or intracavitary gradient. No significant change from 3/22/22.     CPX 4/17/23:   Superior exercise capacity (160% predicted), improved significantly (31%) from last CPX 9/2020.  MVO2 40.1 mL/kg/min (160% predicted, class I, 11.5 METS) VE/VCO2 slope 33.63 RER 1.21  Exercised 15:53 on Jose G Ramp protocol, achieved HR of 162 (104% MPHR)  Normal HR and BP response to exercise.    ZioPatch 4/6/23-4/13/23:  Baseline sinus rhythm, average VR 74 bpm ( bpm.)   No sustained or nonsustained VT, no atrial  fibrillation.   11 asymptomatic episodes of SVT, longest 19.3 sec.   Rare (<1%) PACs and PVCs.   5 patient-triggered events correlated with PACs (4/5 events.)    Zio patch 03/5/24-3/19/24  Rhythm was sinus with an average rate of 72 bpm, range 49 to 132 bpm.  PACs/PVCs less than 1%.  Brief runs of PACs up to 4 beats were asymptomatic.  No episodes of nonsustained VT.  Patient symptoms correlated with sinus rhythm and sinus tachycardia.    TTE 5/14/24   nterpretation Summary  Global and regional left ventricular function is normal with an EF of 60-65%.  Global right ventricular function is normal.  Mild tricuspid insufficiency is present.  Pulmonary artery systolic pressure is normal.  Estimated mean right atrial pressure is normal.  No pericardial effusion is present.     Assessment and Plan:   1.  Hx of stress cardiomyopathy with recovered EF.   Clara has clear resolution of apical thickening on most recent CMR with minimal fibrosis these findings can be explained by her initial index presentation being stress cardiomyopathy with wall thickening caused by edema rather than hypertrophy. Her only risk factor ventricular arrhythmias is sudden cardiac death in her uncle, who had autopsy confirmed hypertrophic cardiomyopathy.  She also has VUS gene mutation as above. She has had no ventricular arrhythmias on ZIO Patch testing and  her EF on echo is normal.     2.  Coronary artery disease, non-obstructive, without angina: Nonobstructive on coronary angiogram.  Continue aspirin and atorvastatin 10 mg (LDL<70.)    3.  Carotid artery disease: Noted on previous CTA.  Continue atorvastatin. Direct LDL-C 74 on atorvastatin 10 mg daily.     4.  Hypertension, essential, controlled: Continue to monitor.     5. Atrial tachycardia/PACs: rare and not bothersome to Clara, continue to monitor.     The patient states understanding and is agreeable with plan.       Aron Uribe MD  Cardiology fellow (PGY5)  [address]    Seen and  Discussed with Dr. Mendes.       Attestation signed by Uriel Mendes MD at 7/22/2025  3:34 PM:      Physician Attestation  I saw this patient with the resident and agree with the resident/fellow's findings and plan of care as documented in the note.      I personally visualized and interpreted:  CMR 7/17/25: Normal LV/RV sie/function/thickness, LVEF=66% RVEF=63%. No fibrosis on late gadolinium enhancement imaging. Compared with 2/14/20, apical thickening has resolved, suggesting prior findings were related to edema and stress cardiomyopathy rather than apical hypertrophic cardiomyopathy.    Abnormal CMR, apparently due to stress cardiomyopathy rather than apical HCM based on serial CMR: no further HCM evaluation in the absence of change in clinical status  Stress cardiomyopathy 2020, resolved  Nonobstructive CAD without angina: continue ASA/atorvastatin, repeat fasting lipids with direct LDL in 1 year  Atrial tachycardia/PACs: Mildly symptomatic on most recent ZioPatch. Continue to monitor, repeat ZioPatch in 1 year    42 MINUTES SPENT BY ME on the date of service doing chart review, history, exam, documentation & further activities per the note.  The longitudinal plan of care for the diagnosis(es)/condition(s) as documented were addressed during this visit. Due to the added complexity in care, I will continue to support Clara in the subsequent management and with ongoing continuity of care.    Uriel Mendes MD  Date of Service (when I saw the patient): 07/22/25    Please do not hesitate to contact me if you have any questions/concerns.     Sincerely,     Uriel Mendes MD

## 2025-07-22 NOTE — PATIENT INSTRUCTIONS
"  Thank you for visiting the Adult [facility]en[facility] and Cardiovascular [facility]etics Clinic at the [facility].    Cardiology Providers you saw during your visit:  Uriel Mendes MD    Diagnosis:  HCM    Results:  Uriel Mendes MD reviewed the results of your EKG, cardiac MRI, lab work and zio patch testing today in clinic.    Recommendations for you:    No changes to medications today.       General Cardiac Recommendations:  Continue to eat a heart healthy, low salt diet.  Continue to get 20-30 minutes of aerobic activity, 4-5 days per week.  Examples of aerobic activity include walking, running, swimming, cycling, etc.  Continue to observe good oral hygiene, with regular dental visits.      SBE prophylaxis:   Yes____  No_x___    Exercise restrictions:   Yes__X__  No____         If yes, list restrictions:  Must be allowed to rest if fatigued or SOB      FASTING CHOLESTEROL was checked in the last 5 years YES__x__  NO____ (2022)  If no, please follow up with your primary care physician. You should have a cholesterol screening every 5 years.      Follow-up:  Follow up with Dr. Mendes in 1 year with a 7 day zio patch monitor and fasting lipids prior.     If you have questions or concerns please contact us at:    Mohinder Devries RN, BSN     Elida Ortiz (Scheduling)  Nurse Care Coordinator     Clinic   CV Genetics      Adult Congenital and CV Genetic  [facility]   [facility]  (P) [phone]     (P) [phone]  (F) [phone]     (F) [phone]        For after hours urgent needs, call [phone] and ask to speak to the \"On-Call Cardiologist.\"      For emergencies call 911.    [facility]  [facility]  Mail Code [id number]  [address] [place] [address]  [id number]   "

## 2025-07-22 NOTE — PROGRESS NOTES
Chief complaint: Follow up of apical-variant HCM    HPI:   Clara Boykin is a 65 year old female with a past medical history notable for nonobstructive coronary artery disease, carotid artery disease, hypertension, and  hypertrophic cardiomyopathy who presents for follow up for hypertrophic cardiomyopathy.    The patient was admitted in 2020 with SOB and elevated troponin and was found to have NSTEMI.  Echo notable for apical akinesis.  Coronary angiogram was notable for nonobstructive coronary disease.  Subsequently she had cardiac MRI was notable for apical thickening with features concerning for HCM.  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 She has been doing well for a number of years with good exercise capacity.  Most recent echocardiograms have been notable for normal biventricular function with resolution of apical akinesis with apical thickening no longer notable.  Repeat CMR confirmed normal biventricular function and normal apical wall thickness.  Overall this is consistent with patient's index presentation being stress cardiomyopathy with apical akinesis complicated by apical thickening caused by wall edema which has since resolved.     Overall today she continues to be in good health.  Denies significant exertional chest pain, shortness of breath , orthopnea, paroxysmal nocturnal dyspnea, palpitations, syncope or presyncope. Functional status is excellent activity limited by back pain.     PAST MEDICAL HISTORY:  Past Medical History:   Diagnosis Date    Apical variant hypertrophic cardiomyopathy (H) 03/10/2020    Arthritis ?    thumbs, ? mild other sites    Coronary artery disease involving native coronary artery of native heart without angina pectoris 02/04/2020    NSTEMI Jan 2020    Degenerative joint disease ?    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    Can palpate pulse near umbilicus. Suspect umbilical hernia    Tinnitus past yr?     CURRENT MEDICATIONS:  Current Outpatient Medications   Medication Sig Dispense Refill    alendronate (FOSAMAX) 70 MG tablet Take 70 mg by mouth every 7 days      aspirin (ASA) 81 MG chewable tablet Take 1 tablet (81 mg) by mouth daily 90 tablet 1    atorvastatin (LIPITOR) 10 MG tablet Take 1 tablet (10 mg) by mouth every evening. 90 tablet 0    CALCIUM PO Take 1,200 mg by mouth daily      Co-Enzyme Q-10 60 MG CAPS       diphenhydrAMINE (BENADRYL) 25 MG tablet Take 12.5-25 mg by mouth nightly as needed Reported on 3/31/2017      lisinopril (ZESTRIL) 2.5 MG tablet Take 0.5 tablets (1.25 mg) by mouth daily as needed (hypertension).      melatonin 3 MG tablet Take 3 mg by mouth nightly as needed for sleep (Patient taking differently: Take 4 mg by mouth nightly as needed for sleep.)      multivitamin, therapeutic (THERA-VIT) TABS tablet Take 1 tablet by mouth daily. 3 times x week      Omega-3 Fatty Acids (OMEGA 3 PO) Take 1 g by mouth daily. Reported on 3/31/2017 Hold      traZODone (DESYREL) 50 MG tablet TAKE 1/2 TABLET BY MOUTH EVERY NIGHT AS NEEDED (Patient taking differently: TAKE 1/4 TABLET BY MOUTH EVERY NIGHT AS NEEDED) 30 tablet 2    VITAMIN D, CHOLECALCIFEROL, PO Take 1,000 Units by mouth daily         PAST SURGICAL HISTORY:  Past Surgical History:   Procedure Laterality Date    ABDOMEN SURGERY  07/2016    Abd  hernia repair (which has reoccurred form coughing 2018)    APPENDECTOMY      AS KNEE SCOPE, DIAGNOSTIC      BACK SURGERY  1999    Left C6-7 foraminotomy    BIOPSY  2014    lipoma excision (near R scapula)    BUNIONECTOMY Right 2016    times 2    BUNIONECTOMY Left 10/22/2018    Procedure: LEFT REVISION BUNION ;  Surgeon: Johanna Lund MD;  Location:  [facility]    COLONOSCOPY  2019 1 sm precancer polyp    CORONARY ANGIOGRAPHY ADULT ORDER  1/24/20    [facility]    CV CORONARY ANGIOGRAM N/A 01/24/2020    Procedure: Coronary Angiogram;  Surgeon: Criss Green MD;  [facility]  [facility]    CV LEFT HEART CATH N/A 01/24/2020    Procedure: Left Heart Cath;  Surgeon: Criss Green MD;  Location:  [facility]    CV LEFT VENTRICULOGRAM N/A 01/24/2020    Procedure: Left Ventriculogram;  Surgeon: Criss Green MD;  Location:  [facility]    EP ABLATION / EP STUDIES  1/25-2/18/20    portable 24 hr    HEAD & NECK SURGERY  Jan 1999    L C6-7 foraminotomy    HERNIORRHAPHY VENTRAL N/A 07/07/2016    Procedure: HERNIORRHAPHY VENTRAL;  Surgeon: Ash Thompson MD;  Location:  [facility]    KNEE SURGERY  1984    L knee arthroscopy    left clavical      NECK SURGERY      ORTHOPEDIC SURGERY  [id number]    ORIF L clavicle/hardware out; bilat bunion/forefoot (4 ops)    SC HAND/FINGER SURGERY UNLISTED  2007    L scapholunate lig reconstruction w FCR tendon: Dr. Graham    SC SHOULDER SURG PROC UNLISTED  2010, 2011    ORIF lat 1/3 L clavicle fx; hardware out 2011    SC STOMACH SURGERY PROCEDURE UNLISTED  2016    Abdominal hernia    RECESSION RESECTION (REPAIR STRABISMUS) Right 04/10/2017    Procedure: RECESSION RESECTION (REPAIR STRABISMUS);  Surgeon: Lyle Leggett MD;  Location:  [facility]    SOFT TISSUE SURGERY  2007    L wrist: scapholunate lig reconstruction post L wristfx    WRIST SURGERY         ALLERGIES:     Allergies   Allergen Reactions    Exparel  [Bupivacaine] GI Disturbance     Patient had severe abdominal distention    Darvocet [Propoxyphene N-Apap] Other (See Comments)     confusion    Liposomes GI Disturbance    Oxycodone     Penicillins Itching    Percocet [Oxycodone-Acetaminophen] Other (See Comments)     confusion    Tape [Adhesive Tape] Rash     Once after surgical tape    Vistaril [Hydroxyzine] Rash     Intense flushing/redness of face        FAMILY HISTORY:  Family History   Problem Relation Age of Onset    Cancer Mother         skin cancer    Diabetes Mother         borderline/Type 2    Hypertension Mother         3 meds: 4.5cm asc aortic aneurysm    Hyperlipidemia Mother         chol & very high triglycerides    Cerebrovascular Disease Mother         many small, cog. impaired,  18    Osteoporosis Mother         fosamax 5 yrs:poststeroids    Obesity Mother         BMI 35+    Unknown/Adopted Mother         dermatomyositis since     Depression Mother     Mental Illness Mother         probably bordeline personality disorder; vascular dementia    Anxiety Disorder Mother     Aneurysm Mother         4.5cm ascending aortic    Colon Polyps Mother     Cancer Father         skin cancer    Coronary Artery Disease Father         angioplasty ; STEMI 2ndary to  CVA 20    Hypertension Father         Age 97    Depression Father         2x: age 87 [facility]    Hyperlipidemia Father         2020 after CVA, lipids not elevated    Cerebrovascular Disease Father         2 in ; dx'd w afib    Thyroid Disease Father         started med age 99    Eye Surgery Father         shattered lens/filtered fluid after cataract w lens; chalazion; droopy lid    Strabismus Father         maybe Dad?    Heart Disease Father         angioplasty : alive    Cancer Maternal Grandmother          of breast CA    Cerebrovascular Disease Maternal Grandmother         - multiple CVAs    Obesity Maternal Grandmother         overweight    Diabetes  Maternal Grandfather         borderline/Type 2    Coronary Artery Disease Maternal Grandfather         2-3 MIs; worked after each    Cerebrovascular Disease Maternal Grandfather         -CVA postop hernia    Obesity Maternal Grandfather         overwt to obese    Heart Disease Maternal Grandfather         MI x 2. Lived thru both    Diabetes Cousin         prediabetes    Hypertension Brother         Lisinopril 40 mg/d x 20 yrsor 20 yrs    Substance Abuse Brother         hx of EtOH    Anxiety Disorder Brother         chronic    Cerebrovascular Disease Other         cog. impairment like my mom    Mental Illness Sister         ?bipolar; past chem dep    Substance Abuse Sister         hx of: prescript drug& EtOH    Unknown/Adopted Sister         sister is adopted    Breast Cancer Sister         stage 2-3,     Anesthesia Reaction Other         N/V 1-8 hr postop if at all    Osteoporosis Other         Alendronate 2023    Genetic Disorder Other         Apical HCM    Cerebrovascular Disease Other         cog. impairment like my mom    Cancer Paternal Grandfather          lung CA age 53    Brain Hemorrhage Paternal Grandmother         age 53- from it    Hypertension Brother         1 med for 20 yrs    Anxiety Disorder Brother     Substance Abuse Brother         hx of EtOH    Mental Illness Sister         adopted sister    Substance Abuse Sister         hx of: prescript drug& EtOH    Unknown/Adopted Sister         sister is adopted    Osteoporosis Other         Alendronate 2023    Genetic Disorder Other         Apical HCM    Cerebrovascular Disease Other         many small - currently nonverbal    Mental Illness Sister         ?bipolar; past chem dep    Substance Abuse Sister         hx of: prescript drug& EtOH    Unknown/Adopted Sister         sister is adopted    Cancer Cousin         thymic carcinoid: alive    Cancer Cousin         Hodgkins: alive, well    Diabetes Cousin         prediabetes     Cerebrovascular Disease Maternal Uncle         same cog. impairment as mom    Hypertension Brother         Lisinopril 40 mg/d x 20 yrsor 20 yrs    Anxiety Disorder Brother         chronic    Substance Abuse Brother         hx of EtOH    Cerebrovascular Disease Other         cog. 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    Exam:  LMP 09/06/2006   GENERAL APPEARANCE: healthy, alert and no distress  General: Well-nourished, well-developed, NAD   HEENT: normocephalic  Oral: moist mucous membranes  Chest: Normal work of breathing. clear to ausculation.   Cardio: Rhythm is regular.  S1 normal, S2. Murmurs are not present, JVP not elevated  Abdomen: without tenderness, rebound, guarding, masses, ascites  Extremities: Edema not present  Neuro: CN II-XII grossly intact. Alert and oriented x 4.   Skin: warm, dry, pink without open lesions  Psych: normal mood, affect     Labs:  Reviewed.  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  A variant of unknown significance means that there is a genetic change in which we do not have enough information to determine if it is disease  causing or not. Labs review published data, functional studies, presences in population databases, similarity to normal sequence, and computer prediction models.    This particular variant occurs in a portion of the gene that is highly conserved in available vertebrate species.  This variant occurs in an intron after exon 23.  A splice prediction tool predicts this alteration will abolish the native donor site; however, direct evidence is unavailable. This variant has not been previously reported as a disease causing mutation or as a normal variation.  Therefore, based on the current information it is unclear if this VUS is associated with disease or not.     Testing/Procedures:    CPX 9/29/20:   Exercised 12:17 on Jose G Ramp protocol  /90-> 185/78  MVO2 30.5 mL/kg/min (9 METS, class I, 120% predicted)    ZioPatch 2/6/21-2/22/21: Baseline sinus rhythm, average VR 73 bpm (range  bpm.) No sustained/nonsustained VT. Rare (<1%) PACs and PVCs; rare nonsustained SVT, likely AT (longest 21.2 sec.) No atrial fibrillation. 7 patient-triggered events, which appeared to correlate with SVT and with PACs.     ZioPatch 2/4/22-2/18/22:   Baseline sinus rhythm, average VR 74 bpm (range  bpm.) Rare (<1%) PACs and PVCs. 8 brief runs of nonsustained SVT (likely atrial tachycardia, longest 7 beats), 3 of which were associated with symptoms. 18 patient-triggered episodes were associated with SVT (3), PACs (12), and the remainder with sinus rhyhtm. Several of the episodes with PACs also had isolated PVCs. No nonsustained VT. No atrial fibrillation. No significant/sustained tachyarrhythmias, bradyarrhythmias, or pauses.   AT was also present on prior ZioPatch from 2/6/21-2/22/21 (and associated with symptoms at that time) and also with 1/2020-2/2020 Holter monitor. The longest SVT/AT run was significantly shorter than on the prior ZioPatch (7 beats vs. 21.2 seconds.)   Results released to the patient via [facility] and will  be discussed at her upcoming outpatient visit.    Echo 3/22/2022  Global and regional left ventricular function is normal with an EF of 60-65%.  Global right ventricular function is normal.  Mild mitral and tricuspid insufficiency present.  Pulmonary artery systolic pressure is normal.  The inferior vena cava was normal in size with preserved respiratory  variability.  No pericardial effusion is present.    CMR 2/14/20:  Apical HCM with apical segments ~1.3 cm (vs. 0.9 cm basal anterosetpum and 0.7 cm basal inferolateral.) Hyperdynamic LV function and normal RV function, LVEF=75% RVEF=67%. No LVOT or intracavitary obstruction. Very small apical LGE per report-- per my review, there is no convincing fibrosis on late gadolinium enhancement imaging.    Coronary angiogram 1/24/2020: non-obstructive CAD (30-40% mLAD/D1, 40% RCA)    Dr. Mendes personally visualized and interpreted:  TTE 05/09/23: Known apical variant HCM with isolated apical hypertrophy, LVEF=60-65%. No LVOT or intracavitary gradient. No significant change from 3/22/22.     CPX 4/17/23:   Superior exercise capacity (160% predicted), improved significantly (31%) from last CPX 9/2020.  MVO2 40.1 mL/kg/min (160% predicted, class I, 11.5 METS) VE/VCO2 slope 33.63 RER 1.21  Exercised 15:53 on Jose G Ramp protocol, achieved HR of 162 (104% MPHR)  Normal HR and BP response to exercise.    ZioPatch 4/6/23-4/13/23:  Baseline sinus rhythm, average VR 74 bpm ( bpm.)   No sustained or nonsustained VT, no atrial fibrillation.   11 asymptomatic episodes of SVT, longest 19.3 sec.   Rare (<1%) PACs and PVCs.   5 patient-triggered events correlated with PACs (4/5 events.)    Zio patch 03/5/24-3/19/24  Rhythm was sinus with an average rate of 72 bpm, range 49 to 132 bpm.  PACs/PVCs less than 1%.  Brief runs of PACs up to 4 beats were asymptomatic.  No episodes of nonsustained VT.  Patient symptoms correlated with sinus rhythm and sinus tachycardia.    TTE 5/14/24    nterpretation Summary  Global and regional left ventricular function is normal with an EF of 60-65%.  Global right ventricular function is normal.  Mild tricuspid insufficiency is present.  Pulmonary artery systolic pressure is normal.  Estimated mean right atrial pressure is normal.  No pericardial effusion is present.     Assessment and Plan:   1.  Hx of stress cardiomyopathy with recovered EF.   Clara has clear resolution of apical thickening on most recent CMR with minimal fibrosis these findings can be explained by her initial index presentation being stress cardiomyopathy with wall thickening caused by edema rather than hypertrophy. Her only risk factor ventricular arrhythmias is sudden cardiac death in her uncle, who had autopsy confirmed hypertrophic cardiomyopathy.  She also has VUS gene mutation as above. She has had no ventricular arrhythmias on ZIO Patch testing and  her EF on echo is normal.     2.  Coronary artery disease, non-obstructive, without angina: Nonobstructive on coronary angiogram.  Continue aspirin and atorvastatin 10 mg (LDL<70.)    3.  Carotid artery disease: Noted on previous CTA.  Continue atorvastatin. Direct LDL-C 74 on atorvastatin 10 mg daily.     4.  Hypertension, essential, controlled: Continue to monitor.     5. Atrial tachycardia/PACs: rare and not bothersome to Clara, continue to monitor.     The patient states understanding and is agreeable with plan.       Aron Uribe MD  Cardiology fellow (PGY5)  [address]    Seen and Discussed with Dr. Mendes.

## 2025-07-22 NOTE — NURSING NOTE
Chief Complaint   Patient presents with    Follow Up     [facility] (Cinthya) 7/22/2025: 65 year old female with history of apical HCM, CAD/NSTEMI 1/23/2020, presenting for yearly follow up.       Vitals were taken, medications reconciled and EKG performed.    ELOISE Sprague    11:02 AM

## 2025-07-24 DIAGNOSIS — F51.01 PRIMARY INSOMNIA: ICD-10-CM

## 2025-07-25 NOTE — TELEPHONE ENCOUNTER
Clinic RN: Please investigate patient's chart or contact patient if the information cannot be found because per medication list there is a discrepancy on how much patient takes.     Carie Jc, RN on 7/25/2025 at 1:48 PM

## 2025-07-28 DIAGNOSIS — I21.4 NSTEMI (NON-ST ELEVATED MYOCARDIAL INFARCTION) (H): Primary | ICD-10-CM

## 2025-07-28 RX ORDER — ATORVASTATIN CALCIUM 10 MG/1
10 TABLET, FILM COATED ORAL EVERY EVENING
Qty: 90 TABLET | Refills: 3 | Status: SHIPPED | OUTPATIENT
Start: 2025-07-28

## 2025-07-28 RX ORDER — TRAZODONE HYDROCHLORIDE 50 MG/1
TABLET ORAL
Qty: 30 TABLET | Refills: 11 | Status: SHIPPED | OUTPATIENT
Start: 2025-07-28

## 2025-07-29 ENCOUNTER — MYC MEDICAL ADVICE (OUTPATIENT)
Dept: CARDIOLOGY | Facility: CLINIC | Age: 65
End: 2025-07-29
Payer: COMMERCIAL

## 2025-07-31 ENCOUNTER — ANCILLARY PROCEDURE (OUTPATIENT)
Dept: MRI IMAGING | Facility: CLINIC | Age: 65
End: 2025-07-31
Attending: FAMILY MEDICINE
Payer: COMMERCIAL

## 2025-07-31 ENCOUNTER — MYC MEDICAL ADVICE (OUTPATIENT)
Dept: FAMILY MEDICINE | Facility: CLINIC | Age: 65
End: 2025-07-31
Payer: COMMERCIAL

## 2025-07-31 DIAGNOSIS — G89.29 CHRONIC LEFT-SIDED LOW BACK PAIN WITH LEFT-SIDED SCIATICA: Primary | ICD-10-CM

## 2025-07-31 DIAGNOSIS — M54.42 CHRONIC LEFT-SIDED LOW BACK PAIN WITH LEFT-SIDED SCIATICA: Primary | ICD-10-CM

## 2025-07-31 DIAGNOSIS — M54.42 CHRONIC LEFT-SIDED LOW BACK PAIN WITH LEFT-SIDED SCIATICA: ICD-10-CM

## 2025-07-31 DIAGNOSIS — G89.29 CHRONIC LEFT-SIDED LOW BACK PAIN WITH LEFT-SIDED SCIATICA: ICD-10-CM

## 2025-07-31 PROCEDURE — 72148 MRI LUMBAR SPINE W/O DYE: CPT

## 2025-08-19 ENCOUNTER — TELEPHONE (OUTPATIENT)
Dept: ANESTHESIOLOGY | Facility: CLINIC | Age: 65
End: 2025-08-19
Payer: COMMERCIAL

## 2025-08-19 PROBLEM — M54.16 LUMBAR RADICULOPATHY: Status: ACTIVE | Noted: 2025-08-15

## 2025-08-29 ENCOUNTER — OFFICE VISIT (OUTPATIENT)
Dept: OPHTHALMOLOGY | Facility: CLINIC | Age: 65
End: 2025-08-29
Attending: OPHTHALMOLOGY
Payer: COMMERCIAL

## 2025-08-29 DIAGNOSIS — H53.2 DOUBLE VISION: Primary | ICD-10-CM

## 2025-08-29 DIAGNOSIS — H53.10 SUBJECTIVE VISUAL DISTURBANCE: ICD-10-CM

## 2025-08-29 DIAGNOSIS — H50.22 HYPERTROPIA OF LEFT EYE: ICD-10-CM

## 2025-08-29 DIAGNOSIS — H50.21 HYPERTROPIA OF RIGHT EYE: ICD-10-CM

## 2025-08-29 PROCEDURE — 92012 INTRM OPH EXAM EST PATIENT: CPT | Performed by: OPHTHALMOLOGY

## 2025-08-29 PROCEDURE — 92060 SENSORIMOTOR EXAMINATION: CPT | Performed by: OPHTHALMOLOGY

## 2025-08-29 PROCEDURE — 99214 OFFICE O/P EST MOD 30 MIN: CPT | Performed by: OPHTHALMOLOGY

## 2025-08-29 ASSESSMENT — REFRACTION_WEARINGRX
OS_VBASE: BASE DOWN
OS_VPRISM: 0.5
OS_SPHERE: +3.50
OD_VBASE: BASE UP
OS_VBASE: BASE DOWN
OD_VPRISM: 1.0
OS_VPRISM: 1.0
OD_AXIS: 165
OD_ADD: +2.50
OD_AXIS: 165
OS_SPHERE: +2.25
OS_AXIS: 010
OS_VPRISM: 0.5
OS_CYLINDER: +0.50
OD_AXIS: 165
OS_CYLINDER: +0.50
OS_ADD: +1.25
OD_SPHERE: +3.00
OD_CYLINDER: +0.50
OD_SPHERE: +0.50
OD_CYLINDER: +0.50
OD_ADD: +1.25
OS_AXIS: 010
OD_CYLINDER: +0.50
OD_VPRISM: 0.5
OS_AXIS: 010
OD_VBASE: BASE UP
OD_SPHERE: +1.75
OD_VPRISM: 0.5
OS_VBASE: BASE DOWN
OS_SPHERE: +1.00
OD_VBASE: BASE UP
OS_CYLINDER: +0.50
OS_ADD: +2.50

## 2025-08-29 ASSESSMENT — VISUAL ACUITY
METHOD: SNELLEN - LINEAR
OD_CC+: -2
OS_CC: 20/20
OS_CC+: -2
OD_CC: 20/20

## 2025-08-29 ASSESSMENT — CONF VISUAL FIELD
OS_INFERIOR_NASAL_RESTRICTION: 0
OS_INFERIOR_TEMPORAL_RESTRICTION: 0
OD_INFERIOR_TEMPORAL_RESTRICTION: 0
OD_SUPERIOR_TEMPORAL_RESTRICTION: 0
OD_SUPERIOR_NASAL_RESTRICTION: 0
OD_NORMAL: 1
OS_NORMAL: 1
OD_INFERIOR_NASAL_RESTRICTION: 0
METHOD: COUNTING FINGERS
OS_SUPERIOR_TEMPORAL_RESTRICTION: 0
OS_SUPERIOR_NASAL_RESTRICTION: 0

## 2025-08-29 ASSESSMENT — TONOMETRY
IOP_METHOD: ICARE
OD_IOP_MMHG: 16
OS_IOP_MMHG: 15

## 2025-09-01 ASSESSMENT — EXTERNAL EXAM - RIGHT EYE: OD_EXAM: NORMAL

## 2025-09-01 ASSESSMENT — SLIT LAMP EXAM - LIDS
COMMENTS: NORMAL
COMMENTS: NORMAL

## 2025-09-01 ASSESSMENT — EXTERNAL EXAM - LEFT EYE: OS_EXAM: NORMAL

## (undated) DEVICE — CATH ANGIO JUDKINS JL4 6FRX100CM INFINITI 534620T

## (undated) DEVICE — APPLICATOR COTTON TIP 3" PKG OF 10 34831010

## (undated) DEVICE — ESU ELEC BLADE HEX-LOCKING 2.5" E1450X

## (undated) DEVICE — SYR 10ML FINGER CONTROL W/O NDL 309695

## (undated) DEVICE — PACK MINOR EYE CUSTOM ASC

## (undated) DEVICE — SU VICRYL 6-0 S-29 12" J556G

## (undated) DEVICE — CATH ANGIO INFINITI 3DRC 6FRX100CM 534676T

## (undated) DEVICE — INTRO SHEATH 4FRX10CM PINNACLE RSS402

## (undated) DEVICE — GLOVE PROTEXIS W/NEU-THERA 7.5  2D73TE75

## (undated) DEVICE — MANIFOLD KIT ANGIO AUTOMATED 014613

## (undated) DEVICE — DRSG ABDOMINAL 07 1/2X8" 7197D

## (undated) DEVICE — GLOVE PROTEXIS W/NEU-THERA 8.5  2D73TE85

## (undated) DEVICE — SOL NACL 0.9% IRRIG 1000ML BOTTLE 07138-09

## (undated) DEVICE — TOTE ANGIO CORP PC15AT SAN32CC83O

## (undated) DEVICE — BLADE SAW OSCILLATING STRYK MED 9.0X25X0.38MM 2296-003-111

## (undated) DEVICE — LINEN TOWEL PACK X5 5464

## (undated) DEVICE — CATH DIAG 4FR ANG PIG 538453S

## (undated) DEVICE — SOL WATER IRRIG 1000ML BOTTLE 2F7114

## (undated) DEVICE — SU VICRYL 6-0 S-14DA 18" UND J670G

## (undated) DEVICE — GOWN XXLG REINFORCED 9071EL

## (undated) DEVICE — SU PLAIN 6-0 G-1DA 18" 770G

## (undated) DEVICE — INTRODUCER CATH VASC 5FRX10CM  MPIS-501-NT-U-SST

## (undated) DEVICE — CATH ANGIO INFINITI 3DRC 4FRX100CM 538476

## (undated) DEVICE — DRAPE STERI TOWEL SM 1000

## (undated) DEVICE — CATH DIAG 4FR JL 4.5 538417

## (undated) DEVICE — GLOVE PROTEXIS MICRO 8.5  2D73PM85

## (undated) DEVICE — PACK EXTREMITY SOP15EXFSD

## (undated) DEVICE — SOL WATER IRRIG 500ML BOTTLE 2F7113

## (undated) DEVICE — ESU PENCIL W/HOLSTER

## (undated) DEVICE — IMM LIMB ELEVATOR DC40-0203

## (undated) DEVICE — SU ETHILON 3-0 FS-1 18" 669H

## (undated) DEVICE — NDL 22GA 1.5"

## (undated) DEVICE — INTRO SHEATH 6FRX10CM PINNACLE RSS602

## (undated) DEVICE — EYE PREP BETADINE 5% SOLUTION 30ML 0065-0411-30

## (undated) DEVICE — BNDG ELASTIC 4"X5YDS UNSTERILE 6611-40

## (undated) DEVICE — SU VICRYL 1 CT-1 27" UND J261H

## (undated) DEVICE — GLOVE PROTEXIS W/NEU-THERA 8.0  2D73TE80

## (undated) DEVICE — GLOVE PROTEXIS MICRO 7.5  2D73PM75

## (undated) DEVICE — PREP DURAPREP 26ML APL 8630

## (undated) DEVICE — KIT HAND CONTROL ANGIOTOUCH ACIST 65CM AT-P65

## (undated) RX ORDER — HEPARIN SODIUM 200 [USP'U]/100ML
INJECTION, SOLUTION INTRAVENOUS
Status: DISPENSED
Start: 2020-01-24

## (undated) RX ORDER — SCOLOPAMINE TRANSDERMAL SYSTEM 1 MG/1
PATCH, EXTENDED RELEASE TRANSDERMAL
Status: DISPENSED
Start: 2018-10-22

## (undated) RX ORDER — DEXAMETHASONE SODIUM PHOSPHATE 4 MG/ML
INJECTION, SOLUTION INTRA-ARTICULAR; INTRALESIONAL; INTRAMUSCULAR; INTRAVENOUS; SOFT TISSUE
Status: DISPENSED
Start: 2017-04-10

## (undated) RX ORDER — LIDOCAINE HYDROCHLORIDE 20 MG/ML
INJECTION, SOLUTION EPIDURAL; INFILTRATION; INTRACAUDAL; PERINEURAL
Status: DISPENSED
Start: 2018-10-22

## (undated) RX ORDER — LIDOCAINE HYDROCHLORIDE 10 MG/ML
INJECTION, SOLUTION EPIDURAL; INFILTRATION; INTRACAUDAL; PERINEURAL
Status: DISPENSED
Start: 2020-01-24

## (undated) RX ORDER — ONDANSETRON 2 MG/ML
INJECTION INTRAMUSCULAR; INTRAVENOUS
Status: DISPENSED
Start: 2017-04-10

## (undated) RX ORDER — PROPOFOL 10 MG/ML
INJECTION, EMULSION INTRAVENOUS
Status: DISPENSED
Start: 2017-04-10

## (undated) RX ORDER — SCOLOPAMINE TRANSDERMAL SYSTEM 1 MG/1
PATCH, EXTENDED RELEASE TRANSDERMAL
Status: DISPENSED
Start: 2017-04-10

## (undated) RX ORDER — PROPOFOL 10 MG/ML
INJECTION, EMULSION INTRAVENOUS
Status: DISPENSED
Start: 2018-10-22

## (undated) RX ORDER — HYDROCODONE BITARTRATE AND ACETAMINOPHEN 5; 325 MG/1; MG/1
TABLET ORAL
Status: DISPENSED
Start: 2017-04-10

## (undated) RX ORDER — CEFAZOLIN SODIUM 2 G/100ML
INJECTION, SOLUTION INTRAVENOUS
Status: DISPENSED
Start: 2018-10-22

## (undated) RX ORDER — HYDROCODONE BITARTRATE AND ACETAMINOPHEN 5; 325 MG/1; MG/1
TABLET ORAL
Status: DISPENSED
Start: 2018-10-22

## (undated) RX ORDER — FENTANYL CITRATE 50 UG/ML
INJECTION, SOLUTION INTRAMUSCULAR; INTRAVENOUS
Status: DISPENSED
Start: 2017-04-10

## (undated) RX ORDER — ONDANSETRON 2 MG/ML
INJECTION INTRAMUSCULAR; INTRAVENOUS
Status: DISPENSED
Start: 2018-10-22

## (undated) RX ORDER — ACETAMINOPHEN 325 MG/1
TABLET ORAL
Status: DISPENSED
Start: 2017-04-10

## (undated) RX ORDER — KETOROLAC TROMETHAMINE 30 MG/ML
INJECTION, SOLUTION INTRAMUSCULAR; INTRAVENOUS
Status: DISPENSED
Start: 2017-04-10

## (undated) RX ORDER — FENTANYL CITRATE 50 UG/ML
INJECTION, SOLUTION INTRAMUSCULAR; INTRAVENOUS
Status: DISPENSED
Start: 2018-10-22

## (undated) RX ORDER — DEXAMETHASONE SODIUM PHOSPHATE 4 MG/ML
INJECTION, SOLUTION INTRA-ARTICULAR; INTRALESIONAL; INTRAMUSCULAR; INTRAVENOUS; SOFT TISSUE
Status: DISPENSED
Start: 2018-10-22

## (undated) RX ORDER — FENTANYL CITRATE 50 UG/ML
INJECTION, SOLUTION INTRAMUSCULAR; INTRAVENOUS
Status: DISPENSED
Start: 2020-01-24

## (undated) RX ORDER — NITROGLYCERIN 5 MG/ML
VIAL (ML) INTRAVENOUS
Status: DISPENSED
Start: 2020-01-24